# Patient Record
Sex: MALE | Race: BLACK OR AFRICAN AMERICAN | Employment: UNEMPLOYED | URBAN - NONMETROPOLITAN AREA
[De-identification: names, ages, dates, MRNs, and addresses within clinical notes are randomized per-mention and may not be internally consistent; named-entity substitution may affect disease eponyms.]

---

## 2017-01-01 ENCOUNTER — HOSPITAL ENCOUNTER (INPATIENT)
Age: 42
LOS: 3 days | Discharge: HOME | DRG: 420 | End: 2017-12-07
Attending: FAMILY MEDICINE | Admitting: INTERNAL MEDICINE
Payer: MEDICAID

## 2017-01-01 ENCOUNTER — TELEPHONE (OUTPATIENT)
Dept: OTHER | Facility: CLINIC | Age: 42
End: 2017-01-01

## 2017-01-01 ENCOUNTER — TELEPHONE (OUTPATIENT)
Dept: INTERNAL MEDICINE CLINIC | Age: 42
End: 2017-01-01

## 2017-01-01 ENCOUNTER — APPOINTMENT (OUTPATIENT)
Dept: GENERAL RADIOLOGY | Age: 42
DRG: 114 | End: 2017-01-01
Payer: MEDICAID

## 2017-01-01 ENCOUNTER — HOSPITAL ENCOUNTER (INPATIENT)
Age: 42
LOS: 3 days | Discharge: HOME | DRG: 114 | End: 2017-10-10
Attending: FAMILY MEDICINE | Admitting: FAMILY MEDICINE
Payer: MEDICAID

## 2017-01-01 ENCOUNTER — APPOINTMENT (OUTPATIENT)
Dept: CT IMAGING | Age: 42
DRG: 114 | End: 2017-01-01
Payer: MEDICAID

## 2017-01-01 ENCOUNTER — APPOINTMENT (OUTPATIENT)
Dept: GENERAL RADIOLOGY | Age: 42
DRG: 420 | End: 2017-01-01
Payer: MEDICAID

## 2017-01-01 VITALS
HEART RATE: 88 BPM | DIASTOLIC BLOOD PRESSURE: 112 MMHG | RESPIRATION RATE: 16 BRPM | OXYGEN SATURATION: 100 % | SYSTOLIC BLOOD PRESSURE: 175 MMHG | BODY MASS INDEX: 24.59 KG/M2 | HEIGHT: 69 IN | TEMPERATURE: 97.9 F | WEIGHT: 166.01 LBS

## 2017-01-01 VITALS
OXYGEN SATURATION: 98 % | BODY MASS INDEX: 24.35 KG/M2 | RESPIRATION RATE: 14 BRPM | WEIGHT: 164.4 LBS | HEART RATE: 94 BPM | DIASTOLIC BLOOD PRESSURE: 72 MMHG | HEIGHT: 69 IN | TEMPERATURE: 97.5 F | SYSTOLIC BLOOD PRESSURE: 119 MMHG

## 2017-01-01 DIAGNOSIS — R73.9 HYPERGLYCEMIA: Primary | ICD-10-CM

## 2017-01-01 DIAGNOSIS — E10.10 DIABETIC KETOACIDOSIS WITHOUT COMA ASSOCIATED WITH TYPE 1 DIABETES MELLITUS (HCC): Primary | ICD-10-CM

## 2017-01-01 DIAGNOSIS — L03.211 FACIAL CELLULITIS: ICD-10-CM

## 2017-01-01 DIAGNOSIS — K04.7 TOOTH ABSCESS: ICD-10-CM

## 2017-01-01 LAB
ABO: NORMAL
ALBUMIN SERPL-MCNC: 2.8 G/DL (ref 3.5–5.1)
ALLEN TEST: POSITIVE
ALLEN TEST: POSITIVE
ALP BLD-CCNC: 179 U/L (ref 38–126)
ALT SERPL-CCNC: 10 U/L (ref 11–66)
AMPHETAMINE+METHAMPHETAMINE URINE SCREEN: NEGATIVE
AMYLASE: 138 U/L (ref 20–104)
ANION GAP SERPL CALCULATED.3IONS-SCNC: 14 MEQ/L (ref 8–16)
ANION GAP SERPL CALCULATED.3IONS-SCNC: 15 MEQ/L (ref 8–16)
ANION GAP SERPL CALCULATED.3IONS-SCNC: 16 MEQ/L (ref 8–16)
ANION GAP SERPL CALCULATED.3IONS-SCNC: 17 MEQ/L (ref 8–16)
ANION GAP SERPL CALCULATED.3IONS-SCNC: 18 MEQ/L (ref 8–16)
ANION GAP SERPL CALCULATED.3IONS-SCNC: 19 MEQ/L (ref 8–16)
ANION GAP SERPL CALCULATED.3IONS-SCNC: 19 MEQ/L (ref 8–16)
ANION GAP SERPL CALCULATED.3IONS-SCNC: 20 MEQ/L (ref 8–16)
ANION GAP SERPL CALCULATED.3IONS-SCNC: 21 MEQ/L (ref 8–16)
ANION GAP SERPL CALCULATED.3IONS-SCNC: 21 MEQ/L (ref 8–16)
ANION GAP SERPL CALCULATED.3IONS-SCNC: 23 MEQ/L (ref 8–16)
ANION GAP SERPL CALCULATED.3IONS-SCNC: 25 MEQ/L (ref 8–16)
ANION GAP SERPL CALCULATED.3IONS-SCNC: 34 MEQ/L (ref 8–16)
ANION GAP SERPL CALCULATED.3IONS-SCNC: 38 MEQ/L (ref 8–16)
ANISOCYTOSIS: ABNORMAL
ANTIBODY SCREEN: NORMAL
AST SERPL-CCNC: 12 U/L (ref 5–40)
AVERAGE GLUCOSE: 312 MG/DL (ref 70–126)
AVERAGE GLUCOSE: 348 MG/DL (ref 70–126)
BACTERIA: ABNORMAL /HPF
BARBITURATE QUANTITATIVE URINE: NEGATIVE
BASE EXCESS (CALCULATED): -15.6 MMOL/L (ref -2.5–2.5)
BASE EXCESS (CALCULATED): -2.8 MMOL/L (ref -2.5–2.5)
BASOPHILS # BLD: 0.2 %
BASOPHILS # BLD: 0.4 %
BASOPHILS # BLD: 1.1 %
BASOPHILS ABSOLUTE: 0 THOU/MM3 (ref 0–0.1)
BASOPHILS ABSOLUTE: 0 THOU/MM3 (ref 0–0.1)
BASOPHILS ABSOLUTE: 0.1 THOU/MM3 (ref 0–0.1)
BENZODIAZEPINE QUANTITATIVE URINE: NEGATIVE
BETA-HYDROXYBUTYRATE: 1.6 MG/DL (ref 0.2–2.81)
BETA-HYDROXYBUTYRATE: 24.92 MG/DL (ref 0.2–2.81)
BETA-HYDROXYBUTYRATE: 51.76 MG/DL (ref 0.2–2.81)
BILIRUB SERPL-MCNC: 0.2 MG/DL (ref 0.3–1.2)
BILIRUBIN DIRECT: < 0.2 MG/DL (ref 0–0.3)
BILIRUBIN URINE: NEGATIVE
BLOOD, URINE: ABNORMAL
BUN BLDV-MCNC: 38 MG/DL (ref 7–22)
BUN BLDV-MCNC: 39 MG/DL (ref 7–22)
BUN BLDV-MCNC: 39 MG/DL (ref 7–22)
BUN BLDV-MCNC: 40 MG/DL (ref 7–22)
BUN BLDV-MCNC: 41 MG/DL (ref 7–22)
BUN BLDV-MCNC: 42 MG/DL (ref 7–22)
BUN BLDV-MCNC: 48 MG/DL (ref 7–22)
BUN BLDV-MCNC: 49 MG/DL (ref 7–22)
BUN BLDV-MCNC: 49 MG/DL (ref 7–22)
BUN BLDV-MCNC: 51 MG/DL (ref 7–22)
BUN BLDV-MCNC: 55 MG/DL (ref 7–22)
BUN BLDV-MCNC: 55 MG/DL (ref 7–22)
BUN BLDV-MCNC: 56 MG/DL (ref 7–22)
BUN BLDV-MCNC: 57 MG/DL (ref 7–22)
BUN BLDV-MCNC: 59 MG/DL (ref 7–22)
BUN BLDV-MCNC: 67 MG/DL (ref 7–22)
BUN BLDV-MCNC: 69 MG/DL (ref 7–22)
C-REACTIVE PROTEIN: 7.71 MG/DL (ref 0–1)
CALCIUM SERPL-MCNC: 8.2 MG/DL (ref 8.5–10.5)
CALCIUM SERPL-MCNC: 8.3 MG/DL (ref 8.5–10.5)
CALCIUM SERPL-MCNC: 8.4 MG/DL (ref 8.5–10.5)
CALCIUM SERPL-MCNC: 8.4 MG/DL (ref 8.5–10.5)
CALCIUM SERPL-MCNC: 8.5 MG/DL (ref 8.5–10.5)
CALCIUM SERPL-MCNC: 8.6 MG/DL (ref 8.5–10.5)
CALCIUM SERPL-MCNC: 8.6 MG/DL (ref 8.5–10.5)
CALCIUM SERPL-MCNC: 8.7 MG/DL (ref 8.5–10.5)
CALCIUM SERPL-MCNC: 8.8 MG/DL (ref 8.5–10.5)
CALCIUM SERPL-MCNC: 8.9 MG/DL (ref 8.5–10.5)
CALCIUM SERPL-MCNC: 9 MG/DL (ref 8.5–10.5)
CALCIUM SERPL-MCNC: 9.1 MG/DL (ref 8.5–10.5)
CALCIUM SERPL-MCNC: 9.3 MG/DL (ref 8.5–10.5)
CANNABINOID QUANTITATIVE URINE: NEGATIVE
CASTS 2: ABNORMAL /LPF
CASTS UA: ABNORMAL /LPF
CHARACTER, URINE: CLEAR
CHLORIDE BLD-SCNC: 100 MEQ/L (ref 98–111)
CHLORIDE BLD-SCNC: 102 MEQ/L (ref 98–111)
CHLORIDE BLD-SCNC: 102 MEQ/L (ref 98–111)
CHLORIDE BLD-SCNC: 78 MEQ/L (ref 98–111)
CHLORIDE BLD-SCNC: 81 MEQ/L (ref 98–111)
CHLORIDE BLD-SCNC: 84 MEQ/L (ref 98–111)
CHLORIDE BLD-SCNC: 86 MEQ/L (ref 98–111)
CHLORIDE BLD-SCNC: 92 MEQ/L (ref 98–111)
CHLORIDE BLD-SCNC: 92 MEQ/L (ref 98–111)
CHLORIDE BLD-SCNC: 93 MEQ/L (ref 98–111)
CHLORIDE BLD-SCNC: 94 MEQ/L (ref 98–111)
CHLORIDE BLD-SCNC: 96 MEQ/L (ref 98–111)
CHLORIDE BLD-SCNC: 99 MEQ/L (ref 98–111)
CO2: 10 MEQ/L (ref 23–33)
CO2: 16 MEQ/L (ref 23–33)
CO2: 17 MEQ/L (ref 23–33)
CO2: 18 MEQ/L (ref 23–33)
CO2: 19 MEQ/L (ref 23–33)
CO2: 20 MEQ/L (ref 23–33)
CO2: 21 MEQ/L (ref 23–33)
CO2: 22 MEQ/L (ref 23–33)
CO2: 24 MEQ/L (ref 23–33)
CO2: 26 MEQ/L (ref 23–33)
CO2: 6 MEQ/L (ref 23–33)
COCAINE METABOLITE QUANTITATIVE URINE: NEGATIVE
COLLECTED BY:: ABNORMAL
COLLECTED BY:: ABNORMAL
COLOR: YELLOW
CREAT SERPL-MCNC: 4.1 MG/DL (ref 0.4–1.2)
CREAT SERPL-MCNC: 4.1 MG/DL (ref 0.4–1.2)
CREAT SERPL-MCNC: 4.2 MG/DL (ref 0.4–1.2)
CREAT SERPL-MCNC: 4.3 MG/DL (ref 0.4–1.2)
CREAT SERPL-MCNC: 4.5 MG/DL (ref 0.4–1.2)
CREAT SERPL-MCNC: 4.6 MG/DL (ref 0.4–1.2)
CREAT SERPL-MCNC: 4.7 MG/DL (ref 0.4–1.2)
CREAT SERPL-MCNC: 4.7 MG/DL (ref 0.4–1.2)
CREAT SERPL-MCNC: 4.9 MG/DL (ref 0.4–1.2)
CREAT SERPL-MCNC: 5 MG/DL (ref 0.4–1.2)
CREAT SERPL-MCNC: 5.1 MG/DL (ref 0.4–1.2)
CREAT SERPL-MCNC: 5.2 MG/DL (ref 0.4–1.2)
CREAT SERPL-MCNC: 5.3 MG/DL (ref 0.4–1.2)
CREAT SERPL-MCNC: 5.3 MG/DL (ref 0.4–1.2)
CREAT SERPL-MCNC: 5.4 MG/DL (ref 0.4–1.2)
CREAT SERPL-MCNC: 5.7 MG/DL (ref 0.4–1.2)
CREAT SERPL-MCNC: 5.8 MG/DL (ref 0.4–1.2)
CRYSTALS, UA: ABNORMAL
DEVICE: ABNORMAL
DEVICE: ABNORMAL
EKG ATRIAL RATE: 82 BPM
EKG ATRIAL RATE: 96 BPM
EKG P AXIS: 38 DEGREES
EKG P AXIS: 53 DEGREES
EKG P-R INTERVAL: 154 MS
EKG P-R INTERVAL: 164 MS
EKG Q-T INTERVAL: 400 MS
EKG Q-T INTERVAL: 402 MS
EKG QRS DURATION: 88 MS
EKG QRS DURATION: 90 MS
EKG QTC CALCULATION (BAZETT): 469 MS
EKG QTC CALCULATION (BAZETT): 505 MS
EKG R AXIS: 16 DEGREES
EKG R AXIS: 18 DEGREES
EKG T AXIS: -4 DEGREES
EKG T AXIS: -58 DEGREES
EKG VENTRICULAR RATE: 82 BPM
EKG VENTRICULAR RATE: 96 BPM
EOSINOPHIL # BLD: 0.4 %
EOSINOPHIL # BLD: 0.7 %
EOSINOPHIL # BLD: 1.7 %
EOSINOPHILS ABSOLUTE: 0 THOU/MM3 (ref 0–0.4)
EOSINOPHILS ABSOLUTE: 0.1 THOU/MM3 (ref 0–0.4)
EOSINOPHILS ABSOLUTE: 0.1 THOU/MM3 (ref 0–0.4)
EPITHELIAL CELLS, UA: ABNORMAL /HPF
GFR SERPL CREATININE-BSD FRML MDRD: 13 ML/MIN/1.73M2
GFR SERPL CREATININE-BSD FRML MDRD: 14 ML/MIN/1.73M2
GFR SERPL CREATININE-BSD FRML MDRD: 15 ML/MIN/1.73M2
GFR SERPL CREATININE-BSD FRML MDRD: 16 ML/MIN/1.73M2
GFR SERPL CREATININE-BSD FRML MDRD: 16 ML/MIN/1.73M2
GFR SERPL CREATININE-BSD FRML MDRD: 17 ML/MIN/1.73M2
GFR SERPL CREATININE-BSD FRML MDRD: 18 ML/MIN/1.73M2
GFR SERPL CREATININE-BSD FRML MDRD: 19 ML/MIN/1.73M2
GFR SERPL CREATININE-BSD FRML MDRD: 19 ML/MIN/1.73M2
GFR SERPL CREATININE-BSD FRML MDRD: 20 ML/MIN/1.73M2
GFR SERPL CREATININE-BSD FRML MDRD: 20 ML/MIN/1.73M2
GLUCOSE BLD-MCNC: 102 MG/DL (ref 70–108)
GLUCOSE BLD-MCNC: 112 MG/DL (ref 70–108)
GLUCOSE BLD-MCNC: 1124 MG/DL (ref 70–108)
GLUCOSE BLD-MCNC: 116 MG/DL (ref 70–108)
GLUCOSE BLD-MCNC: 119 MG/DL (ref 70–108)
GLUCOSE BLD-MCNC: 122 MG/DL (ref 70–108)
GLUCOSE BLD-MCNC: 124 MG/DL (ref 70–108)
GLUCOSE BLD-MCNC: 127 MG/DL (ref 70–108)
GLUCOSE BLD-MCNC: 131 MG/DL (ref 70–108)
GLUCOSE BLD-MCNC: 133 MG/DL (ref 70–108)
GLUCOSE BLD-MCNC: 138 MG/DL (ref 70–108)
GLUCOSE BLD-MCNC: 143 MG/DL (ref 70–108)
GLUCOSE BLD-MCNC: 144 MG/DL (ref 70–108)
GLUCOSE BLD-MCNC: 146 MG/DL (ref 70–108)
GLUCOSE BLD-MCNC: 158 MG/DL (ref 70–108)
GLUCOSE BLD-MCNC: 167 MG/DL (ref 70–108)
GLUCOSE BLD-MCNC: 169 MG/DL (ref 70–108)
GLUCOSE BLD-MCNC: 171 MG/DL (ref 70–108)
GLUCOSE BLD-MCNC: 173 MG/DL (ref 70–108)
GLUCOSE BLD-MCNC: 175 MG/DL (ref 70–108)
GLUCOSE BLD-MCNC: 177 MG/DL (ref 70–108)
GLUCOSE BLD-MCNC: 193 MG/DL (ref 70–108)
GLUCOSE BLD-MCNC: 195 MG/DL (ref 70–108)
GLUCOSE BLD-MCNC: 196 MG/DL (ref 70–108)
GLUCOSE BLD-MCNC: 197 MG/DL (ref 70–108)
GLUCOSE BLD-MCNC: 198 MG/DL (ref 70–108)
GLUCOSE BLD-MCNC: 202 MG/DL (ref 70–108)
GLUCOSE BLD-MCNC: 207 MG/DL (ref 70–108)
GLUCOSE BLD-MCNC: 211 MG/DL (ref 70–108)
GLUCOSE BLD-MCNC: 213 MG/DL (ref 70–108)
GLUCOSE BLD-MCNC: 224 MG/DL (ref 70–108)
GLUCOSE BLD-MCNC: 230 MG/DL (ref 70–108)
GLUCOSE BLD-MCNC: 233 MG/DL (ref 70–108)
GLUCOSE BLD-MCNC: 236 MG/DL (ref 70–108)
GLUCOSE BLD-MCNC: 242 MG/DL (ref 70–108)
GLUCOSE BLD-MCNC: 256 MG/DL (ref 70–108)
GLUCOSE BLD-MCNC: 256 MG/DL (ref 70–108)
GLUCOSE BLD-MCNC: 257 MG/DL (ref 70–108)
GLUCOSE BLD-MCNC: 261 MG/DL (ref 70–108)
GLUCOSE BLD-MCNC: 263 MG/DL (ref 70–108)
GLUCOSE BLD-MCNC: 264 MG/DL (ref 70–108)
GLUCOSE BLD-MCNC: 268 MG/DL (ref 70–108)
GLUCOSE BLD-MCNC: 288 MG/DL (ref 70–108)
GLUCOSE BLD-MCNC: 293 MG/DL (ref 70–108)
GLUCOSE BLD-MCNC: 294 MG/DL (ref 70–108)
GLUCOSE BLD-MCNC: 296 MG/DL (ref 70–108)
GLUCOSE BLD-MCNC: 299 MG/DL (ref 70–108)
GLUCOSE BLD-MCNC: 300 MG/DL (ref 70–108)
GLUCOSE BLD-MCNC: 308 MG/DL (ref 70–108)
GLUCOSE BLD-MCNC: 311 MG/DL (ref 70–108)
GLUCOSE BLD-MCNC: 318 MG/DL (ref 70–108)
GLUCOSE BLD-MCNC: 320 MG/DL (ref 70–108)
GLUCOSE BLD-MCNC: 323 MG/DL (ref 70–108)
GLUCOSE BLD-MCNC: 325 MG/DL (ref 70–108)
GLUCOSE BLD-MCNC: 327 MG/DL (ref 70–108)
GLUCOSE BLD-MCNC: 331 MG/DL (ref 70–108)
GLUCOSE BLD-MCNC: 360 MG/DL (ref 70–108)
GLUCOSE BLD-MCNC: 386 MG/DL (ref 70–108)
GLUCOSE BLD-MCNC: 414 MG/DL (ref 70–108)
GLUCOSE BLD-MCNC: 54 MG/DL (ref 70–108)
GLUCOSE BLD-MCNC: 600 MG/DL (ref 70–108)
GLUCOSE BLD-MCNC: 655 MG/DL (ref 70–108)
GLUCOSE BLD-MCNC: 66 MG/DL (ref 70–108)
GLUCOSE BLD-MCNC: 70 MG/DL (ref 70–108)
GLUCOSE BLD-MCNC: 74 MG/DL (ref 70–108)
GLUCOSE BLD-MCNC: 75 MG/DL (ref 70–108)
GLUCOSE BLD-MCNC: 80 MG/DL (ref 70–108)
GLUCOSE BLD-MCNC: 81 MG/DL (ref 70–108)
GLUCOSE BLD-MCNC: 823 MG/DL (ref 70–108)
GLUCOSE BLD-MCNC: 85 MG/DL (ref 70–108)
GLUCOSE BLD-MCNC: 868 MG/DL (ref 70–108)
GLUCOSE BLD-MCNC: 90 MG/DL (ref 70–108)
GLUCOSE BLD-MCNC: 91 MG/DL (ref 70–108)
GLUCOSE BLD-MCNC: 917 MG/DL (ref 70–108)
GLUCOSE BLD-MCNC: 92 MG/DL (ref 70–108)
GLUCOSE BLD-MCNC: 99 MG/DL (ref 70–108)
GLUCOSE BLD-MCNC: > 600 MG/DL (ref 70–108)
GLUCOSE URINE: >= 1000 MG/DL
HBA1C MFR BLD: 12.4 % (ref 4.4–6.4)
HBA1C MFR BLD: 13.6 % (ref 4.4–6.4)
HCO3: 23 MMOL/L (ref 23–28)
HCO3: 8 MMOL/L (ref 23–28)
HCT VFR BLD CALC: 33.3 % (ref 42–52)
HCT VFR BLD CALC: 34.7 % (ref 42–52)
HCT VFR BLD CALC: 35.4 % (ref 42–52)
HCT VFR BLD CALC: 35.7 % (ref 42–52)
HCT VFR BLD CALC: 36.2 % (ref 42–52)
HEMOGLOBIN: 10.9 GM/DL (ref 14–18)
HEMOGLOBIN: 11.1 GM/DL (ref 14–18)
HEMOGLOBIN: 11.2 GM/DL (ref 14–18)
HEMOGLOBIN: 11.3 GM/DL (ref 14–18)
HEMOGLOBIN: 11.7 GM/DL (ref 14–18)
HYPOCHROMIA: ABNORMAL
IFIO2: 2
KETONES, URINE: 15
LACTIC ACID: 4.9 MMOL/L (ref 0.5–2.2)
LEUKOCYTE ESTERASE, URINE: NEGATIVE
LIPASE: 28 U/L (ref 5.6–51.3)
LIPASE: 49.6 U/L (ref 5.6–51.3)
LYMPHOCYTES # BLD: 10 %
LYMPHOCYTES # BLD: 18.7 %
LYMPHOCYTES # BLD: 6.2 %
LYMPHOCYTES ABSOLUTE: 0.5 THOU/MM3 (ref 1–4.8)
LYMPHOCYTES ABSOLUTE: 1.1 THOU/MM3 (ref 1–4.8)
LYMPHOCYTES ABSOLUTE: 1.6 THOU/MM3 (ref 1–4.8)
MAGNESIUM: 1.6 MG/DL (ref 1.6–2.4)
MAGNESIUM: 1.7 MG/DL (ref 1.6–2.4)
MAGNESIUM: 1.7 MG/DL (ref 1.6–2.4)
MAGNESIUM: 1.8 MG/DL (ref 1.6–2.4)
MAGNESIUM: 1.9 MG/DL (ref 1.6–2.4)
MAGNESIUM: 1.9 MG/DL (ref 1.6–2.4)
MAGNESIUM: 2 MG/DL (ref 1.6–2.4)
MAGNESIUM: 2.1 MG/DL (ref 1.6–2.4)
MCH RBC QN AUTO: 26.4 PG (ref 27–31)
MCH RBC QN AUTO: 26.7 PG (ref 27–31)
MCH RBC QN AUTO: 26.7 PG (ref 27–31)
MCH RBC QN AUTO: 26.9 PG (ref 27–31)
MCH RBC QN AUTO: 27.1 PG (ref 27–31)
MCHC RBC AUTO-ENTMCNC: 30.9 GM/DL (ref 33–37)
MCHC RBC AUTO-ENTMCNC: 31.2 GM/DL (ref 33–37)
MCHC RBC AUTO-ENTMCNC: 32.5 GM/DL (ref 33–37)
MCHC RBC AUTO-ENTMCNC: 32.6 GM/DL (ref 33–37)
MCHC RBC AUTO-ENTMCNC: 32.7 GM/DL (ref 33–37)
MCV RBC AUTO: 82.2 FL (ref 80–94)
MCV RBC AUTO: 82.2 FL (ref 80–94)
MCV RBC AUTO: 83.2 FL (ref 80–94)
MCV RBC AUTO: 84.4 FL (ref 80–94)
MCV RBC AUTO: 86.5 FL (ref 80–94)
MISCELLANEOUS 2: ABNORMAL
MONOCYTES # BLD: 10 %
MONOCYTES # BLD: 2.3 %
MONOCYTES # BLD: 20.3 %
MONOCYTES ABSOLUTE: 0.2 THOU/MM3 (ref 0.4–1.3)
MONOCYTES ABSOLUTE: 1.1 THOU/MM3 (ref 0.4–1.3)
MONOCYTES ABSOLUTE: 1.7 THOU/MM3 (ref 0.4–1.3)
MRSA SCREEN RT-PCR: NEGATIVE
MRSA SCREEN RT-PCR: NEGATIVE
MRSA SCREEN: NORMAL
NITRITE, URINE: NEGATIVE
NUCLEATED RED BLOOD CELLS: 0 /100 WBC
O2 SATURATION: 95 %
O2 SATURATION: 99 %
OPIATES, URINE: NEGATIVE
OSMOLALITY CALCULATION: 305.6 MOSMOL/KG (ref 275–300)
OSMOLALITY CALCULATION: 311.1 MOSMOL/KG (ref 275–300)
OSMOLALITY CALCULATION: 323 MOSMOL/KG (ref 275–300)
OXYCODONE: NEGATIVE
PCO2: 15 MMHG (ref 35–45)
PCO2: 43 MMHG (ref 35–45)
PDW BLD-RTO: 16.9 % (ref 11.5–14.5)
PDW BLD-RTO: 17.5 % (ref 11.5–14.5)
PDW BLD-RTO: 17.8 % (ref 11.5–14.5)
PDW BLD-RTO: 17.9 % (ref 11.5–14.5)
PDW BLD-RTO: 18.1 % (ref 11.5–14.5)
PH BLOOD GAS: 7.33 (ref 7.35–7.45)
PH BLOOD GAS: 7.34 (ref 7.35–7.45)
PH UA: 6
PHENCYCLIDINE QUANTITATIVE URINE: NEGATIVE
PHOSPHORUS: 3.3 MG/DL (ref 2.4–4.7)
PHOSPHORUS: 3.4 MG/DL (ref 2.4–4.7)
PHOSPHORUS: 3.5 MG/DL (ref 2.4–4.7)
PHOSPHORUS: 3.6 MG/DL (ref 2.4–4.7)
PHOSPHORUS: 3.7 MG/DL (ref 2.4–4.7)
PHOSPHORUS: 3.9 MG/DL (ref 2.4–4.7)
PHOSPHORUS: 4 MG/DL (ref 2.4–4.7)
PHOSPHORUS: 4.1 MG/DL (ref 2.4–4.7)
PHOSPHORUS: 4.2 MG/DL (ref 2.4–4.7)
PHOSPHORUS: 4.3 MG/DL (ref 2.4–4.7)
PHOSPHORUS: 4.6 MG/DL (ref 2.4–4.7)
PHOSPHORUS: 5.3 MG/DL (ref 2.4–4.7)
PHOSPHORUS: 5.8 MG/DL (ref 2.4–4.7)
PHOSPHORUS: 7.2 MG/DL (ref 2.4–4.7)
PLATELET # BLD: 262 THOU/MM3 (ref 130–400)
PLATELET # BLD: 275 THOU/MM3 (ref 130–400)
PLATELET # BLD: 308 THOU/MM3 (ref 130–400)
PLATELET # BLD: 357 THOU/MM3 (ref 130–400)
PLATELET # BLD: 398 THOU/MM3 (ref 130–400)
PMV BLD AUTO: 7.5 MCM (ref 7.4–10.4)
PMV BLD AUTO: 7.6 MCM (ref 7.4–10.4)
PMV BLD AUTO: 7.7 MCM (ref 7.4–10.4)
PMV BLD AUTO: 7.9 MCM (ref 7.4–10.4)
PMV BLD AUTO: 8 MCM (ref 7.4–10.4)
PO2: 133 MMHG (ref 71–104)
PO2: 83 MMHG (ref 71–104)
POTASSIUM SERPL-SCNC: 2.8 MEQ/L (ref 3.5–5.2)
POTASSIUM SERPL-SCNC: 2.8 MEQ/L (ref 3.5–5.2)
POTASSIUM SERPL-SCNC: 3.1 MEQ/L (ref 3.5–5.2)
POTASSIUM SERPL-SCNC: 3.1 MEQ/L (ref 3.5–5.2)
POTASSIUM SERPL-SCNC: 3.2 MEQ/L (ref 3.5–5.2)
POTASSIUM SERPL-SCNC: 3.2 MEQ/L (ref 3.5–5.2)
POTASSIUM SERPL-SCNC: 3.3 MEQ/L (ref 3.5–5.2)
POTASSIUM SERPL-SCNC: 3.5 MEQ/L (ref 3.5–5.2)
POTASSIUM SERPL-SCNC: 3.6 MEQ/L (ref 3.5–5.2)
POTASSIUM SERPL-SCNC: 3.6 MEQ/L (ref 3.5–5.2)
POTASSIUM SERPL-SCNC: 3.7 MEQ/L (ref 3.5–5.2)
POTASSIUM SERPL-SCNC: 4 MEQ/L (ref 3.5–5.2)
POTASSIUM SERPL-SCNC: 4.2 MEQ/L (ref 3.5–5.2)
POTASSIUM SERPL-SCNC: 4.3 MEQ/L (ref 3.5–5.2)
POTASSIUM SERPL-SCNC: 4.6 MEQ/L (ref 3.5–5.2)
PRO-BNP: 2283 PG/ML (ref 0–450)
PRO-BNP: 3779 PG/ML (ref 0–450)
PROTEIN UA: 100
RBC # BLD: 4 MILL/MM3 (ref 4.7–6.1)
RBC # BLD: 4.18 MILL/MM3 (ref 4.7–6.1)
RBC # BLD: 4.2 MILL/MM3 (ref 4.7–6.1)
RBC # BLD: 4.23 MILL/MM3 (ref 4.7–6.1)
RBC # BLD: 4.35 MILL/MM3 (ref 4.7–6.1)
RBC # BLD: NORMAL 10*6/UL
RBC # BLD: NORMAL 10*6/UL
RBC URINE: ABNORMAL /HPF
RENAL EPITHELIAL, UA: ABNORMAL
RH FACTOR: NORMAL
SEDIMENTATION RATE, ERYTHROCYTE: 81 MM/HR (ref 0–10)
SEG NEUTROPHILS: 58.2 %
SEG NEUTROPHILS: 78.9 %
SEG NEUTROPHILS: 90.9 %
SEGMENTED NEUTROPHILS ABSOLUTE COUNT: 5 THOU/MM3 (ref 1.8–7.7)
SEGMENTED NEUTROPHILS ABSOLUTE COUNT: 8 THOU/MM3 (ref 1.8–7.7)
SEGMENTED NEUTROPHILS ABSOLUTE COUNT: 8.7 THOU/MM3 (ref 1.8–7.7)
SODIUM BLD-SCNC: 122 MEQ/L (ref 135–145)
SODIUM BLD-SCNC: 123 MEQ/L (ref 135–145)
SODIUM BLD-SCNC: 126 MEQ/L (ref 135–145)
SODIUM BLD-SCNC: 130 MEQ/L (ref 135–145)
SODIUM BLD-SCNC: 131 MEQ/L (ref 135–145)
SODIUM BLD-SCNC: 132 MEQ/L (ref 135–145)
SODIUM BLD-SCNC: 132 MEQ/L (ref 135–145)
SODIUM BLD-SCNC: 133 MEQ/L (ref 135–145)
SODIUM BLD-SCNC: 134 MEQ/L (ref 135–145)
SODIUM BLD-SCNC: 134 MEQ/L (ref 135–145)
SODIUM BLD-SCNC: 136 MEQ/L (ref 135–145)
SODIUM BLD-SCNC: 137 MEQ/L (ref 135–145)
SODIUM BLD-SCNC: 137 MEQ/L (ref 135–145)
SODIUM BLD-SCNC: 138 MEQ/L (ref 135–145)
SODIUM BLD-SCNC: 139 MEQ/L (ref 135–145)
SOURCE, BLOOD GAS: ABNORMAL
SOURCE, BLOOD GAS: ABNORMAL
SPECIFIC GRAVITY, URINE: 1.02 (ref 1–1.03)
TOTAL CK: 87 U/L (ref 55–170)
TOTAL PROTEIN: 6.4 G/DL (ref 6.1–8)
TROPONIN T: 0.06 NG/ML
TROPONIN T: 0.07 NG/ML
TROPONIN T: 0.07 NG/ML
TROPONIN T: 0.09 NG/ML
TSH SERPL DL<=0.05 MIU/L-ACNC: 0.69 UIU/ML (ref 0.4–4.2)
UROBILINOGEN, URINE: 0.2 EU/DL
VANCOMYCIN RANDOM: 9.3 UG/ML (ref 0.1–39.9)
VRE CULTURE: NORMAL
WBC # BLD: 11 THOU/MM3 (ref 4.8–10.8)
WBC # BLD: 7.3 THOU/MM3 (ref 4.8–10.8)
WBC # BLD: 8.6 THOU/MM3 (ref 4.8–10.8)
WBC # BLD: 8.8 THOU/MM3 (ref 4.8–10.8)
WBC # BLD: 9.1 THOU/MM3 (ref 4.8–10.8)
WBC UA: ABNORMAL /HPF
YEAST: ABNORMAL

## 2017-01-01 PROCEDURE — 84295 ASSAY OF SERUM SODIUM: CPT

## 2017-01-01 PROCEDURE — 87641 MR-STAPH DNA AMP PROBE: CPT

## 2017-01-01 PROCEDURE — 2060000000 HC ICU INTERMEDIATE R&B

## 2017-01-01 PROCEDURE — 82150 ASSAY OF AMYLASE: CPT

## 2017-01-01 PROCEDURE — 3E1M39Z IRRIGATION OF PERITONEAL CAVITY USING DIALYSATE, PERCUTANEOUS APPROACH: ICD-10-PCS | Performed by: INTERNAL MEDICINE

## 2017-01-01 PROCEDURE — 36415 COLL VENOUS BLD VENIPUNCTURE: CPT

## 2017-01-01 PROCEDURE — 82948 REAGENT STRIP/BLOOD GLUCOSE: CPT

## 2017-01-01 PROCEDURE — 6370000000 HC RX 637 (ALT 250 FOR IP): Performed by: FAMILY MEDICINE

## 2017-01-01 PROCEDURE — 80053 COMPREHEN METABOLIC PANEL: CPT

## 2017-01-01 PROCEDURE — 96375 TX/PRO/DX INJ NEW DRUG ADDON: CPT

## 2017-01-01 PROCEDURE — 6370000000 HC RX 637 (ALT 250 FOR IP): Performed by: HOSPITALIST

## 2017-01-01 PROCEDURE — 6370000000 HC RX 637 (ALT 250 FOR IP): Performed by: INTERNAL MEDICINE

## 2017-01-01 PROCEDURE — 90945 DIALYSIS ONE EVALUATION: CPT | Performed by: INTERNAL MEDICINE

## 2017-01-01 PROCEDURE — 6360000002 HC RX W HCPCS: Performed by: FAMILY MEDICINE

## 2017-01-01 PROCEDURE — 2580000003 HC RX 258: Performed by: INTERNAL MEDICINE

## 2017-01-01 PROCEDURE — 93005 ELECTROCARDIOGRAM TRACING: CPT

## 2017-01-01 PROCEDURE — 83690 ASSAY OF LIPASE: CPT

## 2017-01-01 PROCEDURE — 99233 SBSQ HOSP IP/OBS HIGH 50: CPT | Performed by: INTERNAL MEDICINE

## 2017-01-01 PROCEDURE — 86850 RBC ANTIBODY SCREEN: CPT

## 2017-01-01 PROCEDURE — 84443 ASSAY THYROID STIM HORMONE: CPT

## 2017-01-01 PROCEDURE — 83735 ASSAY OF MAGNESIUM: CPT

## 2017-01-01 PROCEDURE — 84100 ASSAY OF PHOSPHORUS: CPT

## 2017-01-01 PROCEDURE — 82947 ASSAY GLUCOSE BLOOD QUANT: CPT

## 2017-01-01 PROCEDURE — 82803 BLOOD GASES ANY COMBINATION: CPT

## 2017-01-01 PROCEDURE — 2580000003 HC RX 258: Performed by: FAMILY MEDICINE

## 2017-01-01 PROCEDURE — 85025 COMPLETE CBC W/AUTO DIFF WBC: CPT

## 2017-01-01 PROCEDURE — 80048 BASIC METABOLIC PNL TOTAL CA: CPT

## 2017-01-01 PROCEDURE — 6360000002 HC RX W HCPCS: Performed by: INTERNAL MEDICINE

## 2017-01-01 PROCEDURE — 85027 COMPLETE CBC AUTOMATED: CPT

## 2017-01-01 PROCEDURE — 2000000000 HC ICU R&B

## 2017-01-01 PROCEDURE — 86900 BLOOD TYPING SEROLOGIC ABO: CPT

## 2017-01-01 PROCEDURE — 82010 KETONE BODYS QUAN: CPT

## 2017-01-01 PROCEDURE — 99284 EMERGENCY DEPT VISIT MOD MDM: CPT

## 2017-01-01 PROCEDURE — 99223 1ST HOSP IP/OBS HIGH 75: CPT | Performed by: FAMILY MEDICINE

## 2017-01-01 PROCEDURE — 83880 ASSAY OF NATRIURETIC PEPTIDE: CPT

## 2017-01-01 PROCEDURE — 71010 XR CHEST LIMITED ONE VIEW: CPT

## 2017-01-01 PROCEDURE — 99291 CRITICAL CARE FIRST HOUR: CPT | Performed by: INTERNAL MEDICINE

## 2017-01-01 PROCEDURE — A6212 FOAM DRG <=16 SQ IN W/BORDER: HCPCS

## 2017-01-01 PROCEDURE — 99239 HOSP IP/OBS DSCHRG MGMT >30: CPT | Performed by: INTERNAL MEDICINE

## 2017-01-01 PROCEDURE — 99221 1ST HOSP IP/OBS SF/LOW 40: CPT | Performed by: INTERNAL MEDICINE

## 2017-01-01 PROCEDURE — 80202 ASSAY OF VANCOMYCIN: CPT

## 2017-01-01 PROCEDURE — 83605 ASSAY OF LACTIC ACID: CPT

## 2017-01-01 PROCEDURE — 6370000000 HC RX 637 (ALT 250 FOR IP): Performed by: NURSE PRACTITIONER

## 2017-01-01 PROCEDURE — 87081 CULTURE SCREEN ONLY: CPT

## 2017-01-01 PROCEDURE — 83036 HEMOGLOBIN GLYCOSYLATED A1C: CPT

## 2017-01-01 PROCEDURE — 36600 WITHDRAWAL OF ARTERIAL BLOOD: CPT

## 2017-01-01 PROCEDURE — 82550 ASSAY OF CK (CPK): CPT

## 2017-01-01 PROCEDURE — 84484 ASSAY OF TROPONIN QUANT: CPT

## 2017-01-01 PROCEDURE — 86140 C-REACTIVE PROTEIN: CPT

## 2017-01-01 PROCEDURE — 85651 RBC SED RATE NONAUTOMATED: CPT

## 2017-01-01 PROCEDURE — 81001 URINALYSIS AUTO W/SCOPE: CPT

## 2017-01-01 PROCEDURE — 99253 IP/OBS CNSLTJ NEW/EST LOW 45: CPT | Performed by: INTERNAL MEDICINE

## 2017-01-01 PROCEDURE — 70486 CT MAXILLOFACIAL W/O DYE: CPT

## 2017-01-01 PROCEDURE — 86901 BLOOD TYPING SEROLOGIC RH(D): CPT

## 2017-01-01 PROCEDURE — S0028 INJECTION, FAMOTIDINE, 20 MG: HCPCS | Performed by: FAMILY MEDICINE

## 2017-01-01 PROCEDURE — 71020 XR CHEST STANDARD TWO VW: CPT

## 2017-01-01 PROCEDURE — 93005 ELECTROCARDIOGRAM TRACING: CPT | Performed by: FAMILY MEDICINE

## 2017-01-01 PROCEDURE — 1200000003 HC TELEMETRY R&B

## 2017-01-01 PROCEDURE — 2580000003 HC RX 258: Performed by: NURSE PRACTITIONER

## 2017-01-01 PROCEDURE — 99238 HOSP IP/OBS DSCHRG MGMT 30/<: CPT | Performed by: INTERNAL MEDICINE

## 2017-01-01 PROCEDURE — 80307 DRUG TEST PRSMV CHEM ANLYZR: CPT

## 2017-01-01 PROCEDURE — 99232 SBSQ HOSP IP/OBS MODERATE 35: CPT | Performed by: INTERNAL MEDICINE

## 2017-01-01 PROCEDURE — 82248 BILIRUBIN DIRECT: CPT

## 2017-01-01 PROCEDURE — 99285 EMERGENCY DEPT VISIT HI MDM: CPT

## 2017-01-01 PROCEDURE — 2500000003 HC RX 250 WO HCPCS: Performed by: FAMILY MEDICINE

## 2017-01-01 PROCEDURE — 96374 THER/PROPH/DIAG INJ IV PUSH: CPT

## 2017-01-01 RX ORDER — LACTULOSE 10 G/15ML
30 SOLUTION ORAL ONCE
Status: COMPLETED | OUTPATIENT
Start: 2017-01-01 | End: 2017-01-01

## 2017-01-01 RX ORDER — FUROSEMIDE 40 MG/1
80 TABLET ORAL 2 TIMES DAILY
Status: DISCONTINUED | OUTPATIENT
Start: 2017-01-01 | End: 2017-01-01 | Stop reason: HOSPADM

## 2017-01-01 RX ORDER — ONDANSETRON 2 MG/ML
4 INJECTION INTRAMUSCULAR; INTRAVENOUS ONCE
Status: COMPLETED | OUTPATIENT
Start: 2017-01-01 | End: 2017-01-01

## 2017-01-01 RX ORDER — MAGNESIUM SULFATE IN WATER 40 MG/ML
2 INJECTION, SOLUTION INTRAVENOUS ONCE
Status: COMPLETED | OUTPATIENT
Start: 2017-01-01 | End: 2017-01-01

## 2017-01-01 RX ORDER — AMOXICILLIN AND CLAVULANATE POTASSIUM 500; 125 MG/1; MG/1
1 TABLET, FILM COATED ORAL DAILY
Qty: 5 TABLET | Refills: 0 | Status: SHIPPED | OUTPATIENT
Start: 2017-01-01 | End: 2017-01-01

## 2017-01-01 RX ORDER — POTASSIUM CHLORIDE 20MEQ/15ML
40 LIQUID (ML) ORAL ONCE
Status: COMPLETED | OUTPATIENT
Start: 2017-01-01 | End: 2017-01-01

## 2017-01-01 RX ORDER — HYDROCODONE BITARTRATE AND ACETAMINOPHEN 5; 325 MG/1; MG/1
TABLET ORAL
Status: DISPENSED
Start: 2017-01-01 | End: 2017-01-01

## 2017-01-01 RX ORDER — SODIUM CHLORIDE, SODIUM LACTATE, CALCIUM CHLORIDE, MAGNESIUM CHLORIDE AND DEXTROSE 2.5; 538; 448; 18.3; 5.08 G/100ML; MG/100ML; MG/100ML; MG/100ML; MG/100ML
INJECTION, SOLUTION INTRAPERITONEAL 4 TIMES DAILY
Status: DISCONTINUED | OUTPATIENT
Start: 2017-01-01 | End: 2017-01-01 | Stop reason: HOSPADM

## 2017-01-01 RX ORDER — CLONIDINE HYDROCHLORIDE 0.1 MG/1
0.2 TABLET ORAL 2 TIMES DAILY
Status: ON HOLD | COMMUNITY
End: 2018-01-01 | Stop reason: HOSPADM

## 2017-01-01 RX ORDER — CARVEDILOL 25 MG/1
50 TABLET ORAL 2 TIMES DAILY
Status: DISCONTINUED | OUTPATIENT
Start: 2017-01-01 | End: 2017-01-01 | Stop reason: HOSPADM

## 2017-01-01 RX ORDER — HYDROCODONE BITARTRATE AND ACETAMINOPHEN 5; 325 MG/1; MG/1
1 TABLET ORAL EVERY 6 HOURS PRN
Status: DISCONTINUED | OUTPATIENT
Start: 2017-01-01 | End: 2017-01-01

## 2017-01-01 RX ORDER — SODIUM CHLORIDE, SODIUM LACTATE, CALCIUM CHLORIDE, MAGNESIUM CHLORIDE AND DEXTROSE 1.5; 538; 448; 18.3; 5.08 G/100ML; MG/100ML; MG/100ML; MG/100ML; MG/100ML
INJECTION, SOLUTION INTRAPERITONEAL ONCE
Status: COMPLETED | OUTPATIENT
Start: 2017-01-01 | End: 2017-01-01

## 2017-01-01 RX ORDER — DOXAZOSIN MESYLATE 4 MG/1
2 TABLET ORAL EVERY 12 HOURS SCHEDULED
Status: DISCONTINUED | OUTPATIENT
Start: 2017-01-01 | End: 2017-01-01

## 2017-01-01 RX ORDER — FENTANYL CITRATE 50 UG/ML
25 INJECTION, SOLUTION INTRAMUSCULAR; INTRAVENOUS EVERY 4 HOURS PRN
Status: DISCONTINUED | OUTPATIENT
Start: 2017-01-01 | End: 2017-01-01 | Stop reason: HOSPADM

## 2017-01-01 RX ORDER — SIROLIMUS 0.5 MG/1
3 TABLET, FILM COATED ORAL ONCE
Status: COMPLETED | OUTPATIENT
Start: 2017-01-01 | End: 2017-01-01

## 2017-01-01 RX ORDER — DEXTROSE MONOHYDRATE 25 G/50ML
12.5 INJECTION, SOLUTION INTRAVENOUS PRN
Status: DISCONTINUED | OUTPATIENT
Start: 2017-01-01 | End: 2017-01-01 | Stop reason: HOSPADM

## 2017-01-01 RX ORDER — OMEPRAZOLE 20 MG/1
20 CAPSULE, DELAYED RELEASE ORAL DAILY
Status: DISCONTINUED | OUTPATIENT
Start: 2017-01-01 | End: 2017-01-01 | Stop reason: HOSPADM

## 2017-01-01 RX ORDER — SIROLIMUS 1 MG/1
4 TABLET, FILM COATED ORAL DAILY
Status: DISCONTINUED | OUTPATIENT
Start: 2017-01-01 | End: 2017-01-01 | Stop reason: HOSPADM

## 2017-01-01 RX ORDER — SIROLIMUS 0.5 MG/1
4 TABLET, FILM COATED ORAL DAILY
Status: DISCONTINUED | OUTPATIENT
Start: 2017-01-01 | End: 2017-01-01 | Stop reason: HOSPADM

## 2017-01-01 RX ORDER — NICOTINE POLACRILEX 4 MG
15 LOZENGE BUCCAL PRN
Status: DISCONTINUED | OUTPATIENT
Start: 2017-01-01 | End: 2017-01-01 | Stop reason: HOSPADM

## 2017-01-01 RX ORDER — SIMVASTATIN 20 MG
20 TABLET ORAL EVERY EVENING
Status: DISCONTINUED | OUTPATIENT
Start: 2017-01-01 | End: 2017-01-01 | Stop reason: HOSPADM

## 2017-01-01 RX ORDER — POTASSIUM CHLORIDE 750 MG/1
40 TABLET, FILM COATED, EXTENDED RELEASE ORAL ONCE
Status: COMPLETED | OUTPATIENT
Start: 2017-01-01 | End: 2017-01-01

## 2017-01-01 RX ORDER — FENTANYL CITRATE 50 UG/ML
50 INJECTION, SOLUTION INTRAMUSCULAR; INTRAVENOUS EVERY 4 HOURS PRN
Status: DISCONTINUED | OUTPATIENT
Start: 2017-01-01 | End: 2017-01-01 | Stop reason: HOSPADM

## 2017-01-01 RX ORDER — SODIUM CHLORIDE 0.9 % (FLUSH) 0.9 %
10 SYRINGE (ML) INJECTION EVERY 12 HOURS SCHEDULED
Status: DISCONTINUED | OUTPATIENT
Start: 2017-01-01 | End: 2017-01-01 | Stop reason: HOSPADM

## 2017-01-01 RX ORDER — MYCOPHENOLIC ACID 180 MG/1
360 TABLET, DELAYED RELEASE ORAL 2 TIMES DAILY
Status: DISCONTINUED | OUTPATIENT
Start: 2017-01-01 | End: 2017-01-01 | Stop reason: HOSPADM

## 2017-01-01 RX ORDER — CARVEDILOL 25 MG/1
25 TABLET ORAL 2 TIMES DAILY
Status: DISCONTINUED | OUTPATIENT
Start: 2017-01-01 | End: 2017-01-01 | Stop reason: HOSPADM

## 2017-01-01 RX ORDER — SODIUM CHLORIDE 9 MG/ML
INJECTION, SOLUTION INTRAVENOUS CONTINUOUS
Status: DISCONTINUED | OUTPATIENT
Start: 2017-01-01 | End: 2017-01-01

## 2017-01-01 RX ORDER — DEXTROSE AND SODIUM CHLORIDE 5; .45 G/100ML; G/100ML
INJECTION, SOLUTION INTRAVENOUS CONTINUOUS PRN
Status: DISCONTINUED | OUTPATIENT
Start: 2017-01-01 | End: 2017-01-01

## 2017-01-01 RX ORDER — DEXTROSE AND SODIUM CHLORIDE 5; .45 G/100ML; G/100ML
INJECTION, SOLUTION INTRAVENOUS CONTINUOUS PRN
Status: DISCONTINUED | OUTPATIENT
Start: 2017-01-01 | End: 2017-01-01 | Stop reason: HOSPADM

## 2017-01-01 RX ORDER — LATANOPROST 50 UG/ML
1 SOLUTION/ DROPS OPHTHALMIC NIGHTLY
Status: DISCONTINUED | OUTPATIENT
Start: 2017-01-01 | End: 2017-01-01 | Stop reason: HOSPADM

## 2017-01-01 RX ORDER — SIMVASTATIN 20 MG
20 TABLET ORAL NIGHTLY
Status: DISCONTINUED | OUTPATIENT
Start: 2017-01-01 | End: 2017-01-01 | Stop reason: HOSPADM

## 2017-01-01 RX ORDER — DEXTROSE MONOHYDRATE 25 G/50ML
12.5 INJECTION, SOLUTION INTRAVENOUS PRN
Status: DISCONTINUED | OUTPATIENT
Start: 2017-01-01 | End: 2017-01-01 | Stop reason: SDUPTHER

## 2017-01-01 RX ORDER — ASPIRIN 81 MG/1
81 TABLET, CHEWABLE ORAL DAILY
Status: DISCONTINUED | OUTPATIENT
Start: 2017-01-01 | End: 2017-01-01 | Stop reason: HOSPADM

## 2017-01-01 RX ORDER — METHYLPHENIDATE HYDROCHLORIDE 10 MG/1
10 TABLET ORAL 2 TIMES DAILY WITH MEALS
Status: DISCONTINUED | OUTPATIENT
Start: 2017-01-01 | End: 2017-01-01 | Stop reason: HOSPADM

## 2017-01-01 RX ORDER — INSULIN GLARGINE 100 [IU]/ML
5 INJECTION, SOLUTION SUBCUTANEOUS NIGHTLY
Status: DISCONTINUED | OUTPATIENT
Start: 2017-01-01 | End: 2017-01-01 | Stop reason: HOSPADM

## 2017-01-01 RX ORDER — POTASSIUM CHLORIDE 7.45 MG/ML
10 INJECTION INTRAVENOUS PRN
Status: DISCONTINUED | OUTPATIENT
Start: 2017-01-01 | End: 2017-01-01 | Stop reason: HOSPADM

## 2017-01-01 RX ORDER — INSULIN GLARGINE 100 [IU]/ML
15 INJECTION, SOLUTION SUBCUTANEOUS EVERY MORNING
Status: DISCONTINUED | OUTPATIENT
Start: 2017-01-01 | End: 2017-01-01 | Stop reason: HOSPADM

## 2017-01-01 RX ORDER — ACETAMINOPHEN 325 MG/1
650 TABLET ORAL EVERY 4 HOURS PRN
Status: DISCONTINUED | OUTPATIENT
Start: 2017-01-01 | End: 2017-01-01 | Stop reason: HOSPADM

## 2017-01-01 RX ORDER — 0.9 % SODIUM CHLORIDE 0.9 %
500 INTRAVENOUS SOLUTION INTRAVENOUS ONCE
Status: COMPLETED | OUTPATIENT
Start: 2017-01-01 | End: 2017-01-01

## 2017-01-01 RX ORDER — INSULIN GLARGINE 100 [IU]/ML
20 INJECTION, SOLUTION SUBCUTANEOUS DAILY
Status: DISCONTINUED | OUTPATIENT
Start: 2017-01-01 | End: 2017-01-01 | Stop reason: HOSPADM

## 2017-01-01 RX ORDER — SIROLIMUS 0.5 MG/1
1 TABLET, FILM COATED ORAL DAILY
Status: DISCONTINUED | OUTPATIENT
Start: 2017-01-01 | End: 2017-01-01

## 2017-01-01 RX ORDER — SODIUM CHLORIDE 0.9 % (FLUSH) 0.9 %
10 SYRINGE (ML) INJECTION PRN
Status: DISCONTINUED | OUTPATIENT
Start: 2017-01-01 | End: 2017-01-01 | Stop reason: HOSPADM

## 2017-01-01 RX ORDER — POTASSIUM CHLORIDE 7.45 MG/ML
10 INJECTION INTRAVENOUS
Status: DISCONTINUED | OUTPATIENT
Start: 2017-01-01 | End: 2017-01-01

## 2017-01-01 RX ORDER — LACTULOSE 10 G/15ML
20 SOLUTION ORAL; RECTAL PRN
COMMUNITY
End: 2018-01-01

## 2017-01-01 RX ORDER — METHYLPHENIDATE HYDROCHLORIDE 5 MG/1
10 TABLET ORAL 2 TIMES DAILY
Status: DISCONTINUED | OUTPATIENT
Start: 2017-01-01 | End: 2017-01-01 | Stop reason: HOSPADM

## 2017-01-01 RX ORDER — HEPARIN SODIUM 5000 [USP'U]/ML
5000 INJECTION, SOLUTION INTRAVENOUS; SUBCUTANEOUS EVERY 8 HOURS SCHEDULED
Status: DISCONTINUED | OUTPATIENT
Start: 2017-01-01 | End: 2017-01-01 | Stop reason: HOSPADM

## 2017-01-01 RX ORDER — CETIRIZINE HYDROCHLORIDE 10 MG/1
10 TABLET ORAL DAILY PRN
COMMUNITY

## 2017-01-01 RX ORDER — POTASSIUM CHLORIDE 20MEQ/15ML
30 LIQUID (ML) ORAL ONCE
Status: COMPLETED | OUTPATIENT
Start: 2017-01-01 | End: 2017-01-01

## 2017-01-01 RX ORDER — ONDANSETRON 2 MG/ML
4 INJECTION INTRAMUSCULAR; INTRAVENOUS EVERY 6 HOURS PRN
Status: DISCONTINUED | OUTPATIENT
Start: 2017-01-01 | End: 2017-01-01 | Stop reason: HOSPADM

## 2017-01-01 RX ORDER — SODIUM CHLORIDE 9 MG/ML
INJECTION, SOLUTION INTRAVENOUS CONTINUOUS
Status: DISCONTINUED | OUTPATIENT
Start: 2017-01-01 | End: 2017-01-01 | Stop reason: SDUPTHER

## 2017-01-01 RX ORDER — OXYCODONE AND ACETAMINOPHEN 10; 325 MG/1; MG/1
1 TABLET ORAL EVERY 4 HOURS PRN
Status: DISCONTINUED | OUTPATIENT
Start: 2017-01-01 | End: 2017-01-01 | Stop reason: HOSPADM

## 2017-01-01 RX ORDER — DIPHENHYDRAMINE HYDROCHLORIDE 50 MG/ML
INJECTION INTRAMUSCULAR; INTRAVENOUS
Status: DISPENSED
Start: 2017-01-01 | End: 2017-01-01

## 2017-01-01 RX ORDER — M-VIT,TX,IRON,MINS/CALC/FOLIC 27MG-0.4MG
1 TABLET ORAL DAILY
Status: DISCONTINUED | OUTPATIENT
Start: 2017-01-01 | End: 2017-01-01 | Stop reason: HOSPADM

## 2017-01-01 RX ORDER — CETIRIZINE HYDROCHLORIDE 10 MG/1
10 TABLET ORAL DAILY
Status: DISCONTINUED | OUTPATIENT
Start: 2017-01-01 | End: 2017-01-01 | Stop reason: HOSPADM

## 2017-01-01 RX ORDER — AMOXICILLIN AND CLAVULANATE POTASSIUM 500; 125 MG/1; MG/1
1 TABLET, FILM COATED ORAL 2 TIMES DAILY
Qty: 10 TABLET | Refills: 0 | Status: SHIPPED | OUTPATIENT
Start: 2017-01-01 | End: 2017-01-01

## 2017-01-01 RX ORDER — DEXTROSE MONOHYDRATE 50 MG/ML
100 INJECTION, SOLUTION INTRAVENOUS PRN
Status: DISCONTINUED | OUTPATIENT
Start: 2017-01-01 | End: 2017-01-01 | Stop reason: HOSPADM

## 2017-01-01 RX ORDER — HEPARIN SODIUM 5000 [USP'U]/ML
5000 INJECTION, SOLUTION INTRAVENOUS; SUBCUTANEOUS 2 TIMES DAILY
Status: DISCONTINUED | OUTPATIENT
Start: 2017-01-01 | End: 2017-01-01 | Stop reason: HOSPADM

## 2017-01-01 RX ORDER — 0.9 % SODIUM CHLORIDE 0.9 %
15 INTRAVENOUS SOLUTION INTRAVENOUS ONCE
Status: COMPLETED | OUTPATIENT
Start: 2017-01-01 | End: 2017-01-01

## 2017-01-01 RX ORDER — SODIUM CHLORIDE, SODIUM LACTATE, CALCIUM CHLORIDE, MAGNESIUM CHLORIDE AND DEXTROSE 2.5; 538; 448; 18.3; 5.08 G/100ML; MG/100ML; MG/100ML; MG/100ML; MG/100ML
2000 INJECTION, SOLUTION INTRAPERITONEAL EVERY 6 HOURS SCHEDULED
Status: DISCONTINUED | OUTPATIENT
Start: 2017-01-01 | End: 2017-01-01 | Stop reason: HOSPADM

## 2017-01-01 RX ADMIN — CLONIDINE HYDROCHLORIDE 0.3 MG: 0.2 TABLET ORAL at 20:18

## 2017-01-01 RX ADMIN — AMPICILLIN SODIUM AND SULBACTAM SODIUM 3 G: 2; 1 INJECTION, POWDER, FOR SOLUTION INTRAMUSCULAR; INTRAVENOUS at 12:40

## 2017-01-01 RX ADMIN — CLONIDINE HYDROCHLORIDE 0.3 MG: 0.2 TABLET ORAL at 08:25

## 2017-01-01 RX ADMIN — METHYLPHENIDATE HYDROCHLORIDE 10 MG: 10 TABLET ORAL at 13:45

## 2017-01-01 RX ADMIN — Medication 9 UNITS: at 08:44

## 2017-01-01 RX ADMIN — CETIRIZINE HYDROCHLORIDE 10 MG: 10 TABLET ORAL at 08:39

## 2017-01-01 RX ADMIN — MYCOPHENOLIC ACID 360 MG: 180 TABLET, DELAYED RELEASE ORAL at 09:05

## 2017-01-01 RX ADMIN — FUROSEMIDE 80 MG: 40 TABLET ORAL at 17:34

## 2017-01-01 RX ADMIN — SODIUM CHLORIDE, SODIUM LACTATE, CALCIUM CHLORIDE, MAGNESIUM CHLORIDE AND DEXTROSE: 2.5; 538; 448; 18.3; 5.08 INJECTION, SOLUTION INTRAPERITONEAL at 11:57

## 2017-01-01 RX ADMIN — SODIUM CHLORIDE, SODIUM LACTATE, CALCIUM CHLORIDE, MAGNESIUM CHLORIDE AND DEXTROSE 2000 ML: 2.5; 538; 448; 18.3; 5.08 INJECTION, SOLUTION INTRAPERITONEAL at 10:03

## 2017-01-01 RX ADMIN — CARVEDILOL 50 MG: 25 TABLET, FILM COATED ORAL at 10:24

## 2017-01-01 RX ADMIN — INSULIN GLARGINE 20 UNITS: 100 INJECTION, SOLUTION SUBCUTANEOUS at 08:25

## 2017-01-01 RX ADMIN — MYCOPHENOLIC ACID 360 MG: 180 TABLET, DELAYED RELEASE ORAL at 08:39

## 2017-01-01 RX ADMIN — SODIUM CHLORIDE 500 ML: 9 INJECTION, SOLUTION INTRAVENOUS at 15:06

## 2017-01-01 RX ADMIN — SIMVASTATIN 20 MG: 20 TABLET, FILM COATED ORAL at 19:36

## 2017-01-01 RX ADMIN — FAMOTIDINE 20 MG: 10 INJECTION, SOLUTION INTRAVENOUS at 13:17

## 2017-01-01 RX ADMIN — MULTIPLE VITAMINS W/ MINERALS TAB 1 TABLET: TAB at 08:25

## 2017-01-01 RX ADMIN — INSULIN LISPRO 7 UNITS: 100 INJECTION, SOLUTION INTRAVENOUS; SUBCUTANEOUS at 08:56

## 2017-01-01 RX ADMIN — OMEPRAZOLE 20 MG: 20 CAPSULE, DELAYED RELEASE ORAL at 08:52

## 2017-01-01 RX ADMIN — VANCOMYCIN HYDROCHLORIDE 1250 MG: 1 INJECTION, POWDER, LYOPHILIZED, FOR SOLUTION INTRAVENOUS at 11:52

## 2017-01-01 RX ADMIN — SODIUM CHLORIDE, SODIUM LACTATE, CALCIUM CHLORIDE, MAGNESIUM CHLORIDE AND DEXTROSE 2000 ML: 2.5; 538; 448; 18.3; 5.08 INJECTION, SOLUTION INTRAPERITONEAL at 10:42

## 2017-01-01 RX ADMIN — SIMVASTATIN 20 MG: 20 TABLET, FILM COATED ORAL at 20:18

## 2017-01-01 RX ADMIN — MYCOPHENOLIC ACID 360 MG: 180 TABLET, DELAYED RELEASE ORAL at 20:38

## 2017-01-01 RX ADMIN — CLONIDINE HYDROCHLORIDE 0.3 MG: 0.2 TABLET ORAL at 20:45

## 2017-01-01 RX ADMIN — SODIUM CHLORIDE, SODIUM LACTATE, CALCIUM CHLORIDE, MAGNESIUM CHLORIDE AND DEXTROSE: 2.5; 538; 448; 18.3; 5.08 INJECTION, SOLUTION INTRAPERITONEAL at 06:13

## 2017-01-01 RX ADMIN — Medication 3 UNITS: at 12:41

## 2017-01-01 RX ADMIN — POTASSIUM CHLORIDE 40 MEQ: 40 SOLUTION ORAL at 09:59

## 2017-01-01 RX ADMIN — OMEPRAZOLE 20 MG: 20 CAPSULE, DELAYED RELEASE ORAL at 08:25

## 2017-01-01 RX ADMIN — SODIUM CHLORIDE, SODIUM LACTATE, CALCIUM CHLORIDE, MAGNESIUM CHLORIDE AND DEXTROSE: 2.5; 538; 448; 18.3; 5.08 INJECTION, SOLUTION INTRAPERITONEAL at 00:23

## 2017-01-01 RX ADMIN — SODIUM CHLORIDE, SODIUM LACTATE, CALCIUM CHLORIDE, MAGNESIUM CHLORIDE AND DEXTROSE 2000 ML: 2.5; 538; 448; 18.3; 5.08 INJECTION, SOLUTION INTRAPERITONEAL at 22:00

## 2017-01-01 RX ADMIN — SODIUM CHLORIDE, SODIUM LACTATE, CALCIUM CHLORIDE, MAGNESIUM CHLORIDE AND DEXTROSE 2000 ML: 2.5; 538; 448; 18.3; 5.08 INJECTION, SOLUTION INTRAPERITONEAL at 04:18

## 2017-01-01 RX ADMIN — MULTIPLE VITAMINS W/ MINERALS TAB 1 TABLET: TAB at 08:38

## 2017-01-01 RX ADMIN — SIROLIMUS 4 MG: 1 TABLET ORAL at 08:52

## 2017-01-01 RX ADMIN — CARVEDILOL 25 MG: 25 TABLET, FILM COATED ORAL at 12:40

## 2017-01-01 RX ADMIN — Medication 10 ML: at 10:26

## 2017-01-01 RX ADMIN — INSULIN GLARGINE 20 UNITS: 100 INJECTION, SOLUTION SUBCUTANEOUS at 08:52

## 2017-01-01 RX ADMIN — METHYLPHENIDATE HYDROCHLORIDE 10 MG: 10 TABLET ORAL at 17:25

## 2017-01-01 RX ADMIN — SODIUM CHLORIDE, SODIUM LACTATE, CALCIUM CHLORIDE, MAGNESIUM CHLORIDE AND DEXTROSE: 2.5; 538; 448; 18.3; 5.08 INJECTION, SOLUTION INTRAPERITONEAL at 17:12

## 2017-01-01 RX ADMIN — FUROSEMIDE 80 MG: 40 TABLET ORAL at 17:25

## 2017-01-01 RX ADMIN — LATANOPROST 1 DROP: 50 SOLUTION/ DROPS OPHTHALMIC at 21:12

## 2017-01-01 RX ADMIN — DEXTROSE AND SODIUM CHLORIDE 150 ML/HR: 5; 450 INJECTION, SOLUTION INTRAVENOUS at 19:42

## 2017-01-01 RX ADMIN — SODIUM CHLORIDE, SODIUM LACTATE, CALCIUM CHLORIDE, MAGNESIUM CHLORIDE AND DEXTROSE: 2.5; 538; 448; 18.3; 5.08 INJECTION, SOLUTION INTRAPERITONEAL at 05:44

## 2017-01-01 RX ADMIN — FENTANYL CITRATE 50 MCG: 50 INJECTION INTRAMUSCULAR; INTRAVENOUS at 10:23

## 2017-01-01 RX ADMIN — CLONIDINE HYDROCHLORIDE 0.3 MG: 0.2 TABLET ORAL at 10:23

## 2017-01-01 RX ADMIN — POTASSIUM CHLORIDE 40 MEQ: 1.5 SOLUTION ORAL at 10:02

## 2017-01-01 RX ADMIN — CARVEDILOL 50 MG: 25 TABLET, FILM COATED ORAL at 20:38

## 2017-01-01 RX ADMIN — MYCOPHENOLIC ACID 360 MG: 180 TABLET, DELAYED RELEASE ORAL at 19:37

## 2017-01-01 RX ADMIN — INSULIN LISPRO 7 UNITS: 100 INJECTION, SOLUTION INTRAVENOUS; SUBCUTANEOUS at 08:24

## 2017-01-01 RX ADMIN — INSULIN GLARGINE 15 UNITS: 100 INJECTION, SOLUTION SUBCUTANEOUS at 12:12

## 2017-01-01 RX ADMIN — Medication 15 UNITS: at 08:56

## 2017-01-01 RX ADMIN — SODIUM CHLORIDE 5.2 UNITS/HR: 9 INJECTION, SOLUTION INTRAVENOUS at 12:15

## 2017-01-01 RX ADMIN — CETIRIZINE HYDROCHLORIDE 10 MG: 10 TABLET ORAL at 10:24

## 2017-01-01 RX ADMIN — INSULIN LISPRO 7 UNITS: 100 INJECTION, SOLUTION INTRAVENOUS; SUBCUTANEOUS at 12:43

## 2017-01-01 RX ADMIN — CARVEDILOL 25 MG: 25 TABLET, FILM COATED ORAL at 08:10

## 2017-01-01 RX ADMIN — Medication 2 UNITS: at 12:14

## 2017-01-01 RX ADMIN — CARVEDILOL 50 MG: 25 TABLET, FILM COATED ORAL at 08:25

## 2017-01-01 RX ADMIN — HEPARIN SODIUM 5000 UNITS: 5000 INJECTION, SOLUTION INTRAVENOUS; SUBCUTANEOUS at 21:57

## 2017-01-01 RX ADMIN — SODIUM CHLORIDE 21.3 UNITS/HR: 9 INJECTION, SOLUTION INTRAVENOUS at 15:02

## 2017-01-01 RX ADMIN — MYCOPHENOLIC ACID 360 MG: 180 TABLET, DELAYED RELEASE ORAL at 08:25

## 2017-01-01 RX ADMIN — SIMVASTATIN 20 MG: 20 TABLET, FILM COATED ORAL at 20:38

## 2017-01-01 RX ADMIN — Medication 10 ML: at 20:38

## 2017-01-01 RX ADMIN — INSULIN GLARGINE 5 UNITS: 100 INJECTION, SOLUTION SUBCUTANEOUS at 21:56

## 2017-01-01 RX ADMIN — MULTIPLE VITAMINS W/ MINERALS TAB 1 TABLET: TAB at 10:24

## 2017-01-01 RX ADMIN — SODIUM CHLORIDE, SODIUM LACTATE, CALCIUM CHLORIDE, MAGNESIUM CHLORIDE AND DEXTROSE 2000 ML: 2.5; 538; 448; 18.3; 5.08 INJECTION, SOLUTION INTRAPERITONEAL at 21:48

## 2017-01-01 RX ADMIN — DEXTROSE AND SODIUM CHLORIDE: 5; 450 INJECTION, SOLUTION INTRAVENOUS at 20:26

## 2017-01-01 RX ADMIN — MAGNESIUM SULFATE HEPTAHYDRATE 2 G: 40 INJECTION, SOLUTION INTRAVENOUS at 00:07

## 2017-01-01 RX ADMIN — INSULIN LISPRO 2 UNITS: 100 INJECTION, SOLUTION INTRAVENOUS; SUBCUTANEOUS at 21:57

## 2017-01-01 RX ADMIN — ASPIRIN 81 MG: 81 TABLET, CHEWABLE ORAL at 08:39

## 2017-01-01 RX ADMIN — LATANOPROST 1 DROP: 50 SOLUTION/ DROPS OPHTHALMIC at 20:38

## 2017-01-01 RX ADMIN — DEXTROSE AND SODIUM CHLORIDE: 5; 450 INJECTION, SOLUTION INTRAVENOUS at 02:39

## 2017-01-01 RX ADMIN — Medication 3 UNITS: at 21:14

## 2017-01-01 RX ADMIN — LACTULOSE 20 G: 20 SOLUTION ORAL at 13:47

## 2017-01-01 RX ADMIN — AMPICILLIN SODIUM AND SULBACTAM SODIUM 3 G: 2; 1 INJECTION, POWDER, FOR SOLUTION INTRAMUSCULAR; INTRAVENOUS at 11:34

## 2017-01-01 RX ADMIN — METHYLPHENIDATE HYDROCHLORIDE 10 MG: 10 TABLET ORAL at 08:25

## 2017-01-01 RX ADMIN — SIMVASTATIN 20 MG: 20 TABLET, FILM COATED ORAL at 20:45

## 2017-01-01 RX ADMIN — Medication 3 UNITS: at 17:09

## 2017-01-01 RX ADMIN — ASPIRIN 81 MG: 81 TABLET, CHEWABLE ORAL at 08:10

## 2017-01-01 RX ADMIN — SODIUM CHLORIDE, SODIUM LACTATE, CALCIUM CHLORIDE, MAGNESIUM CHLORIDE AND DEXTROSE: 2.5; 538; 448; 18.3; 5.08 INJECTION, SOLUTION INTRAPERITONEAL at 00:06

## 2017-01-01 RX ADMIN — POTASSIUM CHLORIDE 40 MEQ: 2 INJECTION, SOLUTION, CONCENTRATE INTRAVENOUS at 08:50

## 2017-01-01 RX ADMIN — SIROLIMUS 4 MG: 0.5 TABLET, FILM COATED ORAL at 12:14

## 2017-01-01 RX ADMIN — POTASSIUM CHLORIDE 40 MEQ: 2 INJECTION, SOLUTION, CONCENTRATE INTRAVENOUS at 19:03

## 2017-01-01 RX ADMIN — MYCOPHENOLIC ACID 360 MG: 180 TABLET, DELAYED RELEASE ORAL at 20:19

## 2017-01-01 RX ADMIN — CEFTRIAXONE 1 G: 1 INJECTION, POWDER, FOR SOLUTION INTRAMUSCULAR; INTRAVENOUS at 10:24

## 2017-01-01 RX ADMIN — AMPICILLIN SODIUM AND SULBACTAM SODIUM 3 G: 2; 1 INJECTION, POWDER, FOR SOLUTION INTRAMUSCULAR; INTRAVENOUS at 01:10

## 2017-01-01 RX ADMIN — SODIUM CHLORIDE: 9 INJECTION, SOLUTION INTRAVENOUS at 10:56

## 2017-01-01 RX ADMIN — SODIUM CHLORIDE, SODIUM LACTATE, CALCIUM CHLORIDE, MAGNESIUM CHLORIDE AND DEXTROSE 2000 ML: 2.5; 538; 448; 18.3; 5.08 INJECTION, SOLUTION INTRAPERITONEAL at 04:01

## 2017-01-01 RX ADMIN — CARVEDILOL 25 MG: 25 TABLET, FILM COATED ORAL at 19:36

## 2017-01-01 RX ADMIN — INSULIN LISPRO 7 UNITS: 100 INJECTION, SOLUTION INTRAVENOUS; SUBCUTANEOUS at 17:51

## 2017-01-01 RX ADMIN — Medication 10 ML: at 10:46

## 2017-01-01 RX ADMIN — FUROSEMIDE 80 MG: 40 TABLET ORAL at 08:10

## 2017-01-01 RX ADMIN — INSULIN GLARGINE 15 UNITS: 100 INJECTION, SOLUTION SUBCUTANEOUS at 08:10

## 2017-01-01 RX ADMIN — Medication 10 ML: at 21:12

## 2017-01-01 RX ADMIN — SODIUM CHLORIDE, SODIUM LACTATE, CALCIUM CHLORIDE, MAGNESIUM CHLORIDE AND DEXTROSE 2000 ML: 2.5; 538; 448; 18.3; 5.08 INJECTION, SOLUTION INTRAPERITONEAL at 11:02

## 2017-01-01 RX ADMIN — AMPICILLIN SODIUM AND SULBACTAM SODIUM 3 G: 2; 1 INJECTION, POWDER, FOR SOLUTION INTRAMUSCULAR; INTRAVENOUS at 13:31

## 2017-01-01 RX ADMIN — SIMVASTATIN 20 MG: 20 TABLET, FILM COATED ORAL at 21:10

## 2017-01-01 RX ADMIN — SODIUM CHLORIDE, SODIUM LACTATE, CALCIUM CHLORIDE, MAGNESIUM CHLORIDE AND DEXTROSE 2000 ML: 2.5; 538; 448; 18.3; 5.08 INJECTION, SOLUTION INTRAPERITONEAL at 04:04

## 2017-01-01 RX ADMIN — SODIUM CHLORIDE, SODIUM LACTATE, CALCIUM CHLORIDE, MAGNESIUM CHLORIDE AND DEXTROSE: 2.5; 538; 448; 18.3; 5.08 INJECTION, SOLUTION INTRAPERITONEAL at 22:20

## 2017-01-01 RX ADMIN — OXYCODONE HYDROCHLORIDE AND ACETAMINOPHEN 1 TABLET: 10; 325 TABLET ORAL at 20:45

## 2017-01-01 RX ADMIN — INSULIN GLARGINE 20 UNITS: 100 INJECTION, SOLUTION SUBCUTANEOUS at 11:39

## 2017-01-01 RX ADMIN — Medication 10 ML: at 11:35

## 2017-01-01 RX ADMIN — METHYLPHENIDATE HYDROCHLORIDE 10 MG: 10 TABLET ORAL at 17:49

## 2017-01-01 RX ADMIN — FUROSEMIDE 80 MG: 40 TABLET ORAL at 10:24

## 2017-01-01 RX ADMIN — POTASSIUM CHLORIDE 30 MEQ: 40 SOLUTION ORAL at 04:40

## 2017-01-01 RX ADMIN — OXYCODONE HYDROCHLORIDE AND ACETAMINOPHEN 1 TABLET: 10; 325 TABLET ORAL at 16:12

## 2017-01-01 RX ADMIN — Medication 9 UNITS: at 17:50

## 2017-01-01 RX ADMIN — FUROSEMIDE 80 MG: 40 TABLET ORAL at 17:49

## 2017-01-01 RX ADMIN — ONDANSETRON 4 MG: 2 INJECTION INTRAMUSCULAR; INTRAVENOUS at 13:17

## 2017-01-01 RX ADMIN — CARVEDILOL 25 MG: 25 TABLET, FILM COATED ORAL at 20:18

## 2017-01-01 RX ADMIN — SODIUM CHLORIDE, SODIUM LACTATE, CALCIUM CHLORIDE, MAGNESIUM CHLORIDE AND DEXTROSE 2000 ML: 2.5; 538; 448; 18.3; 5.08 INJECTION, SOLUTION INTRAPERITONEAL at 16:09

## 2017-01-01 RX ADMIN — SODIUM CHLORIDE 1143 ML: 9 INJECTION, SOLUTION INTRAVENOUS at 07:20

## 2017-01-01 RX ADMIN — CLONIDINE HYDROCHLORIDE 0.3 MG: 0.2 TABLET ORAL at 20:38

## 2017-01-01 RX ADMIN — DEXTROSE AND SODIUM CHLORIDE: 5; 450 INJECTION, SOLUTION INTRAVENOUS at 12:46

## 2017-01-01 RX ADMIN — DEXTROSE AND SODIUM CHLORIDE: 5; 450 INJECTION, SOLUTION INTRAVENOUS at 12:12

## 2017-01-01 RX ADMIN — SODIUM CHLORIDE, SODIUM LACTATE, CALCIUM CHLORIDE, MAGNESIUM CHLORIDE AND DEXTROSE: 2.5; 538; 448; 18.3; 5.08 INJECTION, SOLUTION INTRAPERITONEAL at 12:54

## 2017-01-01 RX ADMIN — Medication 10 ML: at 08:27

## 2017-01-01 RX ADMIN — SODIUM CHLORIDE, SODIUM LACTATE, CALCIUM CHLORIDE, MAGNESIUM CHLORIDE AND DEXTROSE 2000 ML: 1.5; 538; 448; 18.3; 5.08 INJECTION, SOLUTION INTRAPERITONEAL at 16:20

## 2017-01-01 RX ADMIN — SIROLIMUS 1 MG: 0.5 TABLET, FILM COATED ORAL at 12:40

## 2017-01-01 RX ADMIN — INSULIN LISPRO 7 UNITS: 100 INJECTION, SOLUTION INTRAVENOUS; SUBCUTANEOUS at 13:26

## 2017-01-01 RX ADMIN — POTASSIUM CHLORIDE 40 MEQ: 750 TABLET, FILM COATED, EXTENDED RELEASE ORAL at 13:31

## 2017-01-01 RX ADMIN — INSULIN LISPRO 2 UNITS: 100 INJECTION, SOLUTION INTRAVENOUS; SUBCUTANEOUS at 21:14

## 2017-01-01 RX ADMIN — FUROSEMIDE 80 MG: 40 TABLET ORAL at 08:52

## 2017-01-01 RX ADMIN — CARVEDILOL 50 MG: 25 TABLET, FILM COATED ORAL at 20:45

## 2017-01-01 RX ADMIN — SODIUM CHLORIDE 0.01 UNITS/KG/HR: 9 INJECTION, SOLUTION INTRAVENOUS at 06:38

## 2017-01-01 RX ADMIN — CLONIDINE HYDROCHLORIDE 0.3 MG: 0.2 TABLET ORAL at 08:10

## 2017-01-01 RX ADMIN — CLONIDINE HYDROCHLORIDE 0.3 MG: 0.2 TABLET ORAL at 21:10

## 2017-01-01 RX ADMIN — SIROLIMUS 4 MG: 1 TABLET ORAL at 11:34

## 2017-01-01 RX ADMIN — CARVEDILOL 50 MG: 25 TABLET, FILM COATED ORAL at 08:52

## 2017-01-01 RX ADMIN — FUROSEMIDE 80 MG: 40 TABLET ORAL at 08:25

## 2017-01-01 RX ADMIN — Medication 10 ML: at 20:47

## 2017-01-01 RX ADMIN — SODIUM CHLORIDE, SODIUM LACTATE, CALCIUM CHLORIDE, MAGNESIUM CHLORIDE AND DEXTROSE: 2.5; 538; 448; 18.3; 5.08 INJECTION, SOLUTION INTRAPERITONEAL at 06:51

## 2017-01-01 RX ADMIN — MYCOPHENOLIC ACID 360 MG: 180 TABLET, DELAYED RELEASE ORAL at 08:10

## 2017-01-01 RX ADMIN — HYDROCODONE BITARTRATE AND ACETAMINOPHEN 1 TABLET: 5; 325 TABLET ORAL at 14:36

## 2017-01-01 RX ADMIN — CLONIDINE HYDROCHLORIDE 0.3 MG: 0.2 TABLET ORAL at 19:36

## 2017-01-01 RX ADMIN — DEXTROSE AND SODIUM CHLORIDE: 5; 450 INJECTION, SOLUTION INTRAVENOUS at 06:14

## 2017-01-01 RX ADMIN — CLONIDINE HYDROCHLORIDE 0.3 MG: 0.2 TABLET ORAL at 08:52

## 2017-01-01 RX ADMIN — CARVEDILOL 25 MG: 25 TABLET, FILM COATED ORAL at 08:58

## 2017-01-01 RX ADMIN — MYCOPHENOLIC ACID 360 MG: 180 TABLET, DELAYED RELEASE ORAL at 08:53

## 2017-01-01 RX ADMIN — INSULIN GLARGINE 20 UNITS: 100 INJECTION, SOLUTION SUBCUTANEOUS at 13:45

## 2017-01-01 RX ADMIN — CLONIDINE HYDROCHLORIDE 0.3 MG: 0.2 TABLET ORAL at 12:40

## 2017-01-01 RX ADMIN — MULTIPLE VITAMINS W/ MINERALS TAB 1 TABLET: TAB at 08:52

## 2017-01-01 RX ADMIN — MYCOPHENOLIC ACID 360 MG: 180 TABLET, DELAYED RELEASE ORAL at 21:11

## 2017-01-01 RX ADMIN — FUROSEMIDE 80 MG: 40 TABLET ORAL at 08:39

## 2017-01-01 RX ADMIN — AMPICILLIN SODIUM AND SULBACTAM SODIUM 3 G: 2; 1 INJECTION, POWDER, FOR SOLUTION INTRAMUSCULAR; INTRAVENOUS at 02:12

## 2017-01-01 RX ADMIN — MYCOPHENOLIC ACID 360 MG: 180 TABLET, DELAYED RELEASE ORAL at 10:23

## 2017-01-01 RX ADMIN — SIROLIMUS 4 MG: 1 TABLET ORAL at 12:41

## 2017-01-01 RX ADMIN — SODIUM CHLORIDE, SODIUM LACTATE, CALCIUM CHLORIDE, MAGNESIUM CHLORIDE AND DEXTROSE: 2.5; 538; 448; 18.3; 5.08 INJECTION, SOLUTION INTRAPERITONEAL at 18:28

## 2017-01-01 RX ADMIN — ASPIRIN 81 MG: 81 TABLET, CHEWABLE ORAL at 08:25

## 2017-01-01 RX ADMIN — MYCOPHENOLIC ACID 360 MG: 180 TABLET, DELAYED RELEASE ORAL at 20:45

## 2017-01-01 RX ADMIN — CETIRIZINE HYDROCHLORIDE 10 MG: 10 TABLET ORAL at 08:52

## 2017-01-01 RX ADMIN — CARVEDILOL 50 MG: 25 TABLET, FILM COATED ORAL at 08:38

## 2017-01-01 RX ADMIN — CARVEDILOL 50 MG: 25 TABLET, FILM COATED ORAL at 21:10

## 2017-01-01 RX ADMIN — ASPIRIN 81 MG: 81 TABLET, CHEWABLE ORAL at 08:52

## 2017-01-01 RX ADMIN — SODIUM CHLORIDE 11.9 UNITS/HR: 9 INJECTION, SOLUTION INTRAVENOUS at 16:47

## 2017-01-01 RX ADMIN — CEFTRIAXONE 1 G: 1 INJECTION, POWDER, FOR SOLUTION INTRAMUSCULAR; INTRAVENOUS at 08:25

## 2017-01-01 RX ADMIN — LATANOPROST 1 DROP: 50 SOLUTION/ DROPS OPHTHALMIC at 20:46

## 2017-01-01 RX ADMIN — SODIUM CHLORIDE, SODIUM LACTATE, CALCIUM CHLORIDE, MAGNESIUM CHLORIDE AND DEXTROSE 2000 ML: 2.5; 538; 448; 18.3; 5.08 INJECTION, SOLUTION INTRAPERITONEAL at 22:45

## 2017-01-01 RX ADMIN — CLONIDINE HYDROCHLORIDE 0.3 MG: 0.2 TABLET ORAL at 08:51

## 2017-01-01 RX ADMIN — POTASSIUM CHLORIDE 40 MEQ: 40 SOLUTION ORAL at 10:11

## 2017-01-01 RX ADMIN — INSULIN HUMAN 8 UNITS: 100 INJECTION, SOLUTION PARENTERAL at 05:56

## 2017-01-01 RX ADMIN — SIROLIMUS 4 MG: 1 TABLET ORAL at 13:45

## 2017-01-01 RX ADMIN — INSULIN LISPRO 7 UNITS: 100 INJECTION, SOLUTION INTRAVENOUS; SUBCUTANEOUS at 08:40

## 2017-01-01 RX ADMIN — CLONIDINE HYDROCHLORIDE 0.3 MG: 0.2 TABLET ORAL at 08:38

## 2017-01-01 RX ADMIN — SODIUM CHLORIDE: 9 INJECTION, SOLUTION INTRAVENOUS at 08:40

## 2017-01-01 RX ADMIN — METHYLPHENIDATE HYDROCHLORIDE 10 MG: 10 TABLET ORAL at 08:52

## 2017-01-01 RX ADMIN — CLONIDINE HYDROCHLORIDE 0.3 MG: 0.2 TABLET ORAL at 04:41

## 2017-01-01 RX ADMIN — ASPIRIN 81 MG: 81 TABLET, CHEWABLE ORAL at 10:24

## 2017-01-01 RX ADMIN — SIROLIMUS 3 MG: 0.5 TABLET, FILM COATED ORAL at 14:40

## 2017-01-01 ASSESSMENT — ENCOUNTER SYMPTOMS
NAUSEA: 1
SHORTNESS OF BREATH: 0
BACK PAIN: 0
DIARRHEA: 0
EYE DISCHARGE: 0
NAUSEA: 0
DIARRHEA: 0
RHINORRHEA: 0
EYE DISCHARGE: 0
WHEEZING: 0
COUGH: 1
EYE REDNESS: 0
VOMITING: 0
VOMITING: 1
COUGH: 0
SORE THROAT: 0
ABDOMINAL PAIN: 0

## 2017-01-01 ASSESSMENT — PAIN DESCRIPTION - LOCATION
LOCATION: FACE
LOCATION: FACE
LOCATION: ABDOMEN
LOCATION: FACE
LOCATION: FACE;MOUTH
LOCATION: FACE

## 2017-01-01 ASSESSMENT — PAIN SCALES - GENERAL
PAINLEVEL_OUTOF10: 0
PAINLEVEL_OUTOF10: 0
PAINLEVEL_OUTOF10: 10
PAINLEVEL_OUTOF10: 4
PAINLEVEL_OUTOF10: 8
PAINLEVEL_OUTOF10: 0
PAINLEVEL_OUTOF10: 0
PAINLEVEL_OUTOF10: 10
PAINLEVEL_OUTOF10: 9
PAINLEVEL_OUTOF10: 0
PAINLEVEL_OUTOF10: 0
PAINLEVEL_OUTOF10: 8
PAINLEVEL_OUTOF10: 0
PAINLEVEL_OUTOF10: 0
PAINLEVEL_OUTOF10: 10
PAINLEVEL_OUTOF10: 2
PAINLEVEL_OUTOF10: 0
PAINLEVEL_OUTOF10: 4
PAINLEVEL_OUTOF10: 0
PAINLEVEL_OUTOF10: 0
PAINLEVEL_OUTOF10: 10
PAINLEVEL_OUTOF10: 3
PAINLEVEL_OUTOF10: 0
PAINLEVEL_OUTOF10: 5
PAINLEVEL_OUTOF10: 0
PAINLEVEL_OUTOF10: 5
PAINLEVEL_OUTOF10: 4
PAINLEVEL_OUTOF10: 6

## 2017-01-01 ASSESSMENT — PAIN DESCRIPTION - PAIN TYPE
TYPE: ACUTE PAIN

## 2017-01-01 ASSESSMENT — PAIN DESCRIPTION - DESCRIPTORS
DESCRIPTORS: BURNING;ACHING
DESCRIPTORS: BURNING
DESCRIPTORS: ACHING;DISCOMFORT

## 2017-01-01 ASSESSMENT — PAIN DESCRIPTION - ORIENTATION
ORIENTATION: LEFT

## 2017-01-01 ASSESSMENT — PAIN DESCRIPTION - FREQUENCY
FREQUENCY: CONTINUOUS
FREQUENCY: CONTINUOUS

## 2017-01-13 RX ORDER — MYCOPHENOLIC ACID 360 MG/1
360 TABLET, DELAYED RELEASE ORAL 2 TIMES DAILY
Qty: 120 TABLET | OUTPATIENT
Start: 2017-01-13

## 2017-01-14 ENCOUNTER — OFFICE VISIT (OUTPATIENT)
Dept: FAMILY MEDICINE CLINIC | Age: 42
End: 2017-01-14

## 2017-01-14 VITALS
SYSTOLIC BLOOD PRESSURE: 142 MMHG | BODY MASS INDEX: 25.65 KG/M2 | HEIGHT: 69 IN | HEART RATE: 62 BPM | TEMPERATURE: 98 F | RESPIRATION RATE: 12 BRPM | DIASTOLIC BLOOD PRESSURE: 96 MMHG | WEIGHT: 173.2 LBS

## 2017-01-14 DIAGNOSIS — Z94.83 HISTORY OF SIMULTANEOUS KIDNEY AND PANCREAS TRANSPLANT (HCC): ICD-10-CM

## 2017-01-14 DIAGNOSIS — Z94.0 HISTORY OF SIMULTANEOUS KIDNEY AND PANCREAS TRANSPLANT (HCC): ICD-10-CM

## 2017-01-14 DIAGNOSIS — N18.1 TYPE 1 DIABETES MELLITUS WITH STAGE 1 CHRONIC KIDNEY DISEASE (HCC): Primary | ICD-10-CM

## 2017-01-14 DIAGNOSIS — J01.90 ACUTE RHINOSINUSITIS: ICD-10-CM

## 2017-01-14 DIAGNOSIS — K21.9 GASTROESOPHAGEAL REFLUX DISEASE WITHOUT ESOPHAGITIS: ICD-10-CM

## 2017-01-14 DIAGNOSIS — I10 ESSENTIAL HYPERTENSION: ICD-10-CM

## 2017-01-14 DIAGNOSIS — E10.22 TYPE 1 DIABETES MELLITUS WITH STAGE 1 CHRONIC KIDNEY DISEASE (HCC): Primary | ICD-10-CM

## 2017-01-14 LAB — HBA1C MFR BLD: 13.2 %

## 2017-01-14 PROCEDURE — 83036 HEMOGLOBIN GLYCOSYLATED A1C: CPT | Performed by: FAMILY MEDICINE

## 2017-01-14 PROCEDURE — 99214 OFFICE O/P EST MOD 30 MIN: CPT | Performed by: FAMILY MEDICINE

## 2017-01-14 RX ORDER — MYCOPHENOLIC ACID 360 MG/1
360 TABLET, DELAYED RELEASE ORAL 2 TIMES DAILY
Qty: 60 TABLET | Refills: 2 | Status: SHIPPED | OUTPATIENT
Start: 2017-01-14

## 2017-01-14 RX ORDER — DOXYCYCLINE HYCLATE 100 MG
100 TABLET ORAL 2 TIMES DAILY
Qty: 20 TABLET | Refills: 0 | Status: SHIPPED | OUTPATIENT
Start: 2017-01-14 | End: 2017-01-24

## 2017-01-14 RX ORDER — OMEPRAZOLE 20 MG/1
20 CAPSULE, DELAYED RELEASE ORAL DAILY
COMMUNITY
End: 2017-01-14 | Stop reason: SDUPTHER

## 2017-01-14 RX ORDER — OMEPRAZOLE 20 MG/1
20 CAPSULE, DELAYED RELEASE ORAL DAILY
Qty: 30 CAPSULE | Refills: 3 | Status: ON HOLD | OUTPATIENT
Start: 2017-01-14 | End: 2017-01-01

## 2017-01-14 RX ORDER — FLUTICASONE PROPIONATE 50 MCG
1 SPRAY, SUSPENSION (ML) NASAL DAILY
Qty: 1 BOTTLE | Refills: 0 | Status: ON HOLD | OUTPATIENT
Start: 2017-01-14 | End: 2017-05-15

## 2017-01-14 RX ORDER — PRAVASTATIN SODIUM 20 MG
20 TABLET ORAL NIGHTLY
Qty: 30 TABLET | Refills: 3 | Status: SHIPPED | OUTPATIENT
Start: 2017-01-14 | End: 2017-03-05 | Stop reason: ALTCHOICE

## 2017-01-14 RX ORDER — CLONIDINE HYDROCHLORIDE 0.2 MG/1
0.2 TABLET ORAL 2 TIMES DAILY
Qty: 60 TABLET | Refills: 2 | Status: ON HOLD | OUTPATIENT
Start: 2017-01-14 | End: 2017-03-09 | Stop reason: HOSPADM

## 2017-01-16 ENCOUNTER — TELEPHONE (OUTPATIENT)
Dept: FAMILY MEDICINE CLINIC | Age: 42
End: 2017-01-16

## 2017-01-18 ENCOUNTER — TELEPHONE (OUTPATIENT)
Dept: FAMILY MEDICINE CLINIC | Age: 42
End: 2017-01-18

## 2017-01-26 ENCOUNTER — OFFICE VISIT (OUTPATIENT)
Dept: NEPHROLOGY | Age: 42
End: 2017-01-26

## 2017-01-26 VITALS
HEART RATE: 79 BPM | BODY MASS INDEX: 23.99 KG/M2 | DIASTOLIC BLOOD PRESSURE: 82 MMHG | RESPIRATION RATE: 18 BRPM | WEIGHT: 162 LBS | OXYGEN SATURATION: 100 % | SYSTOLIC BLOOD PRESSURE: 126 MMHG | HEIGHT: 69 IN

## 2017-01-26 DIAGNOSIS — N18.4 CKD (CHRONIC KIDNEY DISEASE) STAGE 4, GFR 15-29 ML/MIN (HCC): Primary | ICD-10-CM

## 2017-01-26 PROCEDURE — 99213 OFFICE O/P EST LOW 20 MIN: CPT | Performed by: INTERNAL MEDICINE

## 2017-02-08 ENCOUNTER — TELEPHONE (OUTPATIENT)
Dept: FAMILY MEDICINE CLINIC | Age: 42
End: 2017-02-08

## 2017-02-08 DIAGNOSIS — N18.1 TYPE 1 DIABETES MELLITUS WITH STAGE 1 CHRONIC KIDNEY DISEASE (HCC): Primary | ICD-10-CM

## 2017-02-08 DIAGNOSIS — E10.22 TYPE 1 DIABETES MELLITUS WITH STAGE 1 CHRONIC KIDNEY DISEASE (HCC): Primary | ICD-10-CM

## 2017-02-09 RX ORDER — SIMVASTATIN 20 MG
20 TABLET ORAL EVERY EVENING
Qty: 30 TABLET | Refills: 3 | Status: SHIPPED | OUTPATIENT
Start: 2017-02-09

## 2017-03-08 ENCOUNTER — TELEPHONE (OUTPATIENT)
Dept: FAMILY MEDICINE CLINIC | Age: 42
End: 2017-03-08

## 2017-03-27 ENCOUNTER — OFFICE VISIT (OUTPATIENT)
Dept: INTERNAL MEDICINE | Age: 42
End: 2017-03-27

## 2017-03-27 VITALS
BODY MASS INDEX: 23.25 KG/M2 | WEIGHT: 157 LBS | HEIGHT: 69 IN | SYSTOLIC BLOOD PRESSURE: 140 MMHG | DIASTOLIC BLOOD PRESSURE: 100 MMHG | HEART RATE: 72 BPM

## 2017-03-27 DIAGNOSIS — Z94.83 HISTORY OF PANCREAS TRANSPLANT (HCC): ICD-10-CM

## 2017-03-27 DIAGNOSIS — Z91.199 NONCOMPLIANCE: ICD-10-CM

## 2017-03-27 DIAGNOSIS — E87.1 HYPONATREMIA: ICD-10-CM

## 2017-03-27 DIAGNOSIS — I10 ESSENTIAL HYPERTENSION: ICD-10-CM

## 2017-03-27 DIAGNOSIS — D63.1 ANEMIA IN CKD (CHRONIC KIDNEY DISEASE): ICD-10-CM

## 2017-03-27 DIAGNOSIS — N18.6 ESRD (END STAGE RENAL DISEASE) (HCC): ICD-10-CM

## 2017-03-27 DIAGNOSIS — N18.9 ANEMIA IN CKD (CHRONIC KIDNEY DISEASE): ICD-10-CM

## 2017-03-27 DIAGNOSIS — E10.9 TYPE 1 DIABETES MELLITUS NOT AT GOAL (HCC): Primary | ICD-10-CM

## 2017-03-27 DIAGNOSIS — E10.10 DIABETIC KETOACIDOSIS WITHOUT COMA ASSOCIATED WITH TYPE 1 DIABETES MELLITUS (HCC): ICD-10-CM

## 2017-03-27 DIAGNOSIS — Z94.0 H/O KIDNEY TRANSPLANT: ICD-10-CM

## 2017-03-27 LAB — HBA1C MFR BLD: 11.6 %

## 2017-03-27 PROCEDURE — 36416 COLLJ CAPILLARY BLOOD SPEC: CPT | Performed by: INTERNAL MEDICINE

## 2017-03-27 PROCEDURE — 83036 HEMOGLOBIN GLYCOSYLATED A1C: CPT | Performed by: INTERNAL MEDICINE

## 2017-03-27 PROCEDURE — 99214 OFFICE O/P EST MOD 30 MIN: CPT | Performed by: INTERNAL MEDICINE

## 2017-05-01 ENCOUNTER — TELEPHONE (OUTPATIENT)
Dept: FAMILY MEDICINE CLINIC | Age: 42
End: 2017-05-01

## 2017-05-04 ENCOUNTER — TELEPHONE (OUTPATIENT)
Dept: FAMILY MEDICINE CLINIC | Age: 42
End: 2017-05-04

## 2017-05-04 DIAGNOSIS — E10.22 TYPE 1 DIABETES MELLITUS WITH STAGE 1 CHRONIC KIDNEY DISEASE (HCC): ICD-10-CM

## 2017-05-04 DIAGNOSIS — Z94.0 HISTORY OF SIMULTANEOUS KIDNEY AND PANCREAS TRANSPLANT (HCC): ICD-10-CM

## 2017-05-04 DIAGNOSIS — Z94.83 HISTORY OF SIMULTANEOUS KIDNEY AND PANCREAS TRANSPLANT (HCC): ICD-10-CM

## 2017-05-04 DIAGNOSIS — N18.1 TYPE 1 DIABETES MELLITUS WITH STAGE 1 CHRONIC KIDNEY DISEASE (HCC): ICD-10-CM

## 2017-05-14 PROBLEM — I50.23 SYSTOLIC CHF, ACUTE ON CHRONIC (HCC): Status: ACTIVE | Noted: 2017-05-14

## 2017-06-01 ENCOUNTER — TELEPHONE (OUTPATIENT)
Dept: OTHER | Age: 42
End: 2017-06-01

## 2017-06-06 ENCOUNTER — OFFICE VISIT (OUTPATIENT)
Dept: INTERNAL MEDICINE | Age: 42
End: 2017-06-06

## 2017-06-06 VITALS
DIASTOLIC BLOOD PRESSURE: 96 MMHG | SYSTOLIC BLOOD PRESSURE: 152 MMHG | WEIGHT: 167 LBS | BODY MASS INDEX: 24.73 KG/M2 | HEIGHT: 69 IN | HEART RATE: 82 BPM | OXYGEN SATURATION: 98 %

## 2017-06-06 DIAGNOSIS — N18.5 TYPE 1 DIABETES MELLITUS WITH STAGE 5 CHRONIC KIDNEY DISEASE NOT ON CHRONIC DIALYSIS (HCC): Primary | ICD-10-CM

## 2017-06-06 DIAGNOSIS — I10 ESSENTIAL HYPERTENSION: ICD-10-CM

## 2017-06-06 DIAGNOSIS — N18.9 ANEMIA IN CKD (CHRONIC KIDNEY DISEASE): ICD-10-CM

## 2017-06-06 DIAGNOSIS — Z94.0 H/O KIDNEY TRANSPLANT: ICD-10-CM

## 2017-06-06 DIAGNOSIS — Z94.83 HISTORY OF PANCREAS TRANSPLANT (HCC): ICD-10-CM

## 2017-06-06 DIAGNOSIS — D63.1 ANEMIA IN CKD (CHRONIC KIDNEY DISEASE): ICD-10-CM

## 2017-06-06 DIAGNOSIS — E10.22 TYPE 1 DIABETES MELLITUS WITH STAGE 5 CHRONIC KIDNEY DISEASE NOT ON CHRONIC DIALYSIS (HCC): Primary | ICD-10-CM

## 2017-06-06 DIAGNOSIS — T86.12 KIDNEY TRANSPLANT FAILURE: ICD-10-CM

## 2017-06-06 DIAGNOSIS — Z91.199 NONCOMPLIANCE: ICD-10-CM

## 2017-06-06 PROCEDURE — 99214 OFFICE O/P EST MOD 30 MIN: CPT | Performed by: INTERNAL MEDICINE

## 2017-06-08 ENCOUNTER — TELEPHONE (OUTPATIENT)
Dept: INTERNAL MEDICINE | Age: 42
End: 2017-06-08

## 2017-06-14 RX ORDER — CARVEDILOL 25 MG/1
50 TABLET ORAL 2 TIMES DAILY
Qty: 120 TABLET | Refills: 5 | Status: SHIPPED | OUTPATIENT
Start: 2017-06-14

## 2017-07-15 ENCOUNTER — APPOINTMENT (OUTPATIENT)
Dept: CT IMAGING | Age: 42
End: 2017-07-15
Payer: MEDICAID

## 2017-07-15 ENCOUNTER — HOSPITAL ENCOUNTER (EMERGENCY)
Age: 42
Discharge: HOME | End: 2017-07-15
Attending: INTERNAL MEDICINE
Payer: MEDICAID

## 2017-07-15 VITALS
DIASTOLIC BLOOD PRESSURE: 96 MMHG | HEIGHT: 69 IN | BODY MASS INDEX: 25.18 KG/M2 | SYSTOLIC BLOOD PRESSURE: 137 MMHG | HEART RATE: 80 BPM | RESPIRATION RATE: 18 BRPM | WEIGHT: 170 LBS | TEMPERATURE: 97.6 F | OXYGEN SATURATION: 99 %

## 2017-07-15 DIAGNOSIS — R73.9 HYPERGLYCEMIA: Primary | ICD-10-CM

## 2017-07-15 DIAGNOSIS — K65.9 PERITONITIS (HCC): ICD-10-CM

## 2017-07-15 LAB
ANION GAP SERPL CALCULATED.3IONS-SCNC: 23 MEQ/L (ref 8–16)
ANISOCYTOSIS: ABNORMAL
BASOPHILS # BLD: 1.1 %
BASOPHILS ABSOLUTE: 0.1 THOU/MM3 (ref 0–0.1)
BUN BLDV-MCNC: 57 MG/DL (ref 7–22)
CALCIUM SERPL-MCNC: 8.7 MG/DL (ref 8.5–10.5)
CHLORIDE BLD-SCNC: 88 MEQ/L (ref 98–111)
CO2: 16 MEQ/L (ref 23–33)
CREAT SERPL-MCNC: 4.8 MG/DL (ref 0.4–1.2)
EOSINOPHIL # BLD: 0.4 %
EOSINOPHILS ABSOLUTE: 0 THOU/MM3 (ref 0–0.4)
GFR SERPL CREATININE-BSD FRML MDRD: 16 ML/MIN/1.73M2
GLUCOSE BLD-MCNC: 554 MG/DL (ref 70–108)
GLUCOSE BLD-MCNC: 949 MG/DL (ref 70–108)
GRAM STAIN RESULT: NORMAL
HCT VFR BLD CALC: 31.6 % (ref 42–52)
HEMOGLOBIN: 9.4 GM/DL (ref 14–18)
HYPOCHROMIA: ABNORMAL
LYMPHOCYTES # BLD: 9.6 %
LYMPHOCYTES ABSOLUTE: 0.7 THOU/MM3 (ref 1–4.8)
MCH RBC QN AUTO: 26.1 PG (ref 27–31)
MCHC RBC AUTO-ENTMCNC: 29.7 GM/DL (ref 33–37)
MCV RBC AUTO: 87.8 FL (ref 80–94)
MONOCYTES # BLD: 7.9 %
MONOCYTES ABSOLUTE: 0.6 THOU/MM3 (ref 0.4–1.3)
NUCLEATED RED BLOOD CELLS: 0 /100 WBC
PDW BLD-RTO: 18.6 % (ref 11.5–14.5)
PLATELET # BLD: 306 THOU/MM3 (ref 130–400)
PMV BLD AUTO: 7.6 MCM (ref 7.4–10.4)
POTASSIUM SERPL-SCNC: 4.4 MEQ/L (ref 3.5–5.2)
RBC # BLD: 3.6 MILL/MM3 (ref 4.7–6.1)
RBC # BLD: NORMAL 10*6/UL
SEG NEUTROPHILS: 81 %
SEGMENTED NEUTROPHILS ABSOLUTE COUNT: 6.1 THOU/MM3 (ref 1.8–7.7)
SODIUM BLD-SCNC: 127 MEQ/L (ref 135–145)
WBC # BLD: 7.5 THOU/MM3 (ref 4.8–10.8)

## 2017-07-15 PROCEDURE — 6360000002 HC RX W HCPCS: Performed by: INTERNAL MEDICINE

## 2017-07-15 PROCEDURE — 89051 BODY FLUID CELL COUNT: CPT

## 2017-07-15 PROCEDURE — 87205 SMEAR GRAM STAIN: CPT

## 2017-07-15 PROCEDURE — 80048 BASIC METABOLIC PNL TOTAL CA: CPT

## 2017-07-15 PROCEDURE — 6370000000 HC RX 637 (ALT 250 FOR IP): Performed by: INTERNAL MEDICINE

## 2017-07-15 PROCEDURE — 85025 COMPLETE CBC W/AUTO DIFF WBC: CPT

## 2017-07-15 PROCEDURE — 74176 CT ABD & PELVIS W/O CONTRAST: CPT

## 2017-07-15 PROCEDURE — 87040 BLOOD CULTURE FOR BACTERIA: CPT

## 2017-07-15 PROCEDURE — 36415 COLL VENOUS BLD VENIPUNCTURE: CPT

## 2017-07-15 PROCEDURE — 87077 CULTURE AEROBIC IDENTIFY: CPT

## 2017-07-15 PROCEDURE — 82948 REAGENT STRIP/BLOOD GLUCOSE: CPT

## 2017-07-15 PROCEDURE — 87075 CULTR BACTERIA EXCEPT BLOOD: CPT

## 2017-07-15 PROCEDURE — 99284 EMERGENCY DEPT VISIT MOD MDM: CPT

## 2017-07-15 PROCEDURE — 87186 SC STD MICRODIL/AGAR DIL: CPT

## 2017-07-15 PROCEDURE — 2580000003 HC RX 258: Performed by: INTERNAL MEDICINE

## 2017-07-15 PROCEDURE — 96365 THER/PROPH/DIAG IV INF INIT: CPT

## 2017-07-15 PROCEDURE — 87070 CULTURE OTHR SPECIMN AEROBIC: CPT

## 2017-07-15 PROCEDURE — 96375 TX/PRO/DX INJ NEW DRUG ADDON: CPT

## 2017-07-15 RX ADMIN — INSULIN HUMAN 10 UNITS: 100 INJECTION, SOLUTION PARENTERAL at 12:55

## 2017-07-15 RX ADMIN — CEFEPIME HYDROCHLORIDE 2 G: 2 INJECTION, POWDER, FOR SOLUTION INTRAVENOUS at 10:34

## 2017-07-15 ASSESSMENT — ENCOUNTER SYMPTOMS
VOMITING: 0
ABDOMINAL DISTENTION: 1
RHINORRHEA: 0
COUGH: 0
DIARRHEA: 0
SORE THROAT: 0
SHORTNESS OF BREATH: 0
ABDOMINAL PAIN: 0
EYE REDNESS: 0
BACK PAIN: 0
WHEEZING: 0
NAUSEA: 0
EYE DISCHARGE: 0

## 2017-07-16 LAB
CHARACTER, BODY FLUID: ABNORMAL
COLOR: COLORLESS
MONOCYTE, FLUID: 18 % (ref 25–100)
RBC FLUID: 1099 /CUMM (ref 0–100)
SEGMENTED NEUTROPHILS, BODY FLUID: 82 % (ref 0–25)
SPECIMEN: ABNORMAL
WBC FLUID: 2957 /MM3 (ref 0–500)

## 2017-07-18 LAB
BODY FLUID CULTURE, STERILE: ABNORMAL
BODY FLUID CULTURE, STERILE: ABNORMAL
ORGANISM: ABNORMAL

## 2017-07-19 ENCOUNTER — TELEPHONE (OUTPATIENT)
Dept: PHARMACY | Age: 42
End: 2017-07-19

## 2017-07-20 LAB
BLOOD CULTURE, ROUTINE: NORMAL
BLOOD CULTURE, ROUTINE: NORMAL

## 2017-08-14 ENCOUNTER — APPOINTMENT (OUTPATIENT)
Dept: GENERAL RADIOLOGY | Age: 42
DRG: 420 | End: 2017-08-14
Payer: MEDICAID

## 2017-08-14 ENCOUNTER — HOSPITAL ENCOUNTER (INPATIENT)
Age: 42
LOS: 2 days | Discharge: HOME | DRG: 420 | End: 2017-08-16
Attending: EMERGENCY MEDICINE | Admitting: INTERNAL MEDICINE
Payer: MEDICAID

## 2017-08-14 DIAGNOSIS — E10.10 DIABETIC KETOACIDOSIS WITHOUT COMA ASSOCIATED WITH TYPE 1 DIABETES MELLITUS (HCC): Primary | ICD-10-CM

## 2017-08-14 LAB
ALBUMIN SERPL-MCNC: 2.9 G/DL (ref 3.5–5.1)
ALP BLD-CCNC: 128 U/L (ref 38–126)
ALT SERPL-CCNC: 11 U/L (ref 11–66)
ANION GAP SERPL CALCULATED.3IONS-SCNC: 16 MEQ/L (ref 8–16)
ANION GAP SERPL CALCULATED.3IONS-SCNC: 18 MEQ/L (ref 8–16)
ANISOCYTOSIS: ABNORMAL
AST SERPL-CCNC: 25 U/L (ref 5–40)
BACTERIA: ABNORMAL
BASE EXCESS MIXED: 0 MMOL/L (ref -2–3)
BASOPHILS # BLD: 0.4 %
BASOPHILS ABSOLUTE: 0 THOU/MM3 (ref 0–0.1)
BILIRUB SERPL-MCNC: < 0.2 MG/DL (ref 0.3–1.2)
BILIRUBIN DIRECT: < 0.2 MG/DL (ref 0–0.3)
BILIRUBIN URINE: NEGATIVE
BLOOD, URINE: ABNORMAL
BUN BLDV-MCNC: 45 MG/DL (ref 7–22)
BUN BLDV-MCNC: 48 MG/DL (ref 7–22)
CALCIUM IONIZED: 1.1 MMOL/L (ref 1.12–1.32)
CALCIUM SERPL-MCNC: 8.6 MG/DL (ref 8.5–10.5)
CALCIUM SERPL-MCNC: 9.2 MG/DL (ref 8.5–10.5)
CASTS: ABNORMAL /LPF
CASTS: ABNORMAL /LPF
CHARACTER, URINE: CLEAR
CHLORIDE BLD-SCNC: 91 MEQ/L (ref 98–111)
CHLORIDE BLD-SCNC: 96 MEQ/L (ref 98–111)
CO2: 20 MEQ/L (ref 23–33)
CO2: 21 MEQ/L (ref 23–33)
COLLECTED BY:: ABNORMAL
COLOR: YELLOW
COMMENT: ABNORMAL
CREAT SERPL-MCNC: 4.8 MG/DL (ref 0.4–1.2)
CREAT SERPL-MCNC: 4.9 MG/DL (ref 0.4–1.2)
CRYSTALS: ABNORMAL
EOSINOPHIL # BLD: 1.1 %
EOSINOPHILS ABSOLUTE: 0.1 THOU/MM3 (ref 0–0.4)
EPITHELIAL CELLS, UA: ABNORMAL /HPF
GFR SERPL CREATININE-BSD FRML MDRD: 16 ML/MIN/1.73M2
GFR SERPL CREATININE-BSD FRML MDRD: 16 ML/MIN/1.73M2
GLUCOSE BLD-MCNC: 322 MG/DL (ref 70–108)
GLUCOSE BLD-MCNC: 334 MG/DL (ref 70–108)
GLUCOSE BLD-MCNC: 347 MG/DL (ref 70–108)
GLUCOSE BLD-MCNC: 473 MG/DL (ref 70–108)
GLUCOSE, URINE: >= 1000 MG/DL
HCO3, MIXED: 25 MMOL/L (ref 23–28)
HCT VFR BLD CALC: 33.2 % (ref 42–52)
HEMOGLOBIN: 11.2 GM/DL (ref 14–18)
KETONES, URINE: 15
LACTIC ACID: 1.8 MMOL/L (ref 0.5–2.2)
LEUKOCYTE EST, POC: NEGATIVE
LIPASE: 17.6 U/L (ref 5.6–51.3)
LYMPHOCYTES # BLD: 17 %
LYMPHOCYTES ABSOLUTE: 1.3 THOU/MM3 (ref 1–4.8)
MAGNESIUM: 1.9 MG/DL (ref 1.6–2.4)
MAGNESIUM: 1.9 MG/DL (ref 1.6–2.4)
MCH RBC QN AUTO: 27 PG (ref 27–31)
MCHC RBC AUTO-ENTMCNC: 33.7 GM/DL (ref 33–37)
MCV RBC AUTO: 80.1 FL (ref 80–94)
MICROCYTES: ABNORMAL
MISCELLANEOUS LAB TEST RESULT: ABNORMAL
MONOCYTES # BLD: 9.7 %
MONOCYTES ABSOLUTE: 0.7 THOU/MM3 (ref 0.4–1.3)
NITRITE, URINE: NEGATIVE
NUCLEATED RED BLOOD CELLS: 0 /100 WBC
O2 SAT, MIXED: 78 %
OSMOLALITY CALCULATION: 292.4 MOSMOL/KG (ref 275–300)
PCO2, MIXED VENOUS: 43 MMHG (ref 41–51)
PDW BLD-RTO: 18.5 % (ref 11.5–14.5)
PH UA: 6
PH, MIXED: 7.38 (ref 7.31–7.41)
PHOSPHORUS: 3.9 MG/DL (ref 2.4–4.7)
PLATELET # BLD: 322 THOU/MM3 (ref 130–400)
PMV BLD AUTO: 7.5 MCM (ref 7.4–10.4)
PO2 MIXED: 44 MMHG (ref 25–40)
POTASSIUM SERPL-SCNC: 3.5 MEQ/L (ref 3.5–5.2)
POTASSIUM SERPL-SCNC: 3.9 MEQ/L (ref 3.5–5.2)
PROTEIN UA: 100 MG/DL
RBC # BLD: 4.14 MILL/MM3 (ref 4.7–6.1)
RBC # BLD: NORMAL 10*6/UL
RBC URINE: ABNORMAL /HPF
RENAL EPITHELIAL, UA: ABNORMAL
SEG NEUTROPHILS: 71.8 %
SEGMENTED NEUTROPHILS ABSOLUTE COUNT: 5.3 THOU/MM3 (ref 1.8–7.7)
SITE: ABNORMAL
SODIUM BLD-SCNC: 129 MEQ/L (ref 135–145)
SODIUM BLD-SCNC: 133 MEQ/L (ref 135–145)
SPECIFIC GRAVITY UA: 1.01 (ref 1–1.03)
TOTAL PROTEIN: 6.2 G/DL (ref 6.1–8)
UROBILINOGEN, URINE: 0.2 EU/DL
WBC # BLD: 7.4 THOU/MM3 (ref 4.8–10.8)
WBC UA: ABNORMAL /HPF
YEAST: ABNORMAL

## 2017-08-14 PROCEDURE — 83690 ASSAY OF LIPASE: CPT

## 2017-08-14 PROCEDURE — 1200000003 HC TELEMETRY R&B

## 2017-08-14 PROCEDURE — 2580000003 HC RX 258: Performed by: EMERGENCY MEDICINE

## 2017-08-14 PROCEDURE — 84100 ASSAY OF PHOSPHORUS: CPT

## 2017-08-14 PROCEDURE — 71020 XR CHEST STANDARD TWO VW: CPT

## 2017-08-14 PROCEDURE — 36415 COLL VENOUS BLD VENIPUNCTURE: CPT

## 2017-08-14 PROCEDURE — 82330 ASSAY OF CALCIUM: CPT

## 2017-08-14 PROCEDURE — 82948 REAGENT STRIP/BLOOD GLUCOSE: CPT

## 2017-08-14 PROCEDURE — 6370000000 HC RX 637 (ALT 250 FOR IP): Performed by: EMERGENCY MEDICINE

## 2017-08-14 PROCEDURE — 99285 EMERGENCY DEPT VISIT HI MDM: CPT

## 2017-08-14 PROCEDURE — 81001 URINALYSIS AUTO W/SCOPE: CPT

## 2017-08-14 PROCEDURE — 85025 COMPLETE CBC W/AUTO DIFF WBC: CPT

## 2017-08-14 PROCEDURE — 82248 BILIRUBIN DIRECT: CPT

## 2017-08-14 PROCEDURE — 83735 ASSAY OF MAGNESIUM: CPT

## 2017-08-14 PROCEDURE — 93005 ELECTROCARDIOGRAM TRACING: CPT

## 2017-08-14 PROCEDURE — 96361 HYDRATE IV INFUSION ADD-ON: CPT

## 2017-08-14 PROCEDURE — 80053 COMPREHEN METABOLIC PANEL: CPT

## 2017-08-14 PROCEDURE — 83605 ASSAY OF LACTIC ACID: CPT

## 2017-08-14 PROCEDURE — 80048 BASIC METABOLIC PNL TOTAL CA: CPT

## 2017-08-14 PROCEDURE — 87086 URINE CULTURE/COLONY COUNT: CPT

## 2017-08-14 PROCEDURE — 83036 HEMOGLOBIN GLYCOSYLATED A1C: CPT

## 2017-08-14 PROCEDURE — 96360 HYDRATION IV INFUSION INIT: CPT

## 2017-08-14 RX ORDER — SODIUM CHLORIDE, SODIUM LACTATE, CALCIUM CHLORIDE, MAGNESIUM CHLORIDE AND DEXTROSE 1.5; 538; 448; 18.3; 5.08 G/100ML; MG/100ML; MG/100ML; MG/100ML; MG/100ML
INJECTION, SOLUTION INTRAPERITONEAL EVERY 6 HOURS
Status: DISCONTINUED | OUTPATIENT
Start: 2017-08-15 | End: 2017-08-15

## 2017-08-14 RX ORDER — ASPIRIN 81 MG/1
81 TABLET, CHEWABLE ORAL DAILY
Status: DISCONTINUED | OUTPATIENT
Start: 2017-08-15 | End: 2017-08-16 | Stop reason: HOSPADM

## 2017-08-14 RX ORDER — PANTOPRAZOLE SODIUM 40 MG/1
40 TABLET, DELAYED RELEASE ORAL
Status: DISCONTINUED | OUTPATIENT
Start: 2017-08-15 | End: 2017-08-16 | Stop reason: HOSPADM

## 2017-08-14 RX ORDER — SODIUM CHLORIDE 450 MG/100ML
INJECTION, SOLUTION INTRAVENOUS CONTINUOUS
Status: DISCONTINUED | OUTPATIENT
Start: 2017-08-14 | End: 2017-08-14

## 2017-08-14 RX ORDER — DEXTROSE AND SODIUM CHLORIDE 5; .45 G/100ML; G/100ML
INJECTION, SOLUTION INTRAVENOUS CONTINUOUS PRN
Status: DISCONTINUED | OUTPATIENT
Start: 2017-08-14 | End: 2017-08-16 | Stop reason: HOSPADM

## 2017-08-14 RX ORDER — POTASSIUM CHLORIDE 7.45 MG/ML
10 INJECTION INTRAVENOUS PRN
Status: DISCONTINUED | OUTPATIENT
Start: 2017-08-14 | End: 2017-08-14 | Stop reason: SDUPTHER

## 2017-08-14 RX ORDER — FUROSEMIDE 40 MG/1
40 TABLET ORAL 2 TIMES DAILY
Status: DISCONTINUED | OUTPATIENT
Start: 2017-08-15 | End: 2017-08-16

## 2017-08-14 RX ORDER — DEXTROSE, SODIUM CHLORIDE, AND POTASSIUM CHLORIDE 5; .45; .15 G/100ML; G/100ML; G/100ML
INJECTION INTRAVENOUS CONTINUOUS PRN
Status: DISCONTINUED | OUTPATIENT
Start: 2017-08-14 | End: 2017-08-14

## 2017-08-14 RX ORDER — CETIRIZINE HYDROCHLORIDE 10 MG/1
10 TABLET ORAL DAILY
Status: DISCONTINUED | OUTPATIENT
Start: 2017-08-15 | End: 2017-08-16 | Stop reason: HOSPADM

## 2017-08-14 RX ORDER — LATANOPROST 50 UG/ML
1 SOLUTION/ DROPS OPHTHALMIC NIGHTLY
Status: DISCONTINUED | OUTPATIENT
Start: 2017-08-15 | End: 2017-08-16 | Stop reason: HOSPADM

## 2017-08-14 RX ORDER — 0.9 % SODIUM CHLORIDE 0.9 %
1000 INTRAVENOUS SOLUTION INTRAVENOUS ONCE
Status: COMPLETED | OUTPATIENT
Start: 2017-08-14 | End: 2017-08-14

## 2017-08-14 RX ORDER — DEXTROSE MONOHYDRATE 25 G/50ML
12.5 INJECTION, SOLUTION INTRAVENOUS PRN
Status: DISCONTINUED | OUTPATIENT
Start: 2017-08-14 | End: 2017-08-14 | Stop reason: SDUPTHER

## 2017-08-14 RX ORDER — METHYLPHENIDATE HYDROCHLORIDE 5 MG/1
10 TABLET ORAL 2 TIMES DAILY
Status: DISCONTINUED | OUTPATIENT
Start: 2017-08-15 | End: 2017-08-16 | Stop reason: HOSPADM

## 2017-08-14 RX ORDER — SODIUM CHLORIDE 9 MG/ML
INJECTION, SOLUTION INTRAVENOUS CONTINUOUS
Status: DISCONTINUED | OUTPATIENT
Start: 2017-08-14 | End: 2017-08-15

## 2017-08-14 RX ORDER — SIMVASTATIN 20 MG
20 TABLET ORAL EVERY EVENING
Status: DISCONTINUED | OUTPATIENT
Start: 2017-08-15 | End: 2017-08-16 | Stop reason: HOSPADM

## 2017-08-14 RX ORDER — MYCOPHENOLIC ACID 180 MG/1
360 TABLET, DELAYED RELEASE ORAL 2 TIMES DAILY
Status: DISCONTINUED | OUTPATIENT
Start: 2017-08-15 | End: 2017-08-16 | Stop reason: HOSPADM

## 2017-08-14 RX ORDER — INSULIN GLARGINE 100 [IU]/ML
15 INJECTION, SOLUTION SUBCUTANEOUS EVERY MORNING
Status: DISCONTINUED | OUTPATIENT
Start: 2017-08-15 | End: 2017-08-15

## 2017-08-14 RX ORDER — HEPARIN SODIUM 5000 [USP'U]/ML
5000 INJECTION, SOLUTION INTRAVENOUS; SUBCUTANEOUS 2 TIMES DAILY
Status: DISCONTINUED | OUTPATIENT
Start: 2017-08-15 | End: 2017-08-16 | Stop reason: HOSPADM

## 2017-08-14 RX ORDER — SODIUM CHLORIDE 9 MG/ML
INJECTION, SOLUTION INTRAVENOUS CONTINUOUS
Status: DISCONTINUED | OUTPATIENT
Start: 2017-08-14 | End: 2017-08-14

## 2017-08-14 RX ORDER — POTASSIUM CHLORIDE 7.45 MG/ML
10 INJECTION INTRAVENOUS PRN
Status: DISCONTINUED | OUTPATIENT
Start: 2017-08-14 | End: 2017-08-15 | Stop reason: ALTCHOICE

## 2017-08-14 RX ORDER — SIROLIMUS 1 MG/1
4 TABLET, FILM COATED ORAL DAILY
Status: DISCONTINUED | OUTPATIENT
Start: 2017-08-15 | End: 2017-08-16 | Stop reason: HOSPADM

## 2017-08-14 RX ORDER — CARVEDILOL 25 MG/1
50 TABLET ORAL 2 TIMES DAILY
Status: DISCONTINUED | OUTPATIENT
Start: 2017-08-15 | End: 2017-08-16 | Stop reason: HOSPADM

## 2017-08-14 RX ORDER — DEXTROSE MONOHYDRATE 25 G/50ML
12.5 INJECTION, SOLUTION INTRAVENOUS PRN
Status: DISCONTINUED | OUTPATIENT
Start: 2017-08-14 | End: 2017-08-15 | Stop reason: SDUPTHER

## 2017-08-14 RX ADMIN — SODIUM CHLORIDE 1000 ML: 9 INJECTION, SOLUTION INTRAVENOUS at 21:01

## 2017-08-14 RX ADMIN — SODIUM CHLORIDE 1000 ML: 9 INJECTION, SOLUTION INTRAVENOUS at 18:33

## 2017-08-14 RX ADMIN — SODIUM CHLORIDE 5.2 UNITS/HR: 9 INJECTION, SOLUTION INTRAVENOUS at 23:14

## 2017-08-14 ASSESSMENT — ENCOUNTER SYMPTOMS
WHEEZING: 0
DIARRHEA: 0
RHINORRHEA: 0
VOMITING: 0
SHORTNESS OF BREATH: 0
BACK PAIN: 0
NAUSEA: 0
COUGH: 1
SORE THROAT: 0
ABDOMINAL PAIN: 0
EYE PAIN: 0
EYE DISCHARGE: 1
EYE REDNESS: 0

## 2017-08-15 LAB
ANION GAP SERPL CALCULATED.3IONS-SCNC: 16 MEQ/L (ref 8–16)
ANION GAP SERPL CALCULATED.3IONS-SCNC: 17 MEQ/L (ref 8–16)
ANISOCYTOSIS: ABNORMAL
AVERAGE GLUCOSE: 303 MG/DL (ref 70–126)
BASE EXCESS MIXED: -2.9 MMOL/L (ref -2–3)
BASE EXCESS MIXED: -3.8 MMOL/L (ref -2–3)
BASOPHILS # BLD: 0.7 %
BASOPHILS ABSOLUTE: 0 THOU/MM3 (ref 0–0.1)
BUN BLDV-MCNC: 39 MG/DL (ref 7–22)
BUN BLDV-MCNC: 39 MG/DL (ref 7–22)
BUN BLDV-MCNC: 40 MG/DL (ref 7–22)
BUN BLDV-MCNC: 42 MG/DL (ref 7–22)
CALCIUM IONIZED: 1.1 MMOL/L (ref 1.12–1.32)
CALCIUM IONIZED: 1.13 MMOL/L (ref 1.12–1.32)
CALCIUM IONIZED: 1.17 MMOL/L (ref 1.12–1.32)
CALCIUM SERPL-MCNC: 8.4 MG/DL (ref 8.5–10.5)
CALCIUM SERPL-MCNC: 8.6 MG/DL (ref 8.5–10.5)
CALCIUM SERPL-MCNC: 8.7 MG/DL (ref 8.5–10.5)
CALCIUM SERPL-MCNC: 8.8 MG/DL (ref 8.5–10.5)
CHLORIDE BLD-SCNC: 100 MEQ/L (ref 98–111)
CHLORIDE BLD-SCNC: 100 MEQ/L (ref 98–111)
CHLORIDE BLD-SCNC: 96 MEQ/L (ref 98–111)
CHLORIDE BLD-SCNC: 98 MEQ/L (ref 98–111)
CO2: 19 MEQ/L (ref 23–33)
CO2: 20 MEQ/L (ref 23–33)
CO2: 20 MEQ/L (ref 23–33)
CO2: 21 MEQ/L (ref 23–33)
COLLECTED BY:: ABNORMAL
COLLECTED BY:: ABNORMAL
COMMENT: ABNORMAL
CREAT SERPL-MCNC: 4.5 MG/DL (ref 0.4–1.2)
CREAT SERPL-MCNC: 4.6 MG/DL (ref 0.4–1.2)
CREAT SERPL-MCNC: 4.8 MG/DL (ref 0.4–1.2)
CREAT SERPL-MCNC: 5 MG/DL (ref 0.4–1.2)
DEVICE: ABNORMAL
EKG ATRIAL RATE: 85 BPM
EKG P AXIS: 37 DEGREES
EKG P-R INTERVAL: 146 MS
EKG Q-T INTERVAL: 412 MS
EKG QRS DURATION: 86 MS
EKG QTC CALCULATION (BAZETT): 490 MS
EKG R AXIS: 25 DEGREES
EKG T AXIS: -63 DEGREES
EKG VENTRICULAR RATE: 85 BPM
EOSINOPHIL # BLD: 3 %
EOSINOPHILS ABSOLUTE: 0.2 THOU/MM3 (ref 0–0.4)
FERRITIN: 665 NG/ML (ref 22–322)
GFR SERPL CREATININE-BSD FRML MDRD: 16 ML/MIN/1.73M2
GFR SERPL CREATININE-BSD FRML MDRD: 16 ML/MIN/1.73M2
GFR SERPL CREATININE-BSD FRML MDRD: 17 ML/MIN/1.73M2
GFR SERPL CREATININE-BSD FRML MDRD: 18 ML/MIN/1.73M2
GLUCOSE BLD-MCNC: 108 MG/DL (ref 70–108)
GLUCOSE BLD-MCNC: 112 MG/DL (ref 70–108)
GLUCOSE BLD-MCNC: 118 MG/DL (ref 70–108)
GLUCOSE BLD-MCNC: 133 MG/DL (ref 70–108)
GLUCOSE BLD-MCNC: 137 MG/DL (ref 70–108)
GLUCOSE BLD-MCNC: 140 MG/DL (ref 70–108)
GLUCOSE BLD-MCNC: 141 MG/DL (ref 70–108)
GLUCOSE BLD-MCNC: 172 MG/DL (ref 70–108)
GLUCOSE BLD-MCNC: 179 MG/DL (ref 70–108)
GLUCOSE BLD-MCNC: 192 MG/DL (ref 70–108)
GLUCOSE BLD-MCNC: 200 MG/DL (ref 70–108)
GLUCOSE BLD-MCNC: 204 MG/DL (ref 70–108)
GLUCOSE BLD-MCNC: 218 MG/DL (ref 70–108)
GLUCOSE BLD-MCNC: 270 MG/DL (ref 70–108)
GLUCOSE BLD-MCNC: 276 MG/DL (ref 70–108)
GLUCOSE BLD-MCNC: 351 MG/DL (ref 70–108)
GLUCOSE BLD-MCNC: 381 MG/DL (ref 70–108)
GLUCOSE BLD-MCNC: 456 MG/DL (ref 70–108)
GLUCOSE BLD-MCNC: 478 MG/DL (ref 70–108)
GLUCOSE BLD-MCNC: 79 MG/DL (ref 70–108)
GRAM STAIN RESULT: NORMAL
HBA1C MFR BLD: 12.1 % (ref 4.4–6.4)
HCO3, MIXED: 22 MMOL/L (ref 23–28)
HCO3, MIXED: 23 MMOL/L (ref 23–28)
HCT VFR BLD CALC: 34.8 % (ref 42–52)
HEMOGLOBIN: 11.1 GM/DL (ref 14–18)
HYPOCHROMIA: ABNORMAL
IRON SATURATION: 65 % (ref 20–50)
IRON: 131 UG/DL (ref 65–195)
LYMPHOCYTES # BLD: 23.1 %
LYMPHOCYTES ABSOLUTE: 1.5 THOU/MM3 (ref 1–4.8)
MAGNESIUM: 1.7 MG/DL (ref 1.6–2.4)
MAGNESIUM: 1.8 MG/DL (ref 1.6–2.4)
MAGNESIUM: 1.8 MG/DL (ref 1.6–2.4)
MCH RBC QN AUTO: 26.1 PG (ref 27–31)
MCHC RBC AUTO-ENTMCNC: 32 GM/DL (ref 33–37)
MCV RBC AUTO: 81.6 FL (ref 80–94)
MONOCYTES # BLD: 12.9 %
MONOCYTES ABSOLUTE: 0.8 THOU/MM3 (ref 0.4–1.3)
NUCLEATED RED BLOOD CELLS: 0 /100 WBC
O2 SAT, MIXED: 65 %
O2 SAT, MIXED: 78 %
ORGANISM: ABNORMAL
PCO2, MIXED VENOUS: 43 MMHG (ref 41–51)
PCO2, MIXED VENOUS: 45 MMHG (ref 41–51)
PDW BLD-RTO: 18.1 % (ref 11.5–14.5)
PH, MIXED: 7.32 (ref 7.31–7.41)
PH, MIXED: 7.32 (ref 7.31–7.41)
PHOSPHORUS: 3.3 MG/DL (ref 2.4–4.7)
PHOSPHORUS: 3.4 MG/DL (ref 2.4–4.7)
PHOSPHORUS: 3.6 MG/DL (ref 2.4–4.7)
PLATELET # BLD: 309 THOU/MM3 (ref 130–400)
PMV BLD AUTO: 7.2 MCM (ref 7.4–10.4)
PO2 MIXED: 36 MMHG (ref 25–40)
PO2 MIXED: 46 MMHG (ref 25–40)
POTASSIUM SERPL-SCNC: 3.2 MEQ/L (ref 3.5–5.2)
POTASSIUM SERPL-SCNC: 3.4 MEQ/L (ref 3.5–5.2)
POTASSIUM SERPL-SCNC: 3.6 MEQ/L (ref 3.5–5.2)
POTASSIUM SERPL-SCNC: 4 MEQ/L (ref 3.5–5.2)
RBC # BLD: 4.26 MILL/MM3 (ref 4.7–6.1)
RBC # BLD: NORMAL 10*6/UL
SEG NEUTROPHILS: 60.3 %
SEGMENTED NEUTROPHILS ABSOLUTE COUNT: 3.9 THOU/MM3 (ref 1.8–7.7)
SITE: ABNORMAL
SODIUM BLD-SCNC: 132 MEQ/L (ref 135–145)
SODIUM BLD-SCNC: 135 MEQ/L (ref 135–145)
SODIUM BLD-SCNC: 137 MEQ/L (ref 135–145)
SODIUM BLD-SCNC: 137 MEQ/L (ref 135–145)
TOTAL IRON BINDING CAPACITY: 203 UG/DL (ref 171–450)
TSH SERPL DL<=0.05 MIU/L-ACNC: 0.59 UIU/ML (ref 0.4–4.2)
URINE CULTURE, ROUTINE: ABNORMAL
WBC # BLD: 6.4 THOU/MM3 (ref 4.8–10.8)

## 2017-08-15 PROCEDURE — 87075 CULTR BACTERIA EXCEPT BLOOD: CPT

## 2017-08-15 PROCEDURE — 1200000003 HC TELEMETRY R&B

## 2017-08-15 PROCEDURE — 99253 IP/OBS CNSLTJ NEW/EST LOW 45: CPT | Performed by: NURSE PRACTITIONER

## 2017-08-15 PROCEDURE — 84100 ASSAY OF PHOSPHORUS: CPT

## 2017-08-15 PROCEDURE — 36415 COLL VENOUS BLD VENIPUNCTURE: CPT

## 2017-08-15 PROCEDURE — 3E1M39Z IRRIGATION OF PERITONEAL CAVITY USING DIALYSATE, PERCUTANEOUS APPROACH: ICD-10-PCS | Performed by: INTERNAL MEDICINE

## 2017-08-15 PROCEDURE — 82728 ASSAY OF FERRITIN: CPT

## 2017-08-15 PROCEDURE — 87205 SMEAR GRAM STAIN: CPT

## 2017-08-15 PROCEDURE — 6370000000 HC RX 637 (ALT 250 FOR IP): Performed by: INTERNAL MEDICINE

## 2017-08-15 PROCEDURE — 82948 REAGENT STRIP/BLOOD GLUCOSE: CPT

## 2017-08-15 PROCEDURE — 82330 ASSAY OF CALCIUM: CPT

## 2017-08-15 PROCEDURE — 6360000002 HC RX W HCPCS: Performed by: ANESTHESIOLOGY

## 2017-08-15 PROCEDURE — 83550 IRON BINDING TEST: CPT

## 2017-08-15 PROCEDURE — 87147 CULTURE TYPE IMMUNOLOGIC: CPT

## 2017-08-15 PROCEDURE — 6360000002 HC RX W HCPCS: Performed by: INTERNAL MEDICINE

## 2017-08-15 PROCEDURE — 83735 ASSAY OF MAGNESIUM: CPT

## 2017-08-15 PROCEDURE — 99223 1ST HOSP IP/OBS HIGH 75: CPT | Performed by: ANESTHESIOLOGY

## 2017-08-15 PROCEDURE — 90945 DIALYSIS ONE EVALUATION: CPT | Performed by: NURSE PRACTITIONER

## 2017-08-15 PROCEDURE — 84443 ASSAY THYROID STIM HORMONE: CPT

## 2017-08-15 PROCEDURE — 2580000003 HC RX 258: Performed by: NURSE PRACTITIONER

## 2017-08-15 PROCEDURE — 6370000000 HC RX 637 (ALT 250 FOR IP): Performed by: ANESTHESIOLOGY

## 2017-08-15 PROCEDURE — 87070 CULTURE OTHR SPECIMN AEROBIC: CPT

## 2017-08-15 PROCEDURE — 87077 CULTURE AEROBIC IDENTIFY: CPT

## 2017-08-15 PROCEDURE — 93005 ELECTROCARDIOGRAM TRACING: CPT

## 2017-08-15 PROCEDURE — 2580000003 HC RX 258: Performed by: INTERNAL MEDICINE

## 2017-08-15 PROCEDURE — 90945 DIALYSIS ONE EVALUATION: CPT

## 2017-08-15 PROCEDURE — 85025 COMPLETE CBC W/AUTO DIFF WBC: CPT

## 2017-08-15 PROCEDURE — 83540 ASSAY OF IRON: CPT

## 2017-08-15 PROCEDURE — 94762 N-INVAS EAR/PLS OXIMTRY CONT: CPT

## 2017-08-15 PROCEDURE — 2700000000 HC OXYGEN THERAPY PER DAY

## 2017-08-15 PROCEDURE — 94761 N-INVAS EAR/PLS OXIMETRY MLT: CPT

## 2017-08-15 PROCEDURE — 2580000003 HC RX 258: Performed by: ANESTHESIOLOGY

## 2017-08-15 PROCEDURE — 99232 SBSQ HOSP IP/OBS MODERATE 35: CPT | Performed by: INTERNAL MEDICINE

## 2017-08-15 PROCEDURE — 6360000002 HC RX W HCPCS: Performed by: NURSE PRACTITIONER

## 2017-08-15 PROCEDURE — 87186 SC STD MICRODIL/AGAR DIL: CPT

## 2017-08-15 PROCEDURE — 80048 BASIC METABOLIC PNL TOTAL CA: CPT

## 2017-08-15 RX ORDER — DEXTROSE MONOHYDRATE 25 G/50ML
12.5 INJECTION, SOLUTION INTRAVENOUS PRN
Status: DISCONTINUED | OUTPATIENT
Start: 2017-08-15 | End: 2017-08-16 | Stop reason: HOSPADM

## 2017-08-15 RX ORDER — NICOTINE POLACRILEX 4 MG
15 LOZENGE BUCCAL PRN
Status: DISCONTINUED | OUTPATIENT
Start: 2017-08-15 | End: 2017-08-15 | Stop reason: SDUPTHER

## 2017-08-15 RX ORDER — NICOTINE POLACRILEX 4 MG
15 LOZENGE BUCCAL PRN
Status: DISCONTINUED | OUTPATIENT
Start: 2017-08-15 | End: 2017-08-16 | Stop reason: HOSPADM

## 2017-08-15 RX ORDER — POTASSIUM CHLORIDE 20 MEQ/1
40 TABLET, EXTENDED RELEASE ORAL ONCE
Status: COMPLETED | OUTPATIENT
Start: 2017-08-15 | End: 2017-08-15

## 2017-08-15 RX ORDER — POTASSIUM CHLORIDE 7.45 MG/ML
10 INJECTION INTRAVENOUS PRN
Status: DISCONTINUED | OUTPATIENT
Start: 2017-08-15 | End: 2017-08-16 | Stop reason: HOSPADM

## 2017-08-15 RX ORDER — POTASSIUM CHLORIDE 20MEQ/15ML
40 LIQUID (ML) ORAL PRN
Status: DISCONTINUED | OUTPATIENT
Start: 2017-08-15 | End: 2017-08-16 | Stop reason: HOSPADM

## 2017-08-15 RX ORDER — INSULIN GLARGINE 100 [IU]/ML
45 INJECTION, SOLUTION SUBCUTANEOUS EVERY MORNING
Status: DISCONTINUED | OUTPATIENT
Start: 2017-08-15 | End: 2017-08-16

## 2017-08-15 RX ORDER — DEXTROSE MONOHYDRATE 50 MG/ML
100 INJECTION, SOLUTION INTRAVENOUS PRN
Status: DISCONTINUED | OUTPATIENT
Start: 2017-08-15 | End: 2017-08-16 | Stop reason: HOSPADM

## 2017-08-15 RX ORDER — DEXTROSE MONOHYDRATE 25 G/50ML
12.5 INJECTION, SOLUTION INTRAVENOUS PRN
Status: DISCONTINUED | OUTPATIENT
Start: 2017-08-15 | End: 2017-08-15 | Stop reason: SDUPTHER

## 2017-08-15 RX ORDER — POTASSIUM CHLORIDE 20 MEQ/1
40 TABLET, EXTENDED RELEASE ORAL PRN
Status: DISCONTINUED | OUTPATIENT
Start: 2017-08-15 | End: 2017-08-16 | Stop reason: HOSPADM

## 2017-08-15 RX ORDER — SODIUM CHLORIDE, SODIUM LACTATE, CALCIUM CHLORIDE, MAGNESIUM CHLORIDE AND DEXTROSE 1.5; 538; 448; 18.3; 5.08 G/100ML; MG/100ML; MG/100ML; MG/100ML; MG/100ML
INJECTION, SOLUTION INTRAPERITONEAL EVERY 6 HOURS
Status: DISCONTINUED | OUTPATIENT
Start: 2017-08-17 | End: 2017-08-16 | Stop reason: HOSPADM

## 2017-08-15 RX ORDER — DEXTROSE MONOHYDRATE 50 MG/ML
100 INJECTION, SOLUTION INTRAVENOUS PRN
Status: DISCONTINUED | OUTPATIENT
Start: 2017-08-15 | End: 2017-08-15 | Stop reason: SDUPTHER

## 2017-08-15 RX ADMIN — METHYLPHENIDATE HYDROCHLORIDE 10 MG: 5 TABLET ORAL at 08:14

## 2017-08-15 RX ADMIN — CARVEDILOL 50 MG: 25 TABLET, FILM COATED ORAL at 20:23

## 2017-08-15 RX ADMIN — SIROLIMUS 4 MG: 1 TABLET ORAL at 12:54

## 2017-08-15 RX ADMIN — SODIUM CHLORIDE, SODIUM LACTATE, CALCIUM CHLORIDE, MAGNESIUM CHLORIDE AND DEXTROSE 2000 ML: 1.5; 538; 448; 18.3; 5.08 INJECTION, SOLUTION INTRAPERITONEAL at 03:30

## 2017-08-15 RX ADMIN — SODIUM CHLORIDE: 9 INJECTION, SOLUTION INTRAVENOUS at 00:55

## 2017-08-15 RX ADMIN — ASPIRIN 81 MG: 81 TABLET, CHEWABLE ORAL at 08:15

## 2017-08-15 RX ADMIN — MYCOPHENOLIC ACID 360 MG: 180 TABLET, DELAYED RELEASE ORAL at 20:23

## 2017-08-15 RX ADMIN — LATANOPROST 1 DROP: 50 SOLUTION/ DROPS OPHTHALMIC at 00:53

## 2017-08-15 RX ADMIN — MYCOPHENOLIC ACID 360 MG: 180 TABLET, DELAYED RELEASE ORAL at 08:14

## 2017-08-15 RX ADMIN — PANTOPRAZOLE SODIUM 40 MG: 40 TABLET, DELAYED RELEASE ORAL at 03:24

## 2017-08-15 RX ADMIN — HEPARIN SODIUM: 1000 INJECTION, SOLUTION INTRAVENOUS; SUBCUTANEOUS at 14:32

## 2017-08-15 RX ADMIN — MYCOPHENOLIC ACID 360 MG: 180 TABLET, DELAYED RELEASE ORAL at 00:53

## 2017-08-15 RX ADMIN — INSULIN GLARGINE 45 UNITS: 100 INJECTION, SOLUTION SUBCUTANEOUS at 12:24

## 2017-08-15 RX ADMIN — POTASSIUM CHLORIDE 40 MEQ: 1500 TABLET, EXTENDED RELEASE ORAL at 12:54

## 2017-08-15 RX ADMIN — SIMVASTATIN 20 MG: 20 TABLET, FILM COATED ORAL at 20:24

## 2017-08-15 RX ADMIN — LATANOPROST 1 DROP: 50 SOLUTION/ DROPS OPHTHALMIC at 20:25

## 2017-08-15 RX ADMIN — CLONIDINE HYDROCHLORIDE 0.3 MG: 0.2 TABLET ORAL at 00:52

## 2017-08-15 RX ADMIN — Medication 18 UNITS: at 13:16

## 2017-08-15 RX ADMIN — CALCIUM GLUCONATE 1 G: 94 INJECTION, SOLUTION INTRAVENOUS at 14:32

## 2017-08-15 RX ADMIN — CARVEDILOL 50 MG: 25 TABLET, FILM COATED ORAL at 00:53

## 2017-08-15 RX ADMIN — CLONIDINE HYDROCHLORIDE 0.3 MG: 0.2 TABLET ORAL at 08:15

## 2017-08-15 RX ADMIN — Medication 9 UNITS: at 16:36

## 2017-08-15 RX ADMIN — INSULIN LISPRO 2 UNITS: 100 INJECTION, SOLUTION INTRAVENOUS; SUBCUTANEOUS at 20:24

## 2017-08-15 RX ADMIN — DEXTROSE AND SODIUM CHLORIDE: 5; 450 INJECTION, SOLUTION INTRAVENOUS at 01:23

## 2017-08-15 RX ADMIN — SIMVASTATIN 20 MG: 20 TABLET, FILM COATED ORAL at 00:58

## 2017-08-15 RX ADMIN — HEPARIN SODIUM: 1000 INJECTION, SOLUTION INTRAVENOUS; SUBCUTANEOUS at 21:52

## 2017-08-15 RX ADMIN — CLONIDINE HYDROCHLORIDE 0.3 MG: 0.2 TABLET ORAL at 20:24

## 2017-08-15 RX ADMIN — CARVEDILOL 50 MG: 25 TABLET, FILM COATED ORAL at 08:15

## 2017-08-15 RX ADMIN — SODIUM CHLORIDE, SODIUM LACTATE, CALCIUM CHLORIDE, MAGNESIUM CHLORIDE AND DEXTROSE 2000 ML: 1.5; 538; 448; 18.3; 5.08 INJECTION, SOLUTION INTRAPERITONEAL at 09:34

## 2017-08-16 VITALS
SYSTOLIC BLOOD PRESSURE: 141 MMHG | BODY MASS INDEX: 26.5 KG/M2 | DIASTOLIC BLOOD PRESSURE: 97 MMHG | TEMPERATURE: 98.1 F | RESPIRATION RATE: 18 BRPM | HEART RATE: 91 BPM | OXYGEN SATURATION: 98 % | WEIGHT: 178.9 LBS | HEIGHT: 69 IN

## 2017-08-16 LAB
ANION GAP SERPL CALCULATED.3IONS-SCNC: 13 MEQ/L (ref 8–16)
BUN BLDV-MCNC: 37 MG/DL (ref 7–22)
CALCIUM IONIZED: 1.18 MMOL/L (ref 1.12–1.32)
CALCIUM SERPL-MCNC: 8.6 MG/DL (ref 8.5–10.5)
CHLORIDE BLD-SCNC: 101 MEQ/L (ref 98–111)
CO2: 22 MEQ/L (ref 23–33)
CREAT SERPL-MCNC: 4.7 MG/DL (ref 0.4–1.2)
EKG ATRIAL RATE: 76 BPM
EKG P AXIS: 27 DEGREES
EKG P-R INTERVAL: 140 MS
EKG Q-T INTERVAL: 446 MS
EKG QRS DURATION: 88 MS
EKG QTC CALCULATION (BAZETT): 501 MS
EKG R AXIS: 16 DEGREES
EKG T AXIS: -52 DEGREES
EKG VENTRICULAR RATE: 76 BPM
GFR SERPL CREATININE-BSD FRML MDRD: 17 ML/MIN/1.73M2
GLUCOSE BLD-MCNC: 134 MG/DL (ref 70–108)
GLUCOSE BLD-MCNC: 156 MG/DL (ref 70–108)
GLUCOSE BLD-MCNC: 30 MG/DL (ref 70–108)
GLUCOSE BLD-MCNC: 32 MG/DL (ref 70–108)
GLUCOSE BLD-MCNC: 65 MG/DL (ref 70–108)
GLUCOSE BLD-MCNC: 79 MG/DL (ref 70–108)
GLUCOSE BLD-MCNC: 88 MG/DL (ref 70–108)
GLUCOSE BLD-MCNC: 94 MG/DL (ref 70–108)
POTASSIUM SERPL-SCNC: 3.4 MEQ/L (ref 3.5–5.2)
SODIUM BLD-SCNC: 136 MEQ/L (ref 135–145)

## 2017-08-16 PROCEDURE — 2580000003 HC RX 258: Performed by: INTERNAL MEDICINE

## 2017-08-16 PROCEDURE — 6360000002 HC RX W HCPCS: Performed by: INTERNAL MEDICINE

## 2017-08-16 PROCEDURE — 82330 ASSAY OF CALCIUM: CPT

## 2017-08-16 PROCEDURE — 99239 HOSP IP/OBS DSCHRG MGMT >30: CPT | Performed by: INTERNAL MEDICINE

## 2017-08-16 PROCEDURE — 82948 REAGENT STRIP/BLOOD GLUCOSE: CPT

## 2017-08-16 PROCEDURE — 6370000000 HC RX 637 (ALT 250 FOR IP): Performed by: ANESTHESIOLOGY

## 2017-08-16 PROCEDURE — 6370000000 HC RX 637 (ALT 250 FOR IP): Performed by: NURSE PRACTITIONER

## 2017-08-16 PROCEDURE — 90945 DIALYSIS ONE EVALUATION: CPT | Performed by: NURSE PRACTITIONER

## 2017-08-16 PROCEDURE — 6370000000 HC RX 637 (ALT 250 FOR IP): Performed by: INTERNAL MEDICINE

## 2017-08-16 PROCEDURE — 36415 COLL VENOUS BLD VENIPUNCTURE: CPT

## 2017-08-16 PROCEDURE — 80048 BASIC METABOLIC PNL TOTAL CA: CPT

## 2017-08-16 PROCEDURE — 6360000002 HC RX W HCPCS: Performed by: ANESTHESIOLOGY

## 2017-08-16 RX ORDER — DOXAZOSIN 2 MG/1
2 TABLET ORAL EVERY 12 HOURS SCHEDULED
Qty: 60 TABLET | Refills: 0
Start: 2017-08-16 | End: 2017-08-16

## 2017-08-16 RX ORDER — FUROSEMIDE 40 MG/1
80 TABLET ORAL 2 TIMES DAILY
COMMUNITY
End: 2018-01-01

## 2017-08-16 RX ORDER — POTASSIUM CHLORIDE 20 MEQ/1
20 TABLET, EXTENDED RELEASE ORAL ONCE
Status: COMPLETED | OUTPATIENT
Start: 2017-08-16 | End: 2017-08-16

## 2017-08-16 RX ORDER — FUROSEMIDE 40 MG/1
80 TABLET ORAL 2 TIMES DAILY
Status: DISCONTINUED | OUTPATIENT
Start: 2017-08-16 | End: 2017-08-16 | Stop reason: HOSPADM

## 2017-08-16 RX ORDER — DOXAZOSIN 2 MG/1
2 TABLET ORAL EVERY 12 HOURS SCHEDULED
Qty: 60 TABLET | Refills: 0 | Status: ON HOLD | OUTPATIENT
Start: 2017-08-16 | End: 2017-01-01

## 2017-08-16 RX ORDER — POTASSIUM CHLORIDE 750 MG/1
20 TABLET, FILM COATED, EXTENDED RELEASE ORAL ONCE
Status: COMPLETED | OUTPATIENT
Start: 2017-08-16 | End: 2017-08-16

## 2017-08-16 RX ORDER — INSULIN GLARGINE 100 [IU]/ML
15 INJECTION, SOLUTION SUBCUTANEOUS ONCE
Status: COMPLETED | OUTPATIENT
Start: 2017-08-16 | End: 2017-08-16

## 2017-08-16 RX ORDER — LACTULOSE 10 G/15ML
30 SOLUTION ORAL ONCE
Status: COMPLETED | OUTPATIENT
Start: 2017-08-16 | End: 2017-08-16

## 2017-08-16 RX ORDER — DOXAZOSIN MESYLATE 4 MG/1
4 TABLET ORAL EVERY 12 HOURS SCHEDULED
Status: DISCONTINUED | OUTPATIENT
Start: 2017-08-16 | End: 2017-08-16 | Stop reason: HOSPADM

## 2017-08-16 RX ADMIN — LACTULOSE 30 G: 20 SOLUTION ORAL at 12:43

## 2017-08-16 RX ADMIN — POTASSIUM CHLORIDE 20 MEQ: 750 TABLET, FILM COATED, EXTENDED RELEASE ORAL at 15:21

## 2017-08-16 RX ADMIN — PANTOPRAZOLE SODIUM 40 MG: 40 TABLET, DELAYED RELEASE ORAL at 04:24

## 2017-08-16 RX ADMIN — MYCOPHENOLIC ACID 360 MG: 180 TABLET, DELAYED RELEASE ORAL at 08:06

## 2017-08-16 RX ADMIN — CARVEDILOL 50 MG: 25 TABLET, FILM COATED ORAL at 08:06

## 2017-08-16 RX ADMIN — INSULIN LISPRO 2 UNITS: 100 INJECTION, SOLUTION INTRAVENOUS; SUBCUTANEOUS at 13:35

## 2017-08-16 RX ADMIN — SIROLIMUS 4 MG: 1 TABLET ORAL at 12:30

## 2017-08-16 RX ADMIN — HEPARIN SODIUM: 1000 INJECTION, SOLUTION INTRAVENOUS; SUBCUTANEOUS at 11:09

## 2017-08-16 RX ADMIN — CLONIDINE HYDROCHLORIDE 0.3 MG: 0.2 TABLET ORAL at 08:06

## 2017-08-16 RX ADMIN — ASPIRIN 81 MG: 81 TABLET, CHEWABLE ORAL at 08:06

## 2017-08-16 RX ADMIN — CETIRIZINE HYDROCHLORIDE 10 MG: 10 TABLET ORAL at 08:06

## 2017-08-16 RX ADMIN — INSULIN GLARGINE 15 UNITS: 100 INJECTION, SOLUTION SUBCUTANEOUS at 11:39

## 2017-08-16 RX ADMIN — FUROSEMIDE 80 MG: 40 TABLET ORAL at 11:09

## 2017-08-16 RX ADMIN — HEPARIN SODIUM: 1000 INJECTION, SOLUTION INTRAVENOUS; SUBCUTANEOUS at 04:25

## 2017-08-16 RX ADMIN — DEXTROSE MONOHYDRATE 12.5 G: 25 INJECTION, SOLUTION INTRAVENOUS at 05:17

## 2017-08-16 RX ADMIN — POTASSIUM CHLORIDE 20 MEQ: 1500 TABLET, EXTENDED RELEASE ORAL at 11:09

## 2017-08-16 RX ADMIN — DOXAZOSIN 4 MG: 4 TABLET ORAL at 11:09

## 2017-08-16 RX ADMIN — METHYLPHENIDATE HYDROCHLORIDE 10 MG: 5 TABLET ORAL at 08:06

## 2017-08-18 LAB
BODY FLUID CULTURE, STERILE: ABNORMAL
BODY FLUID CULTURE, STERILE: ABNORMAL
ORGANISM: ABNORMAL

## 2017-08-22 ENCOUNTER — TELEPHONE (OUTPATIENT)
Dept: NEPHROLOGY | Age: 42
End: 2017-08-22

## 2017-09-01 ENCOUNTER — HOSPITAL ENCOUNTER (EMERGENCY)
Age: 42
Discharge: HOME | End: 2017-09-01
Attending: EMERGENCY MEDICINE
Payer: MEDICAID

## 2017-09-01 VITALS
DIASTOLIC BLOOD PRESSURE: 97 MMHG | RESPIRATION RATE: 18 BRPM | WEIGHT: 176 LBS | OXYGEN SATURATION: 100 % | BODY MASS INDEX: 26.07 KG/M2 | SYSTOLIC BLOOD PRESSURE: 129 MMHG | HEIGHT: 69 IN | HEART RATE: 80 BPM | TEMPERATURE: 98.2 F

## 2017-09-01 DIAGNOSIS — N18.6 END-STAGE RENAL DISEASE ON PERITONEAL DIALYSIS (HCC): ICD-10-CM

## 2017-09-01 DIAGNOSIS — Z99.2 END-STAGE RENAL DISEASE ON PERITONEAL DIALYSIS (HCC): ICD-10-CM

## 2017-09-01 DIAGNOSIS — R25.2 CRAMPS, EXTREMITY: Primary | ICD-10-CM

## 2017-09-01 LAB
ALBUMIN SERPL-MCNC: 2.9 G/DL (ref 3.5–5.1)
ALP BLD-CCNC: 124 U/L (ref 38–126)
ALT SERPL-CCNC: 11 U/L (ref 11–66)
ANION GAP SERPL CALCULATED.3IONS-SCNC: 17 MEQ/L (ref 8–16)
ANISOCYTOSIS: ABNORMAL
AST SERPL-CCNC: 19 U/L (ref 5–40)
BASOPHILS # BLD: 1.1 %
BASOPHILS ABSOLUTE: 0.1 THOU/MM3 (ref 0–0.1)
BILIRUB SERPL-MCNC: < 0.2 MG/DL (ref 0.3–1.2)
BUN BLDV-MCNC: 33 MG/DL (ref 7–22)
CALCIUM IONIZED: 1.17 MMOL/L (ref 1.12–1.32)
CALCIUM SERPL-MCNC: 9.4 MG/DL (ref 8.5–10.5)
CHLORIDE BLD-SCNC: 95 MEQ/L (ref 98–111)
CO2: 22 MEQ/L (ref 23–33)
CREAT SERPL-MCNC: 4.8 MG/DL (ref 0.4–1.2)
EOSINOPHIL # BLD: 2.2 %
EOSINOPHILS ABSOLUTE: 0.2 THOU/MM3 (ref 0–0.4)
GFR SERPL CREATININE-BSD FRML MDRD: 16 ML/MIN/1.73M2
GLUCOSE BLD-MCNC: 163 MG/DL (ref 70–108)
HCT VFR BLD CALC: 33 % (ref 42–52)
HEMOGLOBIN: 10.8 GM/DL (ref 14–18)
HYPOCHROMIA: ABNORMAL
LYMPHOCYTES # BLD: 17.9 %
LYMPHOCYTES ABSOLUTE: 1.5 THOU/MM3 (ref 1–4.8)
MAGNESIUM: 1.9 MG/DL (ref 1.6–2.4)
MCH RBC QN AUTO: 26.5 PG (ref 27–31)
MCHC RBC AUTO-ENTMCNC: 32.8 GM/DL (ref 33–37)
MCV RBC AUTO: 80.8 FL (ref 80–94)
MICROCYTES: ABNORMAL
MONOCYTES # BLD: 9.6 %
MONOCYTES ABSOLUTE: 0.8 THOU/MM3 (ref 0.4–1.3)
NUCLEATED RED BLOOD CELLS: 0 /100 WBC
OSMOLALITY CALCULATION: 279.1 MOSMOL/KG (ref 275–300)
PDW BLD-RTO: 17.3 % (ref 11.5–14.5)
PLATELET # BLD: 345 THOU/MM3 (ref 130–400)
PMV BLD AUTO: 7.5 MCM (ref 7.4–10.4)
POTASSIUM SERPL-SCNC: 3.6 MEQ/L (ref 3.5–5.2)
RBC # BLD: 4.08 MILL/MM3 (ref 4.7–6.1)
RBC # BLD: NORMAL 10*6/UL
SEG NEUTROPHILS: 69.2 %
SEGMENTED NEUTROPHILS ABSOLUTE COUNT: 5.9 THOU/MM3 (ref 1.8–7.7)
SODIUM BLD-SCNC: 134 MEQ/L (ref 135–145)
TOTAL PROTEIN: 6.4 G/DL (ref 6.1–8)
WBC # BLD: 8.5 THOU/MM3 (ref 4.8–10.8)

## 2017-09-01 PROCEDURE — 80053 COMPREHEN METABOLIC PANEL: CPT

## 2017-09-01 PROCEDURE — 93005 ELECTROCARDIOGRAM TRACING: CPT

## 2017-09-01 PROCEDURE — 36415 COLL VENOUS BLD VENIPUNCTURE: CPT

## 2017-09-01 PROCEDURE — 85025 COMPLETE CBC W/AUTO DIFF WBC: CPT

## 2017-09-01 PROCEDURE — 82330 ASSAY OF CALCIUM: CPT

## 2017-09-01 PROCEDURE — 99283 EMERGENCY DEPT VISIT LOW MDM: CPT

## 2017-09-01 PROCEDURE — 83735 ASSAY OF MAGNESIUM: CPT

## 2017-09-01 ASSESSMENT — ENCOUNTER SYMPTOMS
WHEEZING: 0
EYE ITCHING: 0
RHINORRHEA: 0
ABDOMINAL PAIN: 0
SHORTNESS OF BREATH: 0
ABDOMINAL DISTENTION: 0
VOMITING: 0
NAUSEA: 0
COUGH: 0
EYE DISCHARGE: 0
DIARRHEA: 0

## 2017-09-01 ASSESSMENT — PAIN SCALES - GENERAL
PAINLEVEL_OUTOF10: 10
PAINLEVEL_OUTOF10: 10

## 2017-09-01 ASSESSMENT — PAIN DESCRIPTION - PAIN TYPE: TYPE: ACUTE PAIN

## 2017-09-01 ASSESSMENT — PAIN DESCRIPTION - LOCATION: LOCATION: LEG

## 2017-09-01 ASSESSMENT — PAIN DESCRIPTION - DESCRIPTORS: DESCRIPTORS: ACHING

## 2017-09-01 ASSESSMENT — PAIN DESCRIPTION - ORIENTATION: ORIENTATION: LOWER;RIGHT;LEFT

## 2017-09-03 LAB
EKG ATRIAL RATE: 80 BPM
EKG P AXIS: -7 DEGREES
EKG P-R INTERVAL: 122 MS
EKG Q-T INTERVAL: 398 MS
EKG QRS DURATION: 94 MS
EKG QTC CALCULATION (BAZETT): 459 MS
EKG R AXIS: 30 DEGREES
EKG T AXIS: -37 DEGREES
EKG VENTRICULAR RATE: 80 BPM

## 2017-10-07 PROBLEM — L03.211 CELLULITIS OF FACE: Status: ACTIVE | Noted: 2017-01-01

## 2017-10-07 NOTE — FLOWSHEET NOTE
10/07/17 0657   Vital Signs   Temp 98.8 °F (37.1 °C)   Temp Source Oral   Pulse 93   Heart Rate Source Monitor   Resp 16   BP (!) 142/101   BP Location Right upper arm   MAP (mmHg) 117   Height and Weight   Height 5' 9\" (1.753 m)   Weight 165 lb 14.4 oz (75.3 kg)   Weight Method Actual;Standing scale   BSA (Calculated - sq m) 1.91 sq meters   BMI (Calculated) 24.6   Pain Assessment   Pain Level 10   Pain Type Acute pain   Pain Location Face   Pain Orientation Left   Oxygen Therapy   SpO2 100 %   Pulse Oximeter Device Mode Intermittent   Pulse Oximeter Device Location Finger   O2 Device None (Room air)     Pt arrived in 4K 16 from ED. Complaints: Facial swelling and hyperglycemia . IV- Insulin gtt running at 0.8 mL/hr.

## 2017-10-07 NOTE — ED PROVIDER NOTES
[facility]  eMERGENCY dEPARTMENT eNCOUnter          CHIEF COMPLAINT       Chief Complaint   Patient presents with    Facial Swelling       Nurses Notes reviewed and I agree except as noted in the HPI. HISTORY OF PRESENT ILLNESS    Mauro Stephenson is a 38 y.o. male who presents to the Emergency Department for the evaluation of facial swelling. The patient states that the left side of his face has been swollen x 2 days. He states that he has diabetes and is a peritoneal dialysis patient. Patient hasn't taken anything for the swelling and hasn't seen his PCP. Patient confirms a history of angioedema requiring FFP. The HPI was provided by the patient. REVIEW OF SYSTEMS     Review of Systems   Constitutional: Negative for appetite change, chills, fatigue and fever. HENT: Negative for congestion, ear pain, rhinorrhea and sore throat. Eyes: Negative for discharge, redness and visual disturbance. Respiratory: Negative for cough, shortness of breath and wheezing. Cardiovascular: Negative for chest pain, palpitations and leg swelling. Gastrointestinal: Negative for abdominal pain, diarrhea, nausea and vomiting. Genitourinary: Negative for decreased urine volume, difficulty urinating and dysuria. Musculoskeletal: Positive for myalgias (Left cheek and jaw). Negative for arthralgias, back pain, joint swelling, neck pain and neck stiffness. Skin: Negative for pallor and rash. Allergic/Immunologic: Negative for environmental allergies. Neurological: Negative for dizziness, syncope, weakness, light-headedness and headaches. Hematological: Negative for adenopathy. Psychiatric/Behavioral: Negative for agitation, confusion, dysphoric mood and suicidal ideas. The patient is not nervous/anxious. PAST MEDICAL HISTORY    has a past medical history of Anemia;  Angiotensin converting enzyme inhibitor-aggravated angioedema; Cerebral artery occlusion with cerebral infarction [facility]); CHF (congestive heart failure) ([facility] [address] Disease of blood and blood forming organ; DM type 1 (diabetes mellitus, type 1) ([facility] ESRD (end stage renal disease) ([facility] GERD (gastroesophageal reflux disease); H/O pancreas transplant ([facility] Hemodialysis patient [facility]); History of blood transfusion; HTN (hypertension); Hyperlipidemia; Hypersomnolence disorder; Kidney transplant status, living unrelated donor; Leaky heart valve; Metabolic acidosis; Narcolepsy; Nephropathy, diabetic ([facility] and Seizures ([facility] SURGICAL HISTORY      has a past surgical history that includes Pancreas surgery (2007); eye surgery; and Kidney transplant (2006). CURRENT MEDICATIONS       Previous Medications    ASPIRIN 81 MG CHEWABLE TABLET    Take 81 mg by mouth daily    B-D ULTRAFINE III SHORT PEN 31G X 8 MM MISC    Seven or eight    CARVEDILOL (COREG) 25 MG TABLET    Take 2 tablets by mouth 2 times daily    CETIRIZINE (ZYRTEC) 10 MG TABLET    Take 1 tablet by mouth daily    CLONIDINE (CATAPRES) 0.3 MG TABLET    Take 1 tablet by mouth 2 times daily    DOXAZOSIN (CARDURA) 2 MG TABLET    Take 1 tablet by mouth every 12 hours    FUROSEMIDE (LASIX) 40 MG TABLET    Take 80 mg by mouth 2 times daily    GINKGO BILOBA (GNP GINGKO BILOBA EXTRACT PO)    Take 1 capsule by mouth daily    GLUCOSE BLOOD VI TEST STRIPS (ONETOUCH VERIO) STRIP    1 each by In Vitro route 4 times daily As needed.     INSULIN GLARGINE (BASAGLAR KWIKPEN) 100 UNIT/ML INJECTION PEN    Inject 15 units in morning    INSULIN LISPRO (HUMALOG KWIKPEN) 100 UNIT/ML PEN    Inject 3 Units into the skin 3 times daily (before meals) Sliding scale:  -200 = 1 unit  -250 = 2 units  -300 = 3 units  -350 = 4 units  -400 = 5 units  BS > 400 = 10 units    INSULIN PEN NEEDLE 33G X 5 MM MISC    Indications: E10.21 Use with Insulin pens    LATANOPROST (XALATAN) 0.005 % OPHTHALMIC SOLUTION    Place 1 drop into both eyes nightly    METHYLPHENIDATE (RITALIN) patient's left cheek. Swelling is isolated to the cheek and does not extend to his forehead. No issues with secretions noted. Eyes: Conjunctivae are normal. Right eye exhibits no discharge. Left eye exhibits no discharge. No scleral icterus. Neck: Normal range of motion. Neck supple. No JVD present. Cardiovascular: Normal rate and normal heart sounds. Exam reveals no gallop and no friction rub. No murmur heard. Pulmonary/Chest: Effort normal and breath sounds normal. No respiratory distress. He has no decreased breath sounds. He has no wheezes. He has no rhonchi. He has no rales. Abdominal: Soft. He exhibits no distension. There is no tenderness. There is no rebound and no guarding. Musculoskeletal: Normal range of motion. He exhibits no edema. Neurological: He is alert and oriented to person, place, and time. He exhibits normal muscle tone. He displays no seizure activity. GCS eye subscore is 4. GCS verbal subscore is 5. GCS motor subscore is 6. Skin: Skin is warm and dry. No rash noted. He is not diaphoretic. Psychiatric: He has a normal mood and affect.  His behavior is normal. Thought content normal.       DIFFERENTIAL DIAGNOSIS:   Facial cellulitis, angioedema, hyperglycemia    DIAGNOSTIC RESULTS     EKG: All EKG's are interpreted by the Emergency Department Physician who either signs or Co-signs this chart in the absence of a cardiologist.    None    RADIOLOGY: non-plain film images(s) such as CT, Ultrasound and MRI are read by the radiologist.    No orders to display       LABS:     Labs Reviewed   CBC WITH AUTO DIFFERENTIAL - Abnormal; Notable for the following:        Result Value    WBC 11.0 (*)     RBC 4.20 (*)     Hemoglobin 11.1 (*)     Hematocrit 35.4 (*)     MCH 26.4 (*)     MCHC 31.2 (*)     RDW 18.1 (*)     Segs Absolute 8.7 (*)     All other components within normal limits   BASIC METABOLIC PANEL - Abnormal; Notable for the following:     Sodium 123 (*)     Chloride 81 (*) CO2 19 (*)     Glucose 868 (*)     BUN 55 (*)     CREATININE 5.3 (*)     All other components within normal limits   HEPATIC FUNCTION PANEL - Abnormal; Notable for the following:     Alb 2.8 (*)     Total Bilirubin 0.2 (*)     Alkaline Phosphatase 179 (*)     ALT 10 (*)     All other components within normal limits   SEDIMENTATION RATE - Abnormal; Notable for the following:     Sed Rate 81 (*)     All other components within normal limits   C-REACTIVE PROTEIN - Abnormal; Notable for the following:     CRP 7.71 (*)     All other components within normal limits   ANION GAP - Abnormal; Notable for the following: Anion Gap 23.0 (*)     All other components within normal limits   GLOMERULAR FILTRATION RATE, ESTIMATED - Abnormal; Notable for the following:     Est, Glom Filt Rate 15 (*)     All other components within normal limits   OSMOLALITY - Abnormal; Notable for the following:     Osmolality Calc 305.6 (*)     All other components within normal limits   BETA-HYDROXYBUTYRATE - Abnormal; Notable for the following:     Beta-Hydroxybutyrate 24.92 (*)     All other components within normal limits   PHOSPHORUS - Abnormal; Notable for the following:     Phosphorus 7.2 (*)     All other components within normal limits   LIPASE   TSH WITH REFLEX   MAGNESIUM   TYPE AND SCREEN       EMERGENCY DEPARTMENT COURSE:   Vitals:    Vitals:    10/07/17 0016 10/07/17 0506   BP: 137/86 138/79   Pulse: 88 80   Resp: 14 18   Temp: 97.9 °F (36.6 °C) 98 °F (36.7 °C)   TempSrc: Oral Oral   SpO2: 99% 99%   Weight: 168 lb (76.2 kg)        1:41 AM: The patient was seen and evaluated. Appropriate labs and imaging were ordered. The patient's blood sugar was returned at >800. DKA workup was initiated, patient was placed on abx for facial cellulitis and insulin gtt was ordered. I discussed the case with Dr. Alissa Jasmine who graciously agrees to admit the patient.         CRITICAL CARE:   None CONSULTS:  Cora--Admission    PROCEDURES:  None    FINAL IMPRESSION      1. Diabetic ketoacidosis without coma associated with type 1 diabetes mellitus ([facility] [address]    2. Facial cellulitis          DISPOSITION/PLAN   Admit    PATIENT REFERRED TO:  Isreal Porras DO  [facility]  [address]  [facility] [address] [id number]            DISCHARGE MEDICATIONS:  New Prescriptions    No medications on file       (Please note that portions of this note were completed with a voice recognition program.  Efforts were made to edit the dictations but occasionally words are mis-transcribed.)    The patient was given an opportunity to see the Emergency Attending. The patient voiced understanding that I was a Mid-Level Provider and was in agreement with being seen independently by myself. Scribe:  Ness Loredo 10/7/17 1:41 AM Scribing for and in the presence of Marva Gillespie CNP. Signed by: Milton Cabello, 10/07/17 5:48 AM    Provider:  I personally performed the services described in the documentation, reviewed and edited the documentation which was dictated to the scribe in my presence, and it accurately records my words and actions.     Marva Gillespie CNP 10/7/17 5:48 AM       Bella Caal NP  10/07/17 5133

## 2017-10-07 NOTE — ED NOTES
Patient blood sugar currently reads \"High\" on monitor. Lab paged to draw specimen to send sample. Awaiting antibiotics and insulin drip to arrive from pharmacy before patient is transported to the floor.      Arnulfo Gannon RN  10/07/17 0600

## 2017-10-07 NOTE — CONSULTS
Medications:  Prior to Admission medications    Medication Sig Start Date End Date Taking?  Authorizing Provider   furosemide (LASIX) 40 MG tablet Take 80 mg by mouth 2 times daily   Yes Historical Provider, MD   insulin glargine (BASAGLAR KWIKPEN) 100 UNIT/ML injection pen Inject 15 units in morning 6/22/17  Yes Juwan Sanon MD   carvedilol (COREG) 25 MG tablet Take 2 tablets by mouth 2 times daily 6/14/17  Yes Juwan Sanon MD   insulin lispro (HUMALOG KWIKPEN) 100 UNIT/ML pen Inject 3 Units into the skin 3 times daily (before meals) Sliding scale:  -200 = 1 unit  -250 = 2 units  -300 = 3 units  -350 = 4 units  -400 = 5 units  BS > 400 = 10 units 6/6/17  Yes Juwan Sanon MD   cloNIDine (CATAPRES) 0.3 MG tablet Take 1 tablet by mouth 2 times daily 3/9/17  Yes Margy Green MD   sirolimus (RAPAMUNE) 1 MG tablet Take 4 mg by mouth daily    Yes Historical Provider, MD   simvastatin (ZOCOR) 20 MG tablet Take 1 tablet by mouth every evening 2/9/17  Yes Max Sever, DO   omeprazole (PRILOSEC) 20 MG delayed release capsule Take 1 capsule by mouth daily 1/14/17  Yes Max Sever, DO   Mycophenolate Sodium 360 MG TBEC Take 1 tablet by mouth 2 times daily 1/14/17  Yes Max Sever, DO   methylphenidate (RITALIN) 10 MG tablet Take 1 tablet by mouth 2 times daily 10/21/16  Yes Miriam Norris MD   cetirizine (ZYRTEC) 10 MG tablet Take 1 tablet by mouth daily 10/14/16  Yes Juwan Sanon MD   B-D ULTRAFINE III SHORT PEN 31G X 8 MM MISC Seven or eight 7/7/16  Yes Historical Provider, MD   Multiple Vitamins-Minerals (MENS MULTI VITAMIN & MINERAL PO) Take 1 tablet by mouth daily   Yes Historical Provider, MD Richardskgseamus Biloba (GNP GINGKO BILOBA EXTRACT PO) Take 1 capsule by mouth daily   Yes Historical Provider, MD   Insulin Pen Needle 33G X 5 MM MISC Indications: E10.21 Use with Insulin pens 7/5/16  Yes Naresh Fuentes MD   aspirin 81 MG chewable tablet Take 81 mg by mouth Nausea: none, Vomiting: none, Abdominal pain: none, Diarrhea: none,   Constipation: none, Blood in the stool: none, Melena: none, Rebound: none, Rovsing's: none. GENITOURINARY  Dysuria: none, Pyuria: none, Gross hematuria: none, Urgency: none, Flank pain: none, STD: none. MUSCULOSKELETAL  Myalgia: none, Neck pain: none, Thoracic pain: none, Lumbar pain: none, Joint pain: none, Falls: none    ENDOCRINE/HEMATOLOGY/ALLERGIC  Easy bruising: none, Easy bleeding: none, Environmental allergies: none, Polydipsia: none. NEUROLOGICAL  Dizziness: none, Tingling: none, Numbness: none, Tremor: none, Sensory change: none,   Speech change: none, Focal weakness: none, Seizures: none, Loss of consciousness: none,    PSYCHIATRIC  Depression: none, Suicidal ideation: none, Heroin abuse: none, Cocaine abuse: none,   Marijuana abuse: none, Hallucinations: none, Anxiety: none, Memory loss: none       Physical Examination:  BP (!) 164/99   Pulse 88   Temp 98.7 °F (37.1 °C) (Oral)   Resp 16   Ht 5' 9\" (1.753 m)   Wt 165 lb 14.4 oz (75.3 kg)   SpO2 100%   BMI 24.50 kg/m²       General appearance: alert and cooperative with exam  HEENT: Head: Normocephalic, no lesions, without obvious abnormality. Left face is swollen  Neck: no adenopathy, no carotid bruit and no JVD  Lungs: clear to auscultation bilaterally  Heart: regular rate and rhythm, S1, S2 normal, no murmur, click, rub or gallop  Abdomen: soft, non-tender; bowel sounds normal; no masses,  no organomegaly  Extremities: extremities normal, atraumatic, no cyanosis or edema  Neurologic: Mental status: Alert, oriented, thought content appropriate  PSY: No evidence of depression. Mood is normal for the patient. Skin: Warm and dry. No unusual lesions or rashes noted. Muscles: Hand  is equal and strong bilaterally. Leg strength is equal and strong. Skeletal: No bony or skeletal abnormalities noted.     Admission weight: 168 lb (76.2 kg)  Wt Readings from Last 3

## 2017-10-07 NOTE — ED NOTES
Insulin started as it is ordered per ER provider. Antibiotics have still not arrived from pharmacy. Pharmacy to be notified of patient going to floor.      Ken Stoll RN  10/07/17 2335

## 2017-10-07 NOTE — PROGRESS NOTES
Pharmacy Note  Vancomycin Consult    Marcela Ma is a 38 y.o. male started on Vancomycin for cellulitis of face; consult received from Dr. Nathan Macdonald to manage therapy. Also receiving the following antibiotics: Rocephin.     Patient Active Problem List   Diagnosis    Congestive heart failure (HCC)    Heart disease    Heart valve disease    Type 1 diabetes mellitus with stage 5 chronic kidney disease not on chronic dialysis (HCC)    HIGH CHOLESTEROL    GERD (gastroesophageal reflux disease)    Anemia    VY [facility]) [facility]    Metabolic acidosis    Diabetic nephropathy associated with type 1 diabetes mellitus ([address]    ESRD (end stage renal disease) ([facility] [address] [facility]    Hypersomnia    History of kidney transplant    Essential hypertension    Hyponatremia    Narcolepsy    Hyperglycemia    Diabetic ketoacidosis without coma associated with type 1 diabetes mellitus (HCC)    Gastroesophageal reflux disease without esophagitis    Failed pancreas transplant    CKD (chronic kidney disease) stage 3, GFR 30-59 ml/min    Calcineurin inhibitor toxicity, therapeutic use    Partial seizure ([facility]    Noncompliance    Dehydration with hyponatremia    Acute on chronic renal failure (HCC)    Bilateral leg edema    Angiotensin converting enzyme inhibitor-aggravated angioedema    Elevated brain natriuretic peptide (BNP) level    SOB (shortness of breath) on exertion    Hypoxemia    Acute respiratory failure with hypoxia (HCC)    CKD (chronic kidney disease), stage IV (HCC)    Anemia in CKD (chronic kidney disease)    Mitral valve insufficiency    History of pancreas transplant ([facility]    H/O kidney transplant    Diabetic ketoacidosis associated with type 1 diabetes mellitus (HCC)    Elevated troponin    Chronic renal allograft nephropathy    ESRD on peritoneal dialysis ([facility]    Failed kidney transplant    Type 1 diabetes mellitus with hyperglycemia (HCC)    Dehydration    Diabetic ketoacidosis

## 2017-10-07 NOTE — PROGRESS NOTES
Attempted to consult endocrinology - no physician on call. Hospitalist and house supervisor made aware.

## 2017-10-07 NOTE — ED TRIAGE NOTES
Pt woke up 2 days ago with left sided facial swelling, pt denies seeing any bites or bumps, no dental caries noted or abscesses. Pt states swelling and pain have worsened.

## 2017-10-07 NOTE — H&P
chewable tablet Take 81 mg by mouth daily      ONE TOUCH LANCETS MISC 1 each by Does not apply route 4 times daily 100 each 12    glucose blood VI test strips (ONETOUCH VERIO) strip 1 each by In Vitro route 4 times daily As needed. 100 each 12    doxazosin (CARDURA) 2 MG tablet Take 1 tablet by mouth every 12 hours 60 tablet 0    methylphenidate (RITALIN) 10 MG tablet Take 1 tablet by mouth 2 times daily 60 tablet 0    latanoprost (XALATAN) 0.005 % ophthalmic solution Place 1 drop into both eyes nightly 1 Bottle 3       Allergies:  Ace inhibitors; Bactrim [sulfamethoxazole-trimethoprim]; Hydralazine; Lisinopril; Losartan; Amlodipine; Morphine; and Lipitor [atorvastatin]    Social History:    reports that he has never smoked. He has never used smokeless tobacco. He reports that he does not drink alcohol or use drugs. Family History:       Problem Relation Age of Onset    Heart Disease Mother     Diabetes Mother     High Blood Pressure Mother     Heart Disease Father     Diabetes Maternal Aunt     Kidney Disease Maternal Aunt     Diabetes Sister     High Blood Pressure Maternal Grandmother        Review of systems:  Constitutional: no fever, no night sweats, no fatigue  Head: no headache, no head injury, no migraines, + Left facial swelling and pain  Eye: no blurring of vision, no double vision.   Ears: no hearing difficulty, no tinnitus  Mouth/throat: no ulceration, dental caries, dysphagia  Lungs: no cough, no shortness of breath, no wheeze  CVS: no palpitation, no chest pain, no shortness of breath  GI: no abdominal pain, no nausea , no vomiting, no constipation  [place]: no dysuria, frequency and urgency, no hematuria, no kidney stones  Musculoskeletal: no joint pain, swelling , stiffness  Endocrine: no polyuria, polydypsia, no cold or heat intolerence  Hematology: no anemia, no easy brusing or bleeding, no hx of clotting disorder  Dermatology: no skin rash, no eczema, no prurities,  Psychiatry: no Anisocytosis 1+     Hypochromia 1+     Segs Absolute 8.7 (H) 1.8 - 7.7 thou/mm3    Lymphocytes # 1.1 1.0 - 4.8 thou/mm3    Monocytes # 1.1 0.4 - 1.3 thou/mm3    Eosinophils # 0.1 0.0 - 0.4 thou/mm3    Basophils # 0.0 0.0 - 0.1 thou/mm3   Basic Metabolic Panel    Collection Time: 10/07/17  3:35 AM   Result Value Ref Range    Sodium 123 (L) 135 - 145 meq/L    Potassium 4.6 3.5 - 5.2 meq/L    Chloride 81 (L) 98 - 111 meq/L    CO2 19 (L) 23 - 33 meq/L    Glucose 868 (HH) 70 - 108 mg/dL    BUN 55 (H) 7 - 22 mg/dL    CREATININE 5.3 (HH) 0.4 - 1.2 mg/dL    Calcium 8.9 8.5 - 10.5 mg/dL   Hepatic Function Panel    Collection Time: 10/07/17  3:35 AM   Result Value Ref Range    Alb 2.8 (L) 3.5 - 5.1 g/dL    Total Bilirubin 0.2 (L) 0.3 - 1.2 mg/dL    Bilirubin, Direct <0.2 0.0 - 0.3 mg/dL    Alkaline Phosphatase 179 (H) 38 - 126 U/L    AST 12 5 - 40 U/L    ALT 10 (L) 11 - 66 U/L    Total Protein 6.4 6.1 - 8.0 g/dL   Lipase    Collection Time: 10/07/17  3:35 AM   Result Value Ref Range    Lipase 28.0 5.6 - 51.3 U/L   TSH with Reflex    Collection Time: 10/07/17  3:35 AM   Result Value Ref Range    TSH 0.685 0.400 - 4.20 uIU/mL   Sedimentation Rate    Collection Time: 10/07/17  3:35 AM   Result Value Ref Range    Sed Rate 81 (H) 0 - 10 mm/hr   C-Reactive Protein    Collection Time: 10/07/17  3:35 AM   Result Value Ref Range    CRP 7.71 (H) 0.00 - 1.00 mg/dl   Anion Gap    Collection Time: 10/07/17  3:35 AM   Result Value Ref Range    Anion Gap 23.0 (H) 8.0 - 16.0 meq/L   Glomerular Filtration Rate, Estimated    Collection Time: 10/07/17  3:35 AM   Result Value Ref Range    Est, Glom Filt Rate 15 (A) ml/min/1.73m2   Osmolality    Collection Time: 10/07/17  3:35 AM   Result Value Ref Range    Osmolality Calc 305.6 (H) 275.0 - 300 mOsmol/kg   Beta-Hydroxybutyrate    Collection Time: 10/07/17  3:35 AM   Result Value Ref Range    Beta-Hydroxybutyrate 24.92 (H) 0.20 - 2.81 mg/dl   Magnesium    Collection Time: 10/07/17  3:35 AM

## 2017-10-07 NOTE — PROGRESS NOTES
Attempted to consult oral surgery - no physician on call for this specialty. Hospitalist and house supervisor made aware.  Will attempt consult again tomorrow

## 2017-10-08 NOTE — PROGRESS NOTES
Date    Anemia     Angiotensin converting enzyme inhibitor-aggravated angioedema 8/12/2016    Cerebral artery occlusion with [facility] infarction ([facility]     CHF (congestive heart failure) ([facility]     Disease of blood and blood forming organ     DM type 1 (diabetes mellitus, type 1) ([facility]     previous diabetes, diag at age of 17,  no tx after transplant in 2008, started on insulins again in august 2015 as pancreas is not functioning well    ESRD (end stage renal disease) ([facility] 12/10/2013    was on dialysis in 2006-2207, before transplant    GERD (gastroesophageal reflux disease)     H/O pancreas transplant ([facility] 5/15/2007    Hemodialysis patient ([facility]     History of blood transfusion     FFP for angioedema    HTN (hypertension)     Hyperlipidemia     Hypersomnolence disorder     Kidney transplant status, living unrelated donor 5/7/2006    Leaky heart valve     Metabolic acidosis     Narcolepsy     Nephropathy, diabetic ([facility]     Seizures ([facility]         Past Surgical History:  Past Surgical History:   Procedure Laterality Date    EYE SURGERY      vitrectomy, in both eyes, bleeding from heparin while on dialysis    KIDNEY TRANSPLANT  2006   [address]  2007    transplant        Family History:  Family History   Problem Relation Age of Onset    Heart Disease Mother     Diabetes Mother     High Blood Pressure Mother     Heart Disease Father     Diabetes Maternal Aunt     Kidney Disease Maternal Aunt     Diabetes Sister     High Blood Pressure Maternal Grandmother         Social History:  Social History     Social History    Marital status: Single     Spouse name: N/A    Number of children: 3    Years of education: 15     Occupational History    Disabled      Social History Main Topics    Smoking status: Never Smoker    Smokeless tobacco: Never Used    Alcohol use No    Drug use: No    Sexual activity: Not Currently     Other Topics Concern    Not on file     Social History Narrative    No narrative on file        Home Medications:  Prior to Admission medications    Medication Sig Start Date End Date Taking?  Authorizing Provider   furosemide (LASIX) 40 MG tablet Take 80 mg by mouth 2 times daily   Yes Historical Provider, MD   insulin glargine (BASAGLAR KWIKPEN) 100 UNIT/ML injection pen Inject 15 units in morning 6/22/17  Yes Saloni Linda MD   carvedilol (COREG) 25 MG tablet Take 2 tablets by mouth 2 times daily 6/14/17  Yes Saloni Linda MD   insulin lispro (HUMALOG KWIKPEN) 100 UNIT/ML pen Inject 3 Units into the skin 3 times daily (before meals) Sliding scale:  -200 = 1 unit  -250 = 2 units  -300 = 3 units  -350 = 4 units  -400 = 5 units  BS > 400 = 10 units 6/6/17  Yes Saloni Linda MD   cloNIDine (CATAPRES) 0.3 MG tablet Take 1 tablet by mouth 2 times daily 3/9/17  Yes Lawrence Billy MD   sirolimus (RAPAMUNE) 1 MG tablet Take 4 mg by mouth daily    Yes Historical Provider, MD   simvastatin (ZOCOR) 20 MG tablet Take 1 tablet by mouth every evening 2/9/17  Yes Gertrudis Keita,    omeprazole (PRILOSEC) 20 MG delayed release capsule Take 1 capsule by mouth daily 1/14/17  Yes Gertrudis Keita, DO   Mycophenolate Sodium 360 MG TBEC Take 1 tablet by mouth 2 times daily 1/14/17  Yes Gertrudis Keita,    methylphenidate (RITALIN) 10 MG tablet Take 1 tablet by mouth 2 times daily 10/21/16  Yes Ismael Monet MD   cetirizine (ZYRTEC) 10 MG tablet Take 1 tablet by mouth daily 10/14/16  Yes Saloni Linda MD   B-D ULTRAFINE III SHORT PEN 31G X 8 MM MISC Seven or eight 7/7/16  Yes Historical Provider, MD   Multiple Vitamins-Minerals (MENS MULTI VITAMIN & MINERAL PO) Take 1 tablet by mouth daily   Yes Historical Provider, MD   Gimilagroskgo Biloba (GNP GINGKO BILOBA EXTRACT PO) Take 1 capsule by mouth daily   Yes Historical Provider, MD   Insulin Pen Needle 33G X 5 MM MISC Indications: E10.21 Use with Insulin pens 7/5/16  Yes Fatmata Benavides MD   aspirin 81 MG chewable tablet Take 81 mg by mouth daily   Yes Historical Provider, MD   ONE TOUCH LANCETS [address] 1 each by Does not apply route 4 times daily 12/30/15  Yes Dimitry Coleman,    glucose blood VI test strips (ONETOUCH VERIO) strip 1 each by In Vitro route 4 times daily As needed. 12/30/15  Yes Dimitry Coleman DO   latanoprost (XALATAN) 0.005 % ophthalmic solution Place 1 drop into both eyes nightly 11/13/15  Yes Shari Ramírez MD        Lab data:  CBC:    Recent Labs      10/07/17   0335  10/08/17   0420   WBC  11.0*  8.6   HGB  11.1*  11.7*   PLT  308  275     CMP:    Recent Labs      10/07/17   0702  10/07/17   1123  10/08/17   0420  10/08/17   0845   NA  126*  134*  132*  133*   K  4.0  4.0  3.2*   --    CL  84*  92*  92*   --    CO2  17*  22*  22*   --    BUN  56*  55*  48*   --    CREATININE  5.7*  5.4*  5.1*   --    GLUCOSE  823*  268*  70   --    CALCIUM  9.3  9.1  8.8   --      Urine:  Recent Labs      10/07/17   0703   COLORU  YELLOW   PHUR  6.0   WBCUA  2-4   RBCUA  0-2   YEAST  NONE SEEN   BACTERIA  NONE   LEUKOCYTESUR  NEGATIVE   UROBILINOGEN  0.2   BILIRUBINUR  NEGATIVE   BLOODU  TRACE*      Sed Rate:   Recent Labs      10/07/17   0335   SEDRATE  81*            Radiology  PROCEDURE: CT FACIAL BONES WO CONTRAST       CLINICAL INFORMATION: left facial swelling, diabetic. Facial pain. No known injury. The patient is a diabetic and has an elevated creatinine. No IV contrast was given.       COMPARISON: No prior study.       TECHNIQUE: 3 mm noncontrast axial CT images were obtained through the facial soft tissues. 3 mm coronal reconstructions were obtained.       All CT scans at this facility use dose modulation, iterative reconstruction, and/or weight-based dosing when appropriate to reduce radiation dose to as low as reasonably achievable.       FINDINGS:        There is skin thickening and there is fat stranding in the left facial soft tissues.  This involves the cheek and the soft tissues overlying the left mandible. This extends into the submental location.       There is mucosal thickening along the floor the left maxillary sinus. There is an apical abscess associated with the 3 roots of the first left maxillary molar. The cortex of the maxilla overlying the most anterior root has been eroded. This is contiguous    with the adjacent inflamed soft tissues. The other paranasal sinuses are normally aerated.       There are no stones along the floor the mouth. There are no stones along the expected location of Jersey's duct. There are vascular calcifications.       There is no definite inflammation surrounding either parotid gland. There is no fat stranding surrounding the submandibular glands.       There are some prominent lymph nodes on the left. These are likely reactive.       There are no suspicious osseous lesions. There is no gross abnormality in the brain parenchyma.               Impression       1. Left facial cellulitis. 2. Small apical abscesses associated with the 3 roots of the left maxillary first molar. The abscess associated with the most anterior and lateral root has eroded into the soft tissues on the left. This is near the inflammation.              Review of Systems:  Source of data: patient    CONSTITUTIONAL  Fever: none, Chills: none, Weight loss: none, Malaise: none, Fatigue: none, Diaphoresis: none,   Weakness: none    SKIN  Rash: none, Itch: none, Open sores: none    HENT:  Headache: none, Hearing loss: none, Tinnitus: none, Ear pain: none, Ear discharge: none,   Nasal bleeding: none, Congestion: none, Stridor: none, Sore throat: none. EYES  Blurred vision: none, Double vision: none, Photophobia: none, Eye pain: none, Eye discharge: none,  Eye redness: none. CARDIOVASCULAR  Chest pain: none, Arm pain: none, Palpitations: none, Orthopnea: none, Claudication: none,   Leg swelling: none, PND: none.      RESPIRATORY  Cough: none, Hemoptysis: none, Sputum production: none, Shortness of breath: none, Wheezing: none    GASTROINTESTINAL  Heartburn: none, Nausea: none, Vomiting: none, Abdominal pain: none, Diarrhea: none,   Constipation: none, Blood in the stool: none, Melena: none, Rebound: none, Rovsing's: none. GENITOURINARY  Dysuria: none, Pyuria: none, Gross hematuria: none, Urgency: none, Flank pain: none, STD: none. MUSCULOSKELETAL  Myalgia: none, Neck pain: none, Thoracic pain: none, Lumbar pain: none, Joint pain: none, Falls: none    ENDOCRINE/HEMATOLOGY/ALLERGIC  Easy bruising: none, Easy bleeding: none, Environmental allergies: none, Polydipsia: none. NEUROLOGICAL  Dizziness: none, Tingling: none, Numbness: none, Tremor: none, Sensory change: none,   Speech change: none, Focal weakness: none, Seizures: none, Loss of consciousness: none,    PSYCHIATRIC  Depression: none, Suicidal ideation: none, Heroin abuse: none, Cocaine abuse: none,   Marijuana abuse: none, Hallucinations: none, Anxiety: none, Memory loss: none       Physical Examination:  /86   Pulse 85   Temp 98.3 °F (36.8 °C) (Oral)   Resp 16   Ht 5' 9\" (1.753 m)   Wt 163 lb 4.8 oz (74.1 kg) Comment: Dry weight   SpO2 98%   BMI 24.12 kg/m²       General appearance: alert and cooperative with exam  HEENT: Head: Normocephalic, no lesions, without obvious abnormality. Left face is swollen  Neck: no adenopathy, no carotid bruit and no JVD  Lungs: clear to auscultation bilaterally  Heart: regular rate and rhythm, S1, S2 normal, no murmur, click, rub or gallop  Abdomen: soft, non-tender; bowel sounds normal; no masses,  no organomegaly  Extremities: extremities normal, atraumatic, no cyanosis or edema  Neurologic: Mental status: Alert, oriented, thought content appropriate  PSY: No evidence of depression. Mood is normal for the patient. Skin: Warm and dry. No unusual lesions or rashes noted. Muscles: Hand  is equal and strong bilaterally. Leg strength is equal and strong.   Skeletal: No bony or

## 2017-10-08 NOTE — PROGRESS NOTES
Hospitalist Progress Note    Patient:  Abena Chen      Unit/Bed:4K-16/016-A    YOB: 1975    MRN: [id number]       Acct: [de-identified]     PCP: Tavon Chamberlain DO    Date of Admission: 10/7/2017    Chief Complaint: Left facial swelling, Facial pain    Hospital Course: 38 y.o. male admitted forleft facial swelling and pain for 2 days, found with DKA now resolved, pending endo and surgery consult     Subjective: feels better, left facial pain improved, denies fever, sob, chest pain, n/v or other complaint.         Medications:  Reviewed    Infusion Medications    Scheduled Medications    aspirin  81 mg Oral Daily    carvedilol  50 mg Oral BID    cetirizine  10 mg Oral Daily    cloNIDine  0.3 mg Oral BID    furosemide  80 mg Oral BID    latanoprost  1 drop Both Eyes Nightly    therapeutic multivitamin-minerals  1 tablet Oral Daily    mycophenolate  360 mg Oral BID    omeprazole  20 mg Oral Daily    simvastatin  20 mg Oral QPM    sirolimus  4 mg Oral Daily    heparin (porcine)  5,000 Units Subcutaneous BID    sodium chloride flush  10 mL Intravenous 2 times per day    cefTRIAXone (ROCEPHIN) IV  1 g Intravenous Q24H    vancomycin (VANCOCIN) intermittent dosing (placeholder)   Other RX Placeholder    methylphenidate  10 mg Oral BID WC    insulin glargine  20 Units Subcutaneous Daily    insulin lispro  0-18 Units Subcutaneous 4x Daily AC & HS    insulin lispro  0-9 Units Subcutaneous Nightly    insulin lispro  7 Units Subcutaneous TID AC    dianeal lo-mercedes 2.5%  2,000 mL Intraperitoneal 4 times per day     PRN Meds: insulin regular, dextrose, potassium chloride, magnesium sulfate, sodium phosphate IVPB **OR** sodium phosphate IVPB **OR** sodium phosphate IVPB, sodium chloride flush, acetaminophen, ondansetron, fentanNYL **OR** fentanNYL, oxyCODONE-acetaminophen      Intake/Output Summary (Last 24 hours) at 10/08/17 1115  Last data filed at 10/08/17 0500   Gross per 24 hour   Intake 7636.78 ml   Output             2500 ml   Net          5136.78 ml       Diet:  DIET CARB CONTROL; Carb Control: 4 carbs/meal (approximate 1800 kcals/day)    Exam:  /86   Pulse 85   Temp 98.3 °F (36.8 °C) (Oral)   Resp 16   Ht 5' 9\" (1.753 m)   Wt 163 lb 4.8 oz (74.1 kg) Comment: Dry weight   SpO2 98%   BMI 24.12 kg/m²     General appearance: No apparent distress, appears stated age and cooperative. HEENT: Pupils equal, round, and reactive to light. Conjunctivae/corneas clear. left facial swelling, mildly tender   Neck: Supple, with full range of motion. No jugular venous distention. Trachea midline. Respiratory:  Normal respiratory effort. Clear to auscultation, bilaterally without Rales/Wheezes/Rhonchi. Cardiovascular: Regular rate and rhythm with normal S1/S2 without murmurs, rubs or gallops. Abdomen: Soft, non-tender, non-distended with normal bowel sounds. Musculoskeletal: No clubbing, cyanosis or edema bilaterally. Full range of motion without deformity. Skin:Neurologic:  Neurovascularly intact without any focal sensory/motor deficits. Cranial nerves: II-XII intact, grossly non-focal.  Psychiatric: Alert and oriented, thought content appropriate, normal insight      Labs:   Recent Labs      10/07/17   0335  10/08/17   0420   WBC  11.0*  8.6   HGB  11.1*  11.7*   HCT  35.4*  35.7*   PLT  308  275     Recent Labs      10/07/17   0335  10/07/17   0702  10/07/17   1123  10/08/17   0420  10/08/17   0845   NA  123*  126*  134*  132*  133*   K  4.6  4.0  4.0  3.2*   --    CL  81*  84*  92*  92*   --    CO2  19*  17*  22*  22*   --    BUN  55*  56*  55*  48*   --    CREATININE  5.3*  5.7*  5.4*  5.1*   --    CALCIUM  8.9  9.3  9.1  8.8   --    PHOS  7.2*  5.8*  5.3*   --    --      Recent Labs      10/07/17   0335   AST  12   ALT  10*   BILIDIR  <0.2   BILITOT  0.2*   ALKPHOS  179*     No results for input(s): INR in the last 72 hours.   Recent Labs      10/07/17   0702   CKTOTAL  87

## 2017-10-08 NOTE — PROGRESS NOTES
Both endocrinology and oral surgery consults attempted to call today. Neither had anyone on call again today.

## 2017-10-09 NOTE — PROGRESS NOTES
Pharmacy Vancomycin Consult     Vancomycin Day: 3  Current Dosing: intermittent (PD patient)    Temp max:  98.8    Recent Labs      10/08/17   0420  10/09/17   0523   BUN  48*  49*       Recent Labs      10/08/17   0420  10/09/17   0523   CREATININE  5.1*  5.0*       Recent Labs      10/08/17   0420  10/09/17   0523   WBC  8.6  7.3         Intake/Output Summary (Last 24 hours) at 10/09/17 1315  Last data filed at 10/09/17 1155   Gross per 24 hour   Intake 6654.98 ml   Output 5650 ml   Net 1004.98 ml         Culture Date Source Results   10/7/17 VRE / MRSA screen negative (for both)                       Ht Readings from Last 1 Encounters:   10/07/17 5' 9\" (1.753 m)        Wt Readings from Last 1 Encounters:   10/08/17 163 lb 4.8 oz (74.1 kg)         Body mass index is 24.12 kg/m². CrCl: Patient is on PD    Vancomycin random level: 9.3    Assessment/Plan: Supplemental dose of 500mg X1 dose. Recheck vancomycin trough in approximately 48-72 hours. Rubén Becker, PharmD  10/9/2017 3:34 PM

## 2017-10-09 NOTE — CARE COORDINATION
10/9/17, 2:52 PM      Mauro Pastor       Admitted from: ED 10/7/2017/ [facility] day: 2   Location: [facility] Reason for admit: Diabetes mellitus type 1, uncontrolled, with complications ([facility] [O43.4, E10.65] Status: IP  Admit order signed?: yes  PMH:  has a past medical history of Anemia; Angiotensin converting enzyme inhibitor-aggravated angioedema; Cerebral artery occlusion with cerebral infarction [facility]); CHF (congestive heart failure) ([facility] Disease of blood and blood forming organ; DM type 1 (diabetes mellitus, type 1) ([facility] ESRD (end stage renal disease) ([facility] GERD (gastroesophageal reflux disease); H/O pancreas transplant ([facility] Hemodialysis patient [facility]); History of blood transfusion; HTN (hypertension); Hyperlipidemia; Hypersomnolence disorder; Kidney transplant status, living unrelated donor; Leaky heart valve; Metabolic acidosis; Narcolepsy; Nephropathy, diabetic ([facility] and Seizures ([facility]   Procedure: none  Pertinent abnormal Imaging: 10/7 Left Facial Cellulitis, Apical Abscess Left Maxillary First Molar  Medications:  Scheduled Meds:   ampicillin-sulbactam  3 g Intravenous Q12H    aspirin  81 mg Oral Daily    carvedilol  50 mg Oral BID    cetirizine  10 mg Oral Daily    cloNIDine  0.3 mg Oral BID    furosemide  80 mg Oral BID    latanoprost  1 drop Both Eyes Nightly    therapeutic multivitamin-minerals  1 tablet Oral Daily    mycophenolate  360 mg Oral BID    omeprazole  20 mg Oral Daily    simvastatin  20 mg Oral QPM    sirolimus  4 mg Oral Daily    heparin (porcine)  5,000 Units Subcutaneous BID    sodium chloride flush  10 mL Intravenous 2 times per day    methylphenidate  10 mg Oral BID WC    insulin glargine  20 Units Subcutaneous Daily    insulin lispro  0-18 Units Subcutaneous 4x Daily AC & HS    insulin lispro  0-9 Units Subcutaneous Nightly    insulin lispro  7 Units Subcutaneous TID AC    dianeal lo-mercedes 2.5%  2,000 mL Intraperitoneal 4 times per day     Continuous

## 2017-10-09 NOTE — PROGRESS NOTES
Hospitalist Progress Note    Patient:  [facility]      Unit/Bed:4K-16/016-A    YOB: 1975    MRN: [id number]       Acct: [de-identified]     PCP: Bismark Espinal DO    Date of Admission: 10/7/2017    Chief Complaint: Left facial swelling, Facial pain     Hospital Course: 42 y.o. male admitted forleft facial swelling and pain for 2 days, found with DKA now resolved, pending endo and surgery consult      Subjective: cont feeling better, with minimal left facial pain, swelling improved, denies fever, sob, chest pain, n/v or other complaint. Requested to go home for retuning to work then changed his mind, wants to stay over night waiting for oral surgery and endo consults.        Medications:  Reviewed    Infusion Medications    dextrose       Scheduled Medications    ampicillin-sulbactam  3 g Intravenous Q12H    aspirin  81 mg Oral Daily    carvedilol  50 mg Oral BID    cetirizine  10 mg Oral Daily    cloNIDine  0.3 mg Oral BID    furosemide  80 mg Oral BID    latanoprost  1 drop Both Eyes Nightly    therapeutic multivitamin-minerals  1 tablet Oral Daily    mycophenolate  360 mg Oral BID    omeprazole  20 mg Oral Daily    simvastatin  20 mg Oral QPM    sirolimus  4 mg Oral Daily    heparin (porcine)  5,000 Units Subcutaneous BID    sodium chloride flush  10 mL Intravenous 2 times per day    methylphenidate  10 mg Oral BID WC    insulin glargine  20 Units Subcutaneous Daily    insulin lispro  0-18 Units Subcutaneous 4x Daily AC & HS    insulin lispro  0-9 Units Subcutaneous Nightly    insulin lispro  7 Units Subcutaneous TID AC    dianeal lo-mercedes 2.5%  2,000 mL Intraperitoneal 4 times per day     PRN Meds: glucose, dextrose, glucagon (rDNA), dextrose, insulin regular, dextrose, potassium chloride, magnesium sulfate, sodium phosphate IVPB **OR** sodium phosphate IVPB **OR** sodium phosphate IVPB, sodium chloride flush, acetaminophen, ondansetron, fentanNYL **OR** fentanNYL, oxyCODONE-acetaminophen      Intake/Output Summary (Last 24 hours) at 10/09/17 1726  Last data filed at 10/09/17 1445   Gross per 24 hour   Intake          7460.98 ml   Output             5650 ml   Net          1810.98 ml       Diet:  DIET CARB CONTROL; Carb Control: 4 carbs/meal (approximate 1800 kcals/day)    Exam:  /83   Pulse 85   Temp 98.6 °F (37 °C) (Oral)   Resp 17   Ht 5' 9\" (1.753 m)   Wt 163 lb 4.8 oz (74.1 kg) Comment: Dry weight   SpO2 94%   BMI 24.12 kg/m²     General appearance: No apparent distress, appears stated age and cooperative. HEENT: Pupils equal, round, and reactive to light. Conjunctivae/corneas clear. left facial swelling, mildly tender   Neck: Supple, with full range of motion. No jugular venous distention. Trachea midline. Respiratory:  Normal respiratory effort. Clear to auscultation, bilaterally without Rales/Wheezes/Rhonchi. Cardiovascular: Regular rate and rhythm with normal S1/S2 without murmurs, rubs or gallops. Abdomen: Soft, non-tender, non-distended with normal bowel sounds. Musculoskeletal: No clubbing, cyanosis or edema bilaterally. Full range of motion without deformity. Skin:Neurologic:  Neurovascularly intact without any focal sensory/motor deficits.  Cranial nerves: II-XII intact, grossly non-focal.  Psychiatric: Alert and oriented, thought content appropriate, normal insight      Labs:   Recent Labs      10/07/17   0335  10/08/17   0420  10/09/17   0523   WBC  11.0*  8.6  7.3   HGB  11.1*  11.7*  11.3*   HCT  35.4*  35.7*  34.7*   PLT  308  275  262     Recent Labs      10/07/17   0335  10/07/17   0702  10/07/17   1123  10/08/17   0420  10/08/17   0845  10/09/17   0523   NA  123*  126*  134*  132*  133*  133*   K  4.6  4.0  4.0  3.2*   --   3.2*   CL  81*  84*  92*  92*   --   93*   CO2  19*  17*  22*  22*   --   24   BUN  55*  56*  55*  48*   --   49*   CREATININE  5.3*  5.7*  5.4*  5.1*   --   5.0*   CALCIUM  8.9  9.3  9.1  8.8   --   8.6   PHOS  7.2*

## 2017-10-09 NOTE — PLAN OF CARE
Problem: Pain:  Goal: Pain level will decrease  Pain level will decrease   Outcome: Met This Shift  Patient denies pain. Goal: Control of acute pain  Control of acute pain   Outcome: Met This Shift  Patient denies pain. Goal: Control of chronic pain  Control of chronic pain   Outcome: Met This Shift  Patient denies pain. Problem: Pain Control  Goal: Maintain pain level at or below patient's acceptable level (or 5 if patient is unable to determine acceptable level)  Outcome: Met This Shift  Patient denies pain. Problem: Respiratory  Goal: No pulmonary complications  Outcome: Met This Shift  Patient is able to cough and deep breathe. No shortness of breath. Pulse ox remained above 90%. No supplemental O2 required. Problem:   Goal: No urinary complication  Outcome: Ongoing  Patient did not experience any urinary complications. Problem: Emotional/Psychosocial  Goal: Understanding of community resources  Outcome: Ongoing      Problem: Spiritual  Goal: Coping methods/spiritual issues explored  Outcome: Ongoing      Problem: Nutrition  Goal: Optimal nutrition therapy  Outcome: Met This Shift  Patient able to feed self. Eats at least 75% of all meals. Goal: Understanding of nutritional guidelines  Outcome: Ongoing  Patient understands dietary order. Comments: Care plan reviewed with patient and patient verbalized understanding of the care plan and contributing to goal setting.

## 2017-10-09 NOTE — PLAN OF CARE
Problem: Pain:  Goal: Pain level will decrease  Pain level will decrease   Outcome: Ongoing  Pt pain tolerable this shift. Goal: Control of acute pain  Control of acute pain   Outcome: Ongoing  Pain under control this shift. Goal: Control of chronic pain  Control of chronic pain   Outcome: Completed Date Met: 10/09/17  No chronic pain this shift. Problem: Pain Control  Goal: Maintain pain level at or below patient's acceptable level (or 5 if patient is unable to determine acceptable level)  Outcome: Completed Date Met: 10/09/17      Problem: Respiratory  Goal: No pulmonary complications  Outcome: Completed Date Met: 10/09/17  No respiratory issues noted this shift. Problem:   Goal: No urinary complication  Outcome: Ongoing  Pt with peritoneal dialysis, good urinary output. Problem: Emotional/Psychosocial  Goal: Understanding of community resources  Outcome: Completed Date Met: 10/09/17      Problem: Spiritual  Goal: Coping methods/spiritual issues explored  Outcome: Completed Date Met: 10/09/17      Problem: Nutrition  Goal: Optimal nutrition therapy  Outcome: Completed Date Met: 10/09/17    Goal: Understanding of nutritional guidelines  Outcome: Completed Date Met: 10/09/17  Pt understands diet. Problem: Discharge Planning:  Goal: Discharged to appropriate level of care  Discharged to appropriate level of care  Outcome: Ongoing  Plan discharge back home after course of IV antibiotics, hopefully tomorrow. Comments: Care plan reviewed with patient. Patient verbalize understanding of the plan of care and contribute to goal setting.

## 2017-10-09 NOTE — DISCHARGE INSTR - DIET
 Good nutrition is important when healing from an illness, injury, or surgery. Follow any nutrition recommendations given to you during your hospital stay.  If you were given an oral nutrition supplement while in the hospital, continue to take this supplement at home. You can take it with meals, in-between meals, and/or before bedtime. These supplements can be purchased at most local grocery stores, pharmacies, and chain super-stores.  If you have any questions about your diet or nutrition, call the hospital and ask for the dietitian. You are recommended to follow a renal (kidney) diet    What foods are good to eat? Eat food rich in calories and protein. Meats and proteins like:  Beef  Fish  Poultry  Pork  Eggs  Breads and grains like:  Cereals  Bread  Milk products like: Yogurt  Cream cheese  Ricotta cheese  Butter  Margarine  Heavy cream  Sherbet  Fruits like:  Apples  Berries  Cherries  Grapes  Peaches  Pears  Pineapples  Plums  Veggies like:  Broccoli  Cabbage  Carrots  Cauliflower  Celery  Cucumber  Eggplant  Lettuce  Peppers  Radish  Zucchini  Yellow squash  Talk to your doctor about these foods if you need to control your blood sugar. What foods should be limited or avoided? Stay away from food high in water, potassium, phosphorus, and sodium or salt. If potassium builds up in your blood, it may cause heart problems. Phosphorus, in large amount in your blood, can take away the calcium in your blood. This may weaken your bones. Salt or sodium can trap fluids in your body and cause high blood pressure.  Limit or stay away from eating these foods:  Breads and grains like:  Whole wheat bread  Brown rice  Fruits like:  Oranges  Kiwis  Prunes  Raisins  Bananas  Melons  Veggies like:  Potatoes  Tomatoes  Winter squash  Pumpkin  Asparagus  Avocados  Beets  Spinach  Parsnips  Seasonings like:  Soy sauce  Teriyaki sauce  Other foods and drinks like:  Canned foods  Chips  Restaurant

## 2017-10-09 NOTE — PROGRESS NOTES
Contacted Dr. Dayana Ramos as patient states he has to work tomorrow and cannot stay. Asked if he wants to discharge patient or if he would have to sign out AMA. Dr. Dayana Ramos states he will discharge patient. He states to cancel the endocrinology consult and refer patient to oral surgeon. Called Dr. Dominic Busch to update him. He states to have patient follow up with him in 1 week. While reviewing discharge follow ups, patient states that he thinks it's best if he would stay another day like the doctor recommended and he was contacting someone at work. Updated Dr. Dayana Ramos, who states to cancel the discharge and continue IV antibiotics. He states to contact Dr. Dominic Busch and update him that patient is staying if he would like to see patient.  Called Dr. Dominic Busch who states he will not see patient in hospital and patient has not followed up with him in past.

## 2017-10-09 NOTE — DISCHARGE SUMMARY
Vitals:  Vitals:    10/09/17 0342 10/09/17 0834 10/09/17 1155 10/09/17 1636   BP: 128/73 122/88 112/78 127/83   Pulse: 89 89 91 85   Resp: 16 16 17 17   Temp: 98.4 °F (36.9 °C) 98.4 °F (36.9 °C) 98 °F (36.7 °C) 98.6 °F (37 °C)   TempSrc: Oral Oral Oral Oral   SpO2: 99% 96% 95% 94%   Weight:       Height:         Weight: Weight: 163 lb 4.8 oz (74.1 kg) (Dry weight )     24 hour intake/output:  Intake/Output Summary (Last 24 hours) at 10/09/17 1708  Last data filed at 10/09/17 1445   Gross per 24 hour   Intake          7460.98 ml   Output             5650 ml   Net          1810.98 ml         General appearance: No apparent distress, appears stated age and cooperative. HEENT: Pupils equal, round, and reactive to light. Conjunctivae/corneas clear. left facial swelling improved. Neck: Supple, with full range of motion. No jugular venous distention. Trachea midline. Respiratory:  Normal respiratory effort. Clear to auscultation, bilaterally without Rales/Wheezes/Rhonchi. Cardiovascular: Regular rate and rhythm with normal S1/S2 without murmurs, rubs or gallops. Abdomen: Soft, non-tender, non-distended with normal bowel sounds. Musculoskeletal: No clubbing, cyanosis or edema bilaterally. Full range of motion without deformity. Skin:Neurologic:  Neurovascularly intact without any focal sensory/motor deficits. Cranial nerves: II-XII intact, grossly non-focal.  Psychiatric: Alert and oriented, thought content appropriate, normal insight         Labs:  For convenience and continuity at follow-up the following most recent labs are provided:      CBC:    Lab Results   Component Value Date    WBC 7.3 10/09/2017    HGB 11.3 10/09/2017    HCT 34.7 10/09/2017     10/09/2017       Renal:  Lab Results   Component Value Date     10/09/2017    K 3.2 10/09/2017    CL 93 10/09/2017    CO2 24 10/09/2017    BUN 49 10/09/2017    CREATININE 5.0 10/09/2017    CALCIUM 8.6 10/09/2017    PHOS 5.3 10/07/2017 amoxicillin-clavulanate (AUGMENTIN) 500-125 MG per tablet  Take 1 tablet by mouth 2 times daily for 5 days             aspirin 81 MG chewable tablet  Take 81 mg by mouth daily             B-D ULTRAFINE III SHORT PEN 31G X 8 MM MISC  Seven or eight             carvedilol (COREG) 25 MG tablet  Take 2 tablets by mouth 2 times daily             cetirizine (ZYRTEC) 10 MG tablet  Take 1 tablet by mouth daily             cloNIDine (CATAPRES) 0.3 MG tablet  Take 1 tablet by mouth 2 times daily             darbepoetin mirna-polysorbate (ARANESP) 60 MCG/0.3ML SOSY injection  Inject 60 mcg into the skin every 14 days Per clinic             furosemide (LASIX) 40 MG tablet  Take 80 mg by mouth 2 times daily             Ginkgo Biloba (GNP GINGKO BILOBA EXTRACT PO)  Take 1 capsule by mouth daily             glucose blood VI test strips (ONETOUCH VERIO) strip  1 each by In Vitro route 4 times daily As needed.              insulin glargine (BASAGLAR KWIKPEN) 100 UNIT/ML injection pen  Inject 15 units in morning             insulin lispro (HUMALOG KWIKPEN) 100 UNIT/ML pen  Inject 3 Units into the skin 3 times daily (before meals) Sliding scale:  -200 = 1 unit  -250 = 2 units  -300 = 3 units  -350 = 4 units  -400 = 5 units  BS > 400 = 10 units             Insulin Pen Needle 33G X 5 MM MISC  Indications: E10.21 Use with Insulin pens             latanoprost (XALATAN) 0.005 % ophthalmic solution  Place 1 drop into both eyes nightly             methylphenidate (RITALIN) 10 MG tablet  Take 1 tablet by mouth 2 times daily             Multiple Vitamins-Minerals (MENS MULTI VITAMIN & MINERAL PO)  Take 1 tablet by mouth daily             Mycophenolate Sodium 360 MG TBEC  Take 1 tablet by mouth 2 times daily             omeprazole (PRILOSEC) 20 MG delayed release capsule  Take 1 capsule by mouth daily             ONE TOUCH LANCETS MISC  1 each by Does not apply route 4 times daily             simvastatin (ZOCOR) 20 MG tablet  Take 1 tablet by mouth every evening             sirolimus (RAPAMUNE) 1 MG tablet  Take 4 mg by mouth daily                  Time Spent on discharge is more than 30 minutes in the examination, evaluation, counseling and review of medications and discharge plan. Signed: Thank you Doroteo Hamlin DO for the opportunity to be involved in this patient's care.     Electronically signed by Arsalan Au MD on 10/9/2017 at 5:08 PM

## 2017-10-09 NOTE — PROGRESS NOTES
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 [address]     CHF (congestive heart failure) [facility] [address]     Disease of blood and blood forming organ     DM type 1 (diabetes mellitus, type 1) ([facility] [address]     previous diabetes, diag at age of 17,  no tx after transplant in 2008, started on insulins again in august 2015 as pancreas is not functioning well    ESRD (end stage renal disease) ([facility] [address] 12/10/2013    was on dialysis in 2006-2207, before transplant    GERD (gastroesophageal reflux disease)     H/O pancreas transplant ([facility] [address] 5/15/2007    Hemodialysis patient ([facility] [address]     History of blood transfusion     FFP for angioedema    HTN (hypertension)     Hyperlipidemia     Hypersomnolence disorder     Kidney transplant status, living unrelated donor 5/7/2006    Leaky heart valve     Metabolic acidosis     Narcolepsy     Nephropathy, diabetic ([facility] [address]     Seizures ([facility] [address]         Past Surgical History:  Past Surgical History:   Procedure Laterality Date    EYE SURGERY      vitrectomy, in both eyes, bleeding from heparin while on dialysis    KIDNEY TRANSPLANT  2006   [address]  2007    transplant        Family History:  Family History   Problem Relation Age of Onset    Heart Disease Mother     Diabetes Mother     High Blood Pressure Mother     Heart Disease Father     Diabetes Maternal Aunt     Kidney Disease Maternal Aunt     Diabetes Sister     High Blood Pressure Maternal Grandmother         Social History:  Social History     Social History    Marital status: Single     Spouse name: N/A    Number of children: 3    Years of education: 15     Occupational History    Disabled      Social History Main Topics    Smoking status: Never Smoker    Smokeless tobacco: Never Used    Alcohol use No    Drug use: No    Sexual activity: Not Currently     Other Topics Concern    Not on file     Social History Narrative    No narrative on file Home Medications:  Prior to Admission medications    Medication Sig Start Date End Date Taking?  Authorizing Provider   furosemide (LASIX) 40 MG tablet Take 80 mg by mouth 2 times daily   Yes Historical Provider, MD   insulin glargine (BASAGLAR KWIKPEN) 100 UNIT/ML injection pen Inject 15 units in morning 6/22/17  Yes Daja Garcia MD   carvedilol (COREG) 25 MG tablet Take 2 tablets by mouth 2 times daily 6/14/17  Yes Daja Garcia MD   insulin lispro (HUMALOG KWIKPEN) 100 UNIT/ML pen Inject 3 Units into the skin 3 times daily (before meals) Sliding scale:  -200 = 1 unit  -250 = 2 units  -300 = 3 units  -350 = 4 units  -400 = 5 units  BS > 400 = 10 units 6/6/17  Yes Daja Garcia MD   cloNIDine (CATAPRES) 0.3 MG tablet Take 1 tablet by mouth 2 times daily 3/9/17  Yes Maryanne Yanes MD   sirolimus (RAPAMUNE) 1 MG tablet Take 4 mg by mouth daily    Yes Historical Provider, MD   simvastatin (ZOCOR) 20 MG tablet Take 1 tablet by mouth every evening 2/9/17  Yes Mini Samples, DO   omeprazole (PRILOSEC) 20 MG delayed release capsule Take 1 capsule by mouth daily 1/14/17  Yes Mini Samples, DO   Mycophenolate Sodium 360 MG TBEC Take 1 tablet by mouth 2 times daily 1/14/17  Yes Mini Samples, DO   methylphenidate (RITALIN) 10 MG tablet Take 1 tablet by mouth 2 times daily 10/21/16  Yes Jennifer Benavides MD   cetirizine (ZYRTEC) 10 MG tablet Take 1 tablet by mouth daily 10/14/16  Yes Daja Garcia MD   B-D ULTRAFINE III SHORT PEN 31G X 8 MM MISC Seven or eight 7/7/16  Yes Historical Provider, MD   Multiple Vitamins-Minerals (MENS MULTI VITAMIN & MINERAL PO) Take 1 tablet by mouth daily   Yes Historical Provider, MD   Gimilagroskgseamus Biloba (GNP GINGKO BILOBA EXTRACT PO) Take 1 capsule by mouth daily   Yes Historical Provider, MD   Insulin Pen Needle 33G X 5 MM MISC Indications: E10.21 Use with Insulin pens 7/5/16  Yes Abram Chacon MD   aspirin 81 MG chewable tablet Take 81 mg by mouth daily   Yes Historical MD Therese   ONE TOUCH LANCETS [address] [facility] 1 each by Does not apply route 4 times daily 12/30/15  Yes Franklin Arita, DO   glucose blood VI test strips (ONETOUCH VERIO) strip 1 each by In Vitro route 4 times daily As needed. 12/30/15  Yes Franklin Arita, DO   latanoprost (XALATAN) 0.005 % ophthalmic solution Place 1 drop into both eyes nightly 11/13/15  Yes Mingo Calix MD        Lab data:  CBC:    Recent Labs      10/07/17   0335  10/08/17   0420  10/09/17   0523   WBC  11.0*  8.6  7.3   HGB  11.1*  11.7*  11.3*   PLT  308  275  262     CMP:    Recent Labs      10/07/17   1123  10/08/17   0420  10/08/17   0845  10/09/17   0523   NA  134*  132*  133*  133*   K  4.0  3.2*   --   3.2*   CL  92*  92*   --   93*   CO2  22*  22*   --   24   BUN  55*  48*   --   49*   CREATININE  5.4*  5.1*   --   5.0*   GLUCOSE  268*  70   --   264*   CALCIUM  9.1  8.8   --   8.6     Urine:  Recent Labs      10/07/17   0703   COLORU  YELLOW   PHUR  6.0   WBCUA  2-4   RBCUA  0-2   YEAST  NONE SEEN   BACTERIA  NONE   LEUKOCYTESUR  NEGATIVE   UROBILINOGEN  0.2   BILIRUBINUR  NEGATIVE   BLOODU  TRACE*      Sed Rate:   Recent Labs      10/07/17   0335   SEDRATE  81*            Radiology  PROCEDURE: CT FACIAL BONES WO CONTRAST       CLINICAL INFORMATION: left facial swelling, diabetic. Facial pain. No known injury. The patient is a diabetic and has an elevated creatinine. No IV contrast was given.       COMPARISON: No prior study.       TECHNIQUE: 3 mm noncontrast axial CT images were obtained through the facial soft tissues. 3 mm coronal reconstructions were obtained.       All CT scans at this facility use dose modulation, iterative reconstruction, and/or weight-based dosing when appropriate to reduce radiation dose to as low as reasonably achievable.       FINDINGS:        There is skin thickening and there is fat stranding in the left facial soft tissues.  This involves the cheek and the soft tissues overlying

## 2017-10-10 NOTE — PROGRESS NOTES
inhibitor-aggravated angioedema 8/12/2016    Cerebral artery occlusion with cerebral infarction ([facility]     CHF (congestive heart failure) ([facility]     Disease of blood and blood forming organ     DM type 1 (diabetes mellitus, type 1) ([facility]     previous diabetes, diag at age of 17,  no tx after transplant in 2008, started on insulins again in august 2015 as pancreas is not functioning well    ESRD (end stage renal disease) ([facility] 12/10/2013    was on dialysis in 2006-2207, before transplant    GERD (gastroesophageal reflux disease)     H/O pancreas transplant ([facility] 5/15/2007    Hemodialysis patient ([facility]     History of blood transfusion     FFP for angioedema    HTN (hypertension)     Hyperlipidemia     Hypersomnolence disorder     Kidney transplant status, living unrelated donor 5/7/2006    Leaky heart valve     Metabolic acidosis     Narcolepsy     Nephropathy, diabetic ([facility]     Seizures ([facility]         Past Surgical History:  Past Surgical History:   Procedure Laterality Date    EYE SURGERY      vitrectomy, in both eyes, bleeding from heparin while on dialysis    KIDNEY TRANSPLANT  2006   [address]  2007    transplant        Family History:  Family History   Problem Relation Age of Onset    Heart Disease Mother     Diabetes Mother     High Blood Pressure Mother     Heart Disease Father     Diabetes Maternal Aunt     Kidney Disease Maternal Aunt     Diabetes Sister     High Blood Pressure Maternal Grandmother         Social History:  Social History     Social History    Marital status: Single     Spouse name: N/A    Number of children: 3    Years of education: 15     Occupational History    Disabled      Social History Main Topics    Smoking status: Never Smoker    Smokeless tobacco: Never Used    Alcohol use No    Drug use: No    Sexual activity: Not Currently     Other Topics Concern    Not on file     Social History Narrative    No narrative on file        Home Medications:  Prior to Admission medications    Medication Sig Start Date End Date Taking?  Authorizing Provider   darbepoetin mirna-polysorbate (ARANESP) 60 MCG/0.3ML SOSY injection Inject 60 mcg into the skin every 14 days Per clinic 9/28/17  Yes Historical Provider, MD   amoxicillin-clavulanate (AUGMENTIN) 500-125 MG per tablet Take 1 tablet by mouth 2 times daily for 5 days 10/9/17 10/14/17 Yes Ronna Marroquin MD   furosemide (LASIX) 40 MG tablet Take 80 mg by mouth 2 times daily   Yes Historical Provider, MD   insulin glargine [facility]) 100 UNIT/ML injection pen Inject 15 units in morning 6/22/17  Yes Levonia Mcburney, MD   carvedilol (COREG) 25 MG tablet Take 2 tablets by mouth 2 times daily 6/14/17  Yes Levonia Mcburney, MD   insulin lispro (HUMALOG KWIKPEN) 100 UNIT/ML pen Inject 3 Units into the skin 3 times daily (before meals) Sliding scale:  -200 = 1 unit  -250 = 2 units  -300 = 3 units  -350 = 4 units  -400 = 5 units  BS > 400 = 10 units 6/6/17  Yes Levonia Mcburney, MD   cloNIDine (CATAPRES) 0.3 MG tablet Take 1 tablet by mouth 2 times daily 3/9/17  Yes Jeanine Gerber MD   sirolimus (RAPAMUNE) 1 MG tablet Take 4 mg by mouth daily    Yes Historical Provider, MD   simvastatin (ZOCOR) 20 MG tablet Take 1 tablet by mouth every evening 2/9/17  Yes Will Schumacher DO   omeprazole (PRILOSEC) 20 MG delayed release capsule Take 1 capsule by mouth daily 1/14/17  Yes Will Schumacher DO   Mycophenolate Sodium 360 MG TBEC Take 1 tablet by mouth 2 times daily 1/14/17  Yes Will Schumacher DO   methylphenidate (RITALIN) 10 MG tablet Take 1 tablet by mouth 2 times daily 10/21/16  Yes Gallito Majano MD   cetirizine (ZYRTEC) 10 MG tablet Take 1 tablet by mouth daily 10/14/16  Yes Levonia Mcburney, MD   B-D ULTRAFINE III SHORT PEN 31G X 8 MM MISC Seven or eight 7/7/16  Yes Historical Provider, MD   Multiple Vitamins-Minerals (MENS MULTI VITAMIN & MINERAL PO) Take 1 tablet by mouth daily   Yes Historical Provider, MD   Ginkgo Biloba (GNP GINGKO BILOBA EXTRACT PO) Take 1 capsule by mouth daily   Yes Historical Provider, MD   Insulin Pen Needle 33G X 5 MM MISC Indications: E10.21 Use with Insulin pens 7/5/16  Yes Braulio Simpson MD   aspirin 81 MG chewable tablet Take 81 mg by mouth daily   Yes Historical Provider, MD   ONE TOUCH LANCETS [facility] 1 each by Does not apply route 4 times daily 12/30/15  Yes Denny Siddiqi, DO   glucose blood VI test strips (ONETOUCH VERIO) strip 1 each by In Vitro route 4 times daily As needed. 12/30/15  Yes Denny Hadleyr, DO   latanoprost (XALATAN) 0.005 % ophthalmic solution Place 1 drop into both eyes nightly 11/13/15  Yes Kati Dolan MD        Lab data:  CBC:    Recent Labs      10/08/17   0420  10/09/17   0523   WBC  8.6  7.3   HGB  11.7*  11.3*   PLT  275  262     CMP:    Recent Labs      10/08/17   0420  10/08/17   0845  10/09/17   0523  10/10/17   0357   NA  132*  133*  133*  139   K  3.2*   --   3.2*  3.7   CL  92*   --   93*  96*   CO2  22*   --   24  26   BUN  48*   --   49*  57*   CREATININE  5.1*   --   5.0*  4.9*   GLUCOSE  70   --   264*  196*   CALCIUM  8.8   --   8.6  8.8     Urine:  No results for input(s): COLORU, PHUR, LABCAST, WBCUA, RBCUA, MUCUS, TRICHOMONAS, YEAST, BACTERIA, CLARITYU, SPECGRAV, LEUKOCYTESUR, UROBILINOGEN, BILIRUBINUR, BLOODU in the last 72 hours. Invalid input(s): NITRATE, GLUCOSEUKETONESUAMORPHOUS   Sed Rate:   No results for input(s): SEDRATE in the last 72 hours. Radiology  PROCEDURE: CT FACIAL BONES WO CONTRAST       CLINICAL INFORMATION: left facial swelling, diabetic. Facial pain. No known injury. The patient is a diabetic and has an elevated creatinine. No IV contrast was given.       COMPARISON: No prior study.       TECHNIQUE: 3 mm noncontrast axial CT images were obtained through the facial soft tissues.  3 mm coronal reconstructions were obtained.       All CT scans at this facility use dose modulation, iterative reconstruction, and/or weight-based dosing when appropriate to reduce radiation dose to as low as reasonably achievable.       FINDINGS:        There is skin thickening and there is fat stranding in the left facial soft tissues. This involves the cheek and the soft tissues overlying the left mandible. This extends into the submental location.       There is mucosal thickening along the floor the left maxillary sinus. There is an apical abscess associated with the 3 roots of the first left maxillary molar. The cortex of the maxilla overlying the most anterior root has been eroded. This is contiguous    with the adjacent inflamed soft tissues. The other paranasal sinuses are normally aerated.       There are no stones along the floor the mouth. There are no stones along the expected location of Bear's duct. There are vascular calcifications.       There is no definite inflammation surrounding either parotid gland. There is no fat stranding surrounding the submandibular glands.       There are some prominent lymph nodes on the left. These are likely reactive.       There are no suspicious osseous lesions. There is no gross abnormality in the brain parenchyma.               Impression       1. Left facial cellulitis. 2. Small apical abscesses associated with the 3 roots of the left maxillary first molar. The abscess associated with the most anterior and lateral root has eroded into the soft tissues on the left. This is near the inflammation.              Review of Systems:  Source of data: patient    CONSTITUTIONAL  Fever: none, Chills: none, Weight loss: none, Malaise: none, Fatigue: none, Diaphoresis: none,   Weakness: none    SKIN  Rash: none, Itch: none, Open sores: none    HENT:  Headache: none, Hearing loss: none, Tinnitus: none, Ear pain: none, Ear discharge: none,   Nasal bleeding: none, Congestion: none, Stridor: none, Sore throat: none.     EYES  Blurred vision: none, Double vision: none, Photophobia: none, Eye pain: none, Eye discharge: none,  Eye redness: none. CARDIOVASCULAR  Chest pain: none, Arm pain: none, Palpitations: none, Orthopnea: none, Claudication: none,   Leg swelling: none, PND: none. RESPIRATORY  Cough: none, Hemoptysis: none, Sputum production: none, Shortness of breath: none, Wheezing: none    GASTROINTESTINAL  Heartburn: none, Nausea: none, Vomiting: none, Abdominal pain: none, Diarrhea: none,   Constipation: none, Blood in the stool: none, Melena: none, Rebound: none, Rovsing's: none. GENITOURINARY  Dysuria: none, Pyuria: none, Gross hematuria: none, Urgency: none, Flank pain: none, STD: none. MUSCULOSKELETAL  Myalgia: none, Neck pain: none, Thoracic pain: none, Lumbar pain: none, Joint pain: none, Falls: none    ENDOCRINE/HEMATOLOGY/ALLERGIC  Easy bruising: none, Easy bleeding: none, Environmental allergies: none, Polydipsia: none. NEUROLOGICAL  Dizziness: none, Tingling: none, Numbness: none, Tremor: none, Sensory change: none,   Speech change: none, Focal weakness: none, Seizures: none, Loss of consciousness: none,    PSYCHIATRIC  Depression: none, Suicidal ideation: none, Heroin abuse: none, Cocaine abuse: none,   Marijuana abuse: none, Hallucinations: none, Anxiety: none, Memory loss: none       Physical Examination:  BP (!) 144/95   Pulse 96   Temp 98.7 °F (37.1 °C) (Oral)   Resp 16   Ht 5' 9\" (1.753 m)   Wt 164 lb 6.4 oz (74.6 kg) Comment: Dry weight  SpO2 99%   BMI 24.28 kg/m²       General appearance: alert and appropriate  HEENT: Head: Normocephalic, no lesions, without obvious abnormality.  Left face is swollen  Neck: no adenopathy, no carotid bruit and no JVD  Lungs: clear to auscultation bilaterally  Heart: regular rate and rhythm, S1, S2 normal, no murmur, click, rub or gallop  Abdomen: soft, non-tender; bowel sounds normal; no masses,  no organomegaly  Extremities: extremities normal, atraumatic, no cyanosis or edema  Neurologic: Mental status: Alert, oriented, thought content appropriate  PSY: No evidence of depression. Mood is normal for the patient. Skin: Warm and dry. No unusual lesions or rashes noted. Muscles: Hand  is equal and strong bilaterally. Leg strength is equal and strong. Skeletal: No bony or skeletal abnormalities noted. Admission weight: 168 lb (76.2 kg)  Wt Readings from Last 3 Encounters:   10/10/17 164 lb 6.4 oz (74.6 kg)   09/01/17 176 lb (79.8 kg)   08/16/17 178 lb 14.4 oz (81.1 kg)     Body mass index is 24.28 kg/m². Clinical Impressions:    1. ESRD from DM  2. Failed kidney transplant  3. failed pancrease transplant  4. DKA at admission. 5. HTN  6. Anemia from kidney disease  7. Left facial cellulitis. 8. Left tooth abscess. Plan of Care    1. continue peritoneal dialysis  2. Continue IV antibiotics. 3. Today is his usual day for dose of aranesp. 4. It is OK with nephrology for the primary physician to discharge the patient. 5. You do not need to schedule a follow up visit as we see these patients in clinic every month. 6. He will need follow up with a dentist about the infected tooth. Total time spent interviewing the patient and/or family, evaluating lab data and X-ray data and processing orders was 25 minutes. I personally spent more than 50% of the visit time face to face with the patient in counseling and coordination of the patient's care. Kinga Perkins DO  [facility].

## 2017-10-10 NOTE — CARE COORDINATION
10/10/17, 9:19 AM  Client denied needs as plans home with mother Zak Kramer, is [facility] client and plans new PCP Agustin Pizano CNP (see AVS) after list of choices offered  Discharge plan discussed by  and . Discharge plan reviewed with patient/ family. Patient/ family verbalize understanding of discharge plan and are in agreement with plan. Understanding was demonstrated using the teach back method.

## 2017-10-10 NOTE — PROGRESS NOTES
Discharge teaching and instructions for diagnosis/procedure DKA, tooth abscess completed with patient using teachback method. AVS reviewed. Printed prescriptions given to patient. Patient voiced understanding regarding prescriptions, follow up appointments, and care of self at home.  Discharged ambulatory to  home with support per self   Informed patient of dentist appt today @ 2 PM.

## 2017-10-10 NOTE — TELEPHONE ENCOUNTER
.Transition of Care visit scheduled. 10/16/2017  Patient is being discharged to home tonight, ,  hosp f/u dx Diabetic ketoacidosis without coma associated with type 1 diabetes mellitus.  Risk score 26.75  x

## 2017-10-11 NOTE — PROGRESS NOTES
CLINICAL PHARMACY: DISCHARGE MED RECONCILIATION/REVIEW    [facility] ([facility] Select Patient?: No  Total # of Interventions Recommended: 2 -   - Increased Dose #: 1  - Decreased Dose #: 1   -   Total # Interventions Accepted: 2  Intervention Severity:   - Level 1 Intervention Present?: No   - Level 2 #: 0   - Level 3 #: 2   Time Spent (min): 30    Additional Documentation:  Patient discharged home, discharge medications reviewed.      Shanique Mckeon, PharmD, BCPS   10/11/2017  10:41 AM

## 2017-10-11 NOTE — TELEPHONE ENCOUNTER
Have the medications prescribed at discharge been filled? No , not picked up yet  Does the patient understand what the medications are for? yes  Was the Patient given a follow up appointment at discharge? yes  If no appointment was made at the time of discharge, has the patient scheduled a follow up appointment? If Yes confirm the appointment date and time. 10/16/17 at 1:30  If no, schedule an appointment. Has the patient experienced any new symptoms or have previous symptoms worsened? no  Is the patient experiencing any pain? no If yes, is the pain well controlled? n/a  We want to be sure the patient has the best recovery possible. Does the patient understand all the discharge instructions? yes  Did someone talk to the patient and/or family about the patient needs prior to being discharged? yes  Did the patient's doctor communicate well? yes  Did the patient's nurse communicate well? yes  Was the patient satisfied with the services and care received at [address] [place] [address]? yes  Would the patient like someone to follow up about their concerns? no  We like to recognize our employees who provide excellent care for patients while in [facility] [employer]. Was there one particular person or group of persons the patient would like to recognize? no  Does the patient have any suggestions on what we could do to be even better? no  Has this call helped the patient feel better about their plan of care?  yes

## 2017-12-04 PROBLEM — E11.8 TYPE II DIABETES MELLITUS WITH MANIFESTATIONS (HCC): Status: ACTIVE | Noted: 2017-01-01

## 2017-12-04 NOTE — ED NOTES
Medicated per orders. Resp regular. Family at side. Denies needs. Call light in reach.      Sabine Mares RN  12/04/17 [id number]

## 2017-12-04 NOTE — ED NOTES
Pt updated on bed status. Verbalized understanding. Denies needs. Call light in reach.      Josey Sigala RN  12/04/17 4104

## 2017-12-04 NOTE — ED PROVIDER NOTES
nightly  Qty: 1 Bottle, Refills: 3       !! - Potential duplicate medications found. Please discuss with provider. ALLERGIES     is allergic to ace inhibitors; bactrim [sulfamethoxazole-trimethoprim]; hydralazine; lisinopril; losartan; amlodipine; morphine; and lipitor [atorvastatin]. FAMILY HISTORY     indicated that his mother is alive. He indicated that his father is alive. He indicated that the status of his sister is unknown. He indicated that the status of his maternal grandmother is unknown. He indicated that the status of his maternal aunt is unknown.    family history includes Diabetes in his maternal aunt, mother, and sister; Heart Disease in his father and mother; High Blood Pressure in his maternal grandmother and mother; Kidney Disease in his maternal aunt. SOCIAL HISTORY      reports that he has never smoked. He has never used smokeless tobacco. He reports that he does not drink alcohol or use drugs. PHYSICAL EXAM     INITIAL VITALS:  height is 5' 9\" (1.753 m) and weight is 170 lb 13.7 oz (77.5 kg). His oral temperature is 97.6 °F (36.4 °C). His blood pressure is 172/102 (abnormal) and his pulse is 95. His respiration is 27 and oxygen saturation is 100%. Physical Exam   Constitutional: He is oriented to person, place, and time. He appears well-developed and well-nourished. GCS 15   HENT:   Head: Normocephalic and atraumatic. Eyes: Conjunctivae are normal. Pupils are equal, round, and reactive to light. No scleral icterus. Neck: Normal range of motion. Neck supple. No JVD present. No meningismus   Cardiovascular: Normal rate and regular rhythm. Pulmonary/Chest: Effort normal and breath sounds normal.   Abdominal: Soft. Bowel sounds are normal. There is no tenderness. There is no rebound. Peritoneal dialysis catheter on the left side clean    Musculoskeletal: Normal range of motion. He exhibits no edema.    Neurological: He is alert and oriented to person, place, and Abnormal; Notable for the following: Anion Gap 38.0 (*)     All other components within normal limits   GLOMERULAR FILTRATION RATE, ESTIMATED - Abnormal; Notable for the following:     Est, Glom Filt Rate 13 (*)     All other components within normal limits   OSMOLALITY - Abnormal; Notable for the following:     Osmolality Calc 323.0 (*)     All other components within normal limits   HEMOGLOBIN A1C - Abnormal; Notable for the following:     Hemoglobin A1C 12.4 (*)     AVERAGE GLUCOSE 312 (*)     All other components within normal limits   BLOOD GAS, ARTERIAL - Abnormal; Notable for the following:     pH, Blood Gas 7.33 (*)     PCO2 15 (*)     PO2 133 (*)     HCO3 8 (*)     Base Excess (Calculated) -15.6 (*)     All other components within normal limits   POCT GLUCOSE - Abnormal; Notable for the following:     POC Glucose >600 (*)     All other components within normal limits   POCT GLUCOSE - Abnormal; Notable for the following:     POC Glucose >600 (*)     All other components within normal limits   URINE CULTURE   MRSA SCREENING CULTURE ONLY   VRE CULTURE   URINALYSIS WITH MICROSCOPIC   BASIC METABOLIC PANEL   BASIC METABOLIC PANEL   BASIC METABOLIC PANEL   BASIC METABOLIC PANEL   BASIC METABOLIC PANEL   BASIC METABOLIC PANEL   BASIC METABOLIC PANEL   BASIC METABOLIC PANEL   BASIC METABOLIC PANEL   BASIC METABOLIC PANEL   BASIC METABOLIC PANEL   BASIC METABOLIC PANEL   MRSA BY PCR   GLUCOSE, RANDOM   BASIC METABOLIC PANEL   BETA-HYDROXYBUTYRATE   LACTIC ACID, PLASMA   POCT GLUCOSE   POCT GLUCOSE   POCT GLUCOSE   POCT GLUCOSE       EMERGENCY DEPARTMENT COURSE:   Vitals:    Vitals:    12/04/17 1327 12/04/17 1446 12/04/17 1540 12/04/17 1543   BP: (!) 168/103 (!) 154/78 (!) 172/102    Pulse: 95 93 95    Resp: 18 18 27    Temp:   97.6 °F (36.4 °C)    TempSrc:   Oral    SpO2: 97% 100% 100%    Weight:    170 lb 13.7 oz (77.5 kg)   Height:           1:12 PM: The patient was seen and evaluated.     Nursing notes reviewed    Blood sugar returned at 1124    Given cautious hydration    Creatinine 5.8    Potassium 4.2    CO2 6    Insulin drip started    I did talk to  who will follow his renal status    Admit to intensivist         CRITICAL CARE:   Due to the immediate potential for life-threatening deterioration due to hyperglycemia, poorly controlled type 2 diabetes , I spent 30 minutes providing critical care. This time is excluding time spent performing procedures. CONSULTS:  Dr. Frederick Turner:  none     FINAL IMPRESSION      1. Hyperglycemia    2. Uncontrolled type 2 diabetes mellitus with complication, with long-term current use of insulin ([facility]          DISPOSITION/PLAN   Admit ICU    PATIENT REFERRED TO:  No follow-up provider specified. DISCHARGE MEDICATIONS:  Current Discharge Medication List          (Please note that portions of this note were completed with a voice recognition program.  Efforts were made to edit the dictations but occasionally words are mis-transcribed.)    Scribe:  Williams Silva 12/4/17 1:12 PM Scribing for and in the presence of Adeline Moore MD.    Signed by: Milton Chambers, 12/04/17 4:17 PM    Provider:  I personally performed the services described in the documentation, reviewed and edited the documentation which was dictated to the scribe in my presence, and it accurately records my words and actions.     Adeline Moore MD 12/4/17 4:17 PM          Adeline Moore MD  12/04/17 3740       Adeline Moore MD  12/04/17 1652

## 2017-12-04 NOTE — ED TRIAGE NOTES
Pt to er. Pt c/o SOB. States woke him up out of his sleep. C/o pain all over body. Pt resp regular. Pt c/o cough also. Pt placed on 2L NC per request. Assessment completed. Call light in reach.

## 2017-12-04 NOTE — PROGRESS NOTES
Pharmacy Medication History Note      List of current medications patient is taking is complete. Source of information: Patient and RxNT    Changes made to medication list:  Medications removed (include reason, ex. therapy complete or physician discontinued):  · None    Medications added/doses adjusted:  · Changed clonidine 0.3 mg BID to clonidine 0.3 mg TID  · Changed cetirizine 10 mg daily to cetirizine 10 mg daily PRN  · Changed insulin glargine 20 units daily to insulin glargine 15 units am and 5 units pm    Other notes (ex. Recent course of antibiotics, Coumadin dosing):  · Patient has not filled Ritalin since January of 2017, but he states that he still needs to be on it and is going to his doctor to get his script renewed soon. · Denies use of other OTC or herbal medications.       Allergies reviewed      Electronically signed by Gaudencio Durán [facility] on 12/4/2017 at 4:27 PM

## 2017-12-05 NOTE — CARE COORDINATION
17, 12:28 PM      Mauro Trinh       Admitted from: ED 2017/ [address] [facility] Pob 759 day: 1   Location: [address] [place] [address] Reason for admit: Type II diabetes mellitus with manifestations ([facility] [E11.8] Status: IP  Admit order signed?: yes  PMH:  has a past medical history of Anemia; Angiotensin converting enzyme inhibitor-aggravated angioedema; Cerebral artery occlusion with cerebral infarction [facility]); CHF (congestive heart failure) ([facility] Disease of blood and blood forming organ; DM type 1 (diabetes mellitus, type 1) ([facility] ESRD (end stage renal disease) ([facility] GERD (gastroesophageal reflux disease); H/O pancreas transplant ([facility] Hemodialysis patient [facility]); History of blood transfusion; HTN (hypertension); Hyperlipidemia; Hypersomnolence disorder; Kidney transplant status, living unrelated donor; Leaky heart valve; Metabolic acidosis; Narcolepsy; Nephropathy, diabetic ([facility] and Seizures ([facility] Procedure: none  Pertinent abnormal Imagin/4 CXR: no acute disease. Medications:  Scheduled Meds:   cloNIDine  0.3 mg Oral TID    sirolimus  1 mg Oral Daily    mycophenolate  360 mg Oral BID    carvedilol  25 mg Oral BID    simvastatin  20 mg Oral Nightly    heparin (porcine)  5,000 Units Subcutaneous 3 times per day    dianeal lo-mercedes (ULTRABAG) 2.5%   Intraperitoneal 4x Daily     Continuous Infusions:   insulin (HUMAN R) non-weight based infusion Stopped (17 1214)    dextrose 5 % and 0.45 % NaCl 150 mL/hr at 17 3160    sodium chloride Stopped (17)      Pertinent Info/Orders/Treatment Plan:  Patient has a history of a pancreas transplant and failed kidney transplant. Does home CAPD. Intensivist and Nephrology following. I/O. Daily weights. Na+ 131. K+ 3.3, bun 18, creatinine 5.2. Anion gap 19 (was 34 upon arrival). IVF. Insulin gtt. Diet: DIET RENAL;   DVT Prophylaxis: Heparin  Smoking status:  reports that he has never smoked.  He has never used smokeless tobacco.   Influenza Vaccination Screening Completed: nurse to complete  Pneumonia Vaccination Screening Completed: see above  Core measures: VTE  PCP: No primary care provider on file. Readmission:   no  Risk Score: 25.5     Discharge Planning  Current Residence:  Private Residence  Living Arrangements:  Parent   Support Systems:  Parent  Current Services PTA:     Potential Assistance Needed:  N/A  Potential Assistance Purchasing Medications:  No  Does patient want to participate in local refill/ meds to beds program?     Type of Home Care Services:  None  Patient expects to be discharged to:  home  Expected Discharge date:  12/08/17  Follow Up Appointment: Best Day/ Time: Monday AM    Discharge Plan: Spoke with patient, plans to return home at discharge. Denies needs. Discussed possible step-down transfer in rounds.       Evaluation: no

## 2017-12-05 NOTE — H&P
[address] [place] [address]                               HISTORY AND PHYSICAL    PATIENT NAME: Perry Mccullough                    :        1975  MED REC NO:   [id number]                           ROOM:       0016  ACCOUNT NO:   [de-identified]                           ADMIT DATE: 2017  PROVIDER:     Mayank Castrejon MD      CHIEF COMPLAINT:  Shortness of breath. HISTORY OF PRESENT ILLNESS:  The patient is a 61-year-old male with known  kidney and pancreatic transplant history that presented to our facility  with severe hyperglycemia/DKA. He had started feeling weak and nauseous  for the past 24 hours. Other associated complaints consist of progressing  shortness of breath and an unproductive cough. The patient denies any  fever, vomiting, headaches, or visual disturbances at this point in time. PAST MEDICAL HISTORY:  1. Anemia. 2.  Congestive heart failure. 3.  Hypertension. 4.  Narcolepsy. 5.  Diabetes. 6.  Seizures. 7.  GERD. 8.  End-stage renal disease. 9.  Cerebral artery occlusion with cerebral infarction. PAST SURGICAL HISTORY:  1. Pancreatic surgery in . 2.  Kidney transplant in . FAMILY HISTORY:  Diabetes, heart disease, hypertension, and a unknown  kidney disease in the maternal grandmother and mother. SOCIAL HISTORY:  Denies any history or current smoking for alcohol or drug  use. CURRENT MEDICATIONS:  Please see home med reconciliation form for complete  list.    ALLERGIES:  ACE INHIBITORS, BACTRIM, HYDRALAZINE, LISINOPRIL, LOSARTAN,  AMLODIPINE, MORPHINE, and LIPITOR. REVIEW OF SYSTEMS:  A 12-point review of systems was completed and was  negative unless noted above in HPI. PHYSICAL EXAMINATION:  VITAL SIGNS:  Reviewed. GENERAL:  Minimal distress. Alert and oriented x3. HEENT:  Pupils equal, round, and reactive to light. Dry oral mucosa.   NECK:  Supple without masses. CARDIOVASCULAR:  Regular rate to tachy. S1, S2. No appreciable murmurs. LUNGS:  Clear to auscultation bilaterally without wheezing or rhonchi. ABDOMEN:  Peritoneal dialysis catheter on the left abdomen, which is clean  without signs of infection; otherwise, abdomen is soft and nontender. Hypoactive bowel sounds. EXTREMITIES:  No lower extremity edema. NEURO:  Grossly intact. No appreciable deficits. LABORATORY/IMAGING:  Reviewed. ASSESSMENT AND PLAN:  1. DKA/hyperglycemia. We will continue the patient on DKA protocol. 2.  Pseudohyponatremia with a corrected sodium of 138. We will continue to  monitor electrolytes. 3.  End-stage renal disease/peritoneal dialysis dependent. We will consult  Dr. Burns Hatchet, Nephrology for continued care and treatment. 4.  Systolic heart failure with a noted EF of 30 to 35% from a echo on  05/15/2017. Strict Is and Os. We will monitor for O2 requirement to  necessitate further workup and to decrease the fluid intake.         Patricia Mccray MD    D: 12/04/2017 17:10:48       T: 12/04/2017 18:49:10     [email or web address]  Job#: [id number]     Doc#: [id number]    CC:

## 2017-12-05 NOTE — CONSULTS
[facility]    [facility]  [address]  [facility]  [address] [place] Nephrology Consult      12/5/2017 5:06 AM         Pt Name:    Bessie Alejandro  MRN:     [id number]   [id number]  YOB: 1975  Admit Date:    12/4/2017 12:48 PM  Primary Care Physician:  No primary care provider on file. Room Number:  4D-16/016-A   Reason for Consult:  Management of peritoneal dialysis  Requesting Providor: Dr. Wilder Conway  Primary Nephrologist: Dr. Jonathan Blanchard    ### ISOLATION ###:   none     Admit Chief Complaint: weakness and body achiness     History of Chief Complaint:   The patient is a 38y.o. year old black male who receives peritoneal dialysis under the care of Dr. Jonathan Blanchard to treat ESRD from DM and failed kidney and pancrease transplant. He was well until 12/04/17 when he was awakened by generalized body aches and pains. His dialysis has been proceeding well and his dialysate has been clear. He had nausea and vomiting but no diarrhea. He presented to ER and was found to have a glucose reading of over 1100. His sodium level was low. He states that he checks glucose at home only when needed. He claims that he does not trust glucose meter as he heard that some glucose meters are not compatible with the extraneal dialysate (however, my staff knows this and would have made sure that he has the correct meter). His hGb A1C has been over 12. He does not trust his diabetic doctor and therefore has not communicated with him. Nephrology is following the patient for: ESRD and peritoneal dialysis     24 hour summary:   The patient was relaxing in bed. He feels improved. He remains on the insulin drip. His nausea and vomiting are improved. His dialysate remains clear and inflow and outflow of dialysate remains painless. His sodium level is improving.       Baseline eGFR Peritoneal dialysis patient   Admit eGFR eGFR < 15 ml/min   Current eGFR      Compliance with treatment plan: 50% Spouse name: N/A    Number of children: 1    Years of education: 15     Occupational History    Disabled      Social History Main Topics    Smoking status: Never Smoker    Smokeless tobacco: Never Used    Alcohol use No    Drug use: No    Sexual activity: Not Currently     Other Topics Concern    Not on file     Social History Narrative    No narrative on file        Home Medications:  Prior to Admission medications    Medication Sig Start Date End Date Taking?  Authorizing Provider   cetirizine (ZYRTEC) 10 MG tablet Take 10 mg by mouth daily as needed for Allergies   Yes Historical Provider, MD   cloNIDine (CATAPRES) 0.3 MG tablet Take 0.3 mg by mouth 3 times daily   Yes Historical Provider, MD   insulin glargine (BASAGLAR KWIKPEN) 100 UNIT/ML injection pen Inject 15 units in the morning and 5 units in the evening   Yes Historical Provider, MD   lactulose encephalopathy 10 GM/15ML SOLN solution Take 20 g by mouth as needed (for constipation)   Yes [place],    darbepoetin mirna-polysorbate (ARANESP) 60 MCG/0.3ML SOSY injection Inject 60 mcg into the skin every 14 days Per clinic 9/28/17  Yes Historical Provider, MD   furosemide (LASIX) 40 MG tablet Take 80 mg by mouth 2 times daily   Yes Historical Provider, MD   carvedilol (COREG) 25 MG tablet Take 2 tablets by mouth 2 times daily 6/14/17  Yes Yrn Villasenor MD   insulin lispro (HUMALOG KWIKPEN) 100 UNIT/ML pen Inject 3 Units into the skin 3 times daily (before meals) Sliding scale:  -200 = 1 unit  -250 = 2 units  -300 = 3 units  -350 = 4 units  -400 = 5 units  BS > 400 = 10 units 6/6/17  Yes Yrn Villasenor MD   sirolimus (RAPAMUNE) 1 MG tablet Take 4 mg by mouth daily    Yes Historical Provider, MD   simvastatin (ZOCOR) 20 MG tablet Take 1 tablet by mouth every evening 2/9/17  Yes Oneida Espinal DO   Mycophenolate Sodium 360 MG TBEC Take 1 tablet by mouth 2 times daily 1/14/17  Yes Oneida Espinal DO   B-D pain: none, Ear discharge: none,   Nasal bleeding: none, Congestion: none, Stridor: none, Sore throat: none. EYES  Blurred vision: none, Double vision: none, Photophobia: none, Eye pain: none, Eye discharge: none,  Eye redness: none. CARDIOVASCULAR  Chest pain: none, Arm pain: none, Palpitations: none, Orthopnea: none, Claudication: none,   Leg swelling: none, PND: none. RESPIRATORY  Cough: none, Hemoptysis: none, Sputum production: none, Shortness of breath: none, Wheezing: none    GASTROINTESTINAL  Heartburn: none, Nausea: none, Vomiting: none, Abdominal pain: none, Diarrhea: none,   Constipation: none, Blood in the stool: none, Melena: none, Rebound: none, Rovsing's: none. GENITOURINARY  Dysuria: none, Pyuria: none, Gross hematuria: none, Urgency: none, Flank pain: none, STD: none. MUSCULOSKELETAL  Myalgia: none, Neck pain: none, Thoracic pain: none, Lumbar pain: none, Joint pain: none, Falls: none    ENDOCRINE/HEMATOLOGY/ALLERGIC  Easy bruising: none, Easy bleeding: none, Environmental allergies: none, Polydipsia: none. NEUROLOGICAL  Dizziness: none, Tingling: none, Numbness: none, Tremor: none, Sensory change: none,   Speech change: none, Focal weakness: none, Seizures: none, Loss of consciousness: none,    PSYCHIATRIC  Depression: none, Suicidal ideation: none, Heroin abuse: none, Cocaine abuse: none,   Marijuana abuse: none, Hallucinations: none, Anxiety: none, Memory loss: none       Physical Examination:  BP (!) 158/102   Pulse 105   Temp 99.1 °F (37.3 °C) (Oral)   Resp 19   Ht 5' 9\" (1.753 m)   Wt 170 lb 13.7 oz (77.5 kg)   SpO2 97%   BMI 25.23 kg/m²       General appearance: alert and cooperative with exam  HEENT: Head: Normocephalic, no lesions, without obvious abnormality.   Neck: no adenopathy, no carotid bruit, no JVD, supple, symmetrical, trachea midline and thyroid not enlarged, symmetric, no tenderness/mass/nodules  Lungs: clear to auscultation bilaterally  Heart: regular rate and rhythm, S1, S2 normal, no murmur, click, rub or gallop  Abdomen: soft, non-tender; bowel sounds normal; no masses,  no organomegaly  Extremities: extremities normal, atraumatic, no cyanosis or edema  Neurologic: Mental status: Alert, oriented, thought content appropriate  PSY: No evidence of depression. Mood is normal for the patient. Skin: Warm and dry. No unusual lesions or rashes noted. Muscles: Hand  is equal and strong bilaterally. Leg strength is equal and strong. Skeletal: No bony or skeletal abnormalities noted. Admission weight: 180 lb (81.6 kg)  Wt Readings from Last 3 Encounters:   12/04/17 170 lb 13.7 oz (77.5 kg)   10/10/17 164 lb 6.4 oz (74.6 kg)   09/01/17 176 lb (79.8 kg)     Body mass index is 25.23 kg/m². Clinical Impressions:    1. ESRD from DM, HTN and failed kidney transplant  2. DKA. 3. S/P pancrease transplant with failure of the allograft. 4. S/P kidney transplant with failure of the allograft. 5. Diabetes mellitus. Insulin dependent. 6. Medical non compliance  7. High HgB A1C  8. pseudohyponatremia     Plan of Care    1. Will continue peritoneal dialysis with 2000 ml 2.5% four times per day (he uses the cycler at home with extraneal). 2. Continue insulin drip. Spero Klinefelter Moser, DO  [facility].

## 2017-12-06 PROBLEM — Z79.4 TYPE 2 DIABETES MELLITUS WITH COMPLICATION, WITH LONG-TERM CURRENT USE OF INSULIN (HCC): Status: ACTIVE | Noted: 2017-01-01

## 2017-12-06 NOTE — PROGRESS NOTES
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 12/10/2013    was on dialysis in 1051-0019, before transplant    GERD (gastroesophageal reflux disease)     H/O pancreas transplant ([facility] 5/15/2007    Hemodialysis patient ([facility]     History of blood transfusion     FFP for angioedema    HTN (hypertension)     Hyperlipidemia     Hypersomnolence disorder     Kidney transplant status, living unrelated donor 5/7/2006    Leaky heart valve     Metabolic acidosis     Narcolepsy     Nephropathy, diabetic ([facility]     Seizures ([facility]         Past Surgical History:  Past Surgical History:   Procedure Laterality Date    EYE SURGERY      vitrectomy, in both eyes, bleeding from heparin while on dialysis    KIDNEY TRANSPLANT  2006    PANCREAS SURGERY  2007    transplant        Family History:  Family History   Problem Relation Age of Onset    Heart Disease Mother     Diabetes Mother     High Blood Pressure Mother     Heart Disease Father     Diabetes Maternal Aunt     Kidney Disease Maternal Aunt     Diabetes Sister     High Blood Pressure Maternal Grandmother Social History:  Social History     Social History    Marital status: Single     Spouse name: N/A    Number of children: 3    Years of education: 15     Occupational History    Disabled      Social History Main Topics    Smoking status: Never Smoker    Smokeless tobacco: Never Used    Alcohol use No    Drug use: No    Sexual activity: Not Currently     Other Topics Concern    Not on file     Social History Narrative    No narrative on file        Home Medications:  Prior to Admission medications    Medication Sig Start Date End Date Taking?  Authorizing Provider   cetirizine (ZYRTEC) 10 MG tablet Take 10 mg by mouth daily as needed for Allergies   Yes Historical Provider, MD   cloNIDine (CATAPRES) 0.3 MG tablet Take 0.3 mg by mouth 3 times daily   Yes Historical Provider, MD   insulin glargine (BASAGLAR KWIKPEN) 100 UNIT/ML injection pen Inject 15 units in the morning and 5 units in the evening   Yes Historical Provider, MD   lactulose encephalopathy 10 GM/15ML SOLN solution Take 20 g by mouth as needed (for constipation)   Yes Claudeen Nap, DO   darbepoetin mirna-polysorbate (ARANESP) 60 MCG/0.3ML SOSY injection Inject 60 mcg into the skin every 14 days Per clinic 9/28/17  Yes Historical Provider, MD   furosemide (LASIX) 40 MG tablet Take 80 mg by mouth 2 times daily   Yes Historical Provider, MD   carvedilol (COREG) 25 MG tablet Take 2 tablets by mouth 2 times daily 6/14/17  Yes Ramirez Daniel MD   insulin lispro (HUMALOG KWIKPEN) 100 UNIT/ML pen Inject 3 Units into the skin 3 times daily (before meals) Sliding scale:  -200 = 1 unit  -250 = 2 units  -300 = 3 units  -350 = 4 units  -400 = 5 units  BS > 400 = 10 units 6/6/17  Yes Ramirez Daniel MD   sirolimus (RAPAMUNE) 1 MG tablet Take 4 mg by mouth daily    Yes Historical Provider, MD   simvastatin (ZOCOR) 20 MG tablet Take 1 tablet by mouth every evening 2/9/17  Yes Catalina Lao, DO   Mycophenolate Sodium 360 MG TBEC Take 1 tablet by mouth 2 times daily 1/14/17  Yes Mauricio Truong, DO   B-D ULTRAFINE III SHORT PEN 31G X 8 MM MISC Seven or eight 7/7/16  Yes Historical Provider, MD   Multiple Vitamins-Minerals (MENS MULTI VITAMIN & MINERAL PO) Take 1 tablet by mouth daily   Yes Historical Provider, MD   Ginkgo Biloba (GNP GINGKO BILOBA EXTRACT PO) Take 1 capsule by mouth daily   Yes Historical Provider, MD   Insulin Pen Needle 33G X 5 MM MISC Indications: E10.21 Use with Insulin pens 7/5/16  Yes Tawnya Arce MD   aspirin 81 MG chewable tablet Take 81 mg by mouth daily   Yes Historical Provider, MD   ONE TOUCH LANCETS [address] [facility] 1 each by Does not apply route 4 times daily 12/30/15  Yes Doroteo Hamlin,    glucose blood VI test strips (ONETOUCH VERIO) strip 1 each by In Vitro route 4 times daily As needed. 12/30/15  Yes Doroteo Hamlin DO   latanoprost (XALATAN) 0.005 % ophthalmic solution Place 1 drop into both eyes nightly 11/13/15  Yes Suman Polk MD   methylphenidate (RITALIN) 10 MG tablet Take 1 tablet by mouth 2 times daily 10/21/16   Alivia Alvarado MD        Lab data:  CBC:    Recent Labs      12/04/17   1307  12/05/17   0531   WBC  8.8  9.1   HGB  11.2*  10.9*   PLT  398  357     CMP:    Recent Labs      12/06/17   0000  12/06/17   0417  12/06/17   0800   NA  136  137  138   K  3.5  2.8*  3.5   CL  100  102  99   CO2  22*  20*  20*   BUN  41*  39*  38*   CREATININE  4.7*  4.6*  4.2*   GLUCOSE  92  74  143*   CALCIUM  8.4*  8.3*  8.8     Urine:No results for input(s): COLORU, PHUR, LABCAST, WBCUA, RBCUA, MUCUS, TRICHOMONAS, YEAST, BACTERIA, CLARITYU, SPECGRAV, LEUKOCYTESUR, UROBILINOGEN, BILIRUBINUR, BLOODU in the last 72 hours. Invalid input(s): NITRATE, GLUCOSEUKETONESUAMORPHOUS   Sed Rate: No results for input(s): SEDRATE in the last 72 hours.          Radiology       Review of Systems:  Source of data: patient and chart    CONSTITUTIONAL  Fever: none, Chills: none, Weight loss: none, Malaise: yes, supple, symmetrical, trachea midline and thyroid not enlarged, symmetric, no tenderness/mass/nodules  Lungs: clear to auscultation bilaterally  Heart: regular rate and rhythm, S1, S2 normal, no murmur, click, rub or gallop  Abdomen: soft, non-tender; bowel sounds normal; no masses,  no organomegaly  Extremities: extremities normal, atraumatic, no cyanosis or edema  Neurologic: Mental status: Alert, oriented, thought content appropriate  PSY: No evidence of depression. Mood is normal for the patient. Skin: Warm and dry. No unusual lesions or rashes noted. Muscles: Hand  is equal and strong bilaterally. Leg strength is equal and strong. Skeletal: No bony or skeletal abnormalities noted. Admission weight: 180 lb (81.6 kg)  Wt Readings from Last 3 Encounters:   12/05/17 166 lb 0.1 oz (75.3 kg)   10/10/17 164 lb 6.4 oz (74.6 kg)   09/01/17 176 lb (79.8 kg)     Body mass index is 24.51 kg/m². Clinical Impressions:    1. ESRD from DM, HTN and failed kidney transplant  2. DKA-resolved  3. S/P pancrease transplant with failure of the allograft. 4. S/P kidney transplant with failure of the allograft. 5. Diabetes mellitus. Insulin dependent. 6. Medical non compliance  7. High HgB A1C  8. Pseudohyponatremia-resolved     Plan of Care    1. Will continue peritoneal dialysis with 2000 ml 2.5% four times per day (he uses the cycler at home with extraneal). 2. Continue insulin drip, DC as seen fit. 3. One dose of lactulose for constipation. 4. It is OK with nephrology for the primary care physician to transfer the patient out of ICU when ready. Alem Perkins DO  [facility].

## 2017-12-06 NOTE — PLAN OF CARE
Problem: Discharge Planning:  Goal: Participates in care planning  Participates in care planning  Outcome: Ongoing  Discharge planning in process and discussed with patient/family. Social work consulted for any additional needs. Care manager aware of discharge needs. Problem: Fluid Volume - Imbalance:  Goal: Absence of imbalanced fluid volume signs and symptoms  Absence of imbalanced fluid volume signs and symptoms  Outcome: Ongoing  Continue to monitor strict intake and output. CAPD exchanges. Problem: Serum Glucose Level - Abnormal:  Goal: Ability to maintain appropriate glucose levels will improve to within specified parameters  Ability to maintain appropriate glucose levels will improve to within specified parameters  Outcome: Ongoing  Pt remains on hyperglycemia protocol with glucostabilizer. Continue to monitor chems per glucostabilizer. Comments: Care plan reviewed with patient. Patient verbalizes understanding of the plan of care and contributes to goal setting.

## 2017-12-06 NOTE — PROGRESS NOTES
ICU NOTE   Past 24 hours:   No significant events appreciated    12/4 Admitted for DKA otherwise no significant events appreciated     PHYSICAL EXAMINATION:  VITAL SIGNS:  Reviewed. GENERAL:  NAD  Alert and oriented x3. HEENT:  Pupils equal, round, and reactive to light. NECK:  Supple without masses. CARDIOVASCULAR:  Regular rate  S1, S2.  No appreciable murmurs. LUNGS:  Clear to auscultation bilaterally without wheezing or rhonchi. ABDOMEN:  Peritoneal dialysis catheter on the left abdomen, which is clean  without signs of infection; otherwise, abdomen is soft and nontender, +bs  EXTREMITIES:  No lower extremity edema. NEURO:  Grossly intact.  No appreciable deficits.     LABORATORY/IMAGING:  Reviewed.     ASSESSMENT AND PLAN:  1.  DKA/hyperglycemia- improving-  DKA protocol- trending GAP, bicarb, K  2.  Pseudohyponatremia resolved  3.  End-stage renal disease/peritoneal dialysis dependent- Nephrology for continued care and treatment. 4.  chronic diastolic with systolic heart failure with a noted EF of 30 to 35% from a echo on 05/15/2017. Tanvinayak Browning[place] Is and Os.      will consult cardiology once DKA resolves  5.  Failed kidney and pancrease transplant     CC time 45 min  Yessy Andrade MD

## 2017-12-07 PROBLEM — I51.9 LEFT VENTRICULAR SYSTOLIC DYSFUNCTION: Status: ACTIVE | Noted: 2017-01-01

## 2017-12-07 NOTE — PROGRESS NOTES
12/4/2017 2:02 PM          Diet: DIET CARB CONTROL; Carb Control: 4 carbs/meal (approximate 1800 kcals/day)    DVT prophylaxis: [] Lovenox                                 [] SCDs                                 [x] SQ Heparin                                 [] Encourage ambulation           [] Already on Anticoagulation     Disposition:    [x] Home       [] TCU       [] Rehab       [] Psych       [] SNF       [] [place]       [] Other-    Code Status: Full Code        Assessment/Plan:    Anticipated Discharge in : today? Active Hospital Problems    Diagnosis Date Noted    Essential hypertension [I10]      Priority: Medium    Left ventricular systolic dysfunction [Z66.6] 12/07/2017    Encounter for peritoneal dialysis for ESRD ([facility] [N18.6, Z99.2]     Type 2 diabetes mellitus with complication, with long-term current use of insulin ([facility]) [E11.8, Z79.4] 12/04/2017    CKD (chronic kidney disease) stage 5, GFR less than 15 ml/min ([facility]) [N18.5]     Diabetic ketoacidosis without coma associated with diabetes mellitus due to underlying condition ([facility] [E08.10]     ESRD on peritoneal dialysis ([facility] [N18.6, Z99.2]        1. dka- resolved- sugar down this am  2. Failed transplants- why is he on immunosuppresssives? 3. lv syst dysfx- no background- consider cath, ICD in past?  4. Uncontrolled hbp- many allergies-candidate for minoxidil? 5.  Home when consulants feel ready        Electronically signed by Favian Vela MD on 12/7/2017 at 6:52 AM

## 2017-12-07 NOTE — DISCHARGE SUMMARY
12/07/17 0001 12/07/17 0401 12/07/17 0802   BP: (!) 175/106 (!) 166/90 (!) 148/99 (!) 175/112   Pulse: 92 87 88 88   Resp: 17 16 18 16   Temp: 97.8 °F (36.6 °C)   97.9 °F (36.6 °C)   TempSrc: Oral   Oral   SpO2: 100% 100% 99% 100%   Weight:       Height:         Weight: Weight: 166 lb 0.1 oz (75.3 kg)     24 hour intake/output:  Intake/Output Summary (Last 24 hours) at 12/07/17 1814  Last data filed at 12/07/17 6589   Gross per 24 hour   Intake             2240 ml   Output             2500 ml   Net             -260 ml         General appearance:  No apparent distress, appears stated age and cooperative. HEENT:  Normal cephalic, atraumatic without obvious deformity. Pupils equal, round, and reactive to light. Extra ocular muscles intact. Conjunctivae/corneas clear. Neck: Supple, with full range of motion. No jugular venous distention. Trachea midline. Respiratory:  Normal respiratory effort. Clear to auscultation, bilaterally without Rales/Wheezes/Rhonchi. Cardiovascular:  Regular rate and rhythm with normal S1/S2 without murmurs, rubs or gallops. Abdomen: somewhat distended. .  Musculoskeletal:  No clubbing, cyanosis or edema bilaterally. Full range of motion without deformity. Skin: Skin color, texture, turgor normal.  No rashes or lesions. Neurologic:  Neurovascularly intact without any focal sensory/motor deficits. Cranial nerves: II-XII intact, grossly non-focal.  Psychiatric:  Alert and oriented, thought content appropriate, normal insight      Labs:  For convenience and continuity at follow-up the following most recent labs are provided:      CBC:    Lab Results   Component Value Date    WBC 9.1 12/05/2017    HGB 10.9 12/05/2017    HCT 33.3 12/05/2017     12/05/2017       Renal:  Lab Results   Component Value Date     12/07/2017    K 3.5 12/07/2017     12/07/2017    CO2 20 12/07/2017    BUN 39 12/07/2017    CREATININE 4.1 12/07/2017    CALCIUM 8.6 12/07/2017    PHOS 3.9 12/07/2017 evening             insulin lispro (HUMALOG KWIKPEN) 100 UNIT/ML pen  Inject 3 Units into the skin 3 times daily (before meals) Sliding scale:  -200 = 1 unit  -250 = 2 units  -300 = 3 units  -350 = 4 units  -400 = 5 units  BS > 400 = 10 units             Insulin Pen Needle 33G X 5 MM MISC  Indications: E10.21 Use with Insulin pens             lactulose encephalopathy 10 GM/15ML SOLN solution  Take 20 g by mouth as needed (for constipation)             latanoprost (XALATAN) 0.005 % ophthalmic solution  Place 1 drop into both eyes nightly             methylphenidate (RITALIN) 10 MG tablet  Take 1 tablet by mouth 2 times daily             Multiple Vitamins-Minerals (MENS MULTI VITAMIN & MINERAL PO)  Take 1 tablet by mouth daily             Mycophenolate Sodium 360 MG TBEC  Take 1 tablet by mouth 2 times daily             ONE TOUCH LANCETS MISC  1 each by Does not apply route 4 times daily             simvastatin (ZOCOR) 20 MG tablet  Take 1 tablet by mouth every evening             sirolimus (RAPAMUNE) 1 MG tablet  Take 4 mg by mouth daily                  Time Spent on discharge is more than 30 minutes in the examination, evaluation, counseling and review of medications and discharge plan. Signed: Thank you No primary care provider on file. for the opportunity to be involved in this patient's care.     Electronically signed by Leandra Ramirez MD on 12/7/2017 at 6:14 PM

## 2018-01-01 ENCOUNTER — APPOINTMENT (OUTPATIENT)
Dept: CARDIAC CATH/INVASIVE PROCEDURES | Age: 43
DRG: 160 | End: 2018-01-01
Payer: MEDICAID

## 2018-01-01 ENCOUNTER — PREP FOR PROCEDURE (OUTPATIENT)
Dept: CARDIOLOGY | Age: 43
End: 2018-01-01

## 2018-01-01 ENCOUNTER — ANESTHESIA EVENT (OUTPATIENT)
Dept: OPERATING ROOM | Age: 43
DRG: 160 | End: 2018-01-01
Payer: MEDICAID

## 2018-01-01 ENCOUNTER — HOSPITAL ENCOUNTER (OUTPATIENT)
Dept: INTERVENTIONAL RADIOLOGY/VASCULAR | Age: 43
Discharge: HOME | End: 2018-06-12
Payer: MEDICAID

## 2018-01-01 ENCOUNTER — APPOINTMENT (OUTPATIENT)
Dept: GENERAL RADIOLOGY | Age: 43
DRG: 160 | End: 2018-01-01
Payer: MEDICAID

## 2018-01-01 ENCOUNTER — HOSPITAL ENCOUNTER (OUTPATIENT)
Dept: INTERVENTIONAL RADIOLOGY/VASCULAR | Age: 43
Discharge: HOME | End: 2018-06-01
Payer: MEDICAID

## 2018-01-01 ENCOUNTER — OFFICE VISIT (OUTPATIENT)
Dept: SURGERY | Age: 43
End: 2018-01-01

## 2018-01-01 ENCOUNTER — HOSPITAL ENCOUNTER (OUTPATIENT)
Dept: INTERVENTIONAL RADIOLOGY/VASCULAR | Age: 43
Discharge: HOME | End: 2018-06-06
Payer: MEDICAID

## 2018-01-01 ENCOUNTER — HOSPITAL ENCOUNTER (OUTPATIENT)
Age: 43
Discharge: HOME | End: 2018-07-27
Payer: MEDICAID

## 2018-01-01 ENCOUNTER — APPOINTMENT (OUTPATIENT)
Dept: INTERVENTIONAL RADIOLOGY/VASCULAR | Age: 43
DRG: 160 | End: 2018-01-01
Payer: MEDICAID

## 2018-01-01 ENCOUNTER — CARE COORDINATION (OUTPATIENT)
Dept: CASE MANAGEMENT | Age: 43
End: 2018-01-01

## 2018-01-01 ENCOUNTER — TELEPHONE (OUTPATIENT)
Dept: SURGERY | Age: 43
End: 2018-01-01

## 2018-01-01 ENCOUNTER — HOSPITAL ENCOUNTER (INPATIENT)
Age: 43
LOS: 7 days | Discharge: HOME | DRG: 174 | End: 2018-08-24
Attending: INTERNAL MEDICINE | Admitting: INTERNAL MEDICINE
Payer: MEDICAID

## 2018-01-01 ENCOUNTER — HOSPITAL ENCOUNTER (OUTPATIENT)
Dept: GENERAL RADIOLOGY | Age: 43
Discharge: HOME | End: 2018-07-27
Payer: MEDICAID

## 2018-01-01 ENCOUNTER — ANESTHESIA (OUTPATIENT)
Dept: OPERATING ROOM | Age: 43
DRG: 227 | End: 2018-01-01
Payer: MEDICAID

## 2018-01-01 ENCOUNTER — TELEPHONE (OUTPATIENT)
Dept: INTERNAL MEDICINE CLINIC | Age: 43
End: 2018-01-01

## 2018-01-01 ENCOUNTER — ANESTHESIA (OUTPATIENT)
Dept: OPERATING ROOM | Age: 43
DRG: 160 | End: 2018-01-01
Payer: MEDICAID

## 2018-01-01 ENCOUNTER — OFFICE VISIT (OUTPATIENT)
Dept: INTERNAL MEDICINE CLINIC | Age: 43
End: 2018-01-01
Payer: MEDICAID

## 2018-01-01 ENCOUNTER — APPOINTMENT (OUTPATIENT)
Dept: CT IMAGING | Age: 43
DRG: 160 | End: 2018-01-01
Payer: MEDICAID

## 2018-01-01 ENCOUNTER — ANESTHESIA EVENT (OUTPATIENT)
Dept: OPERATING ROOM | Age: 43
DRG: 227 | End: 2018-01-01
Payer: MEDICAID

## 2018-01-01 ENCOUNTER — HOSPITAL ENCOUNTER (EMERGENCY)
Age: 43
Discharge: HOME | End: 2018-06-09
Payer: MEDICAID

## 2018-01-01 ENCOUNTER — HOSPITAL ENCOUNTER (INPATIENT)
Age: 43
LOS: 13 days | DRG: 160 | End: 2018-09-08
Attending: INTERNAL MEDICINE | Admitting: THORACIC SURGERY (CARDIOTHORACIC VASCULAR SURGERY)
Payer: MEDICAID

## 2018-01-01 ENCOUNTER — HOSPITAL ENCOUNTER (OUTPATIENT)
Age: 43
Setting detail: SPECIMEN
Discharge: HOME | End: 2018-07-13
Payer: MEDICAID

## 2018-01-01 ENCOUNTER — HOSPITAL ENCOUNTER (INPATIENT)
Age: 43
LOS: 2 days | Discharge: HOME | DRG: 227 | End: 2018-07-01
Attending: SURGERY | Admitting: SURGERY
Payer: MEDICAID

## 2018-01-01 ENCOUNTER — APPOINTMENT (OUTPATIENT)
Dept: GENERAL RADIOLOGY | Age: 43
DRG: 174 | End: 2018-01-01
Payer: MEDICAID

## 2018-01-01 ENCOUNTER — HOSPITAL ENCOUNTER (OUTPATIENT)
Age: 43
Discharge: HOME | End: 2018-06-05
Payer: MEDICAID

## 2018-01-01 ENCOUNTER — HOSPITAL ENCOUNTER (EMERGENCY)
Age: 43
Discharge: HOME | End: 2018-05-28
Payer: MEDICAID

## 2018-01-01 ENCOUNTER — OFFICE VISIT (OUTPATIENT)
Dept: SURGERY | Age: 43
End: 2018-01-01
Payer: MEDICAID

## 2018-01-01 VITALS
TEMPERATURE: 99.5 F | OXYGEN SATURATION: 98 % | HEART RATE: 92 BPM | BODY MASS INDEX: 26.66 KG/M2 | SYSTOLIC BLOOD PRESSURE: 144 MMHG | HEIGHT: 69 IN | WEIGHT: 180 LBS | RESPIRATION RATE: 17 BRPM | DIASTOLIC BLOOD PRESSURE: 98 MMHG

## 2018-01-01 VITALS
OXYGEN SATURATION: 100 % | DIASTOLIC BLOOD PRESSURE: 82 MMHG | SYSTOLIC BLOOD PRESSURE: 117 MMHG | RESPIRATION RATE: 1 BRPM

## 2018-01-01 VITALS
SYSTOLIC BLOOD PRESSURE: 120 MMHG | BODY MASS INDEX: 28.15 KG/M2 | HEART RATE: 80 BPM | WEIGHT: 190.04 LBS | RESPIRATION RATE: 10 BRPM | OXYGEN SATURATION: 100 % | DIASTOLIC BLOOD PRESSURE: 70 MMHG | TEMPERATURE: 97 F | HEIGHT: 69 IN

## 2018-01-01 VITALS
HEIGHT: 69 IN | RESPIRATION RATE: 18 BRPM | OXYGEN SATURATION: 96 % | WEIGHT: 176.1 LBS | HEART RATE: 87 BPM | TEMPERATURE: 98.6 F | SYSTOLIC BLOOD PRESSURE: 150 MMHG | DIASTOLIC BLOOD PRESSURE: 84 MMHG | BODY MASS INDEX: 26.08 KG/M2

## 2018-01-01 VITALS
HEART RATE: 90 BPM | DIASTOLIC BLOOD PRESSURE: 106 MMHG | SYSTOLIC BLOOD PRESSURE: 143 MMHG | OXYGEN SATURATION: 99 % | RESPIRATION RATE: 18 BRPM

## 2018-01-01 VITALS
TEMPERATURE: 99.5 F | HEART RATE: 88 BPM | DIASTOLIC BLOOD PRESSURE: 92 MMHG | RESPIRATION RATE: 14 BRPM | SYSTOLIC BLOOD PRESSURE: 149 MMHG | OXYGEN SATURATION: 98 %

## 2018-01-01 VITALS
TEMPERATURE: 97.1 F | SYSTOLIC BLOOD PRESSURE: 130 MMHG | HEART RATE: 88 BPM | HEIGHT: 69 IN | DIASTOLIC BLOOD PRESSURE: 88 MMHG | BODY MASS INDEX: 26.09 KG/M2 | RESPIRATION RATE: 18 BRPM | WEIGHT: 176.15 LBS | OXYGEN SATURATION: 97 %

## 2018-01-01 VITALS
DIASTOLIC BLOOD PRESSURE: 82 MMHG | BODY MASS INDEX: 26.81 KG/M2 | OXYGEN SATURATION: 96 % | HEART RATE: 81 BPM | SYSTOLIC BLOOD PRESSURE: 124 MMHG | RESPIRATION RATE: 18 BRPM | WEIGHT: 181 LBS | HEIGHT: 69 IN | TEMPERATURE: 99 F

## 2018-01-01 VITALS
TEMPERATURE: 98.2 F | BODY MASS INDEX: 26.41 KG/M2 | RESPIRATION RATE: 16 BRPM | SYSTOLIC BLOOD PRESSURE: 130 MMHG | WEIGHT: 178.3 LBS | DIASTOLIC BLOOD PRESSURE: 76 MMHG | HEIGHT: 69 IN | HEART RATE: 76 BPM | OXYGEN SATURATION: 98 %

## 2018-01-01 VITALS
OXYGEN SATURATION: 100 % | HEART RATE: 88 BPM | BODY MASS INDEX: 25.67 KG/M2 | HEIGHT: 69 IN | SYSTOLIC BLOOD PRESSURE: 155 MMHG | RESPIRATION RATE: 16 BRPM | DIASTOLIC BLOOD PRESSURE: 96 MMHG | TEMPERATURE: 98.3 F | WEIGHT: 173.28 LBS

## 2018-01-01 VITALS
TEMPERATURE: 96.8 F | RESPIRATION RATE: 10 BRPM | OXYGEN SATURATION: 100 % | DIASTOLIC BLOOD PRESSURE: 77 MMHG | SYSTOLIC BLOOD PRESSURE: 126 MMHG

## 2018-01-01 VITALS
DIASTOLIC BLOOD PRESSURE: 70 MMHG | SYSTOLIC BLOOD PRESSURE: 134 MMHG | TEMPERATURE: 97.3 F | WEIGHT: 188.4 LBS | OXYGEN SATURATION: 96 % | HEART RATE: 92 BPM | RESPIRATION RATE: 18 BRPM | HEIGHT: 69 IN | BODY MASS INDEX: 27.91 KG/M2

## 2018-01-01 VITALS — WEIGHT: 189.13 LBS | BODY MASS INDEX: 27.93 KG/M2

## 2018-01-01 DIAGNOSIS — R77.8 ELEVATED TROPONIN: ICD-10-CM

## 2018-01-01 DIAGNOSIS — K43.2 INCISIONAL HERNIA, WITHOUT OBSTRUCTION OR GANGRENE: Primary | ICD-10-CM

## 2018-01-01 DIAGNOSIS — N18.6 ENCOUNTER REGARDING VASCULAR ACCESS FOR DIALYSIS FOR ESRD (HCC): ICD-10-CM

## 2018-01-01 DIAGNOSIS — R06.00 DYSPNEA, UNSPECIFIED TYPE: Primary | ICD-10-CM

## 2018-01-01 DIAGNOSIS — Z99.2 ENCOUNTER REGARDING VASCULAR ACCESS FOR DIALYSIS FOR ESRD (HCC): ICD-10-CM

## 2018-01-01 DIAGNOSIS — Z87.19 HISTORY OF VENTRAL HERNIA: Primary | ICD-10-CM

## 2018-01-01 DIAGNOSIS — Z87.19 S/P REPAIR OF RECURRENT VENTRAL HERNIA: ICD-10-CM

## 2018-01-01 DIAGNOSIS — Z01.818 PRE-OP EXAMINATION: ICD-10-CM

## 2018-01-01 DIAGNOSIS — N18.5 TYPE 1 DIABETES MELLITUS WITH STAGE 5 CHRONIC KIDNEY DISEASE NOT ON CHRONIC DIALYSIS (HCC): ICD-10-CM

## 2018-01-01 DIAGNOSIS — E10.22 TYPE 1 DIABETES MELLITUS WITH STAGE 5 CHRONIC KIDNEY DISEASE NOT ON CHRONIC DIALYSIS (HCC): ICD-10-CM

## 2018-01-01 DIAGNOSIS — E10.21 TYPE 1 DIABETES MELLITUS WITH NEPHROPATHY (HCC): ICD-10-CM

## 2018-01-01 DIAGNOSIS — N18.6 ESRD (END STAGE RENAL DISEASE) (HCC): ICD-10-CM

## 2018-01-01 DIAGNOSIS — I10 ESSENTIAL HYPERTENSION: ICD-10-CM

## 2018-01-01 DIAGNOSIS — K43.9 VENTRAL HERNIA WITHOUT OBSTRUCTION OR GANGRENE: ICD-10-CM

## 2018-01-01 DIAGNOSIS — Z48.00 ENCOUNTER FOR POSTPROCEDURAL CHANGE OF DRESSING: Primary | ICD-10-CM

## 2018-01-01 DIAGNOSIS — N18.30 CKD (CHRONIC KIDNEY DISEASE), STAGE III (HCC): ICD-10-CM

## 2018-01-01 DIAGNOSIS — E87.6 HYPOKALEMIA: ICD-10-CM

## 2018-01-01 DIAGNOSIS — R06.02 SOB (SHORTNESS OF BREATH): ICD-10-CM

## 2018-01-01 DIAGNOSIS — E87.5 HYPERKALEMIA: ICD-10-CM

## 2018-01-01 DIAGNOSIS — E08.00 DIABETES MELLITUS DUE TO UNDERLYING CONDITION WITH HYPEROSMOLARITY WITHOUT COMA, WITH LONG-TERM CURRENT USE OF INSULIN (HCC): ICD-10-CM

## 2018-01-01 DIAGNOSIS — Z87.19 S/P REPAIR OF RECURRENT VENTRAL HERNIA: Primary | ICD-10-CM

## 2018-01-01 DIAGNOSIS — R06.02 SHORTNESS OF BREATH: Primary | ICD-10-CM

## 2018-01-01 DIAGNOSIS — Z98.890 S/P REPAIR OF RECURRENT VENTRAL HERNIA: Primary | ICD-10-CM

## 2018-01-01 DIAGNOSIS — G89.18 ACUTE POSTOPERATIVE PAIN: Primary | ICD-10-CM

## 2018-01-01 DIAGNOSIS — T85.71XA PERITONITIS, DIALYSIS-ASSOCIATED, INITIAL ENCOUNTER (HCC): Primary | ICD-10-CM

## 2018-01-01 DIAGNOSIS — Z98.890 S/P REPAIR OF RECURRENT VENTRAL HERNIA: ICD-10-CM

## 2018-01-01 DIAGNOSIS — S39.012A LUMBOSACRAL STRAIN, INITIAL ENCOUNTER: ICD-10-CM

## 2018-01-01 DIAGNOSIS — Z79.4 DIABETES MELLITUS DUE TO UNDERLYING CONDITION WITH HYPEROSMOLARITY WITHOUT COMA, WITH LONG-TERM CURRENT USE OF INSULIN (HCC): ICD-10-CM

## 2018-01-01 LAB
ABO: NORMAL
ACTIVATED CLOTTING TIME: 132 SECONDS (ref 99–130)
ACTIVATED CLOTTING TIME: 150 SECONDS (ref 99–130)
ACTIVATED CLOTTING TIME: 202 SECONDS (ref 1–150)
ACTIVATED CLOTTING TIME: 301 SECONDS (ref 1–150)
ACTIVATED CLOTTING TIME: 487 SECONDS (ref 99–130)
ACTIVATED CLOTTING TIME: 604 SECONDS (ref 99–130)
ACTIVATED CLOTTING TIME: 610 SECONDS (ref 99–130)
ACTIVATED CLOTTING TIME: 705 SECONDS (ref 99–130)
ACTIVATED CLOTTING TIME: 970 SECONDS (ref 99–130)
ALBUMIN SERPL-MCNC: 2.7 G/DL (ref 3.5–5.1)
ALBUMIN SERPL-MCNC: 2.8 G/DL (ref 3.5–5.1)
ALBUMIN SERPL-MCNC: 2.9 G/DL (ref 3.5–5.1)
ALBUMIN SERPL-MCNC: 2.9 G/DL (ref 3.5–5.1)
ALBUMIN SERPL-MCNC: 3 G/DL (ref 3.5–5.1)
ALBUMIN SERPL-MCNC: 3 G/DL (ref 3.5–5.1)
ALBUMIN SERPL-MCNC: 3.1 G/DL (ref 3.5–5.1)
ALBUMIN SERPL-MCNC: 3.2 G/DL (ref 3.5–5.1)
ALBUMIN SERPL-MCNC: 3.2 G/DL (ref 3.5–5.1)
ALBUMIN SERPL-MCNC: 3.3 G/DL (ref 3.5–5.1)
ALBUMIN SERPL-MCNC: 3.4 G/DL (ref 3.5–5.1)
ALBUMIN SERPL-MCNC: 3.4 G/DL (ref 3.5–5.1)
ALBUMIN SERPL-MCNC: 3.7 G/DL (ref 3.5–5.1)
ALLEN TEST: ABNORMAL
ALP BLD-CCNC: 125 U/L (ref 38–126)
ALP BLD-CCNC: 128 U/L (ref 38–126)
ALP BLD-CCNC: 134 U/L (ref 38–126)
ALP BLD-CCNC: 159 U/L (ref 38–126)
ALP BLD-CCNC: 168 U/L (ref 38–126)
ALP BLD-CCNC: 201 U/L (ref 38–126)
ALP BLD-CCNC: 219 U/L (ref 38–126)
ALP BLD-CCNC: 220 U/L (ref 38–126)
ALP BLD-CCNC: 221 U/L (ref 38–126)
ALP BLD-CCNC: 223 U/L (ref 38–126)
ALP BLD-CCNC: 230 U/L (ref 38–126)
ALP BLD-CCNC: 230 U/L (ref 38–126)
ALP BLD-CCNC: 274 U/L (ref 38–126)
ALP BLD-CCNC: 66 U/L (ref 38–126)
ALT SERPL-CCNC: 124 U/L (ref 11–66)
ALT SERPL-CCNC: 1343 U/L (ref 11–66)
ALT SERPL-CCNC: 1635 U/L (ref 11–66)
ALT SERPL-CCNC: 17 U/L (ref 11–66)
ALT SERPL-CCNC: 1902 U/L (ref 11–66)
ALT SERPL-CCNC: 1912 U/L (ref 11–66)
ALT SERPL-CCNC: 2411 U/L (ref 11–66)
ALT SERPL-CCNC: 2692 U/L (ref 11–66)
ALT SERPL-CCNC: 2933 U/L (ref 11–66)
ALT SERPL-CCNC: 3010 U/L (ref 11–66)
ALT SERPL-CCNC: 334 U/L (ref 11–66)
ALT SERPL-CCNC: 508 U/L (ref 11–66)
ALT SERPL-CCNC: 7 U/L (ref 11–66)
ALT SERPL-CCNC: 895 U/L (ref 11–66)
AMYLASE: 75 U/L (ref 20–104)
ANION GAP SERPL CALCULATED.3IONS-SCNC: 10 MEQ/L (ref 8–16)
ANION GAP SERPL CALCULATED.3IONS-SCNC: 12 MEQ/L (ref 8–16)
ANION GAP SERPL CALCULATED.3IONS-SCNC: 14 MEQ/L (ref 8–16)
ANION GAP SERPL CALCULATED.3IONS-SCNC: 15 MEQ/L (ref 8–16)
ANION GAP SERPL CALCULATED.3IONS-SCNC: 15 MEQ/L (ref 8–16)
ANION GAP SERPL CALCULATED.3IONS-SCNC: 16 MEQ/L (ref 8–16)
ANION GAP SERPL CALCULATED.3IONS-SCNC: 17 MEQ/L (ref 8–16)
ANION GAP SERPL CALCULATED.3IONS-SCNC: 18 MEQ/L (ref 8–16)
ANION GAP SERPL CALCULATED.3IONS-SCNC: 19 MEQ/L (ref 8–16)
ANION GAP SERPL CALCULATED.3IONS-SCNC: 20 MEQ/L (ref 8–16)
ANION GAP SERPL CALCULATED.3IONS-SCNC: 22 MEQ/L (ref 8–16)
ANION GAP SERPL CALCULATED.3IONS-SCNC: 23 MEQ/L (ref 8–16)
ANION GAP SERPL CALCULATED.3IONS-SCNC: 24 MEQ/L (ref 8–16)
ANION GAP SERPL CALCULATED.3IONS-SCNC: 24 MEQ/L (ref 8–16)
ANION GAP SERPL CALCULATED.3IONS-SCNC: 25 MEQ/L (ref 8–16)
ANION GAP SERPL CALCULATED.3IONS-SCNC: 28 MEQ/L (ref 8–16)
ANION GAP SERPL CALCULATED.3IONS-SCNC: 28 MEQ/L (ref 8–16)
ANION GAP SERPL CALCULATED.3IONS-SCNC: 30 MEQ/L (ref 8–16)
ANION GAP SERPL CALCULATED.3IONS-SCNC: 7 MEQ/L (ref 8–16)
ANION GAP SERPL CALCULATED.3IONS-SCNC: 8 MEQ/L (ref 8–16)
ANISOCYTOSIS: ABNORMAL
ANTIBODY SCREEN: NORMAL
APTT: 104 SECONDS (ref 22–38)
APTT: 32.1 SECONDS (ref 22–38)
APTT: 34.1 SECONDS (ref 22–38)
APTT: 34.3 SECONDS (ref 22–38)
APTT: 34.4 SECONDS (ref 22–38)
APTT: 35.5 SECONDS (ref 22–38)
APTT: 37.1 SECONDS (ref 22–38)
APTT: 37.7 SECONDS (ref 22–38)
APTT: 38.6 SECONDS (ref 22–38)
APTT: 39.5 SECONDS (ref 22–38)
APTT: 39.6 SECONDS (ref 22–38)
APTT: 40.9 SECONDS (ref 22–38)
APTT: 49.2 SECONDS (ref 22–38)
APTT: 49.6 SECONDS (ref 22–38)
APTT: 50 SECONDS (ref 22–38)
APTT: 56 SECONDS (ref 22–38)
APTT: 56.3 SECONDS (ref 22–38)
APTT: 64.4 SECONDS (ref 22–38)
APTT: 65.1 SECONDS (ref 22–38)
APTT: 67.3 SECONDS (ref 22–38)
APTT: 70.1 SECONDS (ref 22–38)
APTT: 70.5 SECONDS (ref 22–38)
APTT: 72.1 SECONDS (ref 22–38)
APTT: 72.7 SECONDS (ref 22–38)
APTT: 73 SECONDS (ref 22–38)
APTT: 79.8 SECONDS (ref 22–38)
APTT: 82.5 SECONDS (ref 22–38)
APTT: 84.3 SECONDS (ref 22–38)
APTT: 84.9 SECONDS (ref 22–38)
APTT: 85 SECONDS (ref 22–38)
APTT: 85.6 SECONDS (ref 22–38)
APTT: 88.2 SECONDS (ref 22–38)
APTT: > 200 SECONDS (ref 22–38)
AST SERPL-CCNC: 11 U/L (ref 5–40)
AST SERPL-CCNC: 1900 U/L (ref 5–40)
AST SERPL-CCNC: 24 U/L (ref 5–40)
AST SERPL-CCNC: 3351 U/L (ref 5–40)
AST SERPL-CCNC: 3907 U/L (ref 5–40)
AST SERPL-CCNC: 5856 U/L (ref 5–40)
AST SERPL-CCNC: 77 U/L (ref 5–40)
AST SERPL-CCNC: 984 U/L (ref 5–40)
AST SERPL-CCNC: > 7000 U/L (ref 5–40)
AVERAGE GLUCOSE: 234 MG/DL (ref 70–126)
AVERAGE GLUCOSE: 240 MG/DL (ref 70–126)
BACTERIA: ABNORMAL
BACTERIA: ABNORMAL /HPF
BASE EXCESS (CALCULATED): -1 MMOL/L (ref -2.5–2.5)
BASE EXCESS (CALCULATED): -1.8 MMOL/L (ref -2.5–2.5)
BASE EXCESS (CALCULATED): -10.1 MMOL/L (ref -2.5–2.5)
BASE EXCESS (CALCULATED): -11.2 MMOL/L (ref -2.5–2.5)
BASE EXCESS (CALCULATED): -13.1 MMOL/L (ref -2.5–2.5)
BASE EXCESS (CALCULATED): -2.3 MMOL/L (ref -2.5–2.5)
BASE EXCESS (CALCULATED): -4.3 MMOL/L (ref -2.5–2.5)
BASE EXCESS (CALCULATED): -5.6 MMOL/L (ref -2.5–2.5)
BASE EXCESS (CALCULATED): -6.1 MMOL/L (ref -2.5–2.5)
BASE EXCESS (CALCULATED): -6.8 MMOL/L (ref -2.5–2.5)
BASE EXCESS (CALCULATED): -6.9 MMOL/L (ref -2.5–2.5)
BASE EXCESS (CALCULATED): -9 MMOL/L (ref -2.5–2.5)
BASE EXCESS (CALCULATED): -9.5 MMOL/L (ref -2.5–2.5)
BASE EXCESS (CALCULATED): 0.1 MMOL/L (ref -2.5–2.5)
BASE EXCESS (CALCULATED): 10.3 MMOL/L (ref -2.5–2.5)
BASE EXCESS (CALCULATED): 12.1 MMOL/L (ref -2.5–2.5)
BASE EXCESS (CALCULATED): 12.3 MMOL/L (ref -2.5–2.5)
BASE EXCESS (CALCULATED): 2.8 MMOL/L (ref -2.5–2.5)
BASE EXCESS (CALCULATED): 3.1 MMOL/L (ref -2.5–2.5)
BASE EXCESS (CALCULATED): 3.3 MMOL/L (ref -2.5–2.5)
BASE EXCESS (CALCULATED): 4 MMOL/L (ref -2.5–2.5)
BASE EXCESS (CALCULATED): 4.3 MMOL/L (ref -2.5–2.5)
BASE EXCESS (CALCULATED): 5.4 MMOL/L (ref -2.5–2.5)
BASE EXCESS (CALCULATED): 5.5 MMOL/L (ref -2.5–2.5)
BASE EXCESS (CALCULATED): 5.6 MMOL/L (ref -2.5–2.5)
BASE EXCESS (CALCULATED): 5.7 MMOL/L (ref -2.5–2.5)
BASE EXCESS (CALCULATED): 6.7 MMOL/L (ref -2.5–2.5)
BASE EXCESS (CALCULATED): 8.6 MMOL/L (ref -2.5–2.5)
BASE EXCESS MIXED: -1 MMOL/L (ref -2–3)
BASE EXCESS MIXED: -12.4 MMOL/L (ref -2–3)
BASE EXCESS MIXED: -7.8 MMOL/L (ref -2–3)
BASE EXCESS MIXED: 5.1 MMOL/L (ref -2–3)
BASOPHILS # BLD: 0.6 %
BASOPHILS # BLD: 1 %
BASOPHILS # BLD: 1.2 %
BASOPHILS # BLD: 1.5 %
BASOPHILS ABSOLUTE: 0 THOU/MM3 (ref 0–0.1)
BASOPHILS ABSOLUTE: 0.1 THOU/MM3 (ref 0–0.1)
BILIRUB SERPL-MCNC: 0.2 MG/DL (ref 0.3–1.2)
BILIRUB SERPL-MCNC: 0.2 MG/DL (ref 0.3–1.2)
BILIRUB SERPL-MCNC: 0.4 MG/DL (ref 0.3–1.2)
BILIRUB SERPL-MCNC: 0.9 MG/DL (ref 0.3–1.2)
BILIRUB SERPL-MCNC: 1 MG/DL (ref 0.3–1.2)
BILIRUB SERPL-MCNC: 1.5 MG/DL (ref 0.3–1.2)
BILIRUB SERPL-MCNC: 11 MG/DL (ref 0.3–1.2)
BILIRUB SERPL-MCNC: 2.4 MG/DL (ref 0.3–1.2)
BILIRUB SERPL-MCNC: 3.4 MG/DL (ref 0.3–1.2)
BILIRUB SERPL-MCNC: 3.8 MG/DL (ref 0.3–1.2)
BILIRUB SERPL-MCNC: 4.8 MG/DL (ref 0.3–1.2)
BILIRUB SERPL-MCNC: 6 MG/DL (ref 0.3–1.2)
BILIRUB SERPL-MCNC: 7.3 MG/DL (ref 0.3–1.2)
BILIRUB SERPL-MCNC: 8.2 MG/DL (ref 0.3–1.2)
BILIRUBIN DIRECT: < 0.2 MG/DL (ref 0–0.3)
BILIRUBIN DIRECT: < 0.2 MG/DL (ref 0–0.3)
BILIRUBIN URINE: NEGATIVE
BILIRUBIN URINE: NEGATIVE
BLOOD CULTURE, ROUTINE: NORMAL
BLOOD CULTURE, ROUTINE: NORMAL
BLOOD, URINE: ABNORMAL
BLOOD, URINE: ABNORMAL
BUN BLDV-MCNC: 10 MG/DL (ref 7–22)
BUN BLDV-MCNC: 12 MG/DL (ref 7–22)
BUN BLDV-MCNC: 14 MG/DL (ref 7–22)
BUN BLDV-MCNC: 18 MG/DL (ref 7–22)
BUN BLDV-MCNC: 22 MG/DL (ref 7–22)
BUN BLDV-MCNC: 26 MG/DL (ref 7–22)
BUN BLDV-MCNC: 34 MG/DL (ref 7–22)
BUN BLDV-MCNC: 42 MG/DL (ref 7–22)
BUN BLDV-MCNC: 43 MG/DL (ref 7–22)
BUN BLDV-MCNC: 45 MG/DL (ref 7–22)
BUN BLDV-MCNC: 46 MG/DL (ref 7–22)
BUN BLDV-MCNC: 49 MG/DL (ref 7–22)
BUN BLDV-MCNC: 49 MG/DL (ref 7–22)
BUN BLDV-MCNC: 50 MG/DL (ref 7–22)
BUN BLDV-MCNC: 50 MG/DL (ref 7–22)
BUN BLDV-MCNC: 56 MG/DL (ref 7–22)
BUN BLDV-MCNC: 57 MG/DL (ref 7–22)
BUN BLDV-MCNC: 60 MG/DL (ref 7–22)
BUN BLDV-MCNC: 68 MG/DL (ref 7–22)
BUN BLDV-MCNC: 7 MG/DL (ref 7–22)
BUN BLDV-MCNC: 70 MG/DL (ref 7–22)
BUN BLDV-MCNC: 71 MG/DL (ref 7–22)
BUN BLDV-MCNC: 79 MG/DL (ref 7–22)
BUN BLDV-MCNC: 8 MG/DL (ref 7–22)
BUN BLDV-MCNC: 82 MG/DL (ref 7–22)
BUN BLDV-MCNC: 84 MG/DL (ref 7–22)
BUN BLDV-MCNC: 87 MG/DL (ref 7–22)
BUN BLDV-MCNC: 89 MG/DL (ref 7–22)
BUN BLDV-MCNC: 9 MG/DL (ref 7–22)
C-PEPTIDE: < 0.1 NG/ML (ref 1.1–4.4)
CALCIUM IONIZED SERUM: 1.03 MMOL/L (ref 1.12–1.32)
CALCIUM IONIZED SERUM: 1.04 MMOL/L (ref 1.12–1.32)
CALCIUM IONIZED SERUM: 1.05 MMOL/L (ref 1.12–1.32)
CALCIUM IONIZED SERUM: 1.09 MMOL/L (ref 1.12–1.32)
CALCIUM IONIZED SERUM: 1.27 MMOL/L (ref 1.12–1.32)
CALCIUM IONIZED: 1 MMOL/L (ref 1.12–1.32)
CALCIUM IONIZED: 1.03 MMOL/L (ref 1.12–1.32)
CALCIUM IONIZED: 1.04 MMOL/L (ref 1.12–1.32)
CALCIUM IONIZED: 1.04 MMOL/L (ref 1.12–1.32)
CALCIUM IONIZED: 1.05 MMOL/L (ref 1.12–1.32)
CALCIUM IONIZED: 1.06 MMOL/L (ref 1.12–1.32)
CALCIUM IONIZED: 1.06 MMOL/L (ref 1.12–1.32)
CALCIUM IONIZED: 1.07 MMOL/L (ref 1.12–1.32)
CALCIUM IONIZED: 1.08 MMOL/L (ref 1.12–1.32)
CALCIUM IONIZED: 1.1 MMOL/L (ref 1.12–1.32)
CALCIUM IONIZED: 1.13 MMOL/L (ref 1.12–1.32)
CALCIUM IONIZED: 1.25 MMOL/L (ref 1.12–1.32)
CALCIUM SERPL-MCNC: 7.9 MG/DL (ref 8.5–10.5)
CALCIUM SERPL-MCNC: 8.2 MG/DL (ref 8.5–10.5)
CALCIUM SERPL-MCNC: 8.3 MG/DL (ref 8.5–10.5)
CALCIUM SERPL-MCNC: 8.4 MG/DL (ref 8.5–10.5)
CALCIUM SERPL-MCNC: 8.4 MG/DL (ref 8.5–10.5)
CALCIUM SERPL-MCNC: 8.5 MG/DL (ref 8.5–10.5)
CALCIUM SERPL-MCNC: 8.5 MG/DL (ref 8.5–10.5)
CALCIUM SERPL-MCNC: 8.6 MG/DL (ref 8.5–10.5)
CALCIUM SERPL-MCNC: 8.6 MG/DL (ref 8.5–10.5)
CALCIUM SERPL-MCNC: 8.7 MG/DL (ref 8.5–10.5)
CALCIUM SERPL-MCNC: 8.8 MG/DL (ref 8.5–10.5)
CALCIUM SERPL-MCNC: 8.9 MG/DL (ref 8.5–10.5)
CALCIUM SERPL-MCNC: 9 MG/DL (ref 8.5–10.5)
CALCIUM SERPL-MCNC: 9.1 MG/DL (ref 8.5–10.5)
CALCIUM SERPL-MCNC: 9.2 MG/DL (ref 8.5–10.5)
CALCIUM SERPL-MCNC: 9.3 MG/DL (ref 8.5–10.5)
CALCIUM SERPL-MCNC: 9.4 MG/DL (ref 8.5–10.5)
CALCIUM SERPL-MCNC: 9.4 MG/DL (ref 8.5–10.5)
CALCIUM SERPL-MCNC: 9.6 MG/DL (ref 8.5–10.5)
CARTRIDGE COLOR: NORMAL
CASTS 2: ABNORMAL /LPF
CASTS UA: ABNORMAL /LPF
CASTS: ABNORMAL /LPF
CASTS: ABNORMAL /LPF
CHARACTER, BODY FLUID: ABNORMAL
CHARACTER, URINE: CLEAR
CHARACTER, URINE: CLEAR
CHLORIDE BLD-SCNC: 89 MEQ/L (ref 98–111)
CHLORIDE BLD-SCNC: 90 MEQ/L (ref 98–111)
CHLORIDE BLD-SCNC: 91 MEQ/L (ref 98–111)
CHLORIDE BLD-SCNC: 92 MEQ/L (ref 98–111)
CHLORIDE BLD-SCNC: 93 MEQ/L (ref 98–111)
CHLORIDE BLD-SCNC: 94 MEQ/L (ref 98–111)
CHLORIDE BLD-SCNC: 94 MEQ/L (ref 98–111)
CHLORIDE BLD-SCNC: 95 MEQ/L (ref 98–111)
CHLORIDE BLD-SCNC: 96 MEQ/L (ref 98–111)
CHLORIDE BLD-SCNC: 97 MEQ/L (ref 98–111)
CHLORIDE BLD-SCNC: 97 MEQ/L (ref 98–111)
CHLORIDE BLD-SCNC: 98 MEQ/L (ref 98–111)
CO2: 11 MEQ/L (ref 23–33)
CO2: 13 MEQ/L (ref 23–33)
CO2: 14 MEQ/L (ref 23–33)
CO2: 15 MEQ/L (ref 23–33)
CO2: 15 MEQ/L (ref 23–33)
CO2: 16 MEQ/L (ref 23–33)
CO2: 19 MEQ/L (ref 23–33)
CO2: 20 MEQ/L (ref 23–33)
CO2: 20 MEQ/L (ref 23–33)
CO2: 21 MEQ/L (ref 23–33)
CO2: 22 MEQ/L (ref 23–33)
CO2: 22 MEQ/L (ref 23–33)
CO2: 24 MEQ/L (ref 23–33)
CO2: 25 MEQ/L (ref 23–33)
CO2: 26 MEQ/L (ref 23–33)
CO2: 26 MEQ/L (ref 23–33)
CO2: 27 MEQ/L (ref 23–33)
CO2: 28 MEQ/L (ref 23–33)
CO2: 29 MEQ/L (ref 23–33)
CO2: 31 MEQ/L (ref 23–33)
CO2: 33 MEQ/L (ref 23–33)
CO2: 35 MEQ/L (ref 23–33)
CO2: 35 MEQ/L (ref 23–33)
CO2: 36 MEQ/L (ref 23–33)
COLLECTED BY:: ABNORMAL
COLLECTED BY:: NORMAL
COLOR: ABNORMAL
COLOR: YELLOW
COLOR: YELLOW
COMMENT: ABNORMAL
CORTISOL COLLECTION INFO: NORMAL
CORTISOL: 56.89 UG/DL
CREAT SERPL-MCNC: 1 MG/DL (ref 0.4–1.2)
CREAT SERPL-MCNC: 1.1 MG/DL (ref 0.4–1.2)
CREAT SERPL-MCNC: 1.3 MG/DL (ref 0.4–1.2)
CREAT SERPL-MCNC: 1.6 MG/DL (ref 0.4–1.2)
CREAT SERPL-MCNC: 1.6 MG/DL (ref 0.4–1.2)
CREAT SERPL-MCNC: 10.1 MG/DL (ref 0.4–1.2)
CREAT SERPL-MCNC: 10.2 MG/DL (ref 0.4–1.2)
CREAT SERPL-MCNC: 10.2 MG/DL (ref 0.4–1.2)
CREAT SERPL-MCNC: 11.2 MG/DL (ref 0.4–1.2)
CREAT SERPL-MCNC: 11.3 MG/DL (ref 0.4–1.2)
CREAT SERPL-MCNC: 11.3 MG/DL (ref 0.4–1.2)
CREAT SERPL-MCNC: 11.5 MG/DL (ref 0.4–1.2)
CREAT SERPL-MCNC: 2 MG/DL (ref 0.4–1.2)
CREAT SERPL-MCNC: 2.5 MG/DL (ref 0.4–1.2)
CREAT SERPL-MCNC: 3.3 MG/DL (ref 0.4–1.2)
CREAT SERPL-MCNC: 3.5 MG/DL (ref 0.4–1.2)
CREAT SERPL-MCNC: 4.7 MG/DL (ref 0.4–1.2)
CREAT SERPL-MCNC: 5.4 MG/DL (ref 0.4–1.2)
CREAT SERPL-MCNC: 6.8 MG/DL (ref 0.4–1.2)
CREAT SERPL-MCNC: 7.1 MG/DL (ref 0.4–1.2)
CREAT SERPL-MCNC: 7.3 MG/DL (ref 0.4–1.2)
CREAT SERPL-MCNC: 7.4 MG/DL (ref 0.4–1.2)
CREAT SERPL-MCNC: 7.6 MG/DL (ref 0.4–1.2)
CREAT SERPL-MCNC: 7.7 MG/DL (ref 0.4–1.2)
CREAT SERPL-MCNC: 8.1 MG/DL (ref 0.4–1.2)
CREAT SERPL-MCNC: 8.4 MG/DL (ref 0.4–1.2)
CREAT SERPL-MCNC: 8.4 MG/DL (ref 0.4–1.2)
CREAT SERPL-MCNC: 9.8 MG/DL (ref 0.4–1.2)
CREAT SERPL-MCNC: 9.9 MG/DL (ref 0.4–1.2)
CRYSTALS, UA: ABNORMAL
CRYSTALS: ABNORMAL
DEVICE: ABNORMAL
DEVICE: NORMAL
EKG ATRIAL RATE: 100 BPM
EKG ATRIAL RATE: 81 BPM
EKG ATRIAL RATE: 88 BPM
EKG ATRIAL RATE: 88 BPM
EKG ATRIAL RATE: 90 BPM
EKG ATRIAL RATE: 93 BPM
EKG ATRIAL RATE: 98 BPM
EKG P AXIS: 19 DEGREES
EKG P AXIS: 24 DEGREES
EKG P AXIS: 27 DEGREES
EKG P AXIS: 34 DEGREES
EKG P AXIS: 41 DEGREES
EKG P AXIS: 43 DEGREES
EKG P AXIS: 48 DEGREES
EKG P-R INTERVAL: 148 MS
EKG P-R INTERVAL: 154 MS
EKG P-R INTERVAL: 156 MS
EKG P-R INTERVAL: 158 MS
EKG P-R INTERVAL: 160 MS
EKG P-R INTERVAL: 168 MS
EKG P-R INTERVAL: 186 MS
EKG Q-T INTERVAL: 378 MS
EKG Q-T INTERVAL: 380 MS
EKG Q-T INTERVAL: 390 MS
EKG Q-T INTERVAL: 402 MS
EKG Q-T INTERVAL: 410 MS
EKG Q-T INTERVAL: 410 MS
EKG Q-T INTERVAL: 418 MS
EKG QRS DURATION: 72 MS
EKG QRS DURATION: 78 MS
EKG QRS DURATION: 78 MS
EKG QRS DURATION: 80 MS
EKG QRS DURATION: 82 MS
EKG QRS DURATION: 84 MS
EKG QRS DURATION: 84 MS
EKG QTC CALCULATION (BAZETT): 477 MS
EKG QTC CALCULATION (BAZETT): 485 MS
EKG QTC CALCULATION (BAZETT): 485 MS
EKG QTC CALCULATION (BAZETT): 487 MS
EKG QTC CALCULATION (BAZETT): 496 MS
EKG QTC CALCULATION (BAZETT): 496 MS
EKG QTC CALCULATION (BAZETT): 499 MS
EKG R AXIS: 26 DEGREES
EKG R AXIS: 29 DEGREES
EKG R AXIS: 51 DEGREES
EKG R AXIS: 63 DEGREES
EKG R AXIS: 71 DEGREES
EKG R AXIS: 77 DEGREES
EKG R AXIS: 87 DEGREES
EKG T AXIS: -19 DEGREES
EKG T AXIS: -40 DEGREES
EKG T AXIS: -46 DEGREES
EKG T AXIS: -52 DEGREES
EKG T AXIS: -55 DEGREES
EKG T AXIS: -63 DEGREES
EKG T AXIS: -67 DEGREES
EKG VENTRICULAR RATE: 100 BPM
EKG VENTRICULAR RATE: 81 BPM
EKG VENTRICULAR RATE: 88 BPM
EKG VENTRICULAR RATE: 88 BPM
EKG VENTRICULAR RATE: 90 BPM
EKG VENTRICULAR RATE: 93 BPM
EKG VENTRICULAR RATE: 98 BPM
EOSINOPHIL # BLD: 2.7 %
EOSINOPHIL # BLD: 6.2 %
EOSINOPHIL # BLD: 8 %
EOSINOPHIL # BLD: 8.3 %
EOSINOPHILS ABSOLUTE: 0.2 THOU/MM3 (ref 0–0.4)
EOSINOPHILS ABSOLUTE: 0.5 THOU/MM3 (ref 0–0.4)
EOSINOPHILS ABSOLUTE: 0.6 THOU/MM3 (ref 0–0.4)
EOSINOPHILS ABSOLUTE: 0.8 THOU/MM3 (ref 0–0.4)
EPITHELIAL CELLS, UA: ABNORMAL /HPF
EPITHELIAL CELLS, UA: ABNORMAL /HPF
ERYTHROCYTE [DISTWIDTH] IN BLOOD BY AUTOMATED COUNT: 16 % (ref 11.5–14.5)
ERYTHROCYTE [DISTWIDTH] IN BLOOD BY AUTOMATED COUNT: 16.1 % (ref 11.5–14.5)
ERYTHROCYTE [DISTWIDTH] IN BLOOD BY AUTOMATED COUNT: 16.1 % (ref 11.5–14.5)
ERYTHROCYTE [DISTWIDTH] IN BLOOD BY AUTOMATED COUNT: 16.3 % (ref 11.5–14.5)
ERYTHROCYTE [DISTWIDTH] IN BLOOD BY AUTOMATED COUNT: 16.4 % (ref 11.5–14.5)
ERYTHROCYTE [DISTWIDTH] IN BLOOD BY AUTOMATED COUNT: 16.4 % (ref 11.5–14.5)
ERYTHROCYTE [DISTWIDTH] IN BLOOD BY AUTOMATED COUNT: 16.5 % (ref 11.5–14.5)
ERYTHROCYTE [DISTWIDTH] IN BLOOD BY AUTOMATED COUNT: 16.7 % (ref 11.5–14.5)
ERYTHROCYTE [DISTWIDTH] IN BLOOD BY AUTOMATED COUNT: 16.7 % (ref 11.5–14.5)
ERYTHROCYTE [DISTWIDTH] IN BLOOD BY AUTOMATED COUNT: 16.8 % (ref 11.5–14.5)
ERYTHROCYTE [DISTWIDTH] IN BLOOD BY AUTOMATED COUNT: 16.9 % (ref 11.5–14.5)
ERYTHROCYTE [DISTWIDTH] IN BLOOD BY AUTOMATED COUNT: 17 % (ref 11.5–14.5)
ERYTHROCYTE [DISTWIDTH] IN BLOOD BY AUTOMATED COUNT: 17 % (ref 11.5–14.5)
ERYTHROCYTE [DISTWIDTH] IN BLOOD BY AUTOMATED COUNT: 17.1 % (ref 11.5–14.5)
ERYTHROCYTE [DISTWIDTH] IN BLOOD BY AUTOMATED COUNT: 17.1 % (ref 11.5–14.5)
ERYTHROCYTE [DISTWIDTH] IN BLOOD BY AUTOMATED COUNT: 17.2 % (ref 11.5–14.5)
ERYTHROCYTE [DISTWIDTH] IN BLOOD BY AUTOMATED COUNT: 17.5 % (ref 11.5–14.5)
ERYTHROCYTE [DISTWIDTH] IN BLOOD BY AUTOMATED COUNT: 47.2 FL (ref 35–45)
ERYTHROCYTE [DISTWIDTH] IN BLOOD BY AUTOMATED COUNT: 47.9 FL (ref 35–45)
ERYTHROCYTE [DISTWIDTH] IN BLOOD BY AUTOMATED COUNT: 48 FL (ref 35–45)
ERYTHROCYTE [DISTWIDTH] IN BLOOD BY AUTOMATED COUNT: 48 FL (ref 35–45)
ERYTHROCYTE [DISTWIDTH] IN BLOOD BY AUTOMATED COUNT: 48.2 FL (ref 35–45)
ERYTHROCYTE [DISTWIDTH] IN BLOOD BY AUTOMATED COUNT: 48.6 FL (ref 35–45)
ERYTHROCYTE [DISTWIDTH] IN BLOOD BY AUTOMATED COUNT: 48.9 FL (ref 35–45)
ERYTHROCYTE [DISTWIDTH] IN BLOOD BY AUTOMATED COUNT: 49.1 FL (ref 35–45)
ERYTHROCYTE [DISTWIDTH] IN BLOOD BY AUTOMATED COUNT: 49.2 FL (ref 35–45)
ERYTHROCYTE [DISTWIDTH] IN BLOOD BY AUTOMATED COUNT: 49.6 FL (ref 35–45)
ERYTHROCYTE [DISTWIDTH] IN BLOOD BY AUTOMATED COUNT: 49.8 FL (ref 35–45)
ERYTHROCYTE [DISTWIDTH] IN BLOOD BY AUTOMATED COUNT: 50 FL (ref 35–45)
ERYTHROCYTE [DISTWIDTH] IN BLOOD BY AUTOMATED COUNT: 50 FL (ref 35–45)
ERYTHROCYTE [DISTWIDTH] IN BLOOD BY AUTOMATED COUNT: 50.1 FL (ref 35–45)
ERYTHROCYTE [DISTWIDTH] IN BLOOD BY AUTOMATED COUNT: 50.6 FL (ref 35–45)
ERYTHROCYTE [DISTWIDTH] IN BLOOD BY AUTOMATED COUNT: 50.8 FL (ref 35–45)
ERYTHROCYTE [DISTWIDTH] IN BLOOD BY AUTOMATED COUNT: 50.9 FL (ref 35–45)
ERYTHROCYTE [DISTWIDTH] IN BLOOD BY AUTOMATED COUNT: 50.9 FL (ref 35–45)
ERYTHROCYTE [DISTWIDTH] IN BLOOD BY AUTOMATED COUNT: 51 FL (ref 35–45)
ERYTHROCYTE [DISTWIDTH] IN BLOOD BY AUTOMATED COUNT: 51.9 FL (ref 35–45)
ERYTHROCYTE [DISTWIDTH] IN BLOOD BY AUTOMATED COUNT: 52.1 FL (ref 35–45)
ERYTHROCYTE [DISTWIDTH] IN BLOOD BY AUTOMATED COUNT: 52.3 FL (ref 35–45)
ERYTHROCYTE [DISTWIDTH] IN BLOOD BY AUTOMATED COUNT: 52.8 FL (ref 35–45)
ERYTHROCYTE [DISTWIDTH] IN BLOOD BY AUTOMATED COUNT: 53.6 FL (ref 35–45)
ERYTHROCYTE [DISTWIDTH] IN BLOOD BY AUTOMATED COUNT: 54.4 FL (ref 35–45)
ERYTHROCYTE [DISTWIDTH] IN BLOOD BY AUTOMATED COUNT: 54.9 FL (ref 35–45)
FIO2, MIXED VENOUS: 40
FOLATE: 17.4 NG/ML (ref 4.8–24.2)
GFR SERPL CREATININE-BSD FRML MDRD: 10 ML/MIN/1.73M2
GFR SERPL CREATININE-BSD FRML MDRD: 11 ML/MIN/1.73M2
GFR SERPL CREATININE-BSD FRML MDRD: 14 ML/MIN/1.73M2
GFR SERPL CREATININE-BSD FRML MDRD: 17 ML/MIN/1.73M2
GFR SERPL CREATININE-BSD FRML MDRD: 23 ML/MIN/1.73M2
GFR SERPL CREATININE-BSD FRML MDRD: 25 ML/MIN/1.73M2
GFR SERPL CREATININE-BSD FRML MDRD: 35 ML/MIN/1.73M2
GFR SERPL CREATININE-BSD FRML MDRD: 45 ML/MIN/1.73M2
GFR SERPL CREATININE-BSD FRML MDRD: 58 ML/MIN/1.73M2
GFR SERPL CREATININE-BSD FRML MDRD: 58 ML/MIN/1.73M2
GFR SERPL CREATININE-BSD FRML MDRD: 6 ML/MIN/1.73M2
GFR SERPL CREATININE-BSD FRML MDRD: 7 ML/MIN/1.73M2
GFR SERPL CREATININE-BSD FRML MDRD: 74 ML/MIN/1.73M2
GFR SERPL CREATININE-BSD FRML MDRD: 89 ML/MIN/1.73M2
GFR SERPL CREATININE-BSD FRML MDRD: 9 ML/MIN/1.73M2
GFR SERPL CREATININE-BSD FRML MDRD: > 90 ML/MIN/1.73M2
GLUCOSE BLD-MCNC: 103 MG/DL (ref 70–108)
GLUCOSE BLD-MCNC: 104 MG/DL (ref 70–108)
GLUCOSE BLD-MCNC: 105 MG/DL (ref 70–108)
GLUCOSE BLD-MCNC: 107 MG/DL (ref 70–108)
GLUCOSE BLD-MCNC: 109 MG/DL (ref 70–108)
GLUCOSE BLD-MCNC: 109 MG/DL (ref 70–108)
GLUCOSE BLD-MCNC: 111 MG/DL (ref 70–108)
GLUCOSE BLD-MCNC: 111 MG/DL (ref 70–108)
GLUCOSE BLD-MCNC: 112 MG/DL (ref 70–108)
GLUCOSE BLD-MCNC: 112 MG/DL (ref 70–108)
GLUCOSE BLD-MCNC: 116 MG/DL (ref 70–108)
GLUCOSE BLD-MCNC: 117 MG/DL (ref 70–108)
GLUCOSE BLD-MCNC: 118 MG/DL (ref 70–108)
GLUCOSE BLD-MCNC: 119 MG/DL (ref 70–108)
GLUCOSE BLD-MCNC: 121 MG/DL (ref 70–108)
GLUCOSE BLD-MCNC: 122 MG/DL (ref 70–108)
GLUCOSE BLD-MCNC: 123 MG/DL (ref 70–108)
GLUCOSE BLD-MCNC: 124 MG/DL (ref 70–108)
GLUCOSE BLD-MCNC: 128 MG/DL (ref 70–108)
GLUCOSE BLD-MCNC: 129 MG/DL (ref 70–108)
GLUCOSE BLD-MCNC: 135 MG/DL (ref 70–108)
GLUCOSE BLD-MCNC: 142 MG/DL (ref 70–108)
GLUCOSE BLD-MCNC: 146 MG/DL (ref 70–108)
GLUCOSE BLD-MCNC: 146 MG/DL (ref 70–108)
GLUCOSE BLD-MCNC: 151 MG/DL (ref 70–108)
GLUCOSE BLD-MCNC: 159 MG/DL (ref 70–108)
GLUCOSE BLD-MCNC: 159 MG/DL (ref 70–108)
GLUCOSE BLD-MCNC: 167 MG/DL (ref 70–108)
GLUCOSE BLD-MCNC: 167 MG/DL (ref 70–108)
GLUCOSE BLD-MCNC: 173 MG/DL (ref 70–108)
GLUCOSE BLD-MCNC: 174 MG/DL (ref 70–108)
GLUCOSE BLD-MCNC: 186 MG/DL (ref 70–108)
GLUCOSE BLD-MCNC: 192 MG/DL (ref 70–108)
GLUCOSE BLD-MCNC: 197 MG/DL (ref 70–108)
GLUCOSE BLD-MCNC: 203 MG/DL (ref 70–108)
GLUCOSE BLD-MCNC: 205 MG/DL (ref 70–108)
GLUCOSE BLD-MCNC: 206 MG/DL (ref 70–108)
GLUCOSE BLD-MCNC: 211 MG/DL (ref 70–108)
GLUCOSE BLD-MCNC: 212 MG/DL (ref 70–108)
GLUCOSE BLD-MCNC: 217 MG/DL (ref 70–108)
GLUCOSE BLD-MCNC: 217 MG/DL (ref 70–108)
GLUCOSE BLD-MCNC: 220 MG/DL (ref 70–108)
GLUCOSE BLD-MCNC: 227 MG/DL (ref 70–108)
GLUCOSE BLD-MCNC: 228 MG/DL (ref 70–108)
GLUCOSE BLD-MCNC: 233 MG/DL (ref 70–108)
GLUCOSE BLD-MCNC: 235 MG/DL (ref 70–108)
GLUCOSE BLD-MCNC: 238 MG/DL (ref 70–108)
GLUCOSE BLD-MCNC: 248 MG/DL (ref 70–108)
GLUCOSE BLD-MCNC: 254 MG/DL (ref 70–108)
GLUCOSE BLD-MCNC: 265 MG/DL (ref 70–108)
GLUCOSE BLD-MCNC: 266 MG/DL (ref 70–108)
GLUCOSE BLD-MCNC: 267 MG/DL (ref 70–108)
GLUCOSE BLD-MCNC: 269 MG/DL (ref 70–108)
GLUCOSE BLD-MCNC: 270 MG/DL (ref 70–108)
GLUCOSE BLD-MCNC: 273 MG/DL (ref 70–108)
GLUCOSE BLD-MCNC: 277 MG/DL (ref 70–108)
GLUCOSE BLD-MCNC: 279 MG/DL (ref 70–108)
GLUCOSE BLD-MCNC: 287 MG/DL (ref 70–108)
GLUCOSE BLD-MCNC: 288 MG/DL (ref 70–108)
GLUCOSE BLD-MCNC: 292 MG/DL (ref 70–108)
GLUCOSE BLD-MCNC: 296 MG/DL (ref 70–108)
GLUCOSE BLD-MCNC: 306 MG/DL (ref 70–108)
GLUCOSE BLD-MCNC: 307 MG/DL (ref 70–108)
GLUCOSE BLD-MCNC: 312 MG/DL (ref 70–108)
GLUCOSE BLD-MCNC: 318 MG/DL (ref 70–108)
GLUCOSE BLD-MCNC: 328 MG/DL (ref 70–108)
GLUCOSE BLD-MCNC: 331 MG/DL (ref 70–108)
GLUCOSE BLD-MCNC: 332 MG/DL (ref 70–108)
GLUCOSE BLD-MCNC: 332 MG/DL (ref 70–108)
GLUCOSE BLD-MCNC: 339 MG/DL (ref 70–108)
GLUCOSE BLD-MCNC: 343 MG/DL (ref 70–108)
GLUCOSE BLD-MCNC: 347 MG/DL (ref 70–108)
GLUCOSE BLD-MCNC: 373 MG/DL (ref 70–108)
GLUCOSE BLD-MCNC: 375 MG/DL (ref 70–108)
GLUCOSE BLD-MCNC: 378 MG/DL (ref 70–108)
GLUCOSE BLD-MCNC: 383 MG/DL (ref 70–108)
GLUCOSE BLD-MCNC: 388 MG/DL (ref 70–108)
GLUCOSE BLD-MCNC: 397 MG/DL (ref 70–108)
GLUCOSE BLD-MCNC: 398 MG/DL (ref 70–108)
GLUCOSE BLD-MCNC: 398 MG/DL (ref 70–108)
GLUCOSE BLD-MCNC: 403 MG/DL (ref 70–108)
GLUCOSE BLD-MCNC: 406 MG/DL (ref 70–108)
GLUCOSE BLD-MCNC: 408 MG/DL (ref 70–108)
GLUCOSE BLD-MCNC: 409 MG/DL (ref 70–108)
GLUCOSE BLD-MCNC: 412 MG/DL (ref 70–108)
GLUCOSE BLD-MCNC: 416 MG/DL (ref 70–108)
GLUCOSE BLD-MCNC: 420 MG/DL (ref 70–108)
GLUCOSE BLD-MCNC: 434 MG/DL (ref 70–108)
GLUCOSE BLD-MCNC: 456 MG/DL (ref 70–108)
GLUCOSE BLD-MCNC: 475 MG/DL (ref 70–108)
GLUCOSE BLD-MCNC: 48 MG/DL (ref 70–108)
GLUCOSE BLD-MCNC: 49 MG/DL (ref 70–108)
GLUCOSE BLD-MCNC: 52 MG/DL (ref 70–108)
GLUCOSE BLD-MCNC: 54 MG/DL (ref 70–108)
GLUCOSE BLD-MCNC: 56 MG/DL (ref 70–108)
GLUCOSE BLD-MCNC: 58 MG/DL (ref 70–108)
GLUCOSE BLD-MCNC: 60 MG/DL (ref 70–108)
GLUCOSE BLD-MCNC: 62 MG/DL (ref 70–108)
GLUCOSE BLD-MCNC: 65 MG/DL (ref 70–108)
GLUCOSE BLD-MCNC: 66 MG/DL (ref 70–108)
GLUCOSE BLD-MCNC: 70 MG/DL (ref 70–108)
GLUCOSE BLD-MCNC: 70 MG/DL (ref 70–108)
GLUCOSE BLD-MCNC: 72 MG/DL (ref 70–108)
GLUCOSE BLD-MCNC: 79 MG/DL (ref 70–108)
GLUCOSE BLD-MCNC: 81 MG/DL (ref 70–108)
GLUCOSE BLD-MCNC: 82 MG/DL (ref 70–108)
GLUCOSE BLD-MCNC: 85 MG/DL (ref 70–108)
GLUCOSE BLD-MCNC: 86 MG/DL (ref 70–108)
GLUCOSE BLD-MCNC: 87 MG/DL (ref 70–108)
GLUCOSE BLD-MCNC: 88 MG/DL (ref 70–108)
GLUCOSE BLD-MCNC: 89 MG/DL (ref 70–108)
GLUCOSE BLD-MCNC: 90 MG/DL (ref 70–108)
GLUCOSE BLD-MCNC: 90 MG/DL (ref 70–108)
GLUCOSE BLD-MCNC: 95 MG/DL (ref 70–108)
GLUCOSE BLD-MCNC: 99 MG/DL (ref 70–108)
GLUCOSE URINE: 500 MG/DL
GLUCOSE, URINE: >= 1000 MG/DL
GLUCOSE, WHOLE BLOOD: 102 MG/DL (ref 70–108)
GLUCOSE, WHOLE BLOOD: 104 MG/DL (ref 70–108)
GLUCOSE, WHOLE BLOOD: 106 MG/DL (ref 70–108)
GLUCOSE, WHOLE BLOOD: 107 MG/DL (ref 70–108)
GLUCOSE, WHOLE BLOOD: 109 MG/DL (ref 70–108)
GLUCOSE, WHOLE BLOOD: 114 MG/DL (ref 70–108)
GLUCOSE, WHOLE BLOOD: 116 MG/DL (ref 70–108)
GLUCOSE, WHOLE BLOOD: 117 MG/DL (ref 70–108)
GLUCOSE, WHOLE BLOOD: 118 MG/DL (ref 70–108)
GLUCOSE, WHOLE BLOOD: 121 MG/DL (ref 70–108)
GLUCOSE, WHOLE BLOOD: 128 MG/DL (ref 70–108)
GLUCOSE, WHOLE BLOOD: 131 MG/DL (ref 70–108)
GLUCOSE, WHOLE BLOOD: 132 MG/DL (ref 70–108)
GLUCOSE, WHOLE BLOOD: 146 MG/DL (ref 70–108)
GLUCOSE, WHOLE BLOOD: 146 MG/DL (ref 70–108)
GLUCOSE, WHOLE BLOOD: 181 MG/DL (ref 70–108)
GLUCOSE, WHOLE BLOOD: 188 MG/DL (ref 70–108)
GLUCOSE, WHOLE BLOOD: 209 MG/DL (ref 70–108)
GLUCOSE, WHOLE BLOOD: 211 MG/DL (ref 70–108)
GLUCOSE, WHOLE BLOOD: 226 MG/DL (ref 70–108)
GLUCOSE, WHOLE BLOOD: 232 MG/DL (ref 70–108)
GLUCOSE, WHOLE BLOOD: 239 MG/DL (ref 70–108)
GLUCOSE, WHOLE BLOOD: 246 MG/DL (ref 70–108)
GLUCOSE, WHOLE BLOOD: 247 MG/DL (ref 70–108)
GLUCOSE, WHOLE BLOOD: 252 MG/DL (ref 70–108)
GLUCOSE, WHOLE BLOOD: 263 MG/DL (ref 70–108)
GLUCOSE, WHOLE BLOOD: 277 MG/DL (ref 70–108)
GLUCOSE, WHOLE BLOOD: 277 MG/DL (ref 70–108)
GLUCOSE, WHOLE BLOOD: 282 MG/DL (ref 70–108)
GLUCOSE, WHOLE BLOOD: 287 MG/DL (ref 70–108)
GLUCOSE, WHOLE BLOOD: 305 MG/DL (ref 70–108)
GLUCOSE, WHOLE BLOOD: 306 MG/DL (ref 70–108)
GLUCOSE, WHOLE BLOOD: 311 MG/DL (ref 70–108)
GLUCOSE, WHOLE BLOOD: 315 MG/DL (ref 70–108)
GLUCOSE, WHOLE BLOOD: 315 MG/DL (ref 70–108)
GLUCOSE, WHOLE BLOOD: 321 MG/DL (ref 70–108)
GLUCOSE, WHOLE BLOOD: 322 MG/DL (ref 70–108)
GLUCOSE, WHOLE BLOOD: 332 MG/DL (ref 70–108)
GLUCOSE, WHOLE BLOOD: 335 MG/DL (ref 70–108)
GLUCOSE, WHOLE BLOOD: 343 MG/DL (ref 70–108)
GLUCOSE, WHOLE BLOOD: 354 MG/DL (ref 70–108)
GLUCOSE, WHOLE BLOOD: 355 MG/DL (ref 70–108)
GLUCOSE, WHOLE BLOOD: 360 MG/DL (ref 70–108)
GLUCOSE, WHOLE BLOOD: 364 MG/DL (ref 70–108)
GLUCOSE, WHOLE BLOOD: 369 MG/DL (ref 70–108)
GLUCOSE, WHOLE BLOOD: 376 MG/DL (ref 70–108)
GLUCOSE, WHOLE BLOOD: 377 MG/DL (ref 70–108)
GLUCOSE, WHOLE BLOOD: 384 MG/DL (ref 70–108)
GLUCOSE, WHOLE BLOOD: 389 MG/DL (ref 70–108)
GLUCOSE, WHOLE BLOOD: 395 MG/DL (ref 70–108)
GLUCOSE, WHOLE BLOOD: 400 MG/DL (ref 70–108)
GLUCOSE, WHOLE BLOOD: 400 MG/DL (ref 70–108)
GLUCOSE, WHOLE BLOOD: 425 MG/DL (ref 70–108)
GLUCOSE, WHOLE BLOOD: 425 MG/DL (ref 70–108)
GLUCOSE, WHOLE BLOOD: 426 MG/DL (ref 70–108)
GLUCOSE, WHOLE BLOOD: 51 MG/DL (ref 70–108)
GLUCOSE, WHOLE BLOOD: 52 MG/DL (ref 70–108)
GLUCOSE, WHOLE BLOOD: 53 MG/DL (ref 70–108)
GLUCOSE, WHOLE BLOOD: 65 MG/DL (ref 70–108)
GLUCOSE, WHOLE BLOOD: 71 MG/DL (ref 70–108)
GLUCOSE, WHOLE BLOOD: 72 MG/DL (ref 70–108)
GLUCOSE, WHOLE BLOOD: 77 MG/DL (ref 70–108)
GLUCOSE, WHOLE BLOOD: 77 MG/DL (ref 70–108)
GLUCOSE, WHOLE BLOOD: 79 MG/DL (ref 70–108)
GLUCOSE, WHOLE BLOOD: 80 MG/DL (ref 70–108)
GLUCOSE, WHOLE BLOOD: 80 MG/DL (ref 70–108)
GLUCOSE, WHOLE BLOOD: 81 MG/DL (ref 70–108)
GLUCOSE, WHOLE BLOOD: 83 MG/DL (ref 70–108)
GLUCOSE, WHOLE BLOOD: 89 MG/DL (ref 70–108)
GLUCOSE, WHOLE BLOOD: 93 MG/DL (ref 70–108)
GLUCOSE, WHOLE BLOOD: 96 MG/DL (ref 70–108)
GRAM STAIN RESULT: NORMAL
HAPTOGLOBIN: 145 MG/DL (ref 30–200)
HBA1C MFR BLD: 10 % (ref 4.4–6.4)
HBA1C MFR BLD: 9.8 % (ref 4.4–6.4)
HBV SURFACE AB TITR SER: POSITIVE {TITER}
HBV SURFACE AB TITR SER: POSITIVE {TITER}
HCO3, MIXED: 15 MMOL/L (ref 23–28)
HCO3, MIXED: 16 MMOL/L (ref 23–28)
HCO3, MIXED: 25 MMOL/L (ref 23–28)
HCO3, MIXED: 31 MMOL/L (ref 23–28)
HCO3: 13 MMOL/L (ref 23–28)
HCO3: 13 MMOL/L (ref 23–28)
HCO3: 14 MMOL/L (ref 23–28)
HCO3: 14 MMOL/L (ref 23–28)
HCO3: 15 MMOL/L (ref 23–28)
HCO3: 16 MMOL/L (ref 23–28)
HCO3: 17 MMOL/L (ref 23–28)
HCO3: 20 MMOL/L (ref 23–28)
HCO3: 21 MMOL/L (ref 23–28)
HCO3: 22 MMOL/L (ref 23–28)
HCO3: 23 MMOL/L (ref 23–28)
HCO3: 24 MMOL/L (ref 23–28)
HCO3: 27 MMOL/L (ref 23–28)
HCO3: 28 MMOL/L (ref 23–28)
HCO3: 29 MMOL/L (ref 23–28)
HCO3: 30 MMOL/L (ref 23–28)
HCO3: 32 MMOL/L (ref 23–28)
HCO3: 33 MMOL/L (ref 23–28)
HCO3: 36 MMOL/L (ref 23–28)
HCO3: 38 MMOL/L (ref 23–28)
HCO3: 38 MMOL/L (ref 23–28)
HCT VFR BLD CALC: 20.7 % (ref 42–52)
HCT VFR BLD CALC: 21.3 % (ref 42–52)
HCT VFR BLD CALC: 21.5 % (ref 42–52)
HCT VFR BLD CALC: 21.8 % (ref 42–52)
HCT VFR BLD CALC: 22.7 % (ref 42–52)
HCT VFR BLD CALC: 23.4 % (ref 42–52)
HCT VFR BLD CALC: 24.4 % (ref 42–52)
HCT VFR BLD CALC: 25.2 % (ref 42–52)
HCT VFR BLD CALC: 25.6 % (ref 42–52)
HCT VFR BLD CALC: 26.9 % (ref 42–52)
HCT VFR BLD CALC: 27 % (ref 42–52)
HCT VFR BLD CALC: 27.2 % (ref 42–52)
HCT VFR BLD CALC: 27.5 % (ref 42–52)
HCT VFR BLD CALC: 27.5 % (ref 42–52)
HCT VFR BLD CALC: 28.2 % (ref 42–52)
HCT VFR BLD CALC: 28.3 % (ref 42–52)
HCT VFR BLD CALC: 28.5 % (ref 42–52)
HCT VFR BLD CALC: 28.8 % (ref 42–52)
HCT VFR BLD CALC: 28.8 % (ref 42–52)
HCT VFR BLD CALC: 28.9 % (ref 42–52)
HCT VFR BLD CALC: 29.1 % (ref 42–52)
HCT VFR BLD CALC: 29.2 % (ref 42–52)
HCT VFR BLD CALC: 29.4 % (ref 42–52)
HCT VFR BLD CALC: 30.1 % (ref 42–52)
HCT VFR BLD CALC: 30.2 % (ref 42–52)
HCT VFR BLD CALC: 30.7 % (ref 42–52)
HCT VFR BLD CALC: 30.8 % (ref 42–52)
HCT VFR BLD CALC: 30.9 % (ref 42–52)
HCT VFR BLD CALC: 30.9 % (ref 42–52)
HCT VFR BLD CALC: 31.9 % (ref 42–52)
HCT VFR BLD CALC: 32.1 % (ref 42–52)
HCT VFR BLD CALC: 32.3 % (ref 42–52)
HCT VFR BLD CALC: 32.8 % (ref 42–52)
HEMOGLOBIN FINGERSTICK, POC: 7.3 G/DL (ref 14–18)
HEMOGLOBIN FINGERSTICK, POC: 7.4 G/DL (ref 14–18)
HEMOGLOBIN FINGERSTICK, POC: 7.8 G/DL (ref 14–18)
HEMOGLOBIN FINGERSTICK, POC: 8 G/DL (ref 14–18)
HEMOGLOBIN FINGERSTICK, POC: 8.8 G/DL (ref 14–18)
HEMOGLOBIN PLASMA: NORMAL
HEMOGLOBIN: 10.4 GM/DL (ref 14–18)
HEMOGLOBIN: 6.4 GM/DL (ref 14–18)
HEMOGLOBIN: 6.8 GM/DL (ref 14–18)
HEMOGLOBIN: 6.9 GM/DL (ref 14–18)
HEMOGLOBIN: 7.1 GM/DL (ref 14–18)
HEMOGLOBIN: 7.1 GM/DL (ref 14–18)
HEMOGLOBIN: 7.2 GM/DL (ref 14–18)
HEMOGLOBIN: 7.7 GM/DL (ref 14–18)
HEMOGLOBIN: 7.8 GM/DL (ref 14–18)
HEMOGLOBIN: 8.1 GM/DL (ref 14–18)
HEMOGLOBIN: 8.5 GM/DL (ref 14–18)
HEMOGLOBIN: 8.6 GM/DL (ref 14–18)
HEMOGLOBIN: 8.7 GM/DL (ref 14–18)
HEMOGLOBIN: 8.9 GM/DL (ref 14–18)
HEMOGLOBIN: 9 GM/DL (ref 14–18)
HEMOGLOBIN: 9 GM/DL (ref 14–18)
HEMOGLOBIN: 9.1 GM/DL (ref 14–18)
HEMOGLOBIN: 9.2 GM/DL (ref 14–18)
HEMOGLOBIN: 9.2 GM/DL (ref 14–18)
HEMOGLOBIN: 9.3 GM/DL (ref 14–18)
HEMOGLOBIN: 9.3 GM/DL (ref 14–18)
HEMOGLOBIN: 9.4 GM/DL (ref 14–18)
HEMOGLOBIN: 9.4 GM/DL (ref 14–18)
HEMOGLOBIN: 9.5 GM/DL (ref 14–18)
HEMOGLOBIN: 9.6 GM/DL (ref 14–18)
HEMOGLOBIN: 9.7 GM/DL (ref 14–18)
HEMOGLOBIN: 9.7 GM/DL (ref 14–18)
HEMOGLOBIN: 9.8 GM/DL (ref 14–18)
HEMOGLOBIN: 9.8 GM/DL (ref 14–18)
HEPATITIS B CORE TOTAL ANTIBODY: NEGATIVE
HEPATITIS B SURFACE ANTIGEN: NEGATIVE
HEPATITIS B SURFACE ANTIGEN: NEGATIVE
HYPOCHROMIA: ABNORMAL
IFIO2: 100
IFIO2: 25
IFIO2: 30
IFIO2: 35
IFIO2: 40
IFIO2: 50
IFIO2: 50
IFIO2: 75
IFIO2: 80
IFIO2: 80
IFIO2: 85
IMMATURE GRANS (ABS): 0.03 THOU/MM3 (ref 0–0.07)
IMMATURE GRANS (ABS): 0.03 THOU/MM3 (ref 0–0.07)
IMMATURE GRANS (ABS): 0.04 THOU/MM3 (ref 0–0.07)
IMMATURE GRANULOCYTES: 0.3 %
IMMATURE GRANULOCYTES: 0.4 %
IMMATURE GRANULOCYTES: 0.5 %
INR BLD: 1.07 (ref 0.85–1.13)
INR BLD: 1.07 (ref 0.85–1.13)
INR BLD: 1.11 (ref 0.85–1.13)
INR BLD: 1.69 (ref 0.85–1.13)
INR BLD: 1.77 (ref 0.85–1.13)
INR BLD: 2 (ref 0.85–1.13)
INR BLD: 2.04 (ref 0.85–1.13)
INR BLD: 2.43 (ref 0.85–1.13)
INR BLD: 2.55 (ref 0.85–1.13)
INR BLD: 2.58 (ref 0.85–1.13)
INR BLD: 3.15 (ref 0.85–1.13)
INR BLD: 3.45 (ref 0.85–1.13)
INR BLD: 3.68 (ref 0.85–1.13)
INR BLD: 3.87 (ref 0.85–1.13)
INTERPRETATION: ABNORMAL
IRON SATURATION: 24 % (ref 20–50)
IRON: 19 UG/DL (ref 65–195)
IRON: 32 UG/DL (ref 65–195)
KETONES, URINE: ABNORMAL
KETONES, URINE: NEGATIVE
LACTIC ACID: 0.6 MMOL/L (ref 0.5–2.2)
LACTIC ACID: 1.1 MMOL/L (ref 0.5–2.2)
LACTIC ACID: 11.7 MMOL/L (ref 0.5–2.2)
LACTIC ACID: 9.1 MMOL/L (ref 0.5–2.2)
LEUKOCYTE EST, POC: NEGATIVE
LEUKOCYTE ESTERASE, URINE: NEGATIVE
LIPASE: 11.9 U/L (ref 5.6–51.3)
LIPASE: 7.3 U/L (ref 5.6–51.3)
LV EF: 28 %
LV EF: 38 %
LV EF: 40 %
LVEF MODALITY: NORMAL
LYMPHOCYTES # BLD: 16.3 %
LYMPHOCYTES # BLD: 19.7 %
LYMPHOCYTES # BLD: 23.4 %
LYMPHOCYTES # BLD: 32.9 %
LYMPHOCYTES ABSOLUTE: 1.4 THOU/MM3 (ref 1–4.8)
LYMPHOCYTES ABSOLUTE: 1.5 THOU/MM3 (ref 1–4.8)
LYMPHOCYTES ABSOLUTE: 1.8 THOU/MM3 (ref 1–4.8)
LYMPHOCYTES ABSOLUTE: 2.6 THOU/MM3 (ref 1–4.8)
LYMPHOCYTES, BODY FLUID: 1 % (ref 25–100)
MAGNESIUM: 1.6 MG/DL (ref 1.6–2.4)
MAGNESIUM: 1.8 MG/DL (ref 1.6–2.4)
MAGNESIUM: 1.9 MG/DL (ref 1.6–2.4)
MAGNESIUM: 2 MG/DL (ref 1.6–2.4)
MAGNESIUM: 2 MG/DL (ref 1.6–2.4)
MAGNESIUM: 2.3 MG/DL (ref 1.6–2.4)
MAGNESIUM: 2.4 MG/DL (ref 1.6–2.4)
MAGNESIUM: 3.2 MG/DL (ref 1.6–2.4)
MCH RBC QN AUTO: 24.3 PG (ref 26–33)
MCH RBC QN AUTO: 24.4 PG (ref 26–33)
MCH RBC QN AUTO: 24.6 PG (ref 26–33)
MCH RBC QN AUTO: 24.7 PG (ref 26–33)
MCH RBC QN AUTO: 24.9 PG (ref 26–33)
MCH RBC QN AUTO: 24.9 PG (ref 26–33)
MCH RBC QN AUTO: 25 PG (ref 26–33)
MCH RBC QN AUTO: 25.1 PG (ref 26–33)
MCH RBC QN AUTO: 25.1 PG (ref 26–33)
MCH RBC QN AUTO: 25.3 PG (ref 26–33)
MCH RBC QN AUTO: 26.1 PG (ref 26–33)
MCH RBC QN AUTO: 26.2 PG (ref 26–33)
MCH RBC QN AUTO: 26.4 PG (ref 26–33)
MCH RBC QN AUTO: 26.5 PG (ref 26–33)
MCH RBC QN AUTO: 26.5 PG (ref 26–33)
MCH RBC QN AUTO: 26.5 PG (ref 27–31)
MCH RBC QN AUTO: 26.6 PG (ref 26–33)
MCH RBC QN AUTO: 26.8 PG (ref 26–33)
MCH RBC QN AUTO: 26.8 PG (ref 26–33)
MCH RBC QN AUTO: 27.1 PG (ref 26–33)
MCH RBC QN AUTO: 28.2 PG (ref 26–33)
MCH RBC QN AUTO: 28.2 PG (ref 26–33)
MCH RBC QN AUTO: 28.3 PG (ref 26–33)
MCH RBC QN AUTO: 28.6 PG (ref 26–33)
MCHC RBC AUTO-ENTMCNC: 29.5 GM/DL (ref 32.2–35.5)
MCHC RBC AUTO-ENTMCNC: 29.8 GM/DL (ref 32.2–35.5)
MCHC RBC AUTO-ENTMCNC: 30 GM/DL (ref 32.2–35.5)
MCHC RBC AUTO-ENTMCNC: 30.1 GM/DL (ref 32.2–35.5)
MCHC RBC AUTO-ENTMCNC: 30.2 GM/DL (ref 32.2–35.5)
MCHC RBC AUTO-ENTMCNC: 30.2 GM/DL (ref 32.2–35.5)
MCHC RBC AUTO-ENTMCNC: 30.3 GM/DL (ref 32.2–35.5)
MCHC RBC AUTO-ENTMCNC: 30.5 GM/DL (ref 32.2–35.5)
MCHC RBC AUTO-ENTMCNC: 30.7 GM/DL (ref 32.2–35.5)
MCHC RBC AUTO-ENTMCNC: 30.8 GM/DL (ref 32.2–35.5)
MCHC RBC AUTO-ENTMCNC: 30.9 GM/DL (ref 32.2–35.5)
MCHC RBC AUTO-ENTMCNC: 30.9 GM/DL (ref 32.2–35.5)
MCHC RBC AUTO-ENTMCNC: 31 GM/DL (ref 32.2–35.5)
MCHC RBC AUTO-ENTMCNC: 31.2 GM/DL (ref 32.2–35.5)
MCHC RBC AUTO-ENTMCNC: 31.3 GM/DL (ref 32.2–35.5)
MCHC RBC AUTO-ENTMCNC: 31.3 GM/DL (ref 32.2–35.5)
MCHC RBC AUTO-ENTMCNC: 31.6 GM/DL (ref 32.2–35.5)
MCHC RBC AUTO-ENTMCNC: 31.8 GM/DL (ref 32.2–35.5)
MCHC RBC AUTO-ENTMCNC: 32.2 GM/DL (ref 32.2–35.5)
MCHC RBC AUTO-ENTMCNC: 32.4 GM/DL (ref 32.2–35.5)
MCHC RBC AUTO-ENTMCNC: 32.7 GM/DL (ref 32.2–35.5)
MCHC RBC AUTO-ENTMCNC: 32.8 GM/DL (ref 33–37)
MCHC RBC AUTO-ENTMCNC: 33 GM/DL (ref 32.2–35.5)
MCHC RBC AUTO-ENTMCNC: 33.1 GM/DL (ref 32.2–35.5)
MCHC RBC AUTO-ENTMCNC: 33.2 GM/DL (ref 32.2–35.5)
MCHC RBC AUTO-ENTMCNC: 33.3 GM/DL (ref 32.2–35.5)
MCHC RBC AUTO-ENTMCNC: 33.9 GM/DL (ref 32.2–35.5)
MCV RBC AUTO: 79.8 FL (ref 80–94)
MCV RBC AUTO: 79.9 FL (ref 80–94)
MCV RBC AUTO: 80.1 FL (ref 80–94)
MCV RBC AUTO: 80.7 FL (ref 80–94)
MCV RBC AUTO: 80.8 FL (ref 80–94)
MCV RBC AUTO: 80.8 FL (ref 80–94)
MCV RBC AUTO: 80.9 FL (ref 80–94)
MCV RBC AUTO: 81 FL (ref 80–94)
MCV RBC AUTO: 81.1 FL (ref 80–94)
MCV RBC AUTO: 81.7 FL (ref 80–94)
MCV RBC AUTO: 81.8 FL (ref 80–94)
MCV RBC AUTO: 82.1 FL (ref 80–94)
MCV RBC AUTO: 82.2 FL (ref 80–94)
MCV RBC AUTO: 82.8 FL (ref 80–94)
MCV RBC AUTO: 82.8 FL (ref 80–94)
MCV RBC AUTO: 84.2 FL (ref 80–94)
MCV RBC AUTO: 84.4 FL (ref 80–94)
MCV RBC AUTO: 84.5 FL (ref 80–94)
MCV RBC AUTO: 84.8 FL (ref 80–94)
MCV RBC AUTO: 84.9 FL (ref 80–94)
MCV RBC AUTO: 85.3 FL (ref 80–94)
MCV RBC AUTO: 85.5 FL (ref 80–94)
MCV RBC AUTO: 85.6 FL (ref 80–94)
MCV RBC AUTO: 85.7 FL (ref 80–94)
MCV RBC AUTO: 87.3 FL (ref 80–94)
MICROCYTES: ABNORMAL
MISCELLANEOUS 2: ABNORMAL
MISCELLANEOUS LAB TEST RESULT: ABNORMAL
MODE: ABNORMAL
MODE: AC
MODE: NORMAL
MONOCYTE, FLUID: 40 % (ref 25–100)
MONOCYTES # BLD: 11.7 %
MONOCYTES # BLD: 13.4 %
MONOCYTES # BLD: 15.3 %
MONOCYTES # BLD: 8.8 %
MONOCYTES ABSOLUTE: 0.8 THOU/MM3 (ref 0.4–1.3)
MONOCYTES ABSOLUTE: 0.9 THOU/MM3 (ref 0.4–1.3)
MONOCYTES ABSOLUTE: 0.9 THOU/MM3 (ref 0.4–1.3)
MONOCYTES ABSOLUTE: 1.2 THOU/MM3 (ref 0.4–1.3)
NITRITE, URINE: NEGATIVE
NITRITE, URINE: NEGATIVE
NUCLEATED RED BLOOD CELLS: 0 /100 WBC
O2 SAT, MIXED: 31 %
O2 SAT, MIXED: 43 %
O2 SAT, MIXED: 45 %
O2 SAT, MIXED: 78 %
O2 SATURATION: 100 %
O2 SATURATION: 88 %
O2 SATURATION: 89 %
O2 SATURATION: 90 %
O2 SATURATION: 94 %
O2 SATURATION: 98 %
O2 SATURATION: 99 %
ORGANISM: ABNORMAL
ORGANISM: ABNORMAL
ORIGINAL SAMPLE NUMBER: NORMAL
OSMOLALITY CALCULATION: 283.5 MOSMOL/KG (ref 275–300)
OSMOLALITY CALCULATION: 285.4 MOSMOL/KG (ref 275–300)
OSMOLALITY CALCULATION: 290.1 MOSMOL/KG (ref 275–300)
OSMOLALITY CALCULATION: 294.6 MOSMOL/KG (ref 275–300)
OSMOLALITY: NORMAL MOSMOL/KG (ref 275–295)
PATHOLOGIST REVIEW: ABNORMAL
PATHOLOGIST REVIEW: ABNORMAL
PATIENT BOLUS: NORMAL
PATIENT HEPARIN CONCENTRATION: 0
PATIENT HEPARIN CONCENTRATION: 2
PATIENT HEPARIN CONCENTRATION: 3
PATIENT HEPARIN CONCENTRATION: 3
PATIENT HEPARIN CONCENTRATION: 4
PCO2 (TEMP CORRECTED): 29 (ref 35–45)
PCO2 (TEMP CORRECTED): 33 (ref 35–45)
PCO2 (TEMP CORRECTED): 33 (ref 35–45)
PCO2 (TEMP CORRECTED): 44 (ref 35–45)
PCO2, MIXED VENOUS: 25 MMHG (ref 41–51)
PCO2, MIXED VENOUS: 37 MMHG (ref 41–51)
PCO2, MIXED VENOUS: 47 MMHG (ref 41–51)
PCO2, MIXED VENOUS: 50 MMHG (ref 41–51)
PCO2: 19 MMHG (ref 35–45)
PCO2: 22 MMHG (ref 35–45)
PCO2: 22 MMHG (ref 35–45)
PCO2: 25 MMHG (ref 35–45)
PCO2: 25 MMHG (ref 35–45)
PCO2: 26 MMHG (ref 35–45)
PCO2: 27 MMHG (ref 35–45)
PCO2: 28 MMHG (ref 35–45)
PCO2: 30 MMHG (ref 35–45)
PCO2: 30 MMHG (ref 35–45)
PCO2: 34 MMHG (ref 35–45)
PCO2: 34 MMHG (ref 35–45)
PCO2: 35 MMHG (ref 35–45)
PCO2: 37 MMHG (ref 35–45)
PCO2: 39 MMHG (ref 35–45)
PCO2: 40 MMHG (ref 35–45)
PCO2: 40 MMHG (ref 35–45)
PCO2: 41 MMHG (ref 35–45)
PCO2: 41 MMHG (ref 35–45)
PCO2: 42 MMHG (ref 35–45)
PCO2: 44 MMHG (ref 35–45)
PCO2: 46 MMHG (ref 35–45)
PCO2: 48 MMHG (ref 35–45)
PCO2: 54 MMHG (ref 35–45)
PCO2: 58 MMHG (ref 35–45)
PCO2: 63 MMHG (ref 35–45)
PDW BLD-RTO: 16 % (ref 11.5–14.5)
PH (TEMPERATURE CORRECTED): 7.44 (ref 7.35–7.45)
PH (TEMPERATURE CORRECTED): 7.51 (ref 7.35–7.45)
PH (TEMPERATURE CORRECTED): 7.52 (ref 7.35–7.45)
PH (TEMPERATURE CORRECTED): 7.52 (ref 7.35–7.45)
PH BLOOD GAS: 7.28 (ref 7.35–7.45)
PH BLOOD GAS: 7.35 (ref 7.35–7.45)
PH BLOOD GAS: 7.37 (ref 7.35–7.45)
PH BLOOD GAS: 7.37 (ref 7.35–7.45)
PH BLOOD GAS: 7.38 (ref 7.35–7.45)
PH BLOOD GAS: 7.39 (ref 7.35–7.45)
PH BLOOD GAS: 7.41 (ref 7.35–7.45)
PH BLOOD GAS: 7.42 (ref 7.35–7.45)
PH BLOOD GAS: 7.43 (ref 7.35–7.45)
PH BLOOD GAS: 7.44 (ref 7.35–7.45)
PH BLOOD GAS: 7.45 (ref 7.35–7.45)
PH BLOOD GAS: 7.46 (ref 7.35–7.45)
PH BLOOD GAS: 7.47 (ref 7.35–7.45)
PH BLOOD GAS: 7.47 (ref 7.35–7.45)
PH BLOOD GAS: 7.48 (ref 7.35–7.45)
PH BLOOD GAS: 7.49 (ref 7.35–7.45)
PH BLOOD GAS: 7.49 (ref 7.35–7.45)
PH BLOOD GAS: 7.5 (ref 7.35–7.45)
PH BLOOD GAS: 7.51 (ref 7.35–7.45)
PH BLOOD GAS: 7.51 (ref 7.35–7.45)
PH UA: 8
PH UA: 8
PH, MIXED: 7.2 (ref 7.31–7.41)
PH, MIXED: 7.34 (ref 7.31–7.41)
PH, MIXED: 7.4 (ref 7.31–7.41)
PH, MIXED: 7.41 (ref 7.31–7.41)
PHOSPHORUS: 0.9 MG/DL (ref 2.4–4.7)
PHOSPHORUS: 1.1 MG/DL (ref 2.4–4.7)
PHOSPHORUS: 1.4 MG/DL (ref 2.4–4.7)
PHOSPHORUS: 2.2 MG/DL (ref 2.4–4.7)
PHOSPHORUS: 5.2 MG/DL (ref 2.4–4.7)
PHOSPHORUS: 5.8 MG/DL (ref 2.4–4.7)
PHOSPHORUS: 6.3 MG/DL (ref 2.4–4.7)
PHOSPHORUS: 6.5 MG/DL (ref 2.4–4.7)
PHOSPHORUS: 7.2 MG/DL (ref 2.4–4.7)
PHOSPHORUS: 7.2 MG/DL (ref 2.4–4.7)
PHOSPHORUS: 8.1 MG/DL (ref 2.4–4.7)
PLATELET # BLD: 124 THOU/MM3 (ref 130–400)
PLATELET # BLD: 144 THOU/MM3 (ref 130–400)
PLATELET # BLD: 15 THOU/MM3 (ref 130–400)
PLATELET # BLD: 16 THOU/MM3 (ref 130–400)
PLATELET # BLD: 163 THOU/MM3 (ref 130–400)
PLATELET # BLD: 195 THOU/MM3 (ref 130–400)
PLATELET # BLD: 198 THOU/MM3 (ref 130–400)
PLATELET # BLD: 20 THOU/MM3 (ref 130–400)
PLATELET # BLD: 203 THOU/MM3 (ref 130–400)
PLATELET # BLD: 207 THOU/MM3 (ref 130–400)
PLATELET # BLD: 220 THOU/MM3 (ref 130–400)
PLATELET # BLD: 233 THOU/MM3 (ref 130–400)
PLATELET # BLD: 236 THOU/MM3 (ref 130–400)
PLATELET # BLD: 243 THOU/MM3 (ref 130–400)
PLATELET # BLD: 25 THOU/MM3 (ref 130–400)
PLATELET # BLD: 25 THOU/MM3 (ref 130–400)
PLATELET # BLD: 258 THOU/MM3 (ref 130–400)
PLATELET # BLD: 263 THOU/MM3 (ref 130–400)
PLATELET # BLD: 265 THOU/MM3 (ref 130–400)
PLATELET # BLD: 268 THOU/MM3 (ref 130–400)
PLATELET # BLD: 269 THOU/MM3 (ref 130–400)
PLATELET # BLD: 277 THOU/MM3 (ref 130–400)
PLATELET # BLD: 281 THOU/MM3 (ref 130–400)
PLATELET # BLD: 283 THOU/MM3 (ref 130–400)
PLATELET # BLD: 288 THOU/MM3 (ref 130–400)
PLATELET # BLD: 29 THOU/MM3 (ref 130–400)
PLATELET # BLD: 290 THOU/MM3 (ref 130–400)
PLATELET # BLD: 306 THOU/MM3 (ref 130–400)
PLATELET # BLD: 31 THOU/MM3 (ref 130–400)
PLATELET # BLD: 44 THOU/MM3 (ref 130–400)
PLATELET # BLD: 52 THOU/MM3 (ref 130–400)
PLATELET # BLD: 72 THOU/MM3 (ref 130–400)
PLATELET # BLD: 72 THOU/MM3 (ref 130–400)
PLATELET # BLD: 79 THOU/MM3 (ref 130–400)
PLATELET # BLD: 83 THOU/MM3 (ref 130–400)
PMV BLD AUTO: 10 FL (ref 9.4–12.4)
PMV BLD AUTO: 10.2 FL (ref 9.4–12.4)
PMV BLD AUTO: 10.3 FL (ref 9.4–12.4)
PMV BLD AUTO: 10.3 FL (ref 9.4–12.4)
PMV BLD AUTO: 10.4 FL (ref 9.4–12.4)
PMV BLD AUTO: 10.4 FL (ref 9.4–12.4)
PMV BLD AUTO: 10.5 FL (ref 9.4–12.4)
PMV BLD AUTO: 10.7 FL (ref 9.4–12.4)
PMV BLD AUTO: 10.7 FL (ref 9.4–12.4)
PMV BLD AUTO: 10.9 FL (ref 9.4–12.4)
PMV BLD AUTO: 10.9 FL (ref 9.4–12.4)
PMV BLD AUTO: 11.5 FL (ref 9.4–12.4)
PMV BLD AUTO: 12.4 FL (ref 9.4–12.4)
PMV BLD AUTO: 12.4 FL (ref 9.4–12.4)
PMV BLD AUTO: 7.7 FL (ref 7.4–10.4)
PMV BLD AUTO: 9.5 FL (ref 9.4–12.4)
PMV BLD AUTO: 9.7 FL (ref 9.4–12.4)
PMV BLD AUTO: 9.8 FL (ref 9.4–12.4)
PMV BLD AUTO: 9.8 FL (ref 9.4–12.4)
PMV BLD AUTO: 9.9 FL (ref 9.4–12.4)
PMV BLD AUTO: 9.9 FL (ref 9.4–12.4)
PMV BLD AUTO: ABNORMAL FL (ref 9.4–12.4)
PO2 (TEMP CORRECTED): 163 (ref 71–104)
PO2 (TEMP CORRECTED): 49 (ref 71–104)
PO2 (TEMP CORRECTED): 58 (ref 71–104)
PO2 (TEMP CORRECTED): 89 (ref 71–104)
PO2 MIXED: 19 MMHG (ref 25–40)
PO2 MIXED: 27 MMHG (ref 25–40)
PO2 MIXED: 29 MMHG (ref 25–40)
PO2 MIXED: 44 MMHG (ref 25–40)
PO2: 105 MMHG (ref 71–104)
PO2: 106 MMHG (ref 71–104)
PO2: 118 MMHG (ref 71–104)
PO2: 125 MMHG (ref 71–104)
PO2: 128 MMHG (ref 71–104)
PO2: 137 MMHG (ref 71–104)
PO2: 138 MMHG (ref 71–104)
PO2: 146 MMHG (ref 71–104)
PO2: 147 MMHG (ref 71–104)
PO2: 148 MMHG (ref 71–104)
PO2: 150 MMHG (ref 71–104)
PO2: 153 MMHG (ref 71–104)
PO2: 168 MMHG (ref 71–104)
PO2: 224 MMHG (ref 71–104)
PO2: 243 MMHG (ref 71–104)
PO2: 249 MMHG (ref 71–104)
PO2: 326 MMHG (ref 71–104)
PO2: 329 MMHG (ref 71–104)
PO2: 330 MMHG (ref 71–104)
PO2: 345 MMHG (ref 71–104)
PO2: 472 MMHG (ref 71–104)
PO2: 524 MMHG (ref 71–104)
PO2: 53 MMHG (ref 71–104)
PO2: 54 MMHG (ref 71–104)
PO2: 57 MMHG (ref 71–104)
PO2: 600 MMHG (ref 71–104)
PO2: 62 MMHG (ref 71–104)
PO2: 95 MMHG (ref 71–104)
POC ACTIVATED CLOTTING TIME KAOLIN: 169 SECONDS (ref 1–150)
POC ACTIVATED CLOTTING TIME KAOLIN: 180 SECONDS (ref 1–150)
POC ACTIVATED CLOTTING TIME KAOLIN: 186 SECONDS (ref 1–150)
POC ACTIVATED CLOTTING TIME KAOLIN: 191 SECONDS (ref 1–150)
POC ACTIVATED CLOTTING TIME KAOLIN: 197 SECONDS (ref 1–150)
POC ACTIVATED CLOTTING TIME KAOLIN: 202 SECONDS (ref 1–150)
POC ACTIVATED CLOTTING TIME KAOLIN: 213 SECONDS (ref 1–150)
POC ACTIVATED CLOTTING TIME KAOLIN: 230 SECONDS (ref 1–150)
POC ACTIVATED CLOTTING TIME KAOLIN: 775 SECONDS (ref 1–150)
POC O2 SATURATION: 93 % (ref 94–97)
POTASSIUM REFLEX MAGNESIUM: 2.9 MEQ/L (ref 3.5–5.2)
POTASSIUM REFLEX MAGNESIUM: 3 MEQ/L (ref 3.5–5.2)
POTASSIUM REFLEX MAGNESIUM: 3.5 MEQ/L (ref 3.5–5.2)
POTASSIUM REFLEX MAGNESIUM: 3.7 MEQ/L (ref 3.5–5.2)
POTASSIUM REFLEX MAGNESIUM: 3.9 MEQ/L (ref 3.5–5.2)
POTASSIUM REFLEX MAGNESIUM: 4.4 MEQ/L (ref 3.5–5.2)
POTASSIUM REFLEX MAGNESIUM: 4.6 MEQ/L (ref 3.5–5.2)
POTASSIUM REFLEX MAGNESIUM: 5.3 MEQ/L (ref 3.5–5.2)
POTASSIUM REFLEX MAGNESIUM: 5.5 MEQ/L (ref 3.5–5.2)
POTASSIUM REFLEX MAGNESIUM: 5.5 MEQ/L (ref 3.5–5.2)
POTASSIUM REFLEX MAGNESIUM: 5.7 MEQ/L (ref 3.5–5.2)
POTASSIUM REFLEX MAGNESIUM: 5.8 MEQ/L (ref 3.5–5.2)
POTASSIUM REFLEX MAGNESIUM: 6.3 MEQ/L (ref 3.5–5.2)
POTASSIUM REFLEX MAGNESIUM: 6.7 MEQ/L (ref 3.5–5.2)
POTASSIUM SERPL-SCNC: 2.6 MEQ/L (ref 3.5–5.2)
POTASSIUM SERPL-SCNC: 2.7 MEQ/L (ref 3.5–5.2)
POTASSIUM SERPL-SCNC: 3 MEQ/L (ref 3.5–5.2)
POTASSIUM SERPL-SCNC: 3.5 MEQ/L (ref 3.5–5.2)
POTASSIUM SERPL-SCNC: 4 MEQ/L (ref 3.5–5.2)
POTASSIUM SERPL-SCNC: 4.1 MEQ/L (ref 3.5–5.2)
POTASSIUM SERPL-SCNC: 4.2 MEQ/L (ref 3.5–5.2)
POTASSIUM SERPL-SCNC: 4.4 MEQ/L (ref 3.5–5.2)
POTASSIUM SERPL-SCNC: 4.9 MEQ/L (ref 3.5–5.2)
POTASSIUM SERPL-SCNC: 5 MEQ/L (ref 3.5–5.2)
POTASSIUM SERPL-SCNC: 5 MEQ/L (ref 3.5–5.2)
POTASSIUM SERPL-SCNC: 5.2 MEQ/L (ref 3.5–5.2)
POTASSIUM SERPL-SCNC: 5.3 MEQ/L (ref 3.5–5.2)
POTASSIUM SERPL-SCNC: 5.3 MEQ/L (ref 3.5–5.2)
POTASSIUM SERPL-SCNC: 5.4 MEQ/L (ref 3.5–5.2)
POTASSIUM SERPL-SCNC: 5.4 MEQ/L (ref 3.5–5.2)
POTASSIUM SERPL-SCNC: 5.7 MEQ/L (ref 3.5–5.2)
POTASSIUM SERPL-SCNC: 5.8 MEQ/L (ref 3.5–5.2)
POTASSIUM SERPL-SCNC: 5.9 MEQ/L (ref 3.5–5.2)
POTASSIUM SERPL-SCNC: 5.9 MEQ/L (ref 3.5–5.2)
POTASSIUM SERPL-SCNC: 6 MEQ/L (ref 3.5–5.2)
POTASSIUM SERPL-SCNC: 6.2 MEQ/L (ref 3.5–5.2)
POTASSIUM SERPL-SCNC: 6.2 MEQ/L (ref 3.5–5.2)
POTASSIUM SERPL-SCNC: 6.5 MEQ/L (ref 3.5–5.2)
POTASSIUM SERPL-SCNC: 6.8 MEQ/L (ref 3.5–5.2)
POTASSIUM, WHOLE BLOOD: 4.4 MEQ/L (ref 3.5–4.9)
POTASSIUM, WHOLE BLOOD: 4.8 MEQ/L (ref 3.5–4.9)
POTASSIUM, WHOLE BLOOD: 5.1 MEQ/L (ref 3.5–4.9)
POTASSIUM, WHOLE BLOOD: 5.1 MEQ/L (ref 3.5–4.9)
POTASSIUM, WHOLE BLOOD: 5.2 MEQ/L (ref 3.5–4.9)
POTASSIUM, WHOLE BLOOD: 5.3 MEQ/L (ref 3.5–4.9)
POTASSIUM, WHOLE BLOOD: 6 MEQ/L (ref 3.5–4.9)
PRO-BNP: ABNORMAL PG/ML (ref 0–450)
PRO-BNP: ABNORMAL PG/ML (ref 0–450)
PROCALCITONIN: 1.77 NG/ML (ref 0.01–0.09)
PROCALCITONIN: 1.93 NG/ML (ref 0.01–0.09)
PROCALCITONIN: 2.35 NG/ML (ref 0.01–0.09)
PROCALCITONIN: 2.5 NG/ML (ref 0.01–0.09)
PROJECTED HEPARIN CONCENTATION: 3
PROTEIN UA: 100
PROTEIN UA: >= 300 MG/DL
RANGE: NORMAL
RBC # BLD: 2.44 MILL/MM3 (ref 4.7–6.1)
RBC # BLD: 2.51 MILL/MM3 (ref 4.7–6.1)
RBC # BLD: 2.51 MILL/MM3 (ref 4.7–6.1)
RBC # BLD: 2.52 MILL/MM3 (ref 4.7–6.1)
RBC # BLD: 2.55 MILL/MM3 (ref 4.7–6.1)
RBC # BLD: 2.69 MILL/MM3 (ref 4.7–6.1)
RBC # BLD: 2.76 MILL/MM3 (ref 4.7–6.1)
RBC # BLD: 2.86 MILL/MM3 (ref 4.7–6.1)
RBC # BLD: 3.12 MILL/MM3 (ref 4.7–6.1)
RBC # BLD: 3.19 MILL/MM3 (ref 4.7–6.1)
RBC # BLD: 3.32 MILL/MM3 (ref 4.7–6.1)
RBC # BLD: 3.33 MILL/MM3 (ref 4.7–6.1)
RBC # BLD: 3.36 MILL/MM3 (ref 4.7–6.1)
RBC # BLD: 3.4 MILL/MM3 (ref 4.7–6.1)
RBC # BLD: 3.4 MILL/MM3 (ref 4.7–6.1)
RBC # BLD: 3.48 MILL/MM3 (ref 4.7–6.1)
RBC # BLD: 3.49 MILL/MM3 (ref 4.7–6.1)
RBC # BLD: 3.52 MILL/MM3 (ref 4.7–6.1)
RBC # BLD: 3.54 MILL/MM3 (ref 4.7–6.1)
RBC # BLD: 3.65 MILL/MM3 (ref 4.7–6.1)
RBC # BLD: 3.66 MILL/MM3 (ref 4.7–6.1)
RBC # BLD: 3.67 MILL/MM3 (ref 4.7–6.1)
RBC # BLD: 3.68 MILL/MM3 (ref 4.7–6.1)
RBC # BLD: 3.73 MILL/MM3 (ref 4.7–6.1)
RBC # BLD: 3.74 MILL/MM3 (ref 4.7–6.1)
RBC # BLD: 3.76 MILL/MM3 (ref 4.7–6.1)
RBC # BLD: 3.8 MILL/MM3 (ref 4.7–6.1)
RBC # BLD: 3.93 MILL/MM3 (ref 4.7–6.1)
RBC # BLD: 3.93 MILL/MM3 (ref 4.7–6.1)
RBC FLUID: 1782 /CUMM (ref 0–100)
RBC URINE: ABNORMAL /HPF
RBC URINE: ABNORMAL /HPF
REFERENCE LOCATION: NORMAL
REFERENCE RANGE: NORMAL
RENAL EPITHELIAL, UA: ABNORMAL
RENAL EPITHELIAL, UA: ABNORMAL
RH FACTOR: NORMAL
SCAN OF BLOOD SMEAR: NORMAL
SEG NEUTROPHILS: 44.5 %
SEG NEUTROPHILS: 55.3 %
SEG NEUTROPHILS: 62.7 %
SEG NEUTROPHILS: 65.3 %
SEGMENTED NEUTROPHILS ABSOLUTE COUNT: 3.5 THOU/MM3 (ref 1.8–7.7)
SEGMENTED NEUTROPHILS ABSOLUTE COUNT: 4.3 THOU/MM3 (ref 1.8–7.7)
SEGMENTED NEUTROPHILS ABSOLUTE COUNT: 4.4 THOU/MM3 (ref 1.8–7.7)
SEGMENTED NEUTROPHILS ABSOLUTE COUNT: 6.1 THOU/MM3 (ref 1.8–7.7)
SEGMENTED NEUTROPHILS, BODY FLUID: 59 % (ref 0–25)
SET PEEP: 10 MMHG
SET PEEP: 5 MMHG
SET PEEP: 8 MMHG
SET PRESS SUPP: 10 CMH2O
SET PRESS SUPP: 16 CMH2O
SET PRESS SUPP: 20 CMH2O
SET RESPIRATORY RATE: 10 BPM
SET RESPIRATORY RATE: 14 BPM
SET RESPIRATORY RATE: 14 BPM
SET RESPIRATORY RATE: 16 BPM
SET TIDAL VOLUME: 380 ML
SET TIDAL VOLUME: 430 ML
SET TIDAL VOLUME: 570 ML
SITE: ABNORMAL
SITE: ABNORMAL
SITE: NORMAL
SODIUM BLD-SCNC: 130 MEQ/L (ref 135–145)
SODIUM BLD-SCNC: 133 MEQ/L (ref 135–145)
SODIUM BLD-SCNC: 133 MEQ/L (ref 135–145)
SODIUM BLD-SCNC: 134 MEQ/L (ref 135–145)
SODIUM BLD-SCNC: 135 MEQ/L (ref 135–145)
SODIUM BLD-SCNC: 136 MEQ/L (ref 135–145)
SODIUM BLD-SCNC: 137 MEQ/L (ref 135–145)
SODIUM BLD-SCNC: 138 MEQ/L (ref 135–145)
SODIUM BLD-SCNC: 139 MEQ/L (ref 135–145)
SODIUM, WHOLE BLOOD: 129 MEQ/L (ref 138–146)
SODIUM, WHOLE BLOOD: 131 MEQ/L (ref 138–146)
SODIUM, WHOLE BLOOD: 133 MEQ/L (ref 138–146)
SODIUM, WHOLE BLOOD: 134 MEQ/L (ref 138–146)
SODIUM, WHOLE BLOOD: 134 MEQ/L (ref 138–146)
SODIUM, WHOLE BLOOD: 135 MEQ/L (ref 138–146)
SODIUM, WHOLE BLOOD: 135 MEQ/L (ref 138–146)
SOURCE, BLOOD GAS: ABNORMAL
SPECIFIC GRAVITY UA: 1.01 (ref 1–1.03)
SPECIFIC GRAVITY, URINE: 1.01 (ref 1–1.03)
SPECIMEN: ABNORMAL
TEMPERATURE: 35.9
TEMPERATURE: 36
TEMPERATURE: 36.1
TEMPERATURE: 36.1
TEST RESULTS WITH UNITS: 310
TEST(S) BEING PERFORMED: NORMAL
TOTAL IRON BINDING CAPACITY: 136 UG/DL (ref 171–450)
TOTAL PROTEIN: 4.5 G/DL (ref 6.1–8)
TOTAL PROTEIN: 4.6 G/DL (ref 6.1–8)
TOTAL PROTEIN: 4.6 G/DL (ref 6.1–8)
TOTAL PROTEIN: 4.7 G/DL (ref 6.1–8)
TOTAL PROTEIN: 4.8 G/DL (ref 6.1–8)
TOTAL PROTEIN: 4.9 G/DL (ref 6.1–8)
TOTAL PROTEIN: 5 G/DL (ref 6.1–8)
TOTAL PROTEIN: 5.1 G/DL (ref 6.1–8)
TOTAL PROTEIN: 5.5 G/DL (ref 6.1–8)
TOTAL PROTEIN: 5.6 G/DL (ref 6.1–8)
TOTAL PROTEIN: 5.7 G/DL (ref 6.1–8)
TOTAL PROTEIN: 6.6 G/DL (ref 6.1–8)
TOTAL PROTEIN: 7.1 G/DL (ref 6.1–8)
TOTAL PROTEIN: 7.2 G/DL (ref 6.1–8)
TROPONIN T: 0.16 NG/ML
TROPONIN T: 0.17 NG/ML
TROPONIN T: 0.18 NG/ML
TROPONIN T: 0.18 NG/ML
TROPONIN T: 0.2 NG/ML
URINE CULTURE, ROUTINE: ABNORMAL
UROBILINOGEN, URINE: 0.2 EU/DL
UROBILINOGEN, URINE: 0.2 EU/DL
VITAMIN B-12: 740 PG/ML (ref 211–911)
WBC # BLD: 0.9 THOU/MM3 (ref 4.8–10.8)
WBC # BLD: 10.9 THOU/MM3 (ref 4.8–10.8)
WBC # BLD: 11.7 THOU/MM3 (ref 4.8–10.8)
WBC # BLD: 12.1 THOU/MM3 (ref 4.8–10.8)
WBC # BLD: 14.2 THOU/MM3 (ref 4.8–10.8)
WBC # BLD: 14.4 THOU/MM3 (ref 4.8–10.8)
WBC # BLD: 16.1 THOU/MM3 (ref 4.8–10.8)
WBC # BLD: 17.4 THOU/MM3 (ref 4.8–10.8)
WBC # BLD: 17.8 THOU/MM3 (ref 4.8–10.8)
WBC # BLD: 18.9 THOU/MM3 (ref 4.8–10.8)
WBC # BLD: 2.1 THOU/MM3 (ref 4.8–10.8)
WBC # BLD: 21 THOU/MM3 (ref 4.8–10.8)
WBC # BLD: 23.7 THOU/MM3 (ref 4.8–10.8)
WBC # BLD: 25.4 THOU/MM3 (ref 4.8–10.8)
WBC # BLD: 7 THOU/MM3 (ref 4.8–10.8)
WBC # BLD: 7.4 THOU/MM3 (ref 4.8–10.8)
WBC # BLD: 7.5 THOU/MM3 (ref 4.8–10.8)
WBC # BLD: 7.5 THOU/MM3 (ref 4.8–10.8)
WBC # BLD: 7.6 THOU/MM3 (ref 4.8–10.8)
WBC # BLD: 7.7 THOU/MM3 (ref 4.8–10.8)
WBC # BLD: 7.8 THOU/MM3 (ref 4.8–10.8)
WBC # BLD: 7.9 THOU/MM3 (ref 4.8–10.8)
WBC # BLD: 8.1 THOU/MM3 (ref 4.8–10.8)
WBC # BLD: 8.8 THOU/MM3 (ref 4.8–10.8)
WBC # BLD: 9.3 THOU/MM3 (ref 4.8–10.8)
WBC FLUID: 6008 /MM3 (ref 0–500)
WBC UA: ABNORMAL /HPF
WBC UA: ABNORMAL /HPF
YEAST: ABNORMAL
YEAST: ABNORMAL

## 2018-01-01 PROCEDURE — 2780000010 HC IMPLANT OTHER

## 2018-01-01 PROCEDURE — 86923 COMPATIBILITY TEST ELECTRIC: CPT

## 2018-01-01 PROCEDURE — 82948 REAGENT STRIP/BLOOD GLUCOSE: CPT

## 2018-01-01 PROCEDURE — 3700000001 HC ADD 15 MINUTES (ANESTHESIA): Performed by: SURGERY

## 2018-01-01 PROCEDURE — 2580000003 HC RX 258: Performed by: INTERNAL MEDICINE

## 2018-01-01 PROCEDURE — 6360000002 HC RX W HCPCS: Performed by: INTERNAL MEDICINE

## 2018-01-01 PROCEDURE — 80053 COMPREHEN METABOLIC PANEL: CPT

## 2018-01-01 PROCEDURE — 86850 RBC ANTIBODY SCREEN: CPT

## 2018-01-01 PROCEDURE — 93458 L HRT ARTERY/VENTRICLE ANGIO: CPT | Performed by: INTERNAL MEDICINE

## 2018-01-01 PROCEDURE — 99024 POSTOP FOLLOW-UP VISIT: CPT | Performed by: SURGERY

## 2018-01-01 PROCEDURE — 6370000000 HC RX 637 (ALT 250 FOR IP): Performed by: INTERNAL MEDICINE

## 2018-01-01 PROCEDURE — 83010 ASSAY OF HAPTOGLOBIN QUANT: CPT

## 2018-01-01 PROCEDURE — 6360000002 HC RX W HCPCS

## 2018-01-01 PROCEDURE — 84484 ASSAY OF TROPONIN QUANT: CPT

## 2018-01-01 PROCEDURE — 2580000003 HC RX 258: Performed by: RADIOLOGY

## 2018-01-01 PROCEDURE — 85027 COMPLETE CBC AUTOMATED: CPT

## 2018-01-01 PROCEDURE — 83735 ASSAY OF MAGNESIUM: CPT

## 2018-01-01 PROCEDURE — 6370000000 HC RX 637 (ALT 250 FOR IP): Performed by: SURGERY

## 2018-01-01 PROCEDURE — 85014 HEMATOCRIT: CPT

## 2018-01-01 PROCEDURE — 80048 BASIC METABOLIC PNL TOTAL CA: CPT

## 2018-01-01 PROCEDURE — 6360000002 HC RX W HCPCS: Performed by: HOSPITALIST

## 2018-01-01 PROCEDURE — 2500000003 HC RX 250 WO HCPCS: Performed by: INTERNAL MEDICINE

## 2018-01-01 PROCEDURE — 85730 THROMBOPLASTIN TIME PARTIAL: CPT

## 2018-01-01 PROCEDURE — 36430 TRANSFUSION BLD/BLD COMPNT: CPT

## 2018-01-01 PROCEDURE — 71045 X-RAY EXAM CHEST 1 VIEW: CPT

## 2018-01-01 PROCEDURE — 36415 COLL VENOUS BLD VENIPUNCTURE: CPT

## 2018-01-01 PROCEDURE — 84295 ASSAY OF SERUM SODIUM: CPT

## 2018-01-01 PROCEDURE — 83605 ASSAY OF LACTIC ACID: CPT

## 2018-01-01 PROCEDURE — 93306 TTE W/DOPPLER COMPLETE: CPT

## 2018-01-01 PROCEDURE — P9045 ALBUMIN (HUMAN), 5%, 250 ML: HCPCS | Performed by: NURSE ANESTHETIST, CERTIFIED REGISTERED

## 2018-01-01 PROCEDURE — 93005 ELECTROCARDIOGRAM TRACING: CPT | Performed by: NURSE PRACTITIONER

## 2018-01-01 PROCEDURE — G8419 CALC BMI OUT NRM PARAM NOF/U: HCPCS | Performed by: SURGERY

## 2018-01-01 PROCEDURE — 2500000003 HC RX 250 WO HCPCS

## 2018-01-01 PROCEDURE — 2000000000 HC ICU R&B

## 2018-01-01 PROCEDURE — 71046 X-RAY EXAM CHEST 2 VIEWS: CPT

## 2018-01-01 PROCEDURE — 2700000000 HC OXYGEN THERAPY PER DAY

## 2018-01-01 PROCEDURE — C1713 ANCHOR/SCREW BN/BN,TIS/BN: HCPCS | Performed by: SURGERY

## 2018-01-01 PROCEDURE — 84100 ASSAY OF PHOSPHORUS: CPT

## 2018-01-01 PROCEDURE — 99233 SBSQ HOSP IP/OBS HIGH 50: CPT | Performed by: INTERNAL MEDICINE

## 2018-01-01 PROCEDURE — 99232 SBSQ HOSP IP/OBS MODERATE 35: CPT | Performed by: INTERNAL MEDICINE

## 2018-01-01 PROCEDURE — 80069 RENAL FUNCTION PANEL: CPT

## 2018-01-01 PROCEDURE — P9016 RBC LEUKOCYTES REDUCED: HCPCS

## 2018-01-01 PROCEDURE — 90935 HEMODIALYSIS ONE EVALUATION: CPT

## 2018-01-01 PROCEDURE — P9046 ALBUMIN (HUMAN), 25%, 20 ML: HCPCS | Performed by: INTERNAL MEDICINE

## 2018-01-01 PROCEDURE — C9113 INJ PANTOPRAZOLE SODIUM, VIA: HCPCS | Performed by: HOSPITALIST

## 2018-01-01 PROCEDURE — 6360000002 HC RX W HCPCS: Performed by: SURGERY

## 2018-01-01 PROCEDURE — 94640 AIRWAY INHALATION TREATMENT: CPT

## 2018-01-01 PROCEDURE — 85347 COAGULATION TIME ACTIVATED: CPT

## 2018-01-01 PROCEDURE — 99253 IP/OBS CNSLTJ NEW/EST LOW 45: CPT | Performed by: INTERNAL MEDICINE

## 2018-01-01 PROCEDURE — 2709999900 HC NON-CHARGEABLE SUPPLY

## 2018-01-01 PROCEDURE — 6360000002 HC RX W HCPCS: Performed by: THORACIC SURGERY (CARDIOTHORACIC VASCULAR SURGERY)

## 2018-01-01 PROCEDURE — 82330 ASSAY OF CALCIUM: CPT

## 2018-01-01 PROCEDURE — 86704 HEP B CORE ANTIBODY TOTAL: CPT

## 2018-01-01 PROCEDURE — 2140000000 HC CCU INTERMEDIATE R&B

## 2018-01-01 PROCEDURE — 87077 CULTURE AEROBIC IDENTIFY: CPT

## 2018-01-01 PROCEDURE — 82810 BLOOD GASES O2 SAT ONLY: CPT

## 2018-01-01 PROCEDURE — 2500000003 HC RX 250 WO HCPCS: Performed by: RADIOLOGY

## 2018-01-01 PROCEDURE — 83051 HEMOGLOBIN PLASMA: CPT

## 2018-01-01 PROCEDURE — 6360000002 HC RX W HCPCS: Performed by: NURSE ANESTHETIST, CERTIFIED REGISTERED

## 2018-01-01 PROCEDURE — 99231 SBSQ HOSP IP/OBS SF/LOW 25: CPT | Performed by: PHYSICIAN ASSISTANT

## 2018-01-01 PROCEDURE — 5A02210 ASSISTANCE WITH CARDIAC OUTPUT USING BALLOON PUMP, CONTINUOUS: ICD-10-PCS | Performed by: THORACIC SURGERY (CARDIOTHORACIC VASCULAR SURGERY)

## 2018-01-01 PROCEDURE — 2580000003 HC RX 258: Performed by: PHYSICIAN ASSISTANT

## 2018-01-01 PROCEDURE — 2580000003 HC RX 258: Performed by: THORACIC SURGERY (CARDIOTHORACIC VASCULAR SURGERY)

## 2018-01-01 PROCEDURE — 85049 AUTOMATED PLATELET COUNT: CPT

## 2018-01-01 PROCEDURE — P9035 PLATELET PHERES LEUKOREDUCED: HCPCS

## 2018-01-01 PROCEDURE — 85025 COMPLETE CBC W/AUTO DIFF WBC: CPT

## 2018-01-01 PROCEDURE — 99285 EMERGENCY DEPT VISIT HI MDM: CPT

## 2018-01-01 PROCEDURE — 81001 URINALYSIS AUTO W/SCOPE: CPT

## 2018-01-01 PROCEDURE — C1769 GUIDE WIRE: HCPCS

## 2018-01-01 PROCEDURE — 83036 HEMOGLOBIN GLYCOSYLATED A1C: CPT

## 2018-01-01 PROCEDURE — 87070 CULTURE OTHR SPECIMN AEROBIC: CPT

## 2018-01-01 PROCEDURE — 99222 1ST HOSP IP/OBS MODERATE 55: CPT | Performed by: PSYCHIATRY & NEUROLOGY

## 2018-01-01 PROCEDURE — 6370000000 HC RX 637 (ALT 250 FOR IP): Performed by: HOSPITALIST

## 2018-01-01 PROCEDURE — 86706 HEP B SURFACE ANTIBODY: CPT

## 2018-01-01 PROCEDURE — 2500000003 HC RX 250 WO HCPCS: Performed by: THORACIC SURGERY (CARDIOTHORACIC VASCULAR SURGERY)

## 2018-01-01 PROCEDURE — 4A023N7 MEASUREMENT OF CARDIAC SAMPLING AND PRESSURE, LEFT HEART, PERCUTANEOUS APPROACH: ICD-10-PCS | Performed by: INTERNAL MEDICINE

## 2018-01-01 PROCEDURE — 6370000000 HC RX 637 (ALT 250 FOR IP): Performed by: THORACIC SURGERY (CARDIOTHORACIC VASCULAR SURGERY)

## 2018-01-01 PROCEDURE — 99282 EMERGENCY DEPT VISIT SF MDM: CPT

## 2018-01-01 PROCEDURE — APPSS30 APP SPLIT SHARED TIME 16-30 MINUTES: Performed by: NURSE PRACTITIONER

## 2018-01-01 PROCEDURE — 82947 ASSAY GLUCOSE BLOOD QUANT: CPT

## 2018-01-01 PROCEDURE — 99999 PR OFFICE/OUTPT VISIT,PROCEDURE ONLY: CPT | Performed by: NURSE PRACTITIONER

## 2018-01-01 PROCEDURE — 2780000010 HC IMPLANT OTHER: Performed by: THORACIC SURGERY (CARDIOTHORACIC VASCULAR SURGERY)

## 2018-01-01 PROCEDURE — 99239 HOSP IP/OBS DSCHRG MGMT >30: CPT | Performed by: INTERNAL MEDICINE

## 2018-01-01 PROCEDURE — S0028 INJECTION, FAMOTIDINE, 20 MG: HCPCS | Performed by: THORACIC SURGERY (CARDIOTHORACIC VASCULAR SURGERY)

## 2018-01-01 PROCEDURE — 49422 REMOVE TUNNELED IP CATH: CPT

## 2018-01-01 PROCEDURE — 87340 HEPATITIS B SURFACE AG IA: CPT

## 2018-01-01 PROCEDURE — 2580000003 HC RX 258: Performed by: FAMILY MEDICINE

## 2018-01-01 PROCEDURE — 2580000003 HC RX 258

## 2018-01-01 PROCEDURE — 33970 AORTIC CIRCULATION ASSIST: CPT | Performed by: THORACIC SURGERY (CARDIOTHORACIC VASCULAR SURGERY)

## 2018-01-01 PROCEDURE — P9045 ALBUMIN (HUMAN), 5%, 250 ML: HCPCS

## 2018-01-01 PROCEDURE — 83540 ASSAY OF IRON: CPT

## 2018-01-01 PROCEDURE — 1111F DSCHRG MED/CURRENT MED MERGE: CPT | Performed by: INTERNAL MEDICINE

## 2018-01-01 PROCEDURE — 87086 URINE CULTURE/COLONY COUNT: CPT

## 2018-01-01 PROCEDURE — 94645 CONT INHLJ TX EACH ADDL HOUR: CPT

## 2018-01-01 PROCEDURE — 99232 SBSQ HOSP IP/OBS MODERATE 35: CPT | Performed by: NURSE PRACTITIONER

## 2018-01-01 PROCEDURE — 99231 SBSQ HOSP IP/OBS SF/LOW 25: CPT | Performed by: INTERNAL MEDICINE

## 2018-01-01 PROCEDURE — 1200000003 HC TELEMETRY R&B

## 2018-01-01 PROCEDURE — 90935 HEMODIALYSIS ONE EVALUATION: CPT | Performed by: NURSE PRACTITIONER

## 2018-01-01 PROCEDURE — 6360000002 HC RX W HCPCS: Performed by: ANESTHESIOLOGY

## 2018-01-01 PROCEDURE — 85610 PROTHROMBIN TIME: CPT

## 2018-01-01 PROCEDURE — 33533 CABG ARTERIAL SINGLE: CPT | Performed by: THORACIC SURGERY (CARDIOTHORACIC VASCULAR SURGERY)

## 2018-01-01 PROCEDURE — 99024 POSTOP FOLLOW-UP VISIT: CPT | Performed by: NURSE PRACTITIONER

## 2018-01-01 PROCEDURE — 82607 VITAMIN B-12: CPT

## 2018-01-01 PROCEDURE — 93005 ELECTROCARDIOGRAM TRACING: CPT | Performed by: INTERNAL MEDICINE

## 2018-01-01 PROCEDURE — 82248 BILIRUBIN DIRECT: CPT

## 2018-01-01 PROCEDURE — 2709999900 HC NON-CHARGEABLE SUPPLY: Performed by: THORACIC SURGERY (CARDIOTHORACIC VASCULAR SURGERY)

## 2018-01-01 PROCEDURE — 94003 VENT MGMT INPAT SUBQ DAY: CPT

## 2018-01-01 PROCEDURE — 85018 HEMOGLOBIN: CPT

## 2018-01-01 PROCEDURE — 33968 REMOVE AORTIC ASSIST DEVICE: CPT

## 2018-01-01 PROCEDURE — 3600000013 HC SURGERY LEVEL 3 ADDTL 15MIN: Performed by: SURGERY

## 2018-01-01 PROCEDURE — 86900 BLOOD TYPING SEROLOGIC ABO: CPT

## 2018-01-01 PROCEDURE — 87205 SMEAR GRAM STAIN: CPT

## 2018-01-01 PROCEDURE — 6370000000 HC RX 637 (ALT 250 FOR IP): Performed by: FAMILY MEDICINE

## 2018-01-01 PROCEDURE — 33968 REMOVE AORTIC ASSIST DEVICE: CPT | Performed by: INTERNAL MEDICINE

## 2018-01-01 PROCEDURE — 2780000006 HC MISC HEART VALVE: Performed by: THORACIC SURGERY (CARDIOTHORACIC VASCULAR SURGERY)

## 2018-01-01 PROCEDURE — 33426 REPAIR OF MITRAL VALVE: CPT | Performed by: THORACIC SURGERY (CARDIOTHORACIC VASCULAR SURGERY)

## 2018-01-01 PROCEDURE — 33999 UNLISTED PX CARDIAC SURGERY: CPT

## 2018-01-01 PROCEDURE — C1751 CATH, INF, PER/CENT/MIDLINE: HCPCS

## 2018-01-01 PROCEDURE — 93010 ELECTROCARDIOGRAM REPORT: CPT | Performed by: INTERNAL MEDICINE

## 2018-01-01 PROCEDURE — 2580000003 HC RX 258: Performed by: NURSE PRACTITIONER

## 2018-01-01 PROCEDURE — 99231 SBSQ HOSP IP/OBS SF/LOW 25: CPT | Performed by: NURSE PRACTITIONER

## 2018-01-01 PROCEDURE — 49560 PR REPAIR INCISIONAL HERNIA,REDUCIBLE: CPT | Performed by: SURGERY

## 2018-01-01 PROCEDURE — 7100000001 HC PACU RECOVERY - ADDTL 15 MIN: Performed by: SURGERY

## 2018-01-01 PROCEDURE — 83690 ASSAY OF LIPASE: CPT

## 2018-01-01 PROCEDURE — 87040 BLOOD CULTURE FOR BACTERIA: CPT

## 2018-01-01 PROCEDURE — 6370000000 HC RX 637 (ALT 250 FOR IP): Performed by: NURSE PRACTITIONER

## 2018-01-01 PROCEDURE — 7100000000 HC PACU RECOVERY - FIRST 15 MIN: Performed by: SURGERY

## 2018-01-01 PROCEDURE — C1876 STENT, NON-COA/NON-COV W/DEL: HCPCS

## 2018-01-01 PROCEDURE — 99223 1ST HOSP IP/OBS HIGH 75: CPT | Performed by: INTERNAL MEDICINE

## 2018-01-01 PROCEDURE — 84145 PROCALCITONIN (PCT): CPT

## 2018-01-01 PROCEDURE — 6360000004 HC RX CONTRAST MEDICATION: Performed by: EMERGENCY MEDICINE

## 2018-01-01 PROCEDURE — 82803 BLOOD GASES ANY COMBINATION: CPT

## 2018-01-01 PROCEDURE — 6360000002 HC RX W HCPCS: Performed by: RADIOLOGY

## 2018-01-01 PROCEDURE — 83930 ASSAY OF BLOOD OSMOLALITY: CPT

## 2018-01-01 PROCEDURE — 94760 N-INVAS EAR/PLS OXIMETRY 1: CPT

## 2018-01-01 PROCEDURE — 6370000000 HC RX 637 (ALT 250 FOR IP)

## 2018-01-01 PROCEDURE — 36592 COLLECT BLOOD FROM PICC: CPT

## 2018-01-01 PROCEDURE — 6360000002 HC RX W HCPCS: Performed by: PHYSICIAN ASSISTANT

## 2018-01-01 PROCEDURE — 02UJ0JZ SUPPLEMENT TRICUSPID VALVE WITH SYNTHETIC SUBSTITUTE, OPEN APPROACH: ICD-10-PCS | Performed by: THORACIC SURGERY (CARDIOTHORACIC VASCULAR SURGERY)

## 2018-01-01 PROCEDURE — G0108 DIAB MANAGE TRN  PER INDIV: HCPCS | Performed by: NURSE PRACTITIONER

## 2018-01-01 PROCEDURE — P9045 ALBUMIN (HUMAN), 5%, 250 ML: HCPCS | Performed by: THORACIC SURGERY (CARDIOTHORACIC VASCULAR SURGERY)

## 2018-01-01 PROCEDURE — 92928 PRQ TCAT PLMT NTRAC ST 1 LES: CPT | Performed by: INTERNAL MEDICINE

## 2018-01-01 PROCEDURE — 3600000018 HC SURGERY OHS ADDTL 15MIN: Performed by: THORACIC SURGERY (CARDIOTHORACIC VASCULAR SURGERY)

## 2018-01-01 PROCEDURE — 93005 ELECTROCARDIOGRAM TRACING: CPT | Performed by: EMERGENCY MEDICINE

## 2018-01-01 PROCEDURE — 02L70ZK OCCLUSION OF LEFT ATRIAL APPENDAGE, OPEN APPROACH: ICD-10-PCS | Performed by: THORACIC SURGERY (CARDIOTHORACIC VASCULAR SURGERY)

## 2018-01-01 PROCEDURE — B41F1ZZ FLUOROSCOPY OF RIGHT LOWER EXTREMITY ARTERIES USING LOW OSMOLAR CONTRAST: ICD-10-PCS | Performed by: INTERNAL MEDICINE

## 2018-01-01 PROCEDURE — 2500000003 HC RX 250 WO HCPCS: Performed by: NURSE ANESTHETIST, CERTIFIED REGISTERED

## 2018-01-01 PROCEDURE — 86901 BLOOD TYPING SEROLOGIC RH(D): CPT

## 2018-01-01 PROCEDURE — 84681 ASSAY OF C-PEPTIDE: CPT

## 2018-01-01 PROCEDURE — 84132 ASSAY OF SERUM POTASSIUM: CPT

## 2018-01-01 PROCEDURE — 02703DZ DILATION OF CORONARY ARTERY, ONE ARTERY WITH INTRALUMINAL DEVICE, PERCUTANEOUS APPROACH: ICD-10-PCS | Performed by: INTERNAL MEDICINE

## 2018-01-01 PROCEDURE — 93307 TTE W/O DOPPLER COMPLETE: CPT

## 2018-01-01 PROCEDURE — 6370000000 HC RX 637 (ALT 250 FOR IP): Performed by: PHYSICIAN ASSISTANT

## 2018-01-01 PROCEDURE — C1894 INTRO/SHEATH, NON-LASER: HCPCS | Performed by: THORACIC SURGERY (CARDIOTHORACIC VASCULAR SURGERY)

## 2018-01-01 PROCEDURE — 02H633Z INSERTION OF INFUSION DEVICE INTO RIGHT ATRIUM, PERCUTANEOUS APPROACH: ICD-10-PCS | Performed by: RADIOLOGY

## 2018-01-01 PROCEDURE — 49568 PR IMPLANT MESH HERNIA REPAIR/DEBRIDEMENT CLOSURE: CPT | Performed by: SURGERY

## 2018-01-01 PROCEDURE — 37799 UNLISTED PX VASCULAR SURGERY: CPT

## 2018-01-01 PROCEDURE — 36592 COLLECT BLOOD FROM PICC: CPT | Performed by: NURSE PRACTITIONER

## 2018-01-01 PROCEDURE — 85520 HEPARIN ASSAY: CPT

## 2018-01-01 PROCEDURE — 1200000000 HC SEMI PRIVATE

## 2018-01-01 PROCEDURE — B2141ZZ FLUOROSCOPY OF RIGHT HEART USING LOW OSMOLAR CONTRAST: ICD-10-PCS | Performed by: RADIOLOGY

## 2018-01-01 PROCEDURE — 3600000003 HC SURGERY LEVEL 3 BASE: Performed by: SURGERY

## 2018-01-01 PROCEDURE — 99203 OFFICE O/P NEW LOW 30 MIN: CPT | Performed by: SURGERY

## 2018-01-01 PROCEDURE — 99284 EMERGENCY DEPT VISIT MOD MDM: CPT

## 2018-01-01 PROCEDURE — C1781 MESH (IMPLANTABLE): HCPCS | Performed by: SURGERY

## 2018-01-01 PROCEDURE — 76937 US GUIDE VASCULAR ACCESS: CPT | Performed by: RADIOLOGY

## 2018-01-01 PROCEDURE — 99232 SBSQ HOSP IP/OBS MODERATE 35: CPT | Performed by: HOSPITALIST

## 2018-01-01 PROCEDURE — 33990 INSJ PERQ VAD L HRT ARTERIAL: CPT

## 2018-01-01 PROCEDURE — 3700000001 HC ADD 15 MINUTES (ANESTHESIA): Performed by: THORACIC SURGERY (CARDIOTHORACIC VASCULAR SURGERY)

## 2018-01-01 PROCEDURE — 99253 IP/OBS CNSLTJ NEW/EST LOW 45: CPT | Performed by: NURSE PRACTITIONER

## 2018-01-01 PROCEDURE — 83880 ASSAY OF NATRIURETIC PEPTIDE: CPT

## 2018-01-01 PROCEDURE — 81003 URINALYSIS AUTO W/O SCOPE: CPT

## 2018-01-01 PROCEDURE — 87186 SC STD MICRODIL/AGAR DIL: CPT

## 2018-01-01 PROCEDURE — C1894 INTRO/SHEATH, NON-LASER: HCPCS

## 2018-01-01 PROCEDURE — C1887 CATHETER, GUIDING: HCPCS

## 2018-01-01 PROCEDURE — 93320 DOPPLER ECHO COMPLETE: CPT

## 2018-01-01 PROCEDURE — 90935 HEMODIALYSIS ONE EVALUATION: CPT | Performed by: INTERNAL MEDICINE

## 2018-01-01 PROCEDURE — 93971 EXTREMITY STUDY: CPT

## 2018-01-01 PROCEDURE — 3700000000 HC ANESTHESIA ATTENDED CARE: Performed by: SURGERY

## 2018-01-01 PROCEDURE — 76937 US GUIDE VASCULAR ACCESS: CPT

## 2018-01-01 PROCEDURE — 36556 INSERT NON-TUNNEL CV CATH: CPT

## 2018-01-01 PROCEDURE — 82150 ASSAY OF AMYLASE: CPT

## 2018-01-01 PROCEDURE — 99153 MOD SED SAME PHYS/QHP EA: CPT | Performed by: INTERNAL MEDICINE

## 2018-01-01 PROCEDURE — 0WUF0JZ SUPPLEMENT ABDOMINAL WALL WITH SYNTHETIC SUBSTITUTE, OPEN APPROACH: ICD-10-PCS | Performed by: SURGERY

## 2018-01-01 PROCEDURE — C1881 DIALYSIS ACCESS SYSTEM: HCPCS

## 2018-01-01 PROCEDURE — 94002 VENT MGMT INPAT INIT DAY: CPT

## 2018-01-01 PROCEDURE — 33990 INSJ PERQ VAD L HRT ARTERIAL: CPT | Performed by: INTERNAL MEDICINE

## 2018-01-01 PROCEDURE — 99223 1ST HOSP IP/OBS HIGH 75: CPT | Performed by: THORACIC SURGERY (CARDIOTHORACIC VASCULAR SURGERY)

## 2018-01-01 PROCEDURE — 6370000000 HC RX 637 (ALT 250 FOR IP): Performed by: NURSE ANESTHETIST, CERTIFIED REGISTERED

## 2018-01-01 PROCEDURE — 82746 ASSAY OF FOLIC ACID SERUM: CPT

## 2018-01-01 PROCEDURE — 96365 THER/PROPH/DIAG IV INF INIT: CPT

## 2018-01-01 PROCEDURE — 77001 FLUOROGUIDE FOR VEIN DEVICE: CPT

## 2018-01-01 PROCEDURE — 99222 1ST HOSP IP/OBS MODERATE 55: CPT | Performed by: NUCLEAR MEDICINE

## 2018-01-01 PROCEDURE — 82533 TOTAL CORTISOL: CPT

## 2018-01-01 PROCEDURE — C1760 CLOSURE DEV, VASC: HCPCS

## 2018-01-01 PROCEDURE — 94644 CONT INHLJ TX 1ST HOUR: CPT

## 2018-01-01 PROCEDURE — L0625 LO FLEX L1-BELOW L5 PRE OTS: HCPCS | Performed by: SURGERY

## 2018-01-01 PROCEDURE — 02100Z9 BYPASS CORONARY ARTERY, ONE ARTERY FROM LEFT INTERNAL MAMMARY, OPEN APPROACH: ICD-10-PCS | Performed by: THORACIC SURGERY (CARDIOTHORACIC VASCULAR SURGERY)

## 2018-01-01 PROCEDURE — G8427 DOCREV CUR MEDS BY ELIG CLIN: HCPCS | Performed by: SURGERY

## 2018-01-01 PROCEDURE — 5A0221D ASSISTANCE WITH CARDIAC OUTPUT USING IMPELLER PUMP, CONTINUOUS: ICD-10-PCS | Performed by: INTERNAL MEDICINE

## 2018-01-01 PROCEDURE — 94669 MECHANICAL CHEST WALL OSCILL: CPT

## 2018-01-01 PROCEDURE — 5A1D70Z PERFORMANCE OF URINARY FILTRATION, INTERMITTENT, LESS THAN 6 HOURS PER DAY: ICD-10-PCS | Performed by: INTERNAL MEDICINE

## 2018-01-01 PROCEDURE — 71275 CT ANGIOGRAPHY CHEST: CPT

## 2018-01-01 PROCEDURE — 2580000003 HC RX 258: Performed by: HOSPITALIST

## 2018-01-01 PROCEDURE — 89051 BODY FLUID CELL COUNT: CPT

## 2018-01-01 PROCEDURE — 87184 SC STD DISK METHOD PER PLATE: CPT

## 2018-01-01 PROCEDURE — C1751 CATH, INF, PER/CENT/MIDLINE: HCPCS | Performed by: THORACIC SURGERY (CARDIOTHORACIC VASCULAR SURGERY)

## 2018-01-01 PROCEDURE — 95819 EEG AWAKE AND ASLEEP: CPT

## 2018-01-01 PROCEDURE — 3700000000 HC ANESTHESIA ATTENDED CARE: Performed by: THORACIC SURGERY (CARDIOTHORACIC VASCULAR SURGERY)

## 2018-01-01 PROCEDURE — 02UG0JZ SUPPLEMENT MITRAL VALVE WITH SYNTHETIC SUBSTITUTE, OPEN APPROACH: ICD-10-PCS | Performed by: THORACIC SURGERY (CARDIOTHORACIC VASCULAR SURGERY)

## 2018-01-01 PROCEDURE — 93312 ECHO TRANSESOPHAGEAL: CPT

## 2018-01-01 PROCEDURE — 33464 VALVULOPLASTY TRICUSPID: CPT | Performed by: THORACIC SURGERY (CARDIOTHORACIC VASCULAR SURGERY)

## 2018-01-01 PROCEDURE — 93005 ELECTROCARDIOGRAM TRACING: CPT | Performed by: PHYSICIAN ASSISTANT

## 2018-01-01 PROCEDURE — 83550 IRON BINDING TEST: CPT

## 2018-01-01 PROCEDURE — 2580000003 HC RX 258: Performed by: NURSE ANESTHETIST, CERTIFIED REGISTERED

## 2018-01-01 PROCEDURE — 93005 ELECTROCARDIOGRAM TRACING: CPT | Performed by: NUCLEAR MEDICINE

## 2018-01-01 PROCEDURE — 3600000008 HC SURGERY OHS BASE: Performed by: THORACIC SURGERY (CARDIOTHORACIC VASCULAR SURGERY)

## 2018-01-01 PROCEDURE — B2111ZZ FLUOROSCOPY OF MULTIPLE CORONARY ARTERIES USING LOW OSMOLAR CONTRAST: ICD-10-PCS | Performed by: INTERNAL MEDICINE

## 2018-01-01 PROCEDURE — 99233 SBSQ HOSP IP/OBS HIGH 50: CPT | Performed by: THORACIC SURGERY (CARDIOTHORACIC VASCULAR SURGERY)

## 2018-01-01 PROCEDURE — 94761 N-INVAS EAR/PLS OXIMETRY MLT: CPT

## 2018-01-01 PROCEDURE — 93325 DOPPLER ECHO COLOR FLOW MAPG: CPT

## 2018-01-01 PROCEDURE — 1036F TOBACCO NON-USER: CPT | Performed by: SURGERY

## 2018-01-01 PROCEDURE — 36558 INSERT TUNNELED CV CATH: CPT | Performed by: RADIOLOGY

## 2018-01-01 PROCEDURE — 93010 ELECTROCARDIOGRAM REPORT: CPT | Performed by: NUCLEAR MEDICINE

## 2018-01-01 PROCEDURE — 77001 FLUOROGUIDE FOR VEIN DEVICE: CPT | Performed by: RADIOLOGY

## 2018-01-01 PROCEDURE — 87075 CULTR BACTERIA EXCEPT BLOOD: CPT

## 2018-01-01 PROCEDURE — 92950 HEART/LUNG RESUSCITATION CPR: CPT

## 2018-01-01 PROCEDURE — 02HA3RS INSERTION OF BIVENTRICULAR SHORT-TERM EXTERNAL HEART ASSIST SYSTEM INTO HEART, PERCUTANEOUS APPROACH: ICD-10-PCS | Performed by: INTERNAL MEDICINE

## 2018-01-01 PROCEDURE — 5A1945Z RESPIRATORY VENTILATION, 24-96 CONSECUTIVE HOURS: ICD-10-PCS | Performed by: THORACIC SURGERY (CARDIOTHORACIC VASCULAR SURGERY)

## 2018-01-01 PROCEDURE — 99152 MOD SED SAME PHYS/QHP 5/>YRS: CPT | Performed by: INTERNAL MEDICINE

## 2018-01-01 DEVICE — RING ANNULO HEART MITRAL PHYSIO 28MM: Type: IMPLANTABLE DEVICE | Site: CHEST | Status: FUNCTIONAL

## 2018-01-01 DEVICE — IMPLANTABLE DEVICE: Type: IMPLANTABLE DEVICE | Site: CHEST | Status: FUNCTIONAL

## 2018-01-01 DEVICE — ZINACTIVE USE 2540329 DEVICE OCCL CLP L40MM PLUNG GRP FLX SHFT FOR GILLINOV: Type: IMPLANTABLE DEVICE | Site: CHEST | Status: FUNCTIONAL

## 2018-01-01 DEVICE — PATCH HERN M W4.3XL5.5IN UNCOATED MFIL PROPYLENE OVL SELF: Type: IMPLANTABLE DEVICE | Site: ABDOMEN | Status: FUNCTIONAL

## 2018-01-01 RX ORDER — DEXTROSE MONOHYDRATE 25 G/50ML
INJECTION, SOLUTION INTRAVENOUS
Status: DISCONTINUED
Start: 2018-01-01 | End: 2018-01-01

## 2018-01-01 RX ORDER — CALCIUM ACETATE 667 MG/1
1 TABLET ORAL
Status: DISCONTINUED | OUTPATIENT
Start: 2018-01-01 | End: 2018-01-01

## 2018-01-01 RX ORDER — POTASSIUM CHLORIDE AND SODIUM CHLORIDE 900; 300 MG/100ML; MG/100ML
INJECTION, SOLUTION INTRAVENOUS CONTINUOUS
Status: DISCONTINUED | OUTPATIENT
Start: 2018-01-01 | End: 2018-01-01 | Stop reason: HOSPADM

## 2018-01-01 RX ORDER — SIROLIMUS 0.5 MG/1
4 TABLET, FILM COATED ORAL DAILY
Status: DISCONTINUED | OUTPATIENT
Start: 2018-01-01 | End: 2018-01-01

## 2018-01-01 RX ORDER — INSULIN GLARGINE 100 [IU]/ML
5 INJECTION, SOLUTION SUBCUTANEOUS NIGHTLY
Status: DISCONTINUED | OUTPATIENT
Start: 2018-01-01 | End: 2018-01-01

## 2018-01-01 RX ORDER — PAPAVERINE HYDROCHLORIDE 30 MG/ML
INJECTION INTRAMUSCULAR; INTRAVENOUS PRN
Status: DISCONTINUED | OUTPATIENT
Start: 2018-01-01 | End: 2018-01-01 | Stop reason: HOSPADM

## 2018-01-01 RX ORDER — NICOTINE POLACRILEX 4 MG
15 LOZENGE BUCCAL PRN
Status: DISCONTINUED | OUTPATIENT
Start: 2018-01-01 | End: 2018-01-01 | Stop reason: HOSPADM

## 2018-01-01 RX ORDER — MORPHINE SULFATE 2 MG/ML
2 INJECTION, SOLUTION INTRAMUSCULAR; INTRAVENOUS
Status: DISCONTINUED | OUTPATIENT
Start: 2018-01-01 | End: 2018-01-01 | Stop reason: HOSPADM

## 2018-01-01 RX ORDER — INSULIN GLARGINE 100 [IU]/ML
18 INJECTION, SOLUTION SUBCUTANEOUS EVERY MORNING
Status: DISCONTINUED | OUTPATIENT
Start: 2018-01-01 | End: 2018-01-01 | Stop reason: HOSPADM

## 2018-01-01 RX ORDER — DEXTROSE MONOHYDRATE 25 G/50ML
12.5 INJECTION, SOLUTION INTRAVENOUS PRN
Status: DISCONTINUED | OUTPATIENT
Start: 2018-01-01 | End: 2018-01-01 | Stop reason: SDUPTHER

## 2018-01-01 RX ORDER — LACTULOSE 10 G/15ML
20 SOLUTION ORAL PRN
Status: DISCONTINUED | OUTPATIENT
Start: 2018-01-01 | End: 2018-01-01 | Stop reason: HOSPADM

## 2018-01-01 RX ORDER — POTASSIUM CHLORIDE 20 MEQ/1
20 TABLET, EXTENDED RELEASE ORAL 2 TIMES DAILY
Qty: 6 TABLET | Refills: 0 | Status: SHIPPED | OUTPATIENT
Start: 2018-01-01 | End: 2018-01-01 | Stop reason: ALTCHOICE

## 2018-01-01 RX ORDER — M-VIT,TX,IRON,MINS/CALC/FOLIC 27MG-0.4MG
1 TABLET ORAL
Status: DISCONTINUED | OUTPATIENT
Start: 2018-01-01 | End: 2018-01-01

## 2018-01-01 RX ORDER — SIROLIMUS 1 MG/ML
2 SOLUTION ORAL DAILY
Status: DISCONTINUED | OUTPATIENT
Start: 2018-01-01 | End: 2018-01-01 | Stop reason: HOSPADM

## 2018-01-01 RX ORDER — HEPARIN SODIUM 1000 [USP'U]/ML
60 INJECTION, SOLUTION INTRAVENOUS; SUBCUTANEOUS ONCE
Status: DISCONTINUED | OUTPATIENT
Start: 2018-01-01 | End: 2018-01-01 | Stop reason: CLARIF

## 2018-01-01 RX ORDER — SODIUM CHLORIDE 9 MG/ML
INJECTION, SOLUTION INTRAVENOUS CONTINUOUS
Status: DISCONTINUED | OUTPATIENT
Start: 2018-01-01 | End: 2018-01-01 | Stop reason: SDUPTHER

## 2018-01-01 RX ORDER — CALCITRIOL 0.25 UG/1
0.5 CAPSULE, LIQUID FILLED ORAL DAILY
Status: DISCONTINUED | OUTPATIENT
Start: 2018-01-01 | End: 2018-01-01 | Stop reason: HOSPADM

## 2018-01-01 RX ORDER — POLYETHYLENE GLYCOL 3350 17 G/17G
17 POWDER, FOR SOLUTION ORAL DAILY
Status: DISCONTINUED | OUTPATIENT
Start: 2018-01-01 | End: 2018-01-01 | Stop reason: HOSPADM

## 2018-01-01 RX ORDER — FENTANYL CITRATE 50 UG/ML
INJECTION, SOLUTION INTRAMUSCULAR; INTRAVENOUS PRN
Status: DISCONTINUED | OUTPATIENT
Start: 2018-01-01 | End: 2018-01-01 | Stop reason: SDUPTHER

## 2018-01-01 RX ORDER — GABAPENTIN 100 MG/1
100 CAPSULE ORAL 2 TIMES DAILY
COMMUNITY

## 2018-01-01 RX ORDER — ISOSORBIDE MONONITRATE 60 MG/1
60 TABLET, EXTENDED RELEASE ORAL DAILY
Qty: 30 TABLET | Refills: 3 | Status: SHIPPED | OUTPATIENT
Start: 2018-01-01

## 2018-01-01 RX ORDER — DEXTROSE AND SODIUM CHLORIDE 5; .45 G/100ML; G/100ML
INJECTION, SOLUTION INTRAVENOUS CONTINUOUS
Status: DISCONTINUED | OUTPATIENT
Start: 2018-01-01 | End: 2018-01-01

## 2018-01-01 RX ORDER — MIDAZOLAM HYDROCHLORIDE 1 MG/ML
1 INJECTION INTRAMUSCULAR; INTRAVENOUS ONCE
Status: COMPLETED | OUTPATIENT
Start: 2018-01-01 | End: 2018-01-01

## 2018-01-01 RX ORDER — METHYLPHENIDATE HYDROCHLORIDE 5 MG/1
10 TABLET ORAL 2 TIMES DAILY WITH MEALS
Status: DISCONTINUED | OUTPATIENT
Start: 2018-01-01 | End: 2018-01-01 | Stop reason: HOSPADM

## 2018-01-01 RX ORDER — SODIUM CHLORIDE 0.9 % (FLUSH) 0.9 %
10 SYRINGE (ML) INJECTION PRN
Status: DISCONTINUED | OUTPATIENT
Start: 2018-01-01 | End: 2018-01-01

## 2018-01-01 RX ORDER — ISOSORBIDE MONONITRATE 30 MG/1
30 TABLET, EXTENDED RELEASE ORAL DAILY
Status: DISCONTINUED | OUTPATIENT
Start: 2018-01-01 | End: 2018-01-01

## 2018-01-01 RX ORDER — POTASSIUM CHLORIDE 29.8 MG/ML
20 INJECTION INTRAVENOUS
Status: COMPLETED | OUTPATIENT
Start: 2018-01-01 | End: 2018-01-01

## 2018-01-01 RX ORDER — AMIODARONE HYDROCHLORIDE 200 MG/1
200 TABLET ORAL 2 TIMES DAILY
Status: DISCONTINUED | OUTPATIENT
Start: 2018-01-01 | End: 2018-01-01

## 2018-01-01 RX ORDER — FOLIC ACID/VIT B COMPLEX AND C 5 MG
1 TABLET ORAL DAILY
Status: DISCONTINUED | OUTPATIENT
Start: 2018-01-01 | End: 2018-01-01

## 2018-01-01 RX ORDER — POTASSIUM CHLORIDE 750 MG/1
40 TABLET, FILM COATED, EXTENDED RELEASE ORAL ONCE
Status: COMPLETED | OUTPATIENT
Start: 2018-01-01 | End: 2018-01-01

## 2018-01-01 RX ORDER — MORPHINE SULFATE 2 MG/ML
2 INJECTION, SOLUTION INTRAMUSCULAR; INTRAVENOUS
Status: DISCONTINUED | OUTPATIENT
Start: 2018-01-01 | End: 2018-01-01

## 2018-01-01 RX ORDER — ATROPINE SULFATE 0.4 MG/ML
0.5 AMPUL (ML) INJECTION
Status: ACTIVE | OUTPATIENT
Start: 2018-01-01 | End: 2018-01-01

## 2018-01-01 RX ORDER — ONDANSETRON 2 MG/ML
4 INJECTION INTRAMUSCULAR; INTRAVENOUS EVERY 6 HOURS PRN
Status: DISCONTINUED | OUTPATIENT
Start: 2018-01-01 | End: 2018-01-01 | Stop reason: HOSPADM

## 2018-01-01 RX ORDER — SODIUM CHLORIDE 0.9 % (FLUSH) 0.9 %
10 SYRINGE (ML) INJECTION PRN
Status: DISCONTINUED | OUTPATIENT
Start: 2018-01-01 | End: 2018-01-01 | Stop reason: HOSPADM

## 2018-01-01 RX ORDER — CEFAZOLIN SODIUM 1 G/50ML
1 INJECTION, SOLUTION INTRAVENOUS
Status: DISCONTINUED | OUTPATIENT
Start: 2018-01-01 | End: 2018-01-01 | Stop reason: HOSPADM

## 2018-01-01 RX ORDER — ASPIRIN 325 MG
325 TABLET ORAL ONCE
Status: COMPLETED | OUTPATIENT
Start: 2018-01-01 | End: 2018-01-01

## 2018-01-01 RX ORDER — CHLORHEXIDINE GLUCONATE 4 G/100ML
SOLUTION TOPICAL SEE ADMIN INSTRUCTIONS
Status: DISCONTINUED | OUTPATIENT
Start: 2018-01-01 | End: 2018-01-01 | Stop reason: HOSPADM

## 2018-01-01 RX ORDER — LACTULOSE 10 G/15ML
30 SOLUTION ORAL ONCE
Status: COMPLETED | OUTPATIENT
Start: 2018-01-01 | End: 2018-01-01

## 2018-01-01 RX ORDER — DEXTROSE MONOHYDRATE 50 MG/ML
100 INJECTION, SOLUTION INTRAVENOUS PRN
Status: DISCONTINUED | OUTPATIENT
Start: 2018-01-01 | End: 2018-01-01

## 2018-01-01 RX ORDER — DEXTROSE AND SODIUM CHLORIDE 5; .9 G/100ML; G/100ML
INJECTION, SOLUTION INTRAVENOUS CONTINUOUS
Status: DISCONTINUED | OUTPATIENT
Start: 2018-01-01 | End: 2018-01-01 | Stop reason: HOSPADM

## 2018-01-01 RX ORDER — CALCIUM POLYCARBOPHIL 625 MG 625 MG/1
625 TABLET ORAL 3 TIMES DAILY PRN
COMMUNITY
End: 2018-01-01

## 2018-01-01 RX ORDER — FENTANYL CITRATE 50 UG/ML
25 INJECTION, SOLUTION INTRAMUSCULAR; INTRAVENOUS EVERY 5 MIN PRN
Status: DISCONTINUED | OUTPATIENT
Start: 2018-01-01 | End: 2018-01-01 | Stop reason: HOSPADM

## 2018-01-01 RX ORDER — SODIUM CHLORIDE 9 MG/ML
75 INJECTION, SOLUTION INTRAVENOUS CONTINUOUS
Status: ACTIVE | OUTPATIENT
Start: 2018-01-01 | End: 2018-01-01

## 2018-01-01 RX ORDER — SUCRALFATE 1 G/1
1 TABLET ORAL ONCE
Status: COMPLETED | OUTPATIENT
Start: 2018-01-01 | End: 2018-01-01

## 2018-01-01 RX ORDER — DEXTROSE MONOHYDRATE 25 G/50ML
12.5 INJECTION, SOLUTION INTRAVENOUS PRN
Status: DISCONTINUED | OUTPATIENT
Start: 2018-01-01 | End: 2018-01-01 | Stop reason: HOSPADM

## 2018-01-01 RX ORDER — CALCIUM POLYCARBOPHIL 625 MG 625 MG/1
625 TABLET ORAL 3 TIMES DAILY PRN
Status: DISCONTINUED | OUTPATIENT
Start: 2018-01-01 | End: 2018-01-01 | Stop reason: HOSPADM

## 2018-01-01 RX ORDER — INSULIN GLARGINE 100 [IU]/ML
8 INJECTION, SOLUTION SUBCUTANEOUS DAILY
Status: DISCONTINUED | OUTPATIENT
Start: 2018-01-01 | End: 2018-01-01

## 2018-01-01 RX ORDER — IPRATROPIUM BROMIDE AND ALBUTEROL SULFATE 2.5; .5 MG/3ML; MG/3ML
1 SOLUTION RESPIRATORY (INHALATION) 4 TIMES DAILY
Status: DISCONTINUED | OUTPATIENT
Start: 2018-01-01 | End: 2018-01-01

## 2018-01-01 RX ORDER — SODIUM CHLORIDE 450 MG/100ML
INJECTION, SOLUTION INTRAVENOUS CONTINUOUS
Status: CANCELLED | OUTPATIENT
Start: 2018-01-01

## 2018-01-01 RX ORDER — LIDOCAINE HYDROCHLORIDE 20 MG/ML
INJECTION, SOLUTION INFILTRATION; PERINEURAL PRN
Status: DISCONTINUED | OUTPATIENT
Start: 2018-01-01 | End: 2018-01-01 | Stop reason: SDUPTHER

## 2018-01-01 RX ORDER — SODIUM CHLORIDE 0.9 % (FLUSH) 0.9 %
10 SYRINGE (ML) INJECTION EVERY 12 HOURS SCHEDULED
Status: DISCONTINUED | OUTPATIENT
Start: 2018-01-01 | End: 2018-01-01 | Stop reason: HOSPADM

## 2018-01-01 RX ORDER — FENTANYL CITRATE 50 UG/ML
INJECTION, SOLUTION INTRAMUSCULAR; INTRAVENOUS PRN
Status: DISCONTINUED | OUTPATIENT
Start: 2018-01-01 | End: 2018-01-01 | Stop reason: HOSPADM

## 2018-01-01 RX ORDER — INSULIN GLARGINE 100 [IU]/ML
18 INJECTION, SOLUTION SUBCUTANEOUS 2 TIMES DAILY
Status: DISCONTINUED | OUTPATIENT
Start: 2018-01-01 | End: 2018-01-01

## 2018-01-01 RX ORDER — ONDANSETRON 2 MG/ML
4 INJECTION INTRAMUSCULAR; INTRAVENOUS EVERY 6 HOURS PRN
Status: DISCONTINUED | OUTPATIENT
Start: 2018-01-01 | End: 2018-01-01 | Stop reason: RX

## 2018-01-01 RX ORDER — ALBUMIN, HUMAN INJ 5% 5 %
25 SOLUTION INTRAVENOUS PRN
Status: DISCONTINUED | OUTPATIENT
Start: 2018-01-01 | End: 2018-01-01 | Stop reason: HOSPADM

## 2018-01-01 RX ORDER — CEFAZOLIN SODIUM 1 G/50ML
1 INJECTION, SOLUTION INTRAVENOUS
Status: CANCELLED | OUTPATIENT
Start: 2018-01-01

## 2018-01-01 RX ORDER — CETIRIZINE HYDROCHLORIDE 10 MG/1
10 TABLET ORAL DAILY PRN
Status: DISCONTINUED | OUTPATIENT
Start: 2018-01-01 | End: 2018-01-01

## 2018-01-01 RX ORDER — HEPARIN SODIUM 5000 [USP'U]/ML
5000 INJECTION, SOLUTION INTRAVENOUS; SUBCUTANEOUS EVERY 8 HOURS SCHEDULED
Status: DISCONTINUED | OUTPATIENT
Start: 2018-01-01 | End: 2018-01-01

## 2018-01-01 RX ORDER — ALBUTEROL SULFATE 90 UG/1
2 AEROSOL, METERED RESPIRATORY (INHALATION) EVERY 6 HOURS PRN
Status: DISCONTINUED | OUTPATIENT
Start: 2018-01-01 | End: 2018-01-01 | Stop reason: HOSPADM

## 2018-01-01 RX ORDER — CLONIDINE HYDROCHLORIDE 0.2 MG/1
0.2 TABLET ORAL EVERY 8 HOURS
Status: DISCONTINUED | OUTPATIENT
Start: 2018-01-01 | End: 2018-01-01 | Stop reason: HOSPADM

## 2018-01-01 RX ORDER — ERGOCALCIFEROL 1.25 MG/1
50000 CAPSULE ORAL WEEKLY
Status: DISCONTINUED | OUTPATIENT
Start: 2018-01-01 | End: 2018-01-01

## 2018-01-01 RX ORDER — 0.9 % SODIUM CHLORIDE 0.9 %
250 INTRAVENOUS SOLUTION INTRAVENOUS ONCE
Status: COMPLETED | OUTPATIENT
Start: 2018-01-01 | End: 2018-01-01

## 2018-01-01 RX ORDER — ASPIRIN 325 MG
325 TABLET ORAL ONCE
Status: CANCELLED | OUTPATIENT
Start: 2018-01-01 | End: 2018-01-01

## 2018-01-01 RX ORDER — PROTAMINE SULFATE 10 MG/ML
50 INJECTION, SOLUTION INTRAVENOUS
Status: ACTIVE | OUTPATIENT
Start: 2018-01-01 | End: 2018-01-01

## 2018-01-01 RX ORDER — ASPIRIN 81 MG/1
81 TABLET, CHEWABLE ORAL DAILY
Status: DISCONTINUED | OUTPATIENT
Start: 2018-01-01 | End: 2018-01-01 | Stop reason: HOSPADM

## 2018-01-01 RX ORDER — DEXTROSE MONOHYDRATE 50 MG/ML
100 INJECTION, SOLUTION INTRAVENOUS PRN
Status: DISCONTINUED | OUTPATIENT
Start: 2018-01-01 | End: 2018-01-01 | Stop reason: HOSPADM

## 2018-01-01 RX ORDER — POTASSIUM CHLORIDE 20MEQ/15ML
40 LIQUID (ML) ORAL PRN
Status: DISCONTINUED | OUTPATIENT
Start: 2018-01-01 | End: 2018-01-01 | Stop reason: HOSPADM

## 2018-01-01 RX ORDER — SODIUM CHLORIDE 450 MG/100ML
INJECTION, SOLUTION INTRAVENOUS CONTINUOUS
Status: DISCONTINUED | OUTPATIENT
Start: 2018-01-01 | End: 2018-01-01 | Stop reason: HOSPADM

## 2018-01-01 RX ORDER — INSULIN GLARGINE 100 [IU]/ML
15 INJECTION, SOLUTION SUBCUTANEOUS NIGHTLY
Status: DISCONTINUED | OUTPATIENT
Start: 2018-01-01 | End: 2018-01-01

## 2018-01-01 RX ORDER — MYCOPHENOLIC ACID 180 MG/1
360 TABLET, DELAYED RELEASE ORAL 2 TIMES DAILY
Status: DISCONTINUED | OUTPATIENT
Start: 2018-01-01 | End: 2018-01-01

## 2018-01-01 RX ORDER — ATROPINE SULFATE 0.1 MG/ML
INJECTION INTRAVENOUS
Status: DISCONTINUED
Start: 2018-01-01 | End: 2018-01-01 | Stop reason: HOSPADM

## 2018-01-01 RX ORDER — CLOPIDOGREL BISULFATE 75 MG/1
75 TABLET ORAL DAILY
Qty: 30 TABLET | Refills: 11 | Status: SHIPPED | OUTPATIENT
Start: 2018-01-01

## 2018-01-01 RX ORDER — CARVEDILOL 25 MG/1
50 TABLET ORAL 2 TIMES DAILY
Status: DISCONTINUED | OUTPATIENT
Start: 2018-01-01 | End: 2018-01-01 | Stop reason: HOSPADM

## 2018-01-01 RX ORDER — SODIUM CHLORIDE 0.9 % (FLUSH) 0.9 %
10 SYRINGE (ML) INJECTION EVERY 12 HOURS SCHEDULED
Status: CANCELLED | OUTPATIENT
Start: 2018-01-01 | End: 2019-08-20

## 2018-01-01 RX ORDER — FENTANYL CITRATE 50 UG/ML
50 INJECTION, SOLUTION INTRAMUSCULAR; INTRAVENOUS EVERY 5 MIN PRN
Status: DISCONTINUED | OUTPATIENT
Start: 2018-01-01 | End: 2018-01-01 | Stop reason: HOSPADM

## 2018-01-01 RX ORDER — DEXTROSE MONOHYDRATE 25 G/50ML
25 INJECTION, SOLUTION INTRAVENOUS ONCE
Status: DISCONTINUED | OUTPATIENT
Start: 2018-01-01 | End: 2018-01-01

## 2018-01-01 RX ORDER — NITROGLYCERIN 0.4 MG/1
0.4 TABLET SUBLINGUAL EVERY 5 MIN PRN
Qty: 25 TABLET | Refills: 3 | Status: SHIPPED | OUTPATIENT
Start: 2018-01-01

## 2018-01-01 RX ORDER — SODIUM CHLORIDE 9 MG/ML
INJECTION, SOLUTION INTRAVENOUS CONTINUOUS
Status: DISCONTINUED | OUTPATIENT
Start: 2018-01-01 | End: 2018-01-01

## 2018-01-01 RX ORDER — ISOSORBIDE MONONITRATE 60 MG/1
60 TABLET, EXTENDED RELEASE ORAL DAILY
Status: DISCONTINUED | OUTPATIENT
Start: 2018-01-01 | End: 2018-01-01

## 2018-01-01 RX ORDER — LATANOPROST 50 UG/ML
1 SOLUTION/ DROPS OPHTHALMIC NIGHTLY
Status: DISCONTINUED | OUTPATIENT
Start: 2018-01-01 | End: 2018-01-01 | Stop reason: HOSPADM

## 2018-01-01 RX ORDER — MORPHINE SULFATE 2 MG/ML
2 INJECTION, SOLUTION INTRAMUSCULAR; INTRAVENOUS EVERY 4 HOURS PRN
Status: DISCONTINUED | OUTPATIENT
Start: 2018-01-01 | End: 2018-01-01

## 2018-01-01 RX ORDER — DEXTROSE MONOHYDRATE 25 G/50ML
12.5 INJECTION, SOLUTION INTRAVENOUS PRN
Status: DISCONTINUED | OUTPATIENT
Start: 2018-01-01 | End: 2018-01-01

## 2018-01-01 RX ORDER — HEPARIN SODIUM 10000 [USP'U]/100ML
12 INJECTION, SOLUTION INTRAVENOUS CONTINUOUS
Status: DISCONTINUED | OUTPATIENT
Start: 2018-01-01 | End: 2018-01-01

## 2018-01-01 RX ORDER — SIMVASTATIN 20 MG
20 TABLET ORAL EVERY EVENING
Status: DISCONTINUED | OUTPATIENT
Start: 2018-01-01 | End: 2018-01-01 | Stop reason: HOSPADM

## 2018-01-01 RX ORDER — CLONIDINE HYDROCHLORIDE 0.1 MG/1
0.1 TABLET ORAL 2 TIMES DAILY
Status: DISCONTINUED | OUTPATIENT
Start: 2018-01-01 | End: 2018-01-01

## 2018-01-01 RX ORDER — MYCOPHENOLATE MOFETIL 250 MG/1
500 CAPSULE ORAL EVERY 12 HOURS
Status: DISCONTINUED | OUTPATIENT
Start: 2018-01-01 | End: 2018-01-01 | Stop reason: SDUPTHER

## 2018-01-01 RX ORDER — DIPHENHYDRAMINE HYDROCHLORIDE 50 MG/ML
50 INJECTION INTRAMUSCULAR; INTRAVENOUS ONCE
Status: DISCONTINUED | OUTPATIENT
Start: 2018-01-01 | End: 2018-01-01

## 2018-01-01 RX ORDER — GABAPENTIN 100 MG/1
100 CAPSULE ORAL 2 TIMES DAILY
Status: DISCONTINUED | OUTPATIENT
Start: 2018-01-01 | End: 2018-01-01 | Stop reason: HOSPADM

## 2018-01-01 RX ORDER — NICOTINE POLACRILEX 4 MG
15 LOZENGE BUCCAL PRN
Status: DISCONTINUED | OUTPATIENT
Start: 2018-01-01 | End: 2018-01-01 | Stop reason: SDUPTHER

## 2018-01-01 RX ORDER — CLONIDINE HYDROCHLORIDE 0.2 MG/1
0.2 TABLET ORAL EVERY 8 HOURS
Status: DISCONTINUED | OUTPATIENT
Start: 2018-01-01 | End: 2018-01-01

## 2018-01-01 RX ORDER — CLONIDINE HYDROCHLORIDE 0.2 MG/1
0.2 TABLET ORAL EVERY 8 HOURS
Qty: 60 TABLET | Refills: 3 | Status: SHIPPED | OUTPATIENT
Start: 2018-01-01

## 2018-01-01 RX ORDER — GABAPENTIN 100 MG/1
100 CAPSULE ORAL 2 TIMES DAILY
Status: DISCONTINUED | OUTPATIENT
Start: 2018-01-01 | End: 2018-01-01

## 2018-01-01 RX ORDER — LABETALOL HYDROCHLORIDE 5 MG/ML
5 INJECTION, SOLUTION INTRAVENOUS EVERY 10 MIN PRN
Status: DISCONTINUED | OUTPATIENT
Start: 2018-01-01 | End: 2018-01-01 | Stop reason: HOSPADM

## 2018-01-01 RX ORDER — CALCIUM CHLORIDE 100 MG/ML
1 INJECTION INTRAVENOUS; INTRAVENTRICULAR ONCE
Status: COMPLETED | OUTPATIENT
Start: 2018-01-01 | End: 2018-01-01

## 2018-01-01 RX ORDER — CALCIUM CHLORIDE 100 MG/ML
INJECTION INTRAVENOUS; INTRAVENTRICULAR PRN
Status: DISCONTINUED | OUTPATIENT
Start: 2018-01-01 | End: 2018-01-01 | Stop reason: SDUPTHER

## 2018-01-01 RX ORDER — ONDANSETRON 2 MG/ML
4 INJECTION INTRAMUSCULAR; INTRAVENOUS
Status: DISCONTINUED | OUTPATIENT
Start: 2018-01-01 | End: 2018-01-01 | Stop reason: HOSPADM

## 2018-01-01 RX ORDER — CALCITRIOL 0.25 UG/1
0.5 CAPSULE, LIQUID FILLED ORAL
Status: DISCONTINUED | OUTPATIENT
Start: 2018-01-01 | End: 2018-01-01

## 2018-01-01 RX ORDER — CHLORHEXIDINE GLUCONATE 0.12 MG/ML
15 RINSE ORAL ONCE
Status: COMPLETED | OUTPATIENT
Start: 2018-01-01 | End: 2018-01-01

## 2018-01-01 RX ORDER — POTASSIUM CHLORIDE 29.8 MG/ML
20 INJECTION INTRAVENOUS PRN
Status: DISCONTINUED | OUTPATIENT
Start: 2018-01-01 | End: 2018-01-01 | Stop reason: HOSPADM

## 2018-01-01 RX ORDER — METHYLPHENIDATE HYDROCHLORIDE 5 MG/1
10 TABLET ORAL 2 TIMES DAILY
Status: DISCONTINUED | OUTPATIENT
Start: 2018-01-01 | End: 2018-01-01

## 2018-01-01 RX ORDER — CETIRIZINE HYDROCHLORIDE 10 MG/1
10 TABLET ORAL DAILY
Status: DISCONTINUED | OUTPATIENT
Start: 2018-01-01 | End: 2018-01-01 | Stop reason: HOSPADM

## 2018-01-01 RX ORDER — ROCURONIUM BROMIDE 10 MG/ML
INJECTION, SOLUTION INTRAVENOUS PRN
Status: DISCONTINUED | OUTPATIENT
Start: 2018-01-01 | End: 2018-01-01 | Stop reason: SDUPTHER

## 2018-01-01 RX ORDER — CETIRIZINE HYDROCHLORIDE 10 MG/1
10 TABLET ORAL DAILY PRN
Status: DISCONTINUED | OUTPATIENT
Start: 2018-01-01 | End: 2018-01-01 | Stop reason: HOSPADM

## 2018-01-01 RX ORDER — POTASSIUM CHLORIDE 7.45 MG/ML
10 INJECTION INTRAVENOUS
Status: COMPLETED | OUTPATIENT
Start: 2018-01-01 | End: 2018-01-01

## 2018-01-01 RX ORDER — M-VIT,TX,IRON,MINS/CALC/FOLIC 27MG-0.4MG
1 TABLET ORAL DAILY
Status: DISCONTINUED | OUTPATIENT
Start: 2018-01-01 | End: 2018-01-01

## 2018-01-01 RX ORDER — CLOPIDOGREL BISULFATE 75 MG/1
75 TABLET ORAL DAILY
Status: DISCONTINUED | OUTPATIENT
Start: 2018-01-01 | End: 2018-01-01 | Stop reason: HOSPADM

## 2018-01-01 RX ORDER — POTASSIUM CHLORIDE 20 MEQ/1
40 TABLET, EXTENDED RELEASE ORAL PRN
Status: DISCONTINUED | OUTPATIENT
Start: 2018-01-01 | End: 2018-01-01 | Stop reason: HOSPADM

## 2018-01-01 RX ORDER — SIROLIMUS 1 MG/ML
4 SOLUTION ORAL DAILY
Status: DISCONTINUED | OUTPATIENT
Start: 2018-01-01 | End: 2018-01-01

## 2018-01-01 RX ORDER — HEPARIN SODIUM 5000 [USP'U]/ML
5000 INJECTION, SOLUTION INTRAVENOUS; SUBCUTANEOUS EVERY 8 HOURS SCHEDULED
Status: DISCONTINUED | OUTPATIENT
Start: 2018-01-01 | End: 2018-01-01 | Stop reason: HOSPADM

## 2018-01-01 RX ORDER — IPRATROPIUM BROMIDE AND ALBUTEROL SULFATE 2.5; .5 MG/3ML; MG/3ML
1 SOLUTION RESPIRATORY (INHALATION) ONCE
Status: COMPLETED | OUTPATIENT
Start: 2018-01-01 | End: 2018-01-01

## 2018-01-01 RX ORDER — INSULIN GLARGINE 100 [IU]/ML
18 INJECTION, SOLUTION SUBCUTANEOUS NIGHTLY
Status: DISCONTINUED | OUTPATIENT
Start: 2018-01-01 | End: 2018-01-01 | Stop reason: HOSPADM

## 2018-01-01 RX ORDER — UREA 10 %
3 LOTION (ML) TOPICAL NIGHTLY PRN
Status: DISCONTINUED | OUTPATIENT
Start: 2018-01-01 | End: 2018-01-01

## 2018-01-01 RX ORDER — GENTAMICIN SULFATE 1 MG/G
OINTMENT TOPICAL DAILY
COMMUNITY
End: 2018-01-01 | Stop reason: ALTCHOICE

## 2018-01-01 RX ORDER — FAMOTIDINE 20 MG/1
20 TABLET, FILM COATED ORAL EVERY 24 HOURS
Status: DISCONTINUED | OUTPATIENT
Start: 2018-01-01 | End: 2018-01-01

## 2018-01-01 RX ORDER — OXYCODONE HYDROCHLORIDE AND ACETAMINOPHEN 5; 325 MG/1; MG/1
1 TABLET ORAL EVERY 4 HOURS PRN
Status: DISCONTINUED | OUTPATIENT
Start: 2018-01-01 | End: 2018-01-01 | Stop reason: HOSPADM

## 2018-01-01 RX ORDER — SODIUM CHLORIDE 9 MG/ML
INJECTION, SOLUTION INTRAVENOUS CONTINUOUS
Status: DISCONTINUED | OUTPATIENT
Start: 2018-01-01 | End: 2018-01-01 | Stop reason: HOSPADM

## 2018-01-01 RX ORDER — FENTANYL CITRATE 50 UG/ML
50 INJECTION, SOLUTION INTRAMUSCULAR; INTRAVENOUS
Status: DISCONTINUED | OUTPATIENT
Start: 2018-01-01 | End: 2018-01-01 | Stop reason: HOSPADM

## 2018-01-01 RX ORDER — SODIUM CHLORIDE 0.9 % (FLUSH) 0.9 %
10 SYRINGE (ML) INJECTION EVERY 12 HOURS SCHEDULED
Status: DISCONTINUED | OUTPATIENT
Start: 2018-01-01 | End: 2018-01-01

## 2018-01-01 RX ORDER — SODIUM POLYSTYRENE SULFONATE 4.1 MEQ/G
30 POWDER, FOR SUSPENSION ORAL; RECTAL ONCE
Status: COMPLETED | OUTPATIENT
Start: 2018-01-01 | End: 2018-01-01

## 2018-01-01 RX ORDER — ALBUMIN (HUMAN) 12.5 G/50ML
25 SOLUTION INTRAVENOUS EVERY 6 HOURS
Status: COMPLETED | OUTPATIENT
Start: 2018-01-01 | End: 2018-01-01

## 2018-01-01 RX ORDER — ASPIRIN 81 MG/1
81 TABLET, CHEWABLE ORAL DAILY
Status: DISCONTINUED | OUTPATIENT
Start: 2018-01-01 | End: 2018-01-01 | Stop reason: SDUPTHER

## 2018-01-01 RX ORDER — FAMOTIDINE 20 MG/1
20 TABLET, FILM COATED ORAL 2 TIMES DAILY
Status: DISCONTINUED | OUTPATIENT
Start: 2018-01-01 | End: 2018-01-01

## 2018-01-01 RX ORDER — CALCITRIOL 0.25 UG/1
0.5 CAPSULE, LIQUID FILLED ORAL
Status: DISCONTINUED | OUTPATIENT
Start: 2018-01-01 | End: 2018-01-01 | Stop reason: HOSPADM

## 2018-01-01 RX ORDER — INSULIN GLARGINE 100 [IU]/ML
15 INJECTION, SOLUTION SUBCUTANEOUS 2 TIMES DAILY
Status: DISCONTINUED | OUTPATIENT
Start: 2018-01-01 | End: 2018-01-01

## 2018-01-01 RX ORDER — ASPIRIN 81 MG/1
81 TABLET, CHEWABLE ORAL DAILY
Qty: 30 TABLET | Refills: 11 | Status: SHIPPED | OUTPATIENT
Start: 2018-01-01

## 2018-01-01 RX ORDER — FAMOTIDINE 20 MG/1
20 TABLET, FILM COATED ORAL ONCE
Status: COMPLETED | OUTPATIENT
Start: 2018-01-01 | End: 2018-01-01

## 2018-01-01 RX ORDER — SODIUM POLYSTYRENE SULFONATE 15 G/60ML
30 SUSPENSION ORAL; RECTAL ONCE
Status: DISCONTINUED | OUTPATIENT
Start: 2018-01-01 | End: 2018-01-01 | Stop reason: HOSPADM

## 2018-01-01 RX ORDER — NICOTINE POLACRILEX 4 MG
15 LOZENGE BUCCAL PRN
Status: DISCONTINUED | OUTPATIENT
Start: 2018-01-01 | End: 2018-01-01

## 2018-01-01 RX ORDER — DEXTROSE MONOHYDRATE 25 G/50ML
12.5 INJECTION, SOLUTION INTRAVENOUS ONCE
Status: COMPLETED | OUTPATIENT
Start: 2018-01-01 | End: 2018-01-01

## 2018-01-01 RX ORDER — SODIUM CHLORIDE 0.9 % (FLUSH) 0.9 %
10 SYRINGE (ML) INJECTION EVERY 12 HOURS SCHEDULED
Status: DISCONTINUED | OUTPATIENT
Start: 2018-01-01 | End: 2018-01-01 | Stop reason: SDUPTHER

## 2018-01-01 RX ORDER — SUCCINYLCHOLINE CHLORIDE 20 MG/ML
INJECTION INTRAMUSCULAR; INTRAVENOUS PRN
Status: DISCONTINUED | OUTPATIENT
Start: 2018-01-01 | End: 2018-01-01 | Stop reason: SDUPTHER

## 2018-01-01 RX ORDER — ONDANSETRON 2 MG/ML
INJECTION INTRAMUSCULAR; INTRAVENOUS PRN
Status: DISCONTINUED | OUTPATIENT
Start: 2018-01-01 | End: 2018-01-01 | Stop reason: SDUPTHER

## 2018-01-01 RX ORDER — OXYCODONE HYDROCHLORIDE 5 MG/1
5 TABLET ORAL EVERY 4 HOURS PRN
Status: DISCONTINUED | OUTPATIENT
Start: 2018-01-01 | End: 2018-01-01 | Stop reason: HOSPADM

## 2018-01-01 RX ORDER — PROPOFOL 10 MG/ML
INJECTION, EMULSION INTRAVENOUS PRN
Status: DISCONTINUED | OUTPATIENT
Start: 2018-01-01 | End: 2018-01-01 | Stop reason: SDUPTHER

## 2018-01-01 RX ORDER — ONDANSETRON 2 MG/ML
4 INJECTION INTRAMUSCULAR; INTRAVENOUS EVERY 6 HOURS PRN
Status: DISCONTINUED | OUTPATIENT
Start: 2018-01-01 | End: 2018-01-01 | Stop reason: CLARIF

## 2018-01-01 RX ORDER — ERGOCALCIFEROL 1.25 MG/1
50000 CAPSULE ORAL WEEKLY
Status: DISCONTINUED | OUTPATIENT
Start: 2018-01-01 | End: 2018-01-01 | Stop reason: HOSPADM

## 2018-01-01 RX ORDER — SODIUM CHLORIDE 9 MG/ML
INJECTION, SOLUTION INTRAVENOUS CONTINUOUS
Status: CANCELLED | OUTPATIENT
Start: 2018-01-01 | End: 2019-08-20

## 2018-01-01 RX ORDER — PROTAMINE SULFATE 10 MG/ML
INJECTION, SOLUTION INTRAVENOUS PRN
Status: DISCONTINUED | OUTPATIENT
Start: 2018-01-01 | End: 2018-01-01 | Stop reason: SDUPTHER

## 2018-01-01 RX ORDER — MIDAZOLAM HYDROCHLORIDE 1 MG/ML
1 INJECTION INTRAMUSCULAR; INTRAVENOUS ONCE
Status: CANCELLED | OUTPATIENT
Start: 2018-01-01 | End: 2018-01-01

## 2018-01-01 RX ORDER — OXYCODONE HYDROCHLORIDE 5 MG/1
10 TABLET ORAL EVERY 4 HOURS PRN
Status: DISCONTINUED | OUTPATIENT
Start: 2018-01-01 | End: 2018-01-01 | Stop reason: HOSPADM

## 2018-01-01 RX ORDER — FUROSEMIDE 40 MG/1
80 TABLET ORAL 2 TIMES DAILY
Status: DISCONTINUED | OUTPATIENT
Start: 2018-01-01 | End: 2018-01-01 | Stop reason: HOSPADM

## 2018-01-01 RX ORDER — INSULIN GLARGINE 100 [IU]/ML
18 INJECTION, SOLUTION SUBCUTANEOUS NIGHTLY
Status: DISCONTINUED | OUTPATIENT
Start: 2018-01-01 | End: 2018-01-01

## 2018-01-01 RX ORDER — METHYLPREDNISOLONE SODIUM SUCCINATE 125 MG/2ML
125 INJECTION, POWDER, LYOPHILIZED, FOR SOLUTION INTRAMUSCULAR; INTRAVENOUS DAILY
Status: DISCONTINUED | OUTPATIENT
Start: 2018-01-01 | End: 2018-01-01

## 2018-01-01 RX ORDER — MYCOPHENOLIC ACID 180 MG/1
360 TABLET, DELAYED RELEASE ORAL 2 TIMES DAILY
Status: DISCONTINUED | OUTPATIENT
Start: 2018-01-01 | End: 2018-01-01 | Stop reason: HOSPADM

## 2018-01-01 RX ORDER — ACETAMINOPHEN 325 MG/1
650 TABLET ORAL EVERY 4 HOURS PRN
Status: DISCONTINUED | OUTPATIENT
Start: 2018-01-01 | End: 2018-01-01 | Stop reason: HOSPADM

## 2018-01-01 RX ORDER — VASOPRESSIN 20 U/ML
INJECTION PARENTERAL PRN
Status: DISCONTINUED | OUTPATIENT
Start: 2018-01-01 | End: 2018-01-01 | Stop reason: SDUPTHER

## 2018-01-01 RX ORDER — BACITRACIN 50000 [USP'U]/1
50000 INJECTION, POWDER, LYOPHILIZED, FOR SOLUTION INTRAMUSCULAR ONCE
Status: DISCONTINUED | OUTPATIENT
Start: 2018-01-01 | End: 2018-01-01 | Stop reason: SDUPTHER

## 2018-01-01 RX ORDER — HEPARIN SODIUM 10000 [USP'U]/100ML
11.6 INJECTION, SOLUTION INTRAVENOUS CONTINUOUS
Status: DISCONTINUED | OUTPATIENT
Start: 2018-01-01 | End: 2018-01-01 | Stop reason: HOSPADM

## 2018-01-01 RX ORDER — MIDAZOLAM HYDROCHLORIDE 1 MG/ML
0.5 INJECTION INTRAMUSCULAR; INTRAVENOUS ONCE
Status: DISCONTINUED | OUTPATIENT
Start: 2018-01-01 | End: 2018-01-01 | Stop reason: HOSPADM

## 2018-01-01 RX ORDER — OXYCODONE HYDROCHLORIDE AND ACETAMINOPHEN 5; 325 MG/1; MG/1
1 TABLET ORAL EVERY 6 HOURS PRN
Qty: 28 TABLET | Refills: 0 | Status: SHIPPED | OUTPATIENT
Start: 2018-01-01 | End: 2018-01-01

## 2018-01-01 RX ORDER — ONDANSETRON 4 MG/1
4 TABLET, FILM COATED ORAL EVERY 6 HOURS PRN
Status: DISCONTINUED | OUTPATIENT
Start: 2018-01-01 | End: 2018-01-01

## 2018-01-01 RX ORDER — ERGOCALCIFEROL 1.25 MG/1
50000 CAPSULE ORAL WEEKLY
COMMUNITY

## 2018-01-01 RX ORDER — HEPARIN SODIUM 5000 [USP'U]/ML
5000 INJECTION, SOLUTION INTRAVENOUS; SUBCUTANEOUS EVERY 8 HOURS SCHEDULED
Status: DISCONTINUED | OUTPATIENT
Start: 2018-01-01 | End: 2018-01-01 | Stop reason: ALTCHOICE

## 2018-01-01 RX ORDER — ALBUMIN (HUMAN) 12.5 G/50ML
25 SOLUTION INTRAVENOUS EVERY 12 HOURS
Status: COMPLETED | OUTPATIENT
Start: 2018-01-01 | End: 2018-01-01

## 2018-01-01 RX ORDER — CLINDAMYCIN PHOSPHATE 600 MG/50ML
600 INJECTION INTRAVENOUS
Status: COMPLETED | OUTPATIENT
Start: 2018-01-01 | End: 2018-01-01

## 2018-01-01 RX ORDER — CLOPIDOGREL BISULFATE 75 MG/1
75 TABLET ORAL DAILY
Status: DISCONTINUED | OUTPATIENT
Start: 2018-01-01 | End: 2018-01-01

## 2018-01-01 RX ORDER — M-VIT,TX,IRON,MINS/CALC/FOLIC 27MG-0.4MG
1 TABLET ORAL DAILY
Status: DISCONTINUED | OUTPATIENT
Start: 2018-01-01 | End: 2018-01-01 | Stop reason: HOSPADM

## 2018-01-01 RX ORDER — NITROGLYCERIN 0.4 MG/1
0.4 TABLET SUBLINGUAL EVERY 5 MIN PRN
Status: DISCONTINUED | OUTPATIENT
Start: 2018-01-01 | End: 2018-01-01

## 2018-01-01 RX ORDER — SIROLIMUS 1 MG/1
4 TABLET, FILM COATED ORAL DAILY
Status: DISCONTINUED | OUTPATIENT
Start: 2018-01-01 | End: 2018-01-01

## 2018-01-01 RX ORDER — ACETAMINOPHEN 650 MG/1
650 SUPPOSITORY RECTAL EVERY 4 HOURS PRN
Status: DISCONTINUED | OUTPATIENT
Start: 2018-01-01 | End: 2018-01-01 | Stop reason: HOSPADM

## 2018-01-01 RX ORDER — 0.9 % SODIUM CHLORIDE 0.9 %
250 INTRAVENOUS SOLUTION INTRAVENOUS ONCE
Status: DISCONTINUED | OUTPATIENT
Start: 2018-01-01 | End: 2018-01-01

## 2018-01-01 RX ORDER — POTASSIUM CHLORIDE 20 MEQ/1
20 TABLET, EXTENDED RELEASE ORAL 2 TIMES DAILY
Status: DISCONTINUED | OUTPATIENT
Start: 2018-01-01 | End: 2018-01-01 | Stop reason: ALTCHOICE

## 2018-01-01 RX ORDER — DEXTROSE MONOHYDRATE 50 MG/ML
100 INJECTION, SOLUTION INTRAVENOUS PRN
Status: DISCONTINUED | OUTPATIENT
Start: 2018-01-01 | End: 2018-01-01 | Stop reason: SDUPTHER

## 2018-01-01 RX ORDER — ISOSORBIDE MONONITRATE 60 MG/1
60 TABLET, EXTENDED RELEASE ORAL DAILY
Status: DISCONTINUED | OUTPATIENT
Start: 2018-01-01 | End: 2018-01-01 | Stop reason: HOSPADM

## 2018-01-01 RX ORDER — CARVEDILOL 25 MG/1
50 TABLET ORAL 2 TIMES DAILY
Status: DISCONTINUED | OUTPATIENT
Start: 2018-01-01 | End: 2018-01-01

## 2018-01-01 RX ORDER — ALBUMIN, HUMAN INJ 5% 5 %
SOLUTION INTRAVENOUS PRN
Status: DISCONTINUED | OUTPATIENT
Start: 2018-01-01 | End: 2018-01-01 | Stop reason: SDUPTHER

## 2018-01-01 RX ORDER — ALBUMIN, HUMAN INJ 5% 5 %
SOLUTION INTRAVENOUS
Status: COMPLETED
Start: 2018-01-01 | End: 2018-01-01

## 2018-01-01 RX ORDER — SODIUM CHLORIDE 9 MG/ML
INJECTION, SOLUTION INTRAVENOUS CONTINUOUS PRN
Status: DISCONTINUED | OUTPATIENT
Start: 2018-01-01 | End: 2018-01-01 | Stop reason: SDUPTHER

## 2018-01-01 RX ORDER — SIROLIMUS 1 MG/1
4 TABLET, FILM COATED ORAL DAILY
Status: DISCONTINUED | OUTPATIENT
Start: 2018-01-01 | End: 2018-01-01 | Stop reason: HOSPADM

## 2018-01-01 RX ORDER — ONDANSETRON 4 MG/1
4 TABLET, ORALLY DISINTEGRATING ORAL EVERY 6 HOURS PRN
Status: DISCONTINUED | OUTPATIENT
Start: 2018-01-01 | End: 2018-01-01 | Stop reason: HOSPADM

## 2018-01-01 RX ORDER — DEXAMETHASONE SODIUM PHOSPHATE 4 MG/ML
INJECTION, SOLUTION INTRA-ARTICULAR; INTRALESIONAL; INTRAMUSCULAR; INTRAVENOUS; SOFT TISSUE PRN
Status: DISCONTINUED | OUTPATIENT
Start: 2018-01-01 | End: 2018-01-01 | Stop reason: SDUPTHER

## 2018-01-01 RX ORDER — CLONIDINE HYDROCHLORIDE 0.2 MG/1
0.2 TABLET ORAL 2 TIMES DAILY
Status: DISCONTINUED | OUTPATIENT
Start: 2018-01-01 | End: 2018-01-01

## 2018-01-01 RX ORDER — VIT B COMP NO.3/FOLIC/C/BIOTIN 1 MG-60 MG
TABLET ORAL 3 TIMES DAILY
COMMUNITY

## 2018-01-01 RX ORDER — HEPARIN SODIUM 1000 [USP'U]/ML
60 INJECTION, SOLUTION INTRAVENOUS; SUBCUTANEOUS PRN
Status: DISCONTINUED | OUTPATIENT
Start: 2018-01-01 | End: 2018-01-01 | Stop reason: CLARIF

## 2018-01-01 RX ORDER — CALCITRIOL 0.5 UG/1
0.5 CAPSULE, LIQUID FILLED ORAL
COMMUNITY

## 2018-01-01 RX ORDER — MIDAZOLAM HYDROCHLORIDE 1 MG/ML
INJECTION INTRAMUSCULAR; INTRAVENOUS PRN
Status: DISCONTINUED | OUTPATIENT
Start: 2018-01-01 | End: 2018-01-01 | Stop reason: HOSPADM

## 2018-01-01 RX ORDER — DOCUSATE SODIUM 100 MG/1
100 CAPSULE, LIQUID FILLED ORAL 2 TIMES DAILY
Status: DISCONTINUED | OUTPATIENT
Start: 2018-01-01 | End: 2018-01-01 | Stop reason: SDUPTHER

## 2018-01-01 RX ORDER — ISOSORBIDE MONONITRATE 30 MG/1
30 TABLET, EXTENDED RELEASE ORAL ONCE
Status: COMPLETED | OUTPATIENT
Start: 2018-01-01 | End: 2018-01-01

## 2018-01-01 RX ORDER — SIMVASTATIN 20 MG
20 TABLET ORAL EVERY EVENING
Status: DISCONTINUED | OUTPATIENT
Start: 2018-01-01 | End: 2018-01-01

## 2018-01-01 RX ORDER — METOPROLOL TARTRATE 5 MG/5ML
2.5 INJECTION INTRAVENOUS EVERY 10 MIN PRN
Status: DISCONTINUED | OUTPATIENT
Start: 2018-01-01 | End: 2018-01-01 | Stop reason: HOSPADM

## 2018-01-01 RX ORDER — INSULIN GLARGINE 100 [IU]/ML
15 INJECTION, SOLUTION SUBCUTANEOUS EVERY MORNING
Status: DISCONTINUED | OUTPATIENT
Start: 2018-01-01 | End: 2018-01-01

## 2018-01-01 RX ORDER — FENTANYL CITRATE 50 UG/ML
75 INJECTION, SOLUTION INTRAMUSCULAR; INTRAVENOUS
Status: DISCONTINUED | OUTPATIENT
Start: 2018-01-01 | End: 2018-01-01 | Stop reason: HOSPADM

## 2018-01-01 RX ORDER — DIPHENHYDRAMINE HYDROCHLORIDE 50 MG/ML
50 INJECTION INTRAMUSCULAR; INTRAVENOUS ONCE
Status: CANCELLED | OUTPATIENT
Start: 2018-01-01 | End: 2018-01-01

## 2018-01-01 RX ORDER — INSULIN GLARGINE 100 [IU]/ML
15 INJECTION, SOLUTION SUBCUTANEOUS DAILY
Status: DISCONTINUED | OUTPATIENT
Start: 2018-01-01 | End: 2018-01-01

## 2018-01-01 RX ORDER — ASPIRIN 81 MG/1
81 TABLET, CHEWABLE ORAL DAILY
Status: DISCONTINUED | OUTPATIENT
Start: 2018-01-01 | End: 2018-01-01

## 2018-01-01 RX ORDER — SENNOSIDES 8.8 MG/5ML
10 LIQUID ORAL DAILY PRN
Status: DISCONTINUED | OUTPATIENT
Start: 2018-01-01 | End: 2018-01-01 | Stop reason: HOSPADM

## 2018-01-01 RX ORDER — POTASSIUM CHLORIDE 7.45 MG/ML
10 INJECTION INTRAVENOUS PRN
Status: DISCONTINUED | OUTPATIENT
Start: 2018-01-01 | End: 2018-01-01 | Stop reason: HOSPADM

## 2018-01-01 RX ORDER — POLYETHYLENE GLYCOL 3350 17 G/17G
17 POWDER ORAL DAILY
COMMUNITY
End: 2018-01-01

## 2018-01-01 RX ORDER — POTASSIUM CHLORIDE 7.45 MG/ML
10 INJECTION INTRAVENOUS
Status: DISCONTINUED | OUTPATIENT
Start: 2018-01-01 | End: 2018-01-01

## 2018-01-01 RX ORDER — SODIUM CHLORIDE 0.9 % (FLUSH) 0.9 %
10 SYRINGE (ML) INJECTION PRN
Status: CANCELLED | OUTPATIENT
Start: 2018-01-01 | End: 2019-08-20

## 2018-01-01 RX ORDER — DOCUSATE SODIUM 50 MG/5ML
100 LIQUID ORAL 2 TIMES DAILY
Status: DISCONTINUED | OUTPATIENT
Start: 2018-01-01 | End: 2018-01-01 | Stop reason: HOSPADM

## 2018-01-01 RX ORDER — BISACODYL 10 MG
10 SUPPOSITORY, RECTAL RECTAL DAILY PRN
Status: DISCONTINUED | OUTPATIENT
Start: 2018-01-01 | End: 2018-01-01 | Stop reason: HOSPADM

## 2018-01-01 RX ORDER — HEPARIN SODIUM 1000 [USP'U]/ML
30 INJECTION, SOLUTION INTRAVENOUS; SUBCUTANEOUS PRN
Status: DISCONTINUED | OUTPATIENT
Start: 2018-01-01 | End: 2018-01-01 | Stop reason: CLARIF

## 2018-01-01 RX ORDER — SODIUM CHLORIDE 0.9 % (FLUSH) 0.9 %
10 SYRINGE (ML) INJECTION PRN
Status: DISCONTINUED | OUTPATIENT
Start: 2018-01-01 | End: 2018-01-01 | Stop reason: SDUPTHER

## 2018-01-01 RX ORDER — CALCIUM ACETATE 667 MG/1
2 TABLET ORAL
COMMUNITY

## 2018-01-01 RX ORDER — INSULIN GLARGINE 100 [IU]/ML
18 INJECTION, SOLUTION SUBCUTANEOUS EVERY MORNING
Status: DISCONTINUED | OUTPATIENT
Start: 2018-01-01 | End: 2018-01-01

## 2018-01-01 RX ORDER — MIDAZOLAM HYDROCHLORIDE 1 MG/ML
INJECTION INTRAMUSCULAR; INTRAVENOUS PRN
Status: DISCONTINUED | OUTPATIENT
Start: 2018-01-01 | End: 2018-01-01 | Stop reason: SDUPTHER

## 2018-01-01 RX ORDER — HEPARIN SODIUM 1000 [USP'U]/ML
INJECTION, SOLUTION INTRAVENOUS; SUBCUTANEOUS PRN
Status: DISCONTINUED | OUTPATIENT
Start: 2018-01-01 | End: 2018-01-01 | Stop reason: SDUPTHER

## 2018-01-01 RX ADMIN — Medication 4600 ML/HR: at 04:41

## 2018-01-01 RX ADMIN — AZITHROMYCIN MONOHYDRATE 250 MG: 500 INJECTION, POWDER, LYOPHILIZED, FOR SOLUTION INTRAVENOUS at 13:44

## 2018-01-01 RX ADMIN — GABAPENTIN 100 MG: 100 CAPSULE ORAL at 20:57

## 2018-01-01 RX ADMIN — Medication 4600 ML/HR: at 22:58

## 2018-01-01 RX ADMIN — VASOPRESSIN 0.04 UNITS/MIN: 20 INJECTION INTRAVENOUS at 16:43

## 2018-01-01 RX ADMIN — Medication 4600 ML/HR: at 06:15

## 2018-01-01 RX ADMIN — Medication 4 UNITS/HR: at 09:22

## 2018-01-01 RX ADMIN — ACETAMINOPHEN 650 MG: 325 TABLET ORAL at 17:52

## 2018-01-01 RX ADMIN — ALBUMIN (HUMAN) 25 G: 12.5 INJECTION, SOLUTION INTRAVENOUS at 14:20

## 2018-01-01 RX ADMIN — Medication: at 20:50

## 2018-01-01 RX ADMIN — VASOPRESSIN 0.04 UNITS/MIN: 20 INJECTION INTRAVENOUS at 13:46

## 2018-01-01 RX ADMIN — Medication 2 UNITS: at 20:37

## 2018-01-01 RX ADMIN — LIDOCAINE HYDROCHLORIDE 100 MG: 20 INJECTION, SOLUTION INFILTRATION; PERINEURAL at 13:57

## 2018-01-01 RX ADMIN — EPINEPHRINE 20 MCG/MIN: 1 INJECTION, SOLUTION, CONCENTRATE INTRAVENOUS at 12:20

## 2018-01-01 RX ADMIN — MULTIPLE VITAMINS W/ MINERALS TAB 1 TABLET: TAB at 08:26

## 2018-01-01 RX ADMIN — METHYLPHENIDATE HYDROCHLORIDE 10 MG: 10 TABLET ORAL at 12:10

## 2018-01-01 RX ADMIN — Medication 10 ML: at 21:21

## 2018-01-01 RX ADMIN — ERYTHROPOIETIN 10000 UNITS: 10000 INJECTION, SOLUTION INTRAVENOUS; SUBCUTANEOUS at 08:58

## 2018-01-01 RX ADMIN — Medication 1 MG: at 08:14

## 2018-01-01 RX ADMIN — Medication 4 UNITS: at 16:53

## 2018-01-01 RX ADMIN — EPOPROSTENOL 50 NG/KG/MIN: 1.5 INJECTION, POWDER, LYOPHILIZED, FOR SOLUTION INTRAVENOUS at 08:19

## 2018-01-01 RX ADMIN — CLONIDINE HYDROCHLORIDE 0.3 MG: 0.2 TABLET ORAL at 21:09

## 2018-01-01 RX ADMIN — GABAPENTIN 100 MG: 100 CAPSULE ORAL at 20:55

## 2018-01-01 RX ADMIN — SIMVASTATIN 20 MG: 20 TABLET, FILM COATED ORAL at 16:54

## 2018-01-01 RX ADMIN — FENTANYL CITRATE 50 MCG: 50 INJECTION, SOLUTION INTRAMUSCULAR; INTRAVENOUS at 04:36

## 2018-01-01 RX ADMIN — CARVEDILOL 50 MG: 25 TABLET, FILM COATED ORAL at 20:39

## 2018-01-01 RX ADMIN — DOCUSATE SODIUM 100 MG: 50 LIQUID ORAL at 08:28

## 2018-01-01 RX ADMIN — EPOPROSTENOL 50 NG/KG/MIN: 1.5 INJECTION, POWDER, LYOPHILIZED, FOR SOLUTION INTRAVENOUS at 20:17

## 2018-01-01 RX ADMIN — ISOSORBIDE MONONITRATE 60 MG: 60 TABLET ORAL at 12:57

## 2018-01-01 RX ADMIN — Medication 10 ML: at 08:45

## 2018-01-01 RX ADMIN — Medication 10 ML: at 20:48

## 2018-01-01 RX ADMIN — Medication 4600 ML/HR: at 11:50

## 2018-01-01 RX ADMIN — CARVEDILOL 50 MG: 25 TABLET, FILM COATED ORAL at 19:58

## 2018-01-01 RX ADMIN — CALCIUM ACETATE 1334 MG: 667 CAPSULE ORAL at 06:42

## 2018-01-01 RX ADMIN — SODIUM CHLORIDE 8 MG/HR: 9 INJECTION, SOLUTION INTRAVENOUS at 06:03

## 2018-01-01 RX ADMIN — Medication 1.5 G: at 05:22

## 2018-01-01 RX ADMIN — Medication 10 ML: at 09:37

## 2018-01-01 RX ADMIN — MULTIPLE VITAMINS W/ MINERALS TAB 1 TABLET: TAB at 10:05

## 2018-01-01 RX ADMIN — Medication: at 23:53

## 2018-01-01 RX ADMIN — MYCOPHENOLIC ACID 360 MG: 180 TABLET, DELAYED RELEASE ORAL at 21:21

## 2018-01-01 RX ADMIN — SIROLIMUS 2 MG: 1 SOLUTION ORAL at 10:00

## 2018-01-01 RX ADMIN — SIMVASTATIN 20 MG: 20 TABLET, FILM COATED ORAL at 20:39

## 2018-01-01 RX ADMIN — INSULIN LISPRO 3 UNITS: 100 INJECTION, SOLUTION INTRAVENOUS; SUBCUTANEOUS at 06:31

## 2018-01-01 RX ADMIN — IPRATROPIUM BROMIDE AND ALBUTEROL SULFATE 1 AMPULE: .5; 3 SOLUTION RESPIRATORY (INHALATION) at 12:32

## 2018-01-01 RX ADMIN — CALCIUM ACETATE 1334 MG: 667 CAPSULE ORAL at 13:32

## 2018-01-01 RX ADMIN — FENTANYL CITRATE 25 MCG: 50 INJECTION INTRAMUSCULAR; INTRAVENOUS at 14:43

## 2018-01-01 RX ADMIN — SODIUM CHLORIDE: 9 INJECTION, SOLUTION INTRAVENOUS at 04:00

## 2018-01-01 RX ADMIN — SODIUM CHLORIDE 250 ML: 9 INJECTION, SOLUTION INTRAVENOUS at 08:58

## 2018-01-01 RX ADMIN — INSULIN LISPRO 15 UNITS: 100 INJECTION, SOLUTION INTRAVENOUS; SUBCUTANEOUS at 16:30

## 2018-01-01 RX ADMIN — INSULIN LISPRO 6 UNITS: 100 INJECTION, SOLUTION INTRAVENOUS; SUBCUTANEOUS at 16:13

## 2018-01-01 RX ADMIN — MYCOPHENOLIC ACID 360 MG: 180 TABLET, DELAYED RELEASE ORAL at 20:38

## 2018-01-01 RX ADMIN — MYCOPHENOLIC ACID 360 MG: 180 TABLET, DELAYED RELEASE ORAL at 10:05

## 2018-01-01 RX ADMIN — PHENYLEPHRINE HYDROCHLORIDE 50 MCG/MIN: 10 INJECTION INTRAVENOUS at 19:16

## 2018-01-01 RX ADMIN — SODIUM CHLORIDE 33 UNITS/HR: 9 INJECTION, SOLUTION INTRAVENOUS at 23:11

## 2018-01-01 RX ADMIN — CALCIUM ACETATE 1334 MG: 667 CAPSULE ORAL at 11:22

## 2018-01-01 RX ADMIN — CARVEDILOL 50 MG: 25 TABLET, FILM COATED ORAL at 11:32

## 2018-01-01 RX ADMIN — SIMVASTATIN 20 MG: 20 TABLET, FILM COATED ORAL at 21:32

## 2018-01-01 RX ADMIN — CARVEDILOL 50 MG: 25 TABLET, FILM COATED ORAL at 12:56

## 2018-01-01 RX ADMIN — IPRATROPIUM BROMIDE AND ALBUTEROL SULFATE 1 AMPULE: .5; 3 SOLUTION RESPIRATORY (INHALATION) at 16:31

## 2018-01-01 RX ADMIN — METHYLPHENIDATE HYDROCHLORIDE 10 MG: 10 TABLET ORAL at 09:37

## 2018-01-01 RX ADMIN — CLONIDINE HYDROCHLORIDE 0.2 MG: 0.2 TABLET ORAL at 14:05

## 2018-01-01 RX ADMIN — FENTANYL CITRATE 75 MCG: 50 INJECTION, SOLUTION INTRAMUSCULAR; INTRAVENOUS at 20:37

## 2018-01-01 RX ADMIN — IPRATROPIUM BROMIDE AND ALBUTEROL SULFATE 1 AMPULE: .5; 3 SOLUTION RESPIRATORY (INHALATION) at 15:34

## 2018-01-01 RX ADMIN — Medication 3 MG: at 21:05

## 2018-01-01 RX ADMIN — Medication 0.5 UNITS/HR: at 06:59

## 2018-01-01 RX ADMIN — CETIRIZINE HYDROCHLORIDE 10 MG: 10 TABLET ORAL at 08:27

## 2018-01-01 RX ADMIN — GABAPENTIN 100 MG: 100 CAPSULE ORAL at 09:37

## 2018-01-01 RX ADMIN — CARVEDILOL 50 MG: 25 TABLET, FILM COATED ORAL at 22:11

## 2018-01-01 RX ADMIN — HEPARIN SODIUM: 1000 INJECTION INTRAVENOUS; SUBCUTANEOUS at 12:19

## 2018-01-01 RX ADMIN — CARVEDILOL 50 MG: 25 TABLET, FILM COATED ORAL at 12:43

## 2018-01-01 RX ADMIN — SIROLIMUS 4 MG: 0.5 TABLET, FILM COATED ORAL at 13:33

## 2018-01-01 RX ADMIN — CALCIUM ACETATE 667 MG: 667 CAPSULE ORAL at 17:40

## 2018-01-01 RX ADMIN — INSULIN GLARGINE 15 UNITS: 100 INJECTION, SOLUTION SUBCUTANEOUS at 20:47

## 2018-01-01 RX ADMIN — CALCITRIOL 0.5 MCG: 0.25 CAPSULE ORAL at 20:46

## 2018-01-01 RX ADMIN — DEXTROSE MONOHYDRATE 12.5 G: 25 INJECTION, SOLUTION INTRAVENOUS at 13:15

## 2018-01-01 RX ADMIN — TIROFIBAN 0.07 MCG/KG/MIN: 5 INJECTION, SOLUTION INTRAVENOUS at 12:39

## 2018-01-01 RX ADMIN — EPOPROSTENOL 50 NG/KG/MIN: 1.5 INJECTION, POWDER, LYOPHILIZED, FOR SOLUTION INTRAVENOUS at 06:54

## 2018-01-01 RX ADMIN — ISOSORBIDE MONONITRATE 60 MG: 60 TABLET ORAL at 08:26

## 2018-01-01 RX ADMIN — CLONIDINE HYDROCHLORIDE 0.2 MG: 0.2 TABLET ORAL at 16:45

## 2018-01-01 RX ADMIN — LATANOPROST 1 DROP: 50 SOLUTION OPHTHALMIC at 20:42

## 2018-01-01 RX ADMIN — CLONIDINE HYDROCHLORIDE 0.2 MG: 0.2 TABLET ORAL at 06:20

## 2018-01-01 RX ADMIN — CALCIUM ACETATE 667 MG: 667 CAPSULE ORAL at 08:27

## 2018-01-01 RX ADMIN — SIMVASTATIN 20 MG: 20 TABLET, FILM COATED ORAL at 20:49

## 2018-01-01 RX ADMIN — Medication 100 MEQ: at 17:00

## 2018-01-01 RX ADMIN — GABAPENTIN 100 MG: 100 CAPSULE ORAL at 20:40

## 2018-01-01 RX ADMIN — Medication: at 07:48

## 2018-01-01 RX ADMIN — POTASSIUM CHLORIDE 20 MEQ: 29.8 INJECTION, SOLUTION INTRAVENOUS at 05:29

## 2018-01-01 RX ADMIN — METHYLPHENIDATE HYDROCHLORIDE 10 MG: 5 TABLET ORAL at 11:32

## 2018-01-01 RX ADMIN — ERGOCALCIFEROL 50000 UNITS: 1.25 CAPSULE ORAL at 13:41

## 2018-01-01 RX ADMIN — Medication: at 06:43

## 2018-01-01 RX ADMIN — EPOPROSTENOL 50 NG/KG/MIN: 1.5 INJECTION, POWDER, LYOPHILIZED, FOR SOLUTION INTRAVENOUS at 17:20

## 2018-01-01 RX ADMIN — SIMVASTATIN 20 MG: 20 TABLET, FILM COATED ORAL at 22:23

## 2018-01-01 RX ADMIN — CALCIUM ACETATE 1334 MG: 667 CAPSULE ORAL at 11:31

## 2018-01-01 RX ADMIN — Medication 4600 ML/HR: at 15:36

## 2018-01-01 RX ADMIN — CLONIDINE HYDROCHLORIDE 0.2 MG: 0.2 TABLET ORAL at 08:26

## 2018-01-01 RX ADMIN — ISOSORBIDE MONONITRATE 60 MG: 60 TABLET ORAL at 10:04

## 2018-01-01 RX ADMIN — Medication 200 MEQ: at 17:17

## 2018-01-01 RX ADMIN — LATANOPROST 1 DROP: 50 SOLUTION OPHTHALMIC at 20:47

## 2018-01-01 RX ADMIN — POTASSIUM PHOSPHATE, MONOBASIC AND POTASSIUM PHOSPHATE, DIBASIC 30 MMOL: 224; 236 INJECTION, SOLUTION, CONCENTRATE INTRAVENOUS at 00:33

## 2018-01-01 RX ADMIN — SIROLIMUS 4 MG: 0.5 TABLET, FILM COATED ORAL at 09:13

## 2018-01-01 RX ADMIN — SODIUM CHLORIDE 250 ML: 9 INJECTION, SOLUTION INTRAVENOUS at 01:49

## 2018-01-01 RX ADMIN — DEXTROSE MONOHYDRATE 1 G: 5 INJECTION INTRAVENOUS at 12:52

## 2018-01-01 RX ADMIN — POTASSIUM CHLORIDE 20 MEQ: 400 INJECTION, SOLUTION INTRAVENOUS at 10:05

## 2018-01-01 RX ADMIN — Medication 4 UNITS: at 17:20

## 2018-01-01 RX ADMIN — MULTIPLE VITAMINS W/ MINERALS TAB 1 TABLET: TAB at 07:11

## 2018-01-01 RX ADMIN — CARVEDILOL 50 MG: 25 TABLET, FILM COATED ORAL at 20:47

## 2018-01-01 RX ADMIN — DEXTROSE AND SODIUM CHLORIDE: 5; 450 INJECTION, SOLUTION INTRAVENOUS at 04:16

## 2018-01-01 RX ADMIN — CALCIUM ACETATE 1334 MG: 667 CAPSULE ORAL at 16:20

## 2018-01-01 RX ADMIN — MULTIPLE VITAMINS W/ MINERALS TAB 1 TABLET: TAB at 13:18

## 2018-01-01 RX ADMIN — SIROLIMUS 4 MG: 1 TABLET ORAL at 09:57

## 2018-01-01 RX ADMIN — CLONIDINE HYDROCHLORIDE 0.2 MG: 0.2 TABLET ORAL at 06:04

## 2018-01-01 RX ADMIN — Medication 2800 ML/HR: at 10:20

## 2018-01-01 RX ADMIN — EPINEPHRINE 14 MCG/MIN: 1 INJECTION, SOLUTION, CONCENTRATE INTRAVENOUS at 10:49

## 2018-01-01 RX ADMIN — SODIUM CHLORIDE 8 MG/HR: 9 INJECTION, SOLUTION INTRAVENOUS at 05:54

## 2018-01-01 RX ADMIN — VASOPRESSIN 1 UNITS: 20 INJECTION INTRAVENOUS at 13:48

## 2018-01-01 RX ADMIN — MULTIPLE VITAMINS W/ MINERALS TAB 1 TABLET: TAB at 12:43

## 2018-01-01 RX ADMIN — HYDROMORPHONE HYDROCHLORIDE 0.25 MG: 1 INJECTION, SOLUTION INTRAMUSCULAR; INTRAVENOUS; SUBCUTANEOUS at 16:10

## 2018-01-01 RX ADMIN — SIMVASTATIN 20 MG: 20 TABLET, FILM COATED ORAL at 20:55

## 2018-01-01 RX ADMIN — GABAPENTIN 100 MG: 100 CAPSULE ORAL at 11:32

## 2018-01-01 RX ADMIN — INSULIN GLARGINE 15 UNITS: 100 INJECTION, SOLUTION SUBCUTANEOUS at 21:59

## 2018-01-01 RX ADMIN — SODIUM CHLORIDE: 9 INJECTION, SOLUTION INTRAVENOUS at 19:59

## 2018-01-01 RX ADMIN — CALCIUM ACETATE 667 MG: 667 CAPSULE ORAL at 12:18

## 2018-01-01 RX ADMIN — CALCIUM ACETATE 1334 MG: 667 CAPSULE ORAL at 12:10

## 2018-01-01 RX ADMIN — CALCIUM ACETATE 1334 MG: 667 CAPSULE ORAL at 12:16

## 2018-01-01 RX ADMIN — CALCIUM ACETATE 1334 MG: 667 CAPSULE ORAL at 17:36

## 2018-01-01 RX ADMIN — SODIUM CHLORIDE 8 MG/HR: 9 INJECTION, SOLUTION INTRAVENOUS at 18:38

## 2018-01-01 RX ADMIN — IPRATROPIUM BROMIDE AND ALBUTEROL SULFATE 1 AMPULE: .5; 3 SOLUTION RESPIRATORY (INHALATION) at 12:54

## 2018-01-01 RX ADMIN — Medication 8 UNITS: at 21:28

## 2018-01-01 RX ADMIN — Medication 2800 ML/HR: at 05:15

## 2018-01-01 RX ADMIN — CLOPIDOGREL BISULFATE 75 MG: 75 TABLET ORAL at 07:55

## 2018-01-01 RX ADMIN — Medication 4600 ML/HR: at 04:42

## 2018-01-01 RX ADMIN — CLONIDINE HYDROCHLORIDE 0.3 MG: 0.2 TABLET ORAL at 08:27

## 2018-01-01 RX ADMIN — Medication 4600 ML/HR: at 22:09

## 2018-01-01 RX ADMIN — PHENYLEPHRINE HYDROCHLORIDE 200 MCG/MIN: 10 INJECTION INTRAVENOUS at 10:25

## 2018-01-01 RX ADMIN — METHYLPHENIDATE HYDROCHLORIDE 10 MG: 10 TABLET ORAL at 10:33

## 2018-01-01 RX ADMIN — CLONIDINE HYDROCHLORIDE 0.2 MG: 0.2 TABLET ORAL at 22:22

## 2018-01-01 RX ADMIN — DEXTROSE MONOHYDRATE 12.5 G: 25 INJECTION, SOLUTION INTRAVENOUS at 08:07

## 2018-01-01 RX ADMIN — CLONIDINE HYDROCHLORIDE 0.2 MG: 0.2 TABLET ORAL at 16:13

## 2018-01-01 RX ADMIN — Medication 4600 ML/HR: at 08:02

## 2018-01-01 RX ADMIN — Medication 2800 ML/HR: at 16:02

## 2018-01-01 RX ADMIN — Medication 10 ML: at 21:28

## 2018-01-01 RX ADMIN — LATANOPROST 1 DROP: 50 SOLUTION OPHTHALMIC at 20:38

## 2018-01-01 RX ADMIN — CALCITRIOL 0.5 MCG: 0.25 CAPSULE, LIQUID FILLED ORAL at 08:27

## 2018-01-01 RX ADMIN — INSULIN LISPRO 15 UNITS: 100 INJECTION, SOLUTION INTRAVENOUS; SUBCUTANEOUS at 11:31

## 2018-01-01 RX ADMIN — ISOSORBIDE MONONITRATE 60 MG: 60 TABLET ORAL at 08:44

## 2018-01-01 RX ADMIN — EPOPROSTENOL 50 NG/KG/MIN: 1.5 INJECTION, POWDER, LYOPHILIZED, FOR SOLUTION INTRAVENOUS at 04:21

## 2018-01-01 RX ADMIN — Medication 10 ML: at 09:04

## 2018-01-01 RX ADMIN — Medication 4600 ML/HR: at 19:31

## 2018-01-01 RX ADMIN — CARVEDILOL 50 MG: 25 TABLET, FILM COATED ORAL at 21:21

## 2018-01-01 RX ADMIN — MULTIPLE VITAMINS W/ MINERALS TAB 1 TABLET: TAB at 09:37

## 2018-01-01 RX ADMIN — Medication 4600 ML/HR: at 18:25

## 2018-01-01 RX ADMIN — SIROLIMUS 4 MG: 0.5 TABLET, FILM COATED ORAL at 10:04

## 2018-01-01 RX ADMIN — Medication 2 UNITS: at 12:11

## 2018-01-01 RX ADMIN — SODIUM CHLORIDE 44.4 UNITS/HR: 9 INJECTION, SOLUTION INTRAVENOUS at 04:11

## 2018-01-01 RX ADMIN — ASPIRIN 81 MG 81 MG: 81 TABLET ORAL at 13:44

## 2018-01-01 RX ADMIN — Medication 1 TABLET: at 08:57

## 2018-01-01 RX ADMIN — CALCITRIOL 0.5 MCG: 0.25 CAPSULE, LIQUID FILLED ORAL at 08:12

## 2018-01-01 RX ADMIN — Medication 10 ML: at 20:42

## 2018-01-01 RX ADMIN — Medication 2800 ML/HR: at 02:07

## 2018-01-01 RX ADMIN — POTASSIUM CHLORIDE 20 MEQ: 29.8 INJECTION, SOLUTION INTRAVENOUS at 11:48

## 2018-01-01 RX ADMIN — IPRATROPIUM BROMIDE AND ALBUTEROL SULFATE 1 AMPULE: .5; 3 SOLUTION RESPIRATORY (INHALATION) at 15:36

## 2018-01-01 RX ADMIN — CLONIDINE HYDROCHLORIDE 0.2 MG: 0.2 TABLET ORAL at 20:50

## 2018-01-01 RX ADMIN — CLONIDINE HYDROCHLORIDE 0.2 MG: 0.2 TABLET ORAL at 16:11

## 2018-01-01 RX ADMIN — SODIUM CHLORIDE 35.7 UNITS/HR: 9 INJECTION, SOLUTION INTRAVENOUS at 01:46

## 2018-01-01 RX ADMIN — CLONIDINE HYDROCHLORIDE 0.2 MG: 0.2 TABLET ORAL at 15:20

## 2018-01-01 RX ADMIN — SIROLIMUS 4 MG: 1 TABLET ORAL at 17:54

## 2018-01-01 RX ADMIN — VASOPRESSIN 0.04 UNITS/MIN: 20 INJECTION INTRAVENOUS at 00:12

## 2018-01-01 RX ADMIN — CALCITRIOL 0.5 MCG: 0.25 CAPSULE ORAL at 11:32

## 2018-01-01 RX ADMIN — SODIUM BICARBONATE 100 MEQ: 84 INJECTION, SOLUTION INTRAVENOUS at 10:26

## 2018-01-01 RX ADMIN — Medication 160 MG: at 13:57

## 2018-01-01 RX ADMIN — CLONIDINE HYDROCHLORIDE 0.2 MG: 0.2 TABLET ORAL at 22:23

## 2018-01-01 RX ADMIN — Medication: at 04:45

## 2018-01-01 RX ADMIN — MYCOPHENOLIC ACID 360 MG: 180 TABLET, DELAYED RELEASE ORAL at 07:11

## 2018-01-01 RX ADMIN — MIDAZOLAM HYDROCHLORIDE 1 MG: 1 INJECTION INTRAMUSCULAR; INTRAVENOUS at 08:08

## 2018-01-01 RX ADMIN — Medication 2800 ML/HR: at 22:00

## 2018-01-01 RX ADMIN — CALCIUM CHLORIDE 0.5 G: 100 INJECTION INTRAVENOUS; INTRAVENTRICULAR at 12:43

## 2018-01-01 RX ADMIN — HEPARIN SODIUM AND DEXTROSE 14 UNITS/KG/HR: 10000; 5 INJECTION INTRAVENOUS at 20:48

## 2018-01-01 RX ADMIN — SIMVASTATIN 20 MG: 20 TABLET, FILM COATED ORAL at 21:45

## 2018-01-01 RX ADMIN — Medication: at 21:22

## 2018-01-01 RX ADMIN — HYDROMORPHONE HYDROCHLORIDE 0.5 MG: 1 INJECTION, SOLUTION INTRAMUSCULAR; INTRAVENOUS; SUBCUTANEOUS at 15:33

## 2018-01-01 RX ADMIN — CALCIUM ACETATE 1334 MG: 667 CAPSULE ORAL at 10:51

## 2018-01-01 RX ADMIN — Medication 10 ML: at 08:49

## 2018-01-01 RX ADMIN — Medication 10 ML: at 08:28

## 2018-01-01 RX ADMIN — VASOPRESSIN 0.04 UNITS/MIN: 20 INJECTION INTRAVENOUS at 09:30

## 2018-01-01 RX ADMIN — INSULIN GLARGINE 15 UNITS: 100 INJECTION, SOLUTION SUBCUTANEOUS at 11:31

## 2018-01-01 RX ADMIN — GABAPENTIN 100 MG: 100 CAPSULE ORAL at 21:45

## 2018-01-01 RX ADMIN — MAGNESIUM SULFATE HEPTAHYDRATE 1.5 G: 500 INJECTION, SOLUTION INTRAMUSCULAR; INTRAVENOUS at 22:15

## 2018-01-01 RX ADMIN — EPINEPHRINE 5 MCG/MIN: 1 INJECTION, SOLUTION INTRAMUSCULAR; INTRAVENOUS; SUBCUTANEOUS at 12:18

## 2018-01-01 RX ADMIN — CETIRIZINE HYDROCHLORIDE 10 MG: 10 TABLET, FILM COATED ORAL at 09:57

## 2018-01-01 RX ADMIN — Medication 4600 ML/HR: at 12:22

## 2018-01-01 RX ADMIN — LATANOPROST 1 DROP: 50 SOLUTION OPHTHALMIC at 22:24

## 2018-01-01 RX ADMIN — Medication 10 ML: at 21:32

## 2018-01-01 RX ADMIN — Medication 0.5 MG: at 15:33

## 2018-01-01 RX ADMIN — Medication 4600 ML/HR: at 05:18

## 2018-01-01 RX ADMIN — Medication 50 MCG/MIN: at 17:55

## 2018-01-01 RX ADMIN — SODIUM CHLORIDE: 9 INJECTION, SOLUTION INTRAVENOUS at 07:53

## 2018-01-01 RX ADMIN — GABAPENTIN 100 MG: 100 CAPSULE ORAL at 21:26

## 2018-01-01 RX ADMIN — CALCIUM ACETATE 1334 MG: 667 CAPSULE ORAL at 16:28

## 2018-01-01 RX ADMIN — Medication 4600 ML/HR: at 11:52

## 2018-01-01 RX ADMIN — Medication 15 ML: at 06:23

## 2018-01-01 RX ADMIN — INSULIN LISPRO 2 UNITS: 100 INJECTION, SOLUTION INTRAVENOUS; SUBCUTANEOUS at 14:05

## 2018-01-01 RX ADMIN — FENTANYL CITRATE 50 MCG: 50 INJECTION INTRAMUSCULAR; INTRAVENOUS at 13:57

## 2018-01-01 RX ADMIN — Medication 4600 ML/HR: at 05:25

## 2018-01-01 RX ADMIN — Medication 4600 ML/HR: at 23:03

## 2018-01-01 RX ADMIN — ASPIRIN 81 MG 81 MG: 81 TABLET ORAL at 08:12

## 2018-01-01 RX ADMIN — LATANOPROST 1 DROP: 50 SOLUTION OPHTHALMIC at 22:25

## 2018-01-01 RX ADMIN — GABAPENTIN 100 MG: 100 CAPSULE ORAL at 21:09

## 2018-01-01 RX ADMIN — EPOPROSTENOL 50 NG/KG/MIN: 1.5 INJECTION, POWDER, LYOPHILIZED, FOR SOLUTION INTRAVENOUS at 15:16

## 2018-01-01 RX ADMIN — Medication 2800 ML/HR: at 02:08

## 2018-01-01 RX ADMIN — Medication 4600 ML/HR: at 16:22

## 2018-01-01 RX ADMIN — Medication 15 G: at 11:34

## 2018-01-01 RX ADMIN — Medication 4600 ML/HR: at 08:19

## 2018-01-01 RX ADMIN — MIDAZOLAM HYDROCHLORIDE 1 MG: 1 INJECTION INTRAMUSCULAR; INTRAVENOUS at 08:13

## 2018-01-01 RX ADMIN — CEFTRIAXONE SODIUM 1 G: 1 INJECTION, POWDER, FOR SOLUTION INTRAMUSCULAR; INTRAVENOUS at 20:47

## 2018-01-01 RX ADMIN — LATANOPROST 1 DROP: 50 SOLUTION OPHTHALMIC at 19:58

## 2018-01-01 RX ADMIN — CARVEDILOL 50 MG: 25 TABLET, FILM COATED ORAL at 20:25

## 2018-01-01 RX ADMIN — INSULIN GLARGINE 18 UNITS: 100 INJECTION, SOLUTION SUBCUTANEOUS at 21:21

## 2018-01-01 RX ADMIN — INSULIN GLARGINE 15 UNITS: 100 INJECTION, SOLUTION SUBCUTANEOUS at 21:52

## 2018-01-01 RX ADMIN — EPOPROSTENOL 50 NG/KG/MIN: 1.5 INJECTION, POWDER, LYOPHILIZED, FOR SOLUTION INTRAVENOUS at 01:15

## 2018-01-01 RX ADMIN — CARVEDILOL 50 MG: 25 TABLET, FILM COATED ORAL at 09:36

## 2018-01-01 RX ADMIN — NOREPINEPHRINE BITARTRATE 38 MCG/MIN: 1 INJECTION INTRAVENOUS at 20:54

## 2018-01-01 RX ADMIN — Medication: at 12:09

## 2018-01-01 RX ADMIN — HEPARIN SODIUM 32000 UNITS: 1000 INJECTION, SOLUTION INTRAVENOUS; SUBCUTANEOUS at 10:00

## 2018-01-01 RX ADMIN — SIROLIMUS 4 MG: 1 TABLET ORAL at 13:18

## 2018-01-01 RX ADMIN — ALBUMIN (HUMAN) 12.5 G: 12.5 INJECTION, SOLUTION INTRAVENOUS at 13:16

## 2018-01-01 RX ADMIN — VASOPRESSIN 0.04 UNITS/MIN: 20 INJECTION INTRAVENOUS at 00:25

## 2018-01-01 RX ADMIN — SODIUM CHLORIDE: 9 INJECTION, SOLUTION INTRAVENOUS at 14:10

## 2018-01-01 RX ADMIN — PHENYLEPHRINE HYDROCHLORIDE 200 MCG/MIN: 10 INJECTION INTRAVENOUS at 05:22

## 2018-01-01 RX ADMIN — IPRATROPIUM BROMIDE AND ALBUTEROL SULFATE 1 AMPULE: .5; 3 SOLUTION RESPIRATORY (INHALATION) at 10:21

## 2018-01-01 RX ADMIN — CARVEDILOL 50 MG: 25 TABLET, FILM COATED ORAL at 20:49

## 2018-01-01 RX ADMIN — ALBUMIN (HUMAN) 25 G: 0.25 INJECTION, SOLUTION INTRAVENOUS at 18:58

## 2018-01-01 RX ADMIN — CEFAZOLIN SODIUM 1 G: 1 INJECTION, SOLUTION INTRAVENOUS at 07:05

## 2018-01-01 RX ADMIN — VANCOMYCIN HYDROCHLORIDE: 1 INJECTION, POWDER, LYOPHILIZED, FOR SOLUTION INTRAVENOUS at 23:25

## 2018-01-01 RX ADMIN — Medication: at 09:24

## 2018-01-01 RX ADMIN — ALBUMIN (HUMAN) 25 G: 0.25 INJECTION, SOLUTION INTRAVENOUS at 12:36

## 2018-01-01 RX ADMIN — LATANOPROST 1 DROP: 50 SOLUTION OPHTHALMIC at 20:55

## 2018-01-01 RX ADMIN — Medication 10 ML: at 12:23

## 2018-01-01 RX ADMIN — FENTANYL CITRATE 50 MCG: 50 INJECTION, SOLUTION INTRAMUSCULAR; INTRAVENOUS at 15:22

## 2018-01-01 RX ADMIN — SIROLIMUS 4 MG: 0.5 TABLET, FILM COATED ORAL at 11:33

## 2018-01-01 RX ADMIN — SODIUM CHLORIDE: 4.5 INJECTION, SOLUTION INTRAVENOUS at 12:39

## 2018-01-01 RX ADMIN — ISOSORBIDE MONONITRATE 60 MG: 60 TABLET ORAL at 07:56

## 2018-01-01 RX ADMIN — CLOPIDOGREL BISULFATE 75 MG: 75 TABLET ORAL at 13:18

## 2018-01-01 RX ADMIN — ISOSORBIDE MONONITRATE 30 MG: 30 TABLET ORAL at 11:23

## 2018-01-01 RX ADMIN — GABAPENTIN 100 MG: 100 CAPSULE ORAL at 20:36

## 2018-01-01 RX ADMIN — CALCIUM ACETATE 1334 MG: 667 CAPSULE ORAL at 12:27

## 2018-01-01 RX ADMIN — CALCIUM ACETATE 1334 MG: 667 CAPSULE ORAL at 09:57

## 2018-01-01 RX ADMIN — ASPIRIN 325 MG: 325 TABLET ORAL at 13:18

## 2018-01-01 RX ADMIN — Medication 10 ML: at 20:47

## 2018-01-01 RX ADMIN — CLONIDINE HYDROCHLORIDE 0.2 MG: 0.2 TABLET ORAL at 21:56

## 2018-01-01 RX ADMIN — ASPIRIN 81 MG CHEWABLE TABLET 81 MG: 81 TABLET CHEWABLE at 09:57

## 2018-01-01 RX ADMIN — Medication 4600 ML/HR: at 03:00

## 2018-01-01 RX ADMIN — GABAPENTIN 100 MG: 100 CAPSULE ORAL at 08:26

## 2018-01-01 RX ADMIN — Medication 3 UNITS: at 16:27

## 2018-01-01 RX ADMIN — SODIUM CHLORIDE 250 ML: 9 INJECTION, SOLUTION INTRAVENOUS at 00:20

## 2018-01-01 RX ADMIN — ROCURONIUM BROMIDE 50 MG: 10 INJECTION INTRAVENOUS at 09:14

## 2018-01-01 RX ADMIN — CARVEDILOL 50 MG: 25 TABLET, FILM COATED ORAL at 09:45

## 2018-01-01 RX ADMIN — Medication 3 UNITS: at 16:53

## 2018-01-01 RX ADMIN — INSULIN GLARGINE 18 UNITS: 100 INJECTION, SOLUTION SUBCUTANEOUS at 08:27

## 2018-01-01 RX ADMIN — CALCIUM ACETATE 1334 MG: 667 CAPSULE ORAL at 16:45

## 2018-01-01 RX ADMIN — Medication: at 01:14

## 2018-01-01 RX ADMIN — CETIRIZINE HYDROCHLORIDE 10 MG: 10 TABLET, FILM COATED ORAL at 08:26

## 2018-01-01 RX ADMIN — CLONIDINE HYDROCHLORIDE 0.2 MG: 0.2 TABLET ORAL at 06:13

## 2018-01-01 RX ADMIN — CLONIDINE HYDROCHLORIDE 0.2 MG: 0.2 TABLET ORAL at 06:06

## 2018-01-01 RX ADMIN — INSULIN GLARGINE 18 UNITS: 100 INJECTION, SOLUTION SUBCUTANEOUS at 21:07

## 2018-01-01 RX ADMIN — POTASSIUM CHLORIDE 20 MEQ: 29.8 INJECTION, SOLUTION INTRAVENOUS at 12:48

## 2018-01-01 RX ADMIN — EPINEPHRINE 20 MCG/MIN: 1 INJECTION, SOLUTION, CONCENTRATE INTRAVENOUS at 18:41

## 2018-01-01 RX ADMIN — Medication: at 05:25

## 2018-01-01 RX ADMIN — EPOPROSTENOL 50 NG/KG/MIN: 1.5 INJECTION, POWDER, LYOPHILIZED, FOR SOLUTION INTRAVENOUS at 18:11

## 2018-01-01 RX ADMIN — Medication 4600 ML/HR: at 01:24

## 2018-01-01 RX ADMIN — FENTANYL CITRATE 200 MCG: 50 INJECTION INTRAMUSCULAR; INTRAVENOUS at 10:32

## 2018-01-01 RX ADMIN — CARVEDILOL 50 MG: 25 TABLET, FILM COATED ORAL at 20:57

## 2018-01-01 RX ADMIN — EPOPROSTENOL 50 NG/KG/MIN: 1.5 INJECTION, POWDER, LYOPHILIZED, FOR SOLUTION INTRAVENOUS at 03:34

## 2018-01-01 RX ADMIN — EPINEPHRINE 13 MCG/MIN: 1 INJECTION, SOLUTION, CONCENTRATE INTRAVENOUS at 18:17

## 2018-01-01 RX ADMIN — SODIUM CHLORIDE: 9 INJECTION, SOLUTION INTRAVENOUS at 12:59

## 2018-01-01 RX ADMIN — SODIUM CHLORIDE: 4.5 INJECTION, SOLUTION INTRAVENOUS at 07:05

## 2018-01-01 RX ADMIN — VASOPRESSIN 0.04 UNITS/MIN: 20 INJECTION INTRAVENOUS at 07:48

## 2018-01-01 RX ADMIN — Medication: at 03:17

## 2018-01-01 RX ADMIN — Medication 10 ML: at 13:27

## 2018-01-01 RX ADMIN — CARVEDILOL 50 MG: 25 TABLET, FILM COATED ORAL at 20:36

## 2018-01-01 RX ADMIN — GABAPENTIN 100 MG: 100 CAPSULE ORAL at 21:21

## 2018-01-01 RX ADMIN — MYCOPHENOLIC ACID 360 MG: 180 TABLET, DELAYED RELEASE ORAL at 08:26

## 2018-01-01 RX ADMIN — Medication 10 ML: at 20:58

## 2018-01-01 RX ADMIN — MULTIPLE VITAMINS W/ MINERALS TAB 1 TABLET: TAB at 13:33

## 2018-01-01 RX ADMIN — INSULIN LISPRO 3 UNITS: 100 INJECTION, SOLUTION INTRAVENOUS; SUBCUTANEOUS at 16:30

## 2018-01-01 RX ADMIN — SODIUM CHLORIDE: 9 INJECTION, SOLUTION INTRAVENOUS at 17:15

## 2018-01-01 RX ADMIN — SODIUM CHLORIDE 0.7 UNITS/HR: 9 INJECTION, SOLUTION INTRAVENOUS at 14:54

## 2018-01-01 RX ADMIN — SODIUM CHLORIDE 50000 UNITS: 0.9 IRRIGANT IRRIGATION at 08:35

## 2018-01-01 RX ADMIN — PHENYLEPHRINE HYDROCHLORIDE 175 MCG/MIN: 10 INJECTION INTRAVENOUS at 06:06

## 2018-01-01 RX ADMIN — Medication 4600 ML/HR: at 19:29

## 2018-01-01 RX ADMIN — CARVEDILOL 50 MG: 25 TABLET, FILM COATED ORAL at 21:26

## 2018-01-01 RX ADMIN — INSULIN GLARGINE 15 UNITS: 100 INJECTION, SOLUTION SUBCUTANEOUS at 20:33

## 2018-01-01 RX ADMIN — POTASSIUM PHOSPHATE, MONOBASIC AND POTASSIUM PHOSPHATE, DIBASIC 22 MMOL: 224; 236 INJECTION, SOLUTION, CONCENTRATE INTRAVENOUS at 11:30

## 2018-01-01 RX ADMIN — CARVEDILOL 50 MG: 25 TABLET, FILM COATED ORAL at 21:45

## 2018-01-01 RX ADMIN — IPRATROPIUM BROMIDE AND ALBUTEROL SULFATE 1 AMPULE: .5; 3 SOLUTION RESPIRATORY (INHALATION) at 21:28

## 2018-01-01 RX ADMIN — Medication 2800 ML/HR: at 22:01

## 2018-01-01 RX ADMIN — DEXTROSE MONOHYDRATE 12.5 G: 25 INJECTION, SOLUTION INTRAVENOUS at 12:23

## 2018-01-01 RX ADMIN — CARVEDILOL 50 MG: 25 TABLET, FILM COATED ORAL at 20:42

## 2018-01-01 RX ADMIN — ERYTHROPOIETIN 10000 UNITS: 10000 INJECTION, SOLUTION INTRAVENOUS; SUBCUTANEOUS at 09:25

## 2018-01-01 RX ADMIN — HEPARIN SODIUM: 10000 INJECTION, SOLUTION INTRAVENOUS; SUBCUTANEOUS at 10:15

## 2018-01-01 RX ADMIN — ASPIRIN 81 MG CHEWABLE TABLET 81 MG: 81 TABLET CHEWABLE at 08:44

## 2018-01-01 RX ADMIN — Medication 4600 ML/HR: at 16:23

## 2018-01-01 RX ADMIN — INSULIN GLARGINE 15 UNITS: 100 INJECTION, SOLUTION SUBCUTANEOUS at 21:19

## 2018-01-01 RX ADMIN — IPRATROPIUM BROMIDE AND ALBUTEROL SULFATE 1 AMPULE: .5; 3 SOLUTION RESPIRATORY (INHALATION) at 01:00

## 2018-01-01 RX ADMIN — SODIUM CHLORIDE 41.2 UNITS/HR: 9 INJECTION, SOLUTION INTRAVENOUS at 11:15

## 2018-01-01 RX ADMIN — SIMVASTATIN 20 MG: 20 TABLET, FILM COATED ORAL at 19:58

## 2018-01-01 RX ADMIN — Medication 4600 ML/HR: at 16:25

## 2018-01-01 RX ADMIN — ACETAMINOPHEN 650 MG: 325 TABLET ORAL at 21:36

## 2018-01-01 RX ADMIN — Medication: at 16:13

## 2018-01-01 RX ADMIN — Medication 10 ML: at 13:00

## 2018-01-01 RX ADMIN — CALCITRIOL 0.5 MCG: 0.25 CAPSULE ORAL at 13:33

## 2018-01-01 RX ADMIN — INSULIN LISPRO 2 UNITS: 100 INJECTION, SOLUTION INTRAVENOUS; SUBCUTANEOUS at 16:43

## 2018-01-01 RX ADMIN — CARVEDILOL 50 MG: 25 TABLET, FILM COATED ORAL at 21:09

## 2018-01-01 RX ADMIN — ASPIRIN 325 MG: 325 TABLET ORAL at 11:40

## 2018-01-01 RX ADMIN — Medication 2800 ML/HR: at 05:16

## 2018-01-01 RX ADMIN — MULTIPLE VITAMINS W/ MINERALS TAB 1 TABLET: TAB at 14:05

## 2018-01-01 RX ADMIN — GABAPENTIN 100 MG: 100 CAPSULE ORAL at 11:52

## 2018-01-01 RX ADMIN — ISOSORBIDE MONONITRATE 60 MG: 60 TABLET ORAL at 12:43

## 2018-01-01 RX ADMIN — CALCIUM ACETATE 1334 MG: 667 CAPSULE ORAL at 16:29

## 2018-01-01 RX ADMIN — Medication 4600 ML/HR: at 05:20

## 2018-01-01 RX ADMIN — CLONIDINE HYDROCHLORIDE 0.2 MG: 0.2 TABLET ORAL at 20:42

## 2018-01-01 RX ADMIN — CLONIDINE HYDROCHLORIDE 0.2 MG: 0.2 TABLET ORAL at 06:12

## 2018-01-01 RX ADMIN — Medication 2 G: at 08:30

## 2018-01-01 RX ADMIN — SODIUM BICARBONATE 50 MEQ: 84 INJECTION, SOLUTION INTRAVENOUS at 02:44

## 2018-01-01 RX ADMIN — HEPARIN SODIUM AND DEXTROSE 14 UNITS/KG/HR: 10000; 5 INJECTION INTRAVENOUS at 02:52

## 2018-01-01 RX ADMIN — SIROLIMUS 4 MG: 0.5 TABLET, FILM COATED ORAL at 13:27

## 2018-01-01 RX ADMIN — SODIUM CHLORIDE: 9 INJECTION, SOLUTION INTRAVENOUS at 10:01

## 2018-01-01 RX ADMIN — Medication 10 UNITS: at 12:18

## 2018-01-01 RX ADMIN — CALCIUM ACETATE 1334 MG: 667 CAPSULE ORAL at 13:18

## 2018-01-01 RX ADMIN — INSULIN LISPRO 5 UNITS: 100 INJECTION, SOLUTION INTRAVENOUS; SUBCUTANEOUS at 11:29

## 2018-01-01 RX ADMIN — MIDAZOLAM HYDROCHLORIDE 2 MG: 1 INJECTION, SOLUTION INTRAMUSCULAR; INTRAVENOUS at 13:55

## 2018-01-01 RX ADMIN — GABAPENTIN 100 MG: 100 CAPSULE ORAL at 12:26

## 2018-01-01 RX ADMIN — HEPARIN SODIUM AND DEXTROSE 18 UNITS/KG/HR: 10000; 5 INJECTION INTRAVENOUS at 23:26

## 2018-01-01 RX ADMIN — GABAPENTIN 100 MG: 100 CAPSULE ORAL at 22:23

## 2018-01-01 RX ADMIN — CLONIDINE HYDROCHLORIDE 0.3 MG: 0.2 TABLET ORAL at 20:39

## 2018-01-01 RX ADMIN — CALCIUM ACETATE 1334 MG: 667 CAPSULE ORAL at 07:11

## 2018-01-01 RX ADMIN — GABAPENTIN 100 MG: 100 CAPSULE ORAL at 20:42

## 2018-01-01 RX ADMIN — Medication 10 ML: at 21:02

## 2018-01-01 RX ADMIN — Medication: at 11:38

## 2018-01-01 RX ADMIN — MULTIPLE VITAMINS W/ MINERALS TAB 1 TABLET: TAB at 10:51

## 2018-01-01 RX ADMIN — Medication 3 MG: at 22:28

## 2018-01-01 RX ADMIN — INSULIN LISPRO 5 UNITS: 100 INJECTION, SOLUTION INTRAVENOUS; SUBCUTANEOUS at 21:04

## 2018-01-01 RX ADMIN — Medication 4600 ML/HR: at 11:39

## 2018-01-01 RX ADMIN — ISOSORBIDE MONONITRATE 30 MG: 30 TABLET ORAL at 06:43

## 2018-01-01 RX ADMIN — MULTIPLE VITAMINS W/ MINERALS TAB 1 TABLET: TAB at 11:31

## 2018-01-01 RX ADMIN — CARVEDILOL 50 MG: 25 TABLET, FILM COATED ORAL at 08:44

## 2018-01-01 RX ADMIN — Medication 2 MCG/MIN: at 12:48

## 2018-01-01 RX ADMIN — Medication 1 MG: at 13:30

## 2018-01-01 RX ADMIN — CLONIDINE HYDROCHLORIDE 0.3 MG: 0.2 TABLET ORAL at 14:35

## 2018-01-01 RX ADMIN — INSULIN LISPRO 4 UNITS: 100 INJECTION, SOLUTION INTRAVENOUS; SUBCUTANEOUS at 12:01

## 2018-01-01 RX ADMIN — CLONIDINE HYDROCHLORIDE 0.2 MG: 0.2 TABLET ORAL at 22:11

## 2018-01-01 RX ADMIN — CALCITRIOL 0.5 MCG: 0.25 CAPSULE ORAL at 13:19

## 2018-01-01 RX ADMIN — INSULIN LISPRO 5 UNITS: 100 INJECTION, SOLUTION INTRAVENOUS; SUBCUTANEOUS at 12:36

## 2018-01-01 RX ADMIN — METHYLPHENIDATE HYDROCHLORIDE 10 MG: 5 TABLET ORAL at 10:11

## 2018-01-01 RX ADMIN — EPINEPHRINE 20 MCG/MIN: 1 INJECTION, SOLUTION, CONCENTRATE INTRAVENOUS at 00:07

## 2018-01-01 RX ADMIN — DOCUSATE SODIUM 100 MG: 50 LIQUID ORAL at 20:42

## 2018-01-01 RX ADMIN — DEXTROSE AND SODIUM CHLORIDE: 5; 900 INJECTION, SOLUTION INTRAVENOUS at 13:10

## 2018-01-01 RX ADMIN — IPRATROPIUM BROMIDE AND ALBUTEROL SULFATE 1 AMPULE: .5; 3 SOLUTION RESPIRATORY (INHALATION) at 13:14

## 2018-01-01 RX ADMIN — METHYLPHENIDATE HYDROCHLORIDE 10 MG: 5 TABLET ORAL at 13:44

## 2018-01-01 RX ADMIN — Medication: at 18:39

## 2018-01-01 RX ADMIN — EPOPROSTENOL 50 NG/KG/MIN: 1.5 INJECTION, POWDER, LYOPHILIZED, FOR SOLUTION INTRAVENOUS at 22:15

## 2018-01-01 RX ADMIN — MULTIPLE VITAMINS W/ MINERALS TAB 1 TABLET: TAB at 12:59

## 2018-01-01 RX ADMIN — CETIRIZINE HYDROCHLORIDE 10 MG: 10 TABLET, FILM COATED ORAL at 12:26

## 2018-01-01 RX ADMIN — CARVEDILOL 50 MG: 25 TABLET, FILM COATED ORAL at 21:51

## 2018-01-01 RX ADMIN — LACTULOSE 30 G: 20 SOLUTION ORAL at 11:39

## 2018-01-01 RX ADMIN — CALCIUM ACETATE 1334 MG: 667 CAPSULE ORAL at 11:32

## 2018-01-01 RX ADMIN — GABAPENTIN 100 MG: 100 CAPSULE ORAL at 13:18

## 2018-01-01 RX ADMIN — CARVEDILOL 50 MG: 25 TABLET, FILM COATED ORAL at 06:43

## 2018-01-01 RX ADMIN — PHENYLEPHRINE HYDROCHLORIDE 200 MCG/MIN: 10 INJECTION INTRAVENOUS at 01:24

## 2018-01-01 RX ADMIN — Medication 4600 ML/HR: at 04:43

## 2018-01-01 RX ADMIN — CLONIDINE HYDROCHLORIDE 0.2 MG: 0.2 TABLET ORAL at 00:11

## 2018-01-01 RX ADMIN — SIMVASTATIN 20 MG: 20 TABLET, FILM COATED ORAL at 21:08

## 2018-01-01 RX ADMIN — SIMVASTATIN 20 MG: 20 TABLET, FILM COATED ORAL at 22:11

## 2018-01-01 RX ADMIN — MYCOPHENOLIC ACID 360 MG: 180 TABLET, DELAYED RELEASE ORAL at 21:19

## 2018-01-01 RX ADMIN — Medication 10 ML: at 20:50

## 2018-01-01 RX ADMIN — INSULIN LISPRO 9 UNITS: 100 INJECTION, SOLUTION INTRAVENOUS; SUBCUTANEOUS at 10:07

## 2018-01-01 RX ADMIN — FENTANYL CITRATE 50 MCG: 50 INJECTION, SOLUTION INTRAMUSCULAR; INTRAVENOUS at 15:27

## 2018-01-01 RX ADMIN — Medication 4600 ML/HR: at 01:23

## 2018-01-01 RX ADMIN — Medication 50 MEQ: at 08:42

## 2018-01-01 RX ADMIN — EPINEPHRINE 16 MCG/MIN: 1 INJECTION, SOLUTION, CONCENTRATE INTRAVENOUS at 17:30

## 2018-01-01 RX ADMIN — GABAPENTIN 100 MG: 100 CAPSULE ORAL at 10:05

## 2018-01-01 RX ADMIN — CETIRIZINE HYDROCHLORIDE 10 MG: 10 TABLET, FILM COATED ORAL at 09:37

## 2018-01-01 RX ADMIN — DEXTROSE MONOHYDRATE 12.5 G: 25 INJECTION, SOLUTION INTRAVENOUS at 23:25

## 2018-01-01 RX ADMIN — SODIUM CHLORIDE: 9 INJECTION, SOLUTION INTRAVENOUS at 08:24

## 2018-01-01 RX ADMIN — HEPARIN SODIUM AND DEXTROSE 11.6 UNITS/KG/HR: 10000; 5 INJECTION INTRAVENOUS at 12:21

## 2018-01-01 RX ADMIN — DEXTROSE MONOHYDRATE 7 G: 25 INJECTION, SOLUTION INTRAVENOUS at 20:01

## 2018-01-01 RX ADMIN — POTASSIUM CHLORIDE 20 MEQ: 29.8 INJECTION, SOLUTION INTRAVENOUS at 13:40

## 2018-01-01 RX ADMIN — VASOPRESSIN 0.04 UNITS/MIN: 20 INJECTION INTRAVENOUS at 14:20

## 2018-01-01 RX ADMIN — Medication 4600 ML/HR: at 03:01

## 2018-01-01 RX ADMIN — LATANOPROST 1 DROP: 50 SOLUTION OPHTHALMIC at 21:57

## 2018-01-01 RX ADMIN — MYCOPHENOLIC ACID 360 MG: 180 TABLET, DELAYED RELEASE ORAL at 12:10

## 2018-01-01 RX ADMIN — METHYLPHENIDATE HYDROCHLORIDE 10 MG: 5 TABLET ORAL at 12:43

## 2018-01-01 RX ADMIN — SODIUM CHLORIDE 50000 UNITS: 900 IRRIGANT IRRIGATION at 13:45

## 2018-01-01 RX ADMIN — EPINEPHRINE 18 MCG/MIN: 1 INJECTION, SOLUTION, CONCENTRATE INTRAVENOUS at 22:30

## 2018-01-01 RX ADMIN — POTASSIUM CHLORIDE 20 MEQ: 400 INJECTION, SOLUTION INTRAVENOUS at 00:14

## 2018-01-01 RX ADMIN — MIDAZOLAM HYDROCHLORIDE 1 MG: 1 INJECTION, SOLUTION INTRAMUSCULAR; INTRAVENOUS at 13:08

## 2018-01-01 RX ADMIN — POTASSIUM CHLORIDE 10 MEQ: 10 INJECTION, SOLUTION INTRAVENOUS at 19:37

## 2018-01-01 RX ADMIN — INSULIN LISPRO 3 UNITS: 100 INJECTION, SOLUTION INTRAVENOUS; SUBCUTANEOUS at 21:22

## 2018-01-01 RX ADMIN — Medication 4600 ML/HR: at 06:00

## 2018-01-01 RX ADMIN — EPINEPHRINE 16 MCG/MIN: 1 INJECTION, SOLUTION, CONCENTRATE INTRAVENOUS at 14:36

## 2018-01-01 RX ADMIN — CLINDAMYCIN PHOSPHATE 600 MG: 12 INJECTION, SOLUTION INTRAMUSCULAR; INTRAVENOUS at 12:39

## 2018-01-01 RX ADMIN — Medication 10 ML: at 20:39

## 2018-01-01 RX ADMIN — VASOPRESSIN 0.04 UNITS/MIN: 20 INJECTION INTRAVENOUS at 14:47

## 2018-01-01 RX ADMIN — HEPARIN SODIUM: 10000 INJECTION, SOLUTION INTRAVENOUS; SUBCUTANEOUS at 00:50

## 2018-01-01 RX ADMIN — SIMVASTATIN 20 MG: 20 TABLET, FILM COATED ORAL at 16:52

## 2018-01-01 RX ADMIN — ISOSORBIDE MONONITRATE 60 MG: 60 TABLET ORAL at 09:45

## 2018-01-01 RX ADMIN — SODIUM CHLORIDE 43.1 UNITS/HR: 9 INJECTION, SOLUTION INTRAVENOUS at 06:31

## 2018-01-01 RX ADMIN — IPRATROPIUM BROMIDE AND ALBUTEROL SULFATE 1 AMPULE: .5; 3 SOLUTION RESPIRATORY (INHALATION) at 08:51

## 2018-01-01 RX ADMIN — Medication 3 MG: at 22:14

## 2018-01-01 RX ADMIN — CALCIUM ACETATE 1334 MG: 667 CAPSULE ORAL at 12:44

## 2018-01-01 RX ADMIN — CALCITRIOL 0.5 MCG: 0.25 CAPSULE ORAL at 12:26

## 2018-01-01 RX ADMIN — EPINEPHRINE 18 MCG/MIN: 1 INJECTION, SOLUTION, CONCENTRATE INTRAVENOUS at 04:07

## 2018-01-01 RX ADMIN — SIMVASTATIN 20 MG: 20 TABLET, FILM COATED ORAL at 21:19

## 2018-01-01 RX ADMIN — TIROFIBAN 0.07 MCG/KG/MIN: 5 INJECTION, SOLUTION INTRAVENOUS at 03:01

## 2018-01-01 RX ADMIN — VASOPRESSIN 0.04 UNITS/MIN: 20 INJECTION INTRAVENOUS at 21:05

## 2018-01-01 RX ADMIN — CALCITRIOL 0.5 MCG: 0.25 CAPSULE ORAL at 12:57

## 2018-01-01 RX ADMIN — SIMVASTATIN 20 MG: 20 TABLET, FILM COATED ORAL at 20:47

## 2018-01-01 RX ADMIN — HEPARIN SODIUM AND DEXTROSE 18.02 UNITS/KG/HR: 10000; 5 INJECTION INTRAVENOUS at 16:52

## 2018-01-01 RX ADMIN — FAMOTIDINE 20 MG: 20 TABLET ORAL at 12:26

## 2018-01-01 RX ADMIN — IPRATROPIUM BROMIDE AND ALBUTEROL SULFATE 1 AMPULE: .5; 3 SOLUTION RESPIRATORY (INHALATION) at 18:21

## 2018-01-01 RX ADMIN — CLONIDINE HYDROCHLORIDE 0.2 MG: 0.2 TABLET ORAL at 05:04

## 2018-01-01 RX ADMIN — SODIUM BICARBONATE 50 MEQ: 84 INJECTION, SOLUTION INTRAVENOUS at 08:42

## 2018-01-01 RX ADMIN — INSULIN LISPRO 1 UNITS: 100 INJECTION, SOLUTION INTRAVENOUS; SUBCUTANEOUS at 17:34

## 2018-01-01 RX ADMIN — DEXAMETHASONE SODIUM PHOSPHATE 10 MG: 4 INJECTION, SOLUTION INTRAMUSCULAR; INTRAVENOUS at 14:06

## 2018-01-01 RX ADMIN — SODIUM CHLORIDE: 9 INJECTION, SOLUTION INTRAVENOUS at 05:09

## 2018-01-01 RX ADMIN — AZITHROMYCIN MONOHYDRATE 250 MG: 500 INJECTION, POWDER, LYOPHILIZED, FOR SOLUTION INTRAVENOUS at 14:05

## 2018-01-01 RX ADMIN — SUCRALFATE 1 G: 1 TABLET ORAL at 12:26

## 2018-01-01 RX ADMIN — Medication: at 03:14

## 2018-01-01 RX ADMIN — SODIUM CHLORIDE 250 ML: 9 INJECTION, SOLUTION INTRAVENOUS at 17:31

## 2018-01-01 RX ADMIN — INSULIN GLARGINE 15 UNITS: 100 INJECTION, SOLUTION SUBCUTANEOUS at 21:26

## 2018-01-01 RX ADMIN — SODIUM CHLORIDE: 9 INJECTION, SOLUTION INTRAVENOUS at 12:00

## 2018-01-01 RX ADMIN — SODIUM CHLORIDE: 9 INJECTION, SOLUTION INTRAVENOUS at 14:25

## 2018-01-01 RX ADMIN — LATANOPROST 1 DROP: 50 SOLUTION OPHTHALMIC at 20:24

## 2018-01-01 RX ADMIN — SODIUM BICARBONATE 200 MEQ: 84 INJECTION, SOLUTION INTRAVENOUS at 17:17

## 2018-01-01 RX ADMIN — MYCOPHENOLATE MOFETIL 500 MG: 500 INJECTION, POWDER, LYOPHILIZED, FOR SOLUTION INTRAVENOUS at 14:45

## 2018-01-01 RX ADMIN — HEPARIN SODIUM AND DEXTROSE 14 UNITS/KG/HR: 10000; 5 INJECTION INTRAVENOUS at 21:47

## 2018-01-01 RX ADMIN — SIMVASTATIN 20 MG: 20 TABLET, FILM COATED ORAL at 16:45

## 2018-01-01 RX ADMIN — SIMVASTATIN 20 MG: 20 TABLET, FILM COATED ORAL at 20:57

## 2018-01-01 RX ADMIN — LATANOPROST 1 DROP: 50 SOLUTION OPHTHALMIC at 20:43

## 2018-01-01 RX ADMIN — MUPIROCIN: 20 OINTMENT TOPICAL at 04:18

## 2018-01-01 RX ADMIN — IPRATROPIUM BROMIDE AND ALBUTEROL SULFATE 1 AMPULE: .5; 3 SOLUTION RESPIRATORY (INHALATION) at 17:30

## 2018-01-01 RX ADMIN — GABAPENTIN 100 MG: 100 CAPSULE ORAL at 20:47

## 2018-01-01 RX ADMIN — Medication 4600 ML/HR: at 08:20

## 2018-01-01 RX ADMIN — SODIUM BICARBONATE: 84 INJECTION, SOLUTION INTRAVENOUS at 19:13

## 2018-01-01 RX ADMIN — SIROLIMUS 4 MG: 0.5 TABLET, FILM COATED ORAL at 11:52

## 2018-01-01 RX ADMIN — Medication 10 ML: at 20:25

## 2018-01-01 RX ADMIN — DEXTROSE MONOHYDRATE 12.5 G: 25 INJECTION, SOLUTION INTRAVENOUS at 21:00

## 2018-01-01 RX ADMIN — Medication: at 23:11

## 2018-01-01 RX ADMIN — MYCOPHENOLATE MOFETIL 500 MG: 500 INJECTION, POWDER, LYOPHILIZED, FOR SOLUTION INTRAVENOUS at 13:40

## 2018-01-01 RX ADMIN — ISOSORBIDE MONONITRATE 60 MG: 60 TABLET ORAL at 07:11

## 2018-01-01 RX ADMIN — CARVEDILOL 50 MG: 25 TABLET, FILM COATED ORAL at 08:27

## 2018-01-01 RX ADMIN — MYCOPHENOLIC ACID 360 MG: 180 TABLET, DELAYED RELEASE ORAL at 20:42

## 2018-01-01 RX ADMIN — Medication 3 UNITS: at 16:12

## 2018-01-01 RX ADMIN — IPRATROPIUM BROMIDE AND ALBUTEROL SULFATE 1 AMPULE: .5; 3 SOLUTION RESPIRATORY (INHALATION) at 21:11

## 2018-01-01 RX ADMIN — ASPIRIN 81 MG CHEWABLE TABLET 81 MG: 81 TABLET CHEWABLE at 09:36

## 2018-01-01 RX ADMIN — INSULIN LISPRO 5 UNITS: 100 INJECTION, SOLUTION INTRAVENOUS; SUBCUTANEOUS at 16:48

## 2018-01-01 RX ADMIN — EPINEPHRINE 20 MCG/MIN: 1 INJECTION, SOLUTION, CONCENTRATE INTRAVENOUS at 02:21

## 2018-01-01 RX ADMIN — Medication 4600 ML/HR: at 08:21

## 2018-01-01 RX ADMIN — CALCIUM ACETATE 667 MG: 667 CAPSULE ORAL at 17:35

## 2018-01-01 RX ADMIN — POTASSIUM CHLORIDE 20 MEQ: 29.8 INJECTION, SOLUTION INTRAVENOUS at 17:20

## 2018-01-01 RX ADMIN — CALCIUM CHLORIDE 1 G: 100 INJECTION, SOLUTION INTRAVENOUS at 01:09

## 2018-01-01 RX ADMIN — CLONIDINE HYDROCHLORIDE 0.2 MG: 0.2 TABLET ORAL at 05:21

## 2018-01-01 RX ADMIN — EPINEPHRINE 14 MCG/MIN: 1 INJECTION, SOLUTION, CONCENTRATE INTRAVENOUS at 21:31

## 2018-01-01 RX ADMIN — INSULIN LISPRO 6 UNITS: 100 INJECTION, SOLUTION INTRAVENOUS; SUBCUTANEOUS at 08:27

## 2018-01-01 RX ADMIN — FENTANYL CITRATE 75 MCG: 50 INJECTION, SOLUTION INTRAMUSCULAR; INTRAVENOUS at 17:33

## 2018-01-01 RX ADMIN — IPRATROPIUM BROMIDE AND ALBUTEROL SULFATE 1 AMPULE: .5; 3 SOLUTION RESPIRATORY (INHALATION) at 08:32

## 2018-01-01 RX ADMIN — VASOPRESSIN 1 UNITS: 20 INJECTION INTRAVENOUS at 13:40

## 2018-01-01 RX ADMIN — ONDANSETRON 4 MG: 2 INJECTION INTRAMUSCULAR; INTRAVENOUS at 03:35

## 2018-01-01 RX ADMIN — CLONIDINE HYDROCHLORIDE 0.2 MG: 0.2 TABLET ORAL at 21:45

## 2018-01-01 RX ADMIN — DEXTROSE MONOHYDRATE 12.5 G: 25 INJECTION, SOLUTION INTRAVENOUS at 23:30

## 2018-01-01 RX ADMIN — CARVEDILOL 50 MG: 25 TABLET, FILM COATED ORAL at 22:23

## 2018-01-01 RX ADMIN — CLONIDINE HYDROCHLORIDE 0.2 MG: 0.2 TABLET ORAL at 09:45

## 2018-01-01 RX ADMIN — Medication 10 ML: at 09:10

## 2018-01-01 RX ADMIN — INSULIN LISPRO 5 UNITS: 100 INJECTION, SOLUTION INTRAVENOUS; SUBCUTANEOUS at 17:28

## 2018-01-01 RX ADMIN — EPOPROSTENOL 50 NG/KG/MIN: 1.5 INJECTION, POWDER, LYOPHILIZED, FOR SOLUTION INTRAVENOUS at 11:23

## 2018-01-01 RX ADMIN — CALCIUM ACETATE 1334 MG: 667 CAPSULE ORAL at 11:52

## 2018-01-01 RX ADMIN — CARVEDILOL 50 MG: 25 TABLET, FILM COATED ORAL at 07:11

## 2018-01-01 RX ADMIN — Medication 8 UNITS: at 10:10

## 2018-01-01 RX ADMIN — SODIUM CHLORIDE 8 MG/HR: 9 INJECTION, SOLUTION INTRAVENOUS at 21:47

## 2018-01-01 RX ADMIN — VANCOMYCIN HYDROCHLORIDE 1000 MG: 1 INJECTION, POWDER, LYOPHILIZED, FOR SOLUTION INTRAVENOUS at 08:29

## 2018-01-01 RX ADMIN — ERGOCALCIFEROL 50000 UNITS: 1.25 CAPSULE ORAL at 12:43

## 2018-01-01 RX ADMIN — TIROFIBAN 0.07 MCG/KG/MIN: 5 INJECTION, SOLUTION INTRAVENOUS at 14:20

## 2018-01-01 RX ADMIN — AMINOCAPROIC ACID 5 G: 250 INJECTION, SOLUTION INTRAVENOUS at 12:45

## 2018-01-01 RX ADMIN — CARVEDILOL 50 MG: 25 TABLET, FILM COATED ORAL at 09:57

## 2018-01-01 RX ADMIN — AZITHROMYCIN MONOHYDRATE 250 MG: 500 INJECTION, POWDER, LYOPHILIZED, FOR SOLUTION INTRAVENOUS at 13:27

## 2018-01-01 RX ADMIN — Medication 15 UNITS: at 04:38

## 2018-01-01 RX ADMIN — Medication 4600 ML/HR: at 05:26

## 2018-01-01 RX ADMIN — MULTIPLE VITAMINS W/ MINERALS TAB 1 TABLET: TAB at 09:13

## 2018-01-01 RX ADMIN — EPOPROSTENOL 50 NG/KG/MIN: 1.5 INJECTION, POWDER, LYOPHILIZED, FOR SOLUTION INTRAVENOUS at 13:30

## 2018-01-01 RX ADMIN — CLONIDINE HYDROCHLORIDE 0.2 MG: 0.2 TABLET ORAL at 16:54

## 2018-01-01 RX ADMIN — Medication 4600 ML/HR: at 05:19

## 2018-01-01 RX ADMIN — CALCIUM ACETATE 667 MG: 667 CAPSULE ORAL at 08:12

## 2018-01-01 RX ADMIN — INSULIN LISPRO 2 UNITS: 100 INJECTION, SOLUTION INTRAVENOUS; SUBCUTANEOUS at 19:58

## 2018-01-01 RX ADMIN — Medication 2 UNITS: at 20:47

## 2018-01-01 RX ADMIN — VASOPRESSIN 0.04 UNITS/MIN: 20 INJECTION INTRAVENOUS at 14:23

## 2018-01-01 RX ADMIN — Medication 4600 ML/HR: at 08:04

## 2018-01-01 RX ADMIN — IPRATROPIUM BROMIDE AND ALBUTEROL SULFATE 1 AMPULE: .5; 3 SOLUTION RESPIRATORY (INHALATION) at 10:08

## 2018-01-01 RX ADMIN — Medication 1.5 G: at 00:14

## 2018-01-01 RX ADMIN — GABAPENTIN 100 MG: 100 CAPSULE ORAL at 07:55

## 2018-01-01 RX ADMIN — Medication 4600 ML/HR: at 22:07

## 2018-01-01 RX ADMIN — LATANOPROST 1 DROP: 50 SOLUTION OPHTHALMIC at 22:12

## 2018-01-01 RX ADMIN — ASPIRIN 81 MG 81 MG: 81 TABLET ORAL at 08:57

## 2018-01-01 RX ADMIN — INSULIN LISPRO 12 UNITS: 100 INJECTION, SOLUTION INTRAVENOUS; SUBCUTANEOUS at 16:49

## 2018-01-01 RX ADMIN — CETIRIZINE HYDROCHLORIDE 10 MG: 10 TABLET, FILM COATED ORAL at 13:18

## 2018-01-01 RX ADMIN — IPRATROPIUM BROMIDE AND ALBUTEROL SULFATE 1 AMPULE: .5; 3 SOLUTION RESPIRATORY (INHALATION) at 14:25

## 2018-01-01 RX ADMIN — Medication 4600 ML/HR: at 15:34

## 2018-01-01 RX ADMIN — MYCOPHENOLATE MOFETIL 500 MG: 500 INJECTION, POWDER, LYOPHILIZED, FOR SOLUTION INTRAVENOUS at 00:50

## 2018-01-01 RX ADMIN — CALCIUM ACETATE 1334 MG: 667 CAPSULE ORAL at 16:48

## 2018-01-01 RX ADMIN — EPINEPHRINE 16 MCG/MIN: 1 INJECTION, SOLUTION, CONCENTRATE INTRAVENOUS at 05:00

## 2018-01-01 RX ADMIN — Medication 10 ML: at 22:22

## 2018-01-01 RX ADMIN — DEXTROSE MONOHYDRATE 12.5 G: 25 INJECTION, SOLUTION INTRAVENOUS at 13:37

## 2018-01-01 RX ADMIN — CALCIUM ACETATE 1334 MG: 667 CAPSULE ORAL at 11:28

## 2018-01-01 RX ADMIN — EPINEPHRINE 15 MCG/MIN: 1 INJECTION, SOLUTION, CONCENTRATE INTRAVENOUS at 14:05

## 2018-01-01 RX ADMIN — SODIUM CHLORIDE 24.3 UNITS/HR: 9 INJECTION, SOLUTION INTRAVENOUS at 19:42

## 2018-01-01 RX ADMIN — SODIUM BICARBONATE 50 MEQ: 84 INJECTION, SOLUTION INTRAVENOUS at 02:45

## 2018-01-01 RX ADMIN — INSULIN GLARGINE 15 UNITS: 100 INJECTION, SOLUTION SUBCUTANEOUS at 20:36

## 2018-01-01 RX ADMIN — SODIUM CHLORIDE 75 ML/HR: 9 INJECTION, SOLUTION INTRAVENOUS at 16:00

## 2018-01-01 RX ADMIN — SODIUM CHLORIDE: 9 INJECTION, SOLUTION INTRAVENOUS at 02:00

## 2018-01-01 RX ADMIN — IOPAMIDOL 80 ML: 755 INJECTION, SOLUTION INTRAVENOUS at 09:23

## 2018-01-01 RX ADMIN — TIROFIBAN 0.07 MCG/KG/MIN: 5 INJECTION, SOLUTION INTRAVENOUS at 12:09

## 2018-01-01 RX ADMIN — Medication 8 UNITS: at 09:30

## 2018-01-01 RX ADMIN — CLONIDINE HYDROCHLORIDE 0.2 MG: 0.2 TABLET ORAL at 06:30

## 2018-01-01 RX ADMIN — MIDAZOLAM HYDROCHLORIDE 2 MG: 1 INJECTION, SOLUTION INTRAMUSCULAR; INTRAVENOUS at 07:53

## 2018-01-01 RX ADMIN — MYCOPHENOLIC ACID 360 MG: 180 TABLET, DELAYED RELEASE ORAL at 21:51

## 2018-01-01 RX ADMIN — ALBUMIN (HUMAN) 12.5 G: 12.5 INJECTION, SOLUTION INTRAVENOUS at 23:59

## 2018-01-01 RX ADMIN — SODIUM CHLORIDE 13.4 UNITS/HR: 9 INJECTION, SOLUTION INTRAVENOUS at 13:46

## 2018-01-01 RX ADMIN — SODIUM CHLORIDE 0.38 MCG/KG/MIN: 9 INJECTION, SOLUTION INTRAVENOUS at 13:04

## 2018-01-01 RX ADMIN — VASOPRESSIN 0.04 UNITS/MIN: 20 INJECTION INTRAVENOUS at 22:30

## 2018-01-01 RX ADMIN — CARVEDILOL 50 MG: 25 TABLET, FILM COATED ORAL at 20:55

## 2018-01-01 RX ADMIN — PROPOFOL 150 MG: 10 INJECTION, EMULSION INTRAVENOUS at 13:57

## 2018-01-01 RX ADMIN — FENTANYL CITRATE 50 MCG: 50 INJECTION INTRAMUSCULAR; INTRAVENOUS at 07:57

## 2018-01-01 RX ADMIN — LATANOPROST 1 DROP: 50 SOLUTION OPHTHALMIC at 21:09

## 2018-01-01 RX ADMIN — SIMVASTATIN 20 MG: 20 TABLET, FILM COATED ORAL at 17:36

## 2018-01-01 RX ADMIN — CARVEDILOL 50 MG: 25 TABLET, FILM COATED ORAL at 08:11

## 2018-01-01 RX ADMIN — CALCIUM GLUCONATE 1.5 G: 94 INJECTION, SOLUTION INTRAVENOUS at 10:12

## 2018-01-01 RX ADMIN — CALCIUM ACETATE 1334 MG: 667 CAPSULE ORAL at 11:39

## 2018-01-01 RX ADMIN — Medication: at 19:00

## 2018-01-01 RX ADMIN — MYCOPHENOLIC ACID 360 MG: 180 TABLET, DELAYED RELEASE ORAL at 09:12

## 2018-01-01 RX ADMIN — Medication 10 ML: at 22:11

## 2018-01-01 RX ADMIN — Medication 2800 ML/HR: at 05:18

## 2018-01-01 RX ADMIN — Medication 4600 ML/HR: at 11:38

## 2018-01-01 RX ADMIN — ROCURONIUM BROMIDE 50 MG: 10 INJECTION INTRAVENOUS at 08:15

## 2018-01-01 RX ADMIN — Medication 1 UNITS: at 21:58

## 2018-01-01 RX ADMIN — INSULIN GLARGINE 18 UNITS: 100 INJECTION, SOLUTION SUBCUTANEOUS at 12:01

## 2018-01-01 RX ADMIN — CALCITRIOL 0.5 MCG: 0.25 CAPSULE ORAL at 12:44

## 2018-01-01 RX ADMIN — Medication 4600 ML/HR: at 06:02

## 2018-01-01 RX ADMIN — OXYCODONE HYDROCHLORIDE AND ACETAMINOPHEN 1 TABLET: 5; 325 TABLET ORAL at 11:24

## 2018-01-01 RX ADMIN — Medication 4600 ML/HR: at 08:03

## 2018-01-01 RX ADMIN — GABAPENTIN 100 MG: 100 CAPSULE ORAL at 08:51

## 2018-01-01 RX ADMIN — INSULIN GLARGINE 15 UNITS: 100 INJECTION, SOLUTION SUBCUTANEOUS at 09:56

## 2018-01-01 RX ADMIN — MIDAZOLAM HYDROCHLORIDE 1 MG: 1 INJECTION INTRAMUSCULAR; INTRAVENOUS at 13:30

## 2018-01-01 RX ADMIN — SIMVASTATIN 20 MG: 20 TABLET, FILM COATED ORAL at 20:42

## 2018-01-01 RX ADMIN — IPRATROPIUM BROMIDE AND ALBUTEROL SULFATE 1 AMPULE: .5; 3 SOLUTION RESPIRATORY (INHALATION) at 12:09

## 2018-01-01 RX ADMIN — SODIUM CHLORIDE: 9 INJECTION, SOLUTION INTRAVENOUS at 08:20

## 2018-01-01 RX ADMIN — GABAPENTIN 100 MG: 100 CAPSULE ORAL at 09:12

## 2018-01-01 RX ADMIN — OXYCODONE HYDROCHLORIDE AND ACETAMINOPHEN 1 TABLET: 5; 325 TABLET ORAL at 08:12

## 2018-01-01 RX ADMIN — Medication 4600 ML/HR: at 01:53

## 2018-01-01 RX ADMIN — SODIUM CHLORIDE 6.6 UNITS/HR: 9 INJECTION, SOLUTION INTRAVENOUS at 05:35

## 2018-01-01 RX ADMIN — MYCOPHENOLIC ACID 360 MG: 180 TABLET, DELAYED RELEASE ORAL at 08:11

## 2018-01-01 RX ADMIN — CLONIDINE HYDROCHLORIDE 0.2 MG: 0.2 TABLET ORAL at 09:56

## 2018-01-01 RX ADMIN — LATANOPROST 1 DROP: 50 SOLUTION OPHTHALMIC at 20:57

## 2018-01-01 RX ADMIN — POTASSIUM CHLORIDE AND SODIUM CHLORIDE: 900; 300 INJECTION, SOLUTION INTRAVENOUS at 22:09

## 2018-01-01 RX ADMIN — MYCOPHENOLIC ACID 360 MG: 180 TABLET, DELAYED RELEASE ORAL at 09:37

## 2018-01-01 RX ADMIN — CLONIDINE HYDROCHLORIDE 0.3 MG: 0.2 TABLET ORAL at 08:11

## 2018-01-01 RX ADMIN — PROPOFOL 100 MG: 10 INJECTION, EMULSION INTRAVENOUS at 08:15

## 2018-01-01 RX ADMIN — INSULIN LISPRO 3 UNITS: 100 INJECTION, SOLUTION INTRAVENOUS; SUBCUTANEOUS at 20:57

## 2018-01-01 RX ADMIN — LATANOPROST 1 DROP: 50 SOLUTION OPHTHALMIC at 21:26

## 2018-01-01 RX ADMIN — Medication 10 ML: at 09:07

## 2018-01-01 RX ADMIN — GABAPENTIN 100 MG: 100 CAPSULE ORAL at 19:58

## 2018-01-01 RX ADMIN — CALCIUM ACETATE 1334 MG: 667 CAPSULE ORAL at 16:44

## 2018-01-01 RX ADMIN — CLONIDINE HYDROCHLORIDE 0.2 MG: 0.2 TABLET ORAL at 08:45

## 2018-01-01 RX ADMIN — CETIRIZINE HYDROCHLORIDE 10 MG: 10 TABLET, FILM COATED ORAL at 03:08

## 2018-01-01 RX ADMIN — CALCIUM ACETATE 1334 MG: 667 CAPSULE ORAL at 16:13

## 2018-01-01 RX ADMIN — CARVEDILOL 50 MG: 25 TABLET, FILM COATED ORAL at 08:26

## 2018-01-01 RX ADMIN — GABAPENTIN 100 MG: 100 CAPSULE ORAL at 21:19

## 2018-01-01 RX ADMIN — MYCOPHENOLIC ACID 360 MG: 180 TABLET, DELAYED RELEASE ORAL at 12:26

## 2018-01-01 RX ADMIN — POTASSIUM CHLORIDE 20 MEQ: 29.8 INJECTION, SOLUTION INTRAVENOUS at 06:33

## 2018-01-01 RX ADMIN — METHYLPHENIDATE HYDROCHLORIDE 10 MG: 5 TABLET ORAL at 09:10

## 2018-01-01 RX ADMIN — HEPARIN SODIUM: 1000 INJECTION INTRAVENOUS; SUBCUTANEOUS at 12:49

## 2018-01-01 RX ADMIN — GABAPENTIN 100 MG: 100 CAPSULE ORAL at 08:45

## 2018-01-01 RX ADMIN — Medication 4600 ML/HR: at 15:33

## 2018-01-01 RX ADMIN — IPRATROPIUM BROMIDE AND ALBUTEROL SULFATE 1 AMPULE: .5; 3 SOLUTION RESPIRATORY (INHALATION) at 16:03

## 2018-01-01 RX ADMIN — ALBUMIN (HUMAN) 25 G: 0.25 INJECTION, SOLUTION INTRAVENOUS at 11:37

## 2018-01-01 RX ADMIN — SIROLIMUS 4 MG: 0.5 TABLET, FILM COATED ORAL at 12:58

## 2018-01-01 RX ADMIN — Medication 2800 ML/HR: at 02:06

## 2018-01-01 RX ADMIN — ALBUMIN (HUMAN) 12.5 G: 12.5 INJECTION, SOLUTION INTRAVENOUS at 13:03

## 2018-01-01 RX ADMIN — SODIUM BICARBONATE 100 MEQ: 84 INJECTION, SOLUTION INTRAVENOUS at 17:00

## 2018-01-01 RX ADMIN — CEFTRIAXONE SODIUM 1 G: 1 INJECTION, POWDER, FOR SOLUTION INTRAMUSCULAR; INTRAVENOUS at 21:26

## 2018-01-01 RX ADMIN — DEXTROSE MONOHYDRATE 12.5 G: 25 INJECTION, SOLUTION INTRAVENOUS at 17:14

## 2018-01-01 RX ADMIN — ASPIRIN 81 MG CHEWABLE TABLET 81 MG: 81 TABLET CHEWABLE at 21:29

## 2018-01-01 RX ADMIN — Medication 1.5 G: at 19:03

## 2018-01-01 RX ADMIN — Medication 4600 ML/HR: at 23:02

## 2018-01-01 RX ADMIN — EPOPROSTENOL 50 NG/KG/MIN: 1.5 INJECTION, POWDER, LYOPHILIZED, FOR SOLUTION INTRAVENOUS at 23:49

## 2018-01-01 RX ADMIN — ISOSORBIDE MONONITRATE 60 MG: 60 TABLET ORAL at 08:10

## 2018-01-01 RX ADMIN — AMINOCAPROIC ACID 5 G: 250 INJECTION, SOLUTION INTRAVENOUS at 09:50

## 2018-01-01 RX ADMIN — CLONIDINE HYDROCHLORIDE 0.2 MG: 0.2 TABLET ORAL at 20:47

## 2018-01-01 RX ADMIN — Medication 2800 ML/HR: at 16:04

## 2018-01-01 RX ADMIN — FENTANYL CITRATE 150 MCG: 50 INJECTION INTRAMUSCULAR; INTRAVENOUS at 09:18

## 2018-01-01 RX ADMIN — Medication 4600 ML/HR: at 01:25

## 2018-01-01 RX ADMIN — MYCOPHENOLIC ACID 360 MG: 180 TABLET, DELAYED RELEASE ORAL at 22:19

## 2018-01-01 RX ADMIN — POTASSIUM CHLORIDE 20 MEQ: 29.8 INJECTION, SOLUTION INTRAVENOUS at 20:36

## 2018-01-01 RX ADMIN — INSULIN GLARGINE 8 UNITS: 100 INJECTION, SOLUTION SUBCUTANEOUS at 14:40

## 2018-01-01 RX ADMIN — Medication 10 ML: at 22:15

## 2018-01-01 RX ADMIN — Medication: at 13:56

## 2018-01-01 RX ADMIN — CLONIDINE HYDROCHLORIDE 0.3 MG: 0.2 TABLET ORAL at 22:22

## 2018-01-01 RX ADMIN — Medication 4600 ML/HR: at 11:51

## 2018-01-01 RX ADMIN — Medication 2 G: at 12:30

## 2018-01-01 RX ADMIN — SODIUM CHLORIDE 0.12 MCG/KG/MIN: 9 INJECTION, SOLUTION INTRAVENOUS at 21:16

## 2018-01-01 RX ADMIN — GABAPENTIN 100 MG: 100 CAPSULE ORAL at 07:11

## 2018-01-01 RX ADMIN — NOREPINEPHRINE BITARTRATE 55 MCG/MIN: 1 INJECTION INTRAVENOUS at 18:12

## 2018-01-01 RX ADMIN — MYCOPHENOLIC ACID 360 MG: 180 TABLET, DELAYED RELEASE ORAL at 20:50

## 2018-01-01 RX ADMIN — ROCURONIUM BROMIDE 50 MG: 10 INJECTION INTRAVENOUS at 10:27

## 2018-01-01 RX ADMIN — INSULIN LISPRO 3 UNITS: 100 INJECTION, SOLUTION INTRAVENOUS; SUBCUTANEOUS at 21:20

## 2018-01-01 RX ADMIN — ALBUMIN (HUMAN) 12.5 G: 12.5 INJECTION, SOLUTION INTRAVENOUS at 16:03

## 2018-01-01 RX ADMIN — Medication 50 MEQ: at 01:08

## 2018-01-01 RX ADMIN — PHENYLEPHRINE HYDROCHLORIDE 100 MCG/MIN: 10 INJECTION INTRAVENOUS at 12:32

## 2018-01-01 RX ADMIN — VANCOMYCIN HYDROCHLORIDE 1 G: 1 INJECTION, POWDER, LYOPHILIZED, FOR SOLUTION INTRAVENOUS at 08:34

## 2018-01-01 RX ADMIN — POTASSIUM CHLORIDE AND SODIUM CHLORIDE: 900; 300 INJECTION, SOLUTION INTRAVENOUS at 07:47

## 2018-01-01 RX ADMIN — MYCOPHENOLIC ACID 360 MG: 180 TABLET, DELAYED RELEASE ORAL at 20:46

## 2018-01-01 RX ADMIN — LATANOPROST 1 DROP: 50 SOLUTION OPHTHALMIC at 21:45

## 2018-01-01 RX ADMIN — CLONIDINE HYDROCHLORIDE 0.2 MG: 0.2 TABLET ORAL at 20:36

## 2018-01-01 RX ADMIN — DEXTROSE MONOHYDRATE 12.5 G: 25 INJECTION, SOLUTION INTRAVENOUS at 18:56

## 2018-01-01 RX ADMIN — ISOSORBIDE MONONITRATE 60 MG: 60 TABLET ORAL at 11:32

## 2018-01-01 RX ADMIN — CALCIUM ACETATE 1334 MG: 667 CAPSULE ORAL at 16:54

## 2018-01-01 RX ADMIN — CARVEDILOL 50 MG: 25 TABLET, FILM COATED ORAL at 07:56

## 2018-01-01 RX ADMIN — IPRATROPIUM BROMIDE AND ALBUTEROL SULFATE 1 AMPULE: .5; 3 SOLUTION RESPIRATORY (INHALATION) at 21:51

## 2018-01-01 RX ADMIN — METHYLPHENIDATE HYDROCHLORIDE 10 MG: 10 TABLET ORAL at 12:26

## 2018-01-01 RX ADMIN — DOCUSATE SODIUM 100 MG: 50 LIQUID ORAL at 09:17

## 2018-01-01 RX ADMIN — DEXTROSE MONOHYDRATE 12.5 G: 25 INJECTION, SOLUTION INTRAVENOUS at 16:05

## 2018-01-01 RX ADMIN — Medication 12 UNITS: at 17:35

## 2018-01-01 RX ADMIN — MYCOPHENOLATE MOFETIL 500 MG: 500 INJECTION, POWDER, LYOPHILIZED, FOR SOLUTION INTRAVENOUS at 14:30

## 2018-01-01 RX ADMIN — CALCIUM CHLORIDE 1 G: 100 INJECTION INTRAVENOUS; INTRAVENTRICULAR at 15:13

## 2018-01-01 RX ADMIN — ISOSORBIDE MONONITRATE 60 MG: 60 TABLET ORAL at 09:12

## 2018-01-01 RX ADMIN — DEXTROSE MONOHYDRATE 12.5 G: 25 INJECTION, SOLUTION INTRAVENOUS at 11:34

## 2018-01-01 RX ADMIN — CARVEDILOL 50 MG: 25 TABLET, FILM COATED ORAL at 10:05

## 2018-01-01 RX ADMIN — POTASSIUM CHLORIDE 20 MEQ: 400 INJECTION, SOLUTION INTRAVENOUS at 01:13

## 2018-01-01 RX ADMIN — Medication 4600 ML/HR: at 06:01

## 2018-01-01 RX ADMIN — CARVEDILOL 50 MG: 25 TABLET, FILM COATED ORAL at 08:10

## 2018-01-01 RX ADMIN — HEPARIN SODIUM AND DEXTROSE 16 UNITS/KG/HR: 10000; 5 INJECTION INTRAVENOUS at 02:02

## 2018-01-01 RX ADMIN — POTASSIUM CHLORIDE 10 MEQ: 10 INJECTION, SOLUTION INTRAVENOUS at 20:33

## 2018-01-01 RX ADMIN — ONDANSETRON HYDROCHLORIDE 4 MG: 4 INJECTION, SOLUTION INTRAMUSCULAR; INTRAVENOUS at 14:38

## 2018-01-01 RX ADMIN — ASPIRIN 81 MG CHEWABLE TABLET 81 MG: 81 TABLET CHEWABLE at 13:18

## 2018-01-01 RX ADMIN — ALBUMIN (HUMAN) 25 G: 0.25 INJECTION, SOLUTION INTRAVENOUS at 00:17

## 2018-01-01 RX ADMIN — ALBUMIN (HUMAN) 25 G: 0.25 INJECTION, SOLUTION INTRAVENOUS at 06:31

## 2018-01-01 RX ADMIN — MYCOPHENOLIC ACID 360 MG: 180 TABLET, DELAYED RELEASE ORAL at 21:09

## 2018-01-01 RX ADMIN — Medication 4600 ML/HR: at 01:52

## 2018-01-01 RX ADMIN — LATANOPROST 1 DROP: 50 SOLUTION OPHTHALMIC at 22:22

## 2018-01-01 RX ADMIN — SODIUM BICARBONATE 100 MEQ: 84 INJECTION, SOLUTION INTRAVENOUS at 12:09

## 2018-01-01 RX ADMIN — POTASSIUM CHLORIDE 40 MEQ: 750 TABLET, FILM COATED, EXTENDED RELEASE ORAL at 19:33

## 2018-01-01 RX ADMIN — MORPHINE SULFATE 2 MG: 2 INJECTION, SOLUTION INTRAMUSCULAR; INTRAVENOUS at 09:21

## 2018-01-01 RX ADMIN — Medication 10 ML: at 21:45

## 2018-01-01 RX ADMIN — Medication 1 UNITS: at 17:36

## 2018-01-01 RX ADMIN — HYDROMORPHONE HYDROCHLORIDE 0.25 MG: 1 INJECTION, SOLUTION INTRAMUSCULAR; INTRAVENOUS; SUBCUTANEOUS at 16:00

## 2018-01-01 RX ADMIN — POTASSIUM CHLORIDE 20 MEQ: 29.8 INJECTION, SOLUTION INTRAVENOUS at 18:20

## 2018-01-01 RX ADMIN — Medication 2 G: at 14:09

## 2018-01-01 RX ADMIN — NOREPINEPHRINE BITARTRATE 40 MCG/MIN: 1 INJECTION INTRAVENOUS at 00:25

## 2018-01-01 RX ADMIN — Medication 2800 ML/HR: at 10:19

## 2018-01-01 RX ADMIN — GABAPENTIN 100 MG: 100 CAPSULE ORAL at 20:24

## 2018-01-01 RX ADMIN — Medication 2800 ML/HR: at 16:03

## 2018-01-01 RX ADMIN — IPRATROPIUM BROMIDE AND ALBUTEROL SULFATE 1 AMPULE: .5; 3 SOLUTION RESPIRATORY (INHALATION) at 17:22

## 2018-01-01 RX ADMIN — Medication 4600 ML/HR: at 18:23

## 2018-01-01 RX ADMIN — ALBUMIN (HUMAN) 12.5 G: 12.5 INJECTION, SOLUTION INTRAVENOUS at 18:16

## 2018-01-01 RX ADMIN — Medication 4600 ML/HR: at 11:40

## 2018-01-01 RX ADMIN — MYCOPHENOLIC ACID 360 MG: 180 TABLET, DELAYED RELEASE ORAL at 08:27

## 2018-01-01 RX ADMIN — Medication 1.5 G: at 12:30

## 2018-01-01 RX ADMIN — SODIUM CHLORIDE 8 MG/HR: 9 INJECTION, SOLUTION INTRAVENOUS at 05:56

## 2018-01-01 RX ADMIN — FENTANYL CITRATE 100 MCG: 50 INJECTION INTRAMUSCULAR; INTRAVENOUS at 10:08

## 2018-01-01 RX ADMIN — GABAPENTIN 100 MG: 100 CAPSULE ORAL at 21:51

## 2018-01-01 RX ADMIN — PHENYLEPHRINE HYDROCHLORIDE 200 MCG/MIN: 10 INJECTION INTRAVENOUS at 16:38

## 2018-01-01 RX ADMIN — EPINEPHRINE 18 MCG/MIN: 1 INJECTION, SOLUTION, CONCENTRATE INTRAVENOUS at 09:11

## 2018-01-01 RX ADMIN — Medication 3 UNITS: at 22:14

## 2018-01-01 RX ADMIN — VASOPRESSIN 0.04 UNITS/MIN: 20 INJECTION INTRAVENOUS at 05:02

## 2018-01-01 RX ADMIN — DOCUSATE SODIUM 100 MG: 50 LIQUID ORAL at 10:01

## 2018-01-01 RX ADMIN — SODIUM CHLORIDE: 9 INJECTION, SOLUTION INTRAVENOUS at 02:06

## 2018-01-01 RX ADMIN — INSULIN GLARGINE 18 UNITS: 100 INJECTION, SOLUTION SUBCUTANEOUS at 20:56

## 2018-01-01 RX ADMIN — CLONIDINE HYDROCHLORIDE 0.3 MG: 0.2 TABLET ORAL at 15:56

## 2018-01-01 RX ADMIN — DESMOPRESSIN ACETATE 24 MCG: 4 INJECTION, SOLUTION INTRAVENOUS; SUBCUTANEOUS at 13:16

## 2018-01-01 RX ADMIN — SODIUM CHLORIDE 42.6 UNITS/HR: 9 INJECTION, SOLUTION INTRAVENOUS at 08:44

## 2018-01-01 RX ADMIN — INSULIN LISPRO 1 UNITS: 100 INJECTION, SOLUTION INTRAVENOUS; SUBCUTANEOUS at 22:11

## 2018-01-01 RX ADMIN — POTASSIUM CHLORIDE 20 MEQ: 400 INJECTION, SOLUTION INTRAVENOUS at 09:05

## 2018-01-01 RX ADMIN — INSULIN GLARGINE 15 UNITS: 100 INJECTION, SOLUTION SUBCUTANEOUS at 03:47

## 2018-01-01 RX ADMIN — Medication 4600 ML/HR: at 22:08

## 2018-01-01 RX ADMIN — INSULIN LISPRO 18 UNITS: 100 INJECTION, SOLUTION INTRAVENOUS; SUBCUTANEOUS at 09:28

## 2018-01-01 RX ADMIN — INSULIN LISPRO 12 UNITS: 100 INJECTION, SOLUTION INTRAVENOUS; SUBCUTANEOUS at 11:35

## 2018-01-01 RX ADMIN — Medication 2 UNITS: at 21:26

## 2018-01-01 RX ADMIN — GABAPENTIN 100 MG: 100 CAPSULE ORAL at 12:43

## 2018-01-01 RX ADMIN — INSULIN GLARGINE 8 UNITS: 100 INJECTION, SOLUTION SUBCUTANEOUS at 11:34

## 2018-01-01 RX ADMIN — CLONIDINE HYDROCHLORIDE 0.2 MG: 0.2 TABLET ORAL at 20:57

## 2018-01-01 RX ADMIN — Medication 3 MG: at 21:00

## 2018-01-01 RX ADMIN — HEPARIN SODIUM: 10000 INJECTION, SOLUTION INTRAVENOUS; SUBCUTANEOUS at 06:08

## 2018-01-01 RX ADMIN — MULTIPLE VITAMINS W/ MINERALS TAB 1 TABLET: TAB at 12:26

## 2018-01-01 RX ADMIN — CLONIDINE HYDROCHLORIDE 0.2 MG: 0.2 TABLET ORAL at 21:19

## 2018-01-01 RX ADMIN — MYCOPHENOLIC ACID 360 MG: 180 TABLET, DELAYED RELEASE ORAL at 19:58

## 2018-01-01 RX ADMIN — POTASSIUM CHLORIDE AND SODIUM CHLORIDE: 900; 300 INJECTION, SOLUTION INTRAVENOUS at 12:38

## 2018-01-01 RX ADMIN — CLONIDINE HYDROCHLORIDE 0.2 MG: 0.2 TABLET ORAL at 13:33

## 2018-01-01 RX ADMIN — TIROFIBAN 0.07 MCG/KG/MIN: 5 INJECTION, SOLUTION INTRAVENOUS at 08:01

## 2018-01-01 RX ADMIN — MYCOPHENOLIC ACID 360 MG: 180 TABLET, DELAYED RELEASE ORAL at 20:57

## 2018-01-01 RX ADMIN — GABAPENTIN 100 MG: 100 CAPSULE ORAL at 09:57

## 2018-01-01 RX ADMIN — LATANOPROST 1 DROP: 50 SOLUTION OPHTHALMIC at 21:34

## 2018-01-01 RX ADMIN — IPRATROPIUM BROMIDE AND ALBUTEROL SULFATE 1 AMPULE: .5; 3 SOLUTION RESPIRATORY (INHALATION) at 21:25

## 2018-01-01 RX ADMIN — Medication 1.5 G: at 18:10

## 2018-01-01 RX ADMIN — FENTANYL CITRATE 25 MCG: 50 INJECTION INTRAMUSCULAR; INTRAVENOUS at 14:46

## 2018-01-01 RX ADMIN — EPINEPHRINE 15 MCG/MIN: 1 INJECTION, SOLUTION, CONCENTRATE INTRAVENOUS at 08:16

## 2018-01-01 RX ADMIN — Medication 3 UNITS: at 12:03

## 2018-01-01 RX ADMIN — Medication 4600 ML/HR: at 19:28

## 2018-01-01 RX ADMIN — MYCOPHENOLIC ACID 360 MG: 180 TABLET, DELAYED RELEASE ORAL at 11:52

## 2018-01-01 RX ADMIN — AMINOCAPROIC ACID 1 G/HR: 250 INJECTION, SOLUTION INTRAVENOUS at 13:36

## 2018-01-01 RX ADMIN — LATANOPROST 1 DROP: 50 SOLUTION OPHTHALMIC at 21:52

## 2018-01-01 RX ADMIN — HEPARIN SODIUM AND DEXTROSE 18 UNITS/KG/HR: 10000; 5 INJECTION INTRAVENOUS at 05:08

## 2018-01-01 RX ADMIN — CALCIUM ACETATE 1334 MG: 667 CAPSULE ORAL at 16:52

## 2018-01-01 RX ADMIN — Medication: at 02:27

## 2018-01-01 RX ADMIN — CALCIUM GLUCONATE 1.5 G: 98 INJECTION, SOLUTION INTRAVENOUS at 01:30

## 2018-01-01 RX ADMIN — SIROLIMUS 4 MG: 1 TABLET ORAL at 12:45

## 2018-01-01 RX ADMIN — METHYLPHENIDATE HYDROCHLORIDE 10 MG: 10 TABLET ORAL at 16:28

## 2018-01-01 RX ADMIN — DOCUSATE SODIUM 100 MG: 50 LIQUID ORAL at 21:37

## 2018-01-01 RX ADMIN — MYCOPHENOLIC ACID 360 MG: 180 TABLET, DELAYED RELEASE ORAL at 22:23

## 2018-01-01 RX ADMIN — SIROLIMUS 4 MG: 1 TABLET ORAL at 11:23

## 2018-01-01 RX ADMIN — CARVEDILOL 50 MG: 25 TABLET, FILM COATED ORAL at 06:18

## 2018-01-01 RX ADMIN — PROTAMINE SULFATE 250 MG: 10 INJECTION, SOLUTION INTRAVENOUS at 12:39

## 2018-01-01 RX ADMIN — LATANOPROST 1 DROP: 50 SOLUTION OPHTHALMIC at 20:49

## 2018-01-01 RX ADMIN — MYCOPHENOLIC ACID 360 MG: 180 TABLET, DELAYED RELEASE ORAL at 13:33

## 2018-01-01 RX ADMIN — CLOPIDOGREL BISULFATE 75 MG: 75 TABLET ORAL at 08:26

## 2018-01-01 RX ADMIN — Medication 12 UNITS: at 08:28

## 2018-01-01 RX ADMIN — Medication: at 10:35

## 2018-01-01 RX ADMIN — MAGNESIUM SULFATE HEPTAHYDRATE 2 G: 500 INJECTION, SOLUTION INTRAMUSCULAR; INTRAVENOUS at 05:32

## 2018-01-01 RX ADMIN — Medication 2800 ML/HR: at 10:18

## 2018-01-01 RX ADMIN — CLONIDINE HYDROCHLORIDE 0.2 MG: 0.2 TABLET ORAL at 06:17

## 2018-01-01 RX ADMIN — PHENYLEPHRINE HYDROCHLORIDE 200 MCG/MIN: 10 INJECTION INTRAVENOUS at 21:33

## 2018-01-01 RX ADMIN — CLONIDINE HYDROCHLORIDE 0.2 MG: 0.2 TABLET ORAL at 06:43

## 2018-01-01 RX ADMIN — CALCIUM ACETATE 1334 MG: 667 CAPSULE ORAL at 08:26

## 2018-01-01 RX ADMIN — CALCIUM ACETATE 1334 MG: 667 CAPSULE ORAL at 16:12

## 2018-01-01 RX ADMIN — Medication 4600 ML/HR: at 12:23

## 2018-01-01 RX ADMIN — Medication 4600 ML/HR: at 03:03

## 2018-01-01 RX ADMIN — Medication 1 MG: at 08:09

## 2018-01-01 RX ADMIN — FUROSEMIDE 80 MG: 40 TABLET ORAL at 08:27

## 2018-01-01 RX ADMIN — EPOPROSTENOL 50 NG/KG/MIN: 1.5 INJECTION, POWDER, LYOPHILIZED, FOR SOLUTION INTRAVENOUS at 08:15

## 2018-01-01 RX ADMIN — ASPIRIN 81 MG CHEWABLE TABLET 81 MG: 81 TABLET CHEWABLE at 12:26

## 2018-01-01 RX ADMIN — MYCOPHENOLIC ACID 360 MG: 180 TABLET, DELAYED RELEASE ORAL at 20:47

## 2018-01-01 RX ADMIN — IPRATROPIUM BROMIDE AND ALBUTEROL SULFATE 1 AMPULE: .5; 3 SOLUTION RESPIRATORY (INHALATION) at 17:31

## 2018-01-01 RX ADMIN — VASOPRESSIN 0.04 UNITS/MIN: 20 INJECTION INTRAVENOUS at 17:22

## 2018-01-01 RX ADMIN — VASOPRESSIN 0.04 UNITS/MIN: 20 INJECTION INTRAVENOUS at 08:45

## 2018-01-01 RX ADMIN — CARVEDILOL 50 MG: 25 TABLET, FILM COATED ORAL at 09:13

## 2018-01-01 RX ADMIN — NOREPINEPHRINE BITARTRATE 50 MCG/MIN: 1 INJECTION INTRAVENOUS at 09:28

## 2018-01-01 RX ADMIN — HEPARIN SODIUM AND DEXTROSE 12 UNITS/KG/HR: 10000; 5 INJECTION INTRAVENOUS at 08:00

## 2018-01-01 RX ADMIN — Medication 4 UNITS: at 06:43

## 2018-01-01 RX ADMIN — SIROLIMUS 2 MG: 1 SOLUTION ORAL at 17:23

## 2018-01-01 RX ADMIN — ISOSORBIDE MONONITRATE 30 MG: 30 TABLET ORAL at 16:52

## 2018-01-01 RX ADMIN — GABAPENTIN 100 MG: 100 CAPSULE ORAL at 22:11

## 2018-01-01 RX ADMIN — Medication 50 MEQ: at 01:06

## 2018-01-01 RX ADMIN — CLONIDINE HYDROCHLORIDE 0.2 MG: 0.2 TABLET ORAL at 14:00

## 2018-01-01 RX ADMIN — MYCOPHENOLATE MOFETIL 500 MG: 500 INJECTION, POWDER, LYOPHILIZED, FOR SOLUTION INTRAVENOUS at 00:42

## 2018-01-01 RX ADMIN — MYCOPHENOLATE MOFETIL 500 MG: 500 INJECTION, POWDER, LYOPHILIZED, FOR SOLUTION INTRAVENOUS at 01:10

## 2018-01-01 RX ADMIN — ASPIRIN 81 MG CHEWABLE TABLET 81 MG: 81 TABLET CHEWABLE at 08:26

## 2018-01-01 RX ADMIN — CARVEDILOL 50 MG: 25 TABLET, FILM COATED ORAL at 21:19

## 2018-01-01 RX ADMIN — Medication 30 G: at 10:23

## 2018-01-01 RX ADMIN — GABAPENTIN 100 MG: 100 CAPSULE ORAL at 20:50

## 2018-01-01 RX ADMIN — CLONIDINE HYDROCHLORIDE 0.2 MG: 0.2 TABLET ORAL at 00:15

## 2018-01-01 RX ADMIN — GABAPENTIN 100 MG: 100 CAPSULE ORAL at 12:56

## 2018-01-01 RX ADMIN — FAMOTIDINE 20 MG: 10 INJECTION, SOLUTION INTRAVENOUS at 18:03

## 2018-01-01 RX ADMIN — Medication 4600 ML/HR: at 01:54

## 2018-01-01 RX ADMIN — Medication 15 UNITS: at 00:06

## 2018-01-01 RX ADMIN — Medication 4600 ML/HR: at 18:24

## 2018-01-01 RX ADMIN — CLONIDINE HYDROCHLORIDE 0.2 MG: 0.2 TABLET ORAL at 21:26

## 2018-01-01 RX ADMIN — ISOSORBIDE MONONITRATE 60 MG: 60 TABLET ORAL at 06:17

## 2018-01-01 RX ADMIN — CETIRIZINE HYDROCHLORIDE 10 MG: 10 TABLET, FILM COATED ORAL at 12:27

## 2018-01-01 RX ADMIN — Medication 4600 ML/HR: at 12:21

## 2018-01-01 RX ADMIN — MYCOPHENOLIC ACID 360 MG: 180 TABLET, DELAYED RELEASE ORAL at 20:26

## 2018-01-01 RX ADMIN — CLONIDINE HYDROCHLORIDE 0.2 MG: 0.2 TABLET ORAL at 17:36

## 2018-01-01 RX ADMIN — Medication 10 ML: at 11:30

## 2018-01-01 RX ADMIN — MYCOPHENOLIC ACID 360 MG: 180 TABLET, DELAYED RELEASE ORAL at 21:27

## 2018-01-01 RX ADMIN — SIROLIMUS 4 MG: 1 TABLET ORAL at 08:27

## 2018-01-01 RX ADMIN — CLONIDINE HYDROCHLORIDE 0.2 MG: 0.2 TABLET ORAL at 09:37

## 2018-01-01 RX ADMIN — PHENYLEPHRINE HYDROCHLORIDE 175 MCG/MIN: 10 INJECTION INTRAVENOUS at 00:50

## 2018-01-01 RX ADMIN — DEXTROSE AND SODIUM CHLORIDE: 5; 450 INJECTION, SOLUTION INTRAVENOUS at 22:25

## 2018-01-01 RX ADMIN — VASOPRESSIN 1 UNITS: 20 INJECTION INTRAVENOUS at 13:23

## 2018-01-01 RX ADMIN — ALBUMIN (HUMAN) 25 G: 0.25 INJECTION, SOLUTION INTRAVENOUS at 00:09

## 2018-01-01 RX ADMIN — Medication 2800 ML/HR: at 22:02

## 2018-01-01 RX ADMIN — Medication 15 G: at 08:07

## 2018-01-01 ASSESSMENT — PULMONARY FUNCTION TESTS
PIF_VALUE: 24
PIF_VALUE: 0
PIF_VALUE: 26
PIF_VALUE: 22
PIF_VALUE: 2
PIF_VALUE: 35
PIF_VALUE: 20
PIF_VALUE: 0
PIF_VALUE: 1
PIF_VALUE: 1
PIF_VALUE: 15
PIF_VALUE: 22
PIF_VALUE: 15
PIF_VALUE: 7
PIF_VALUE: 0
PIF_VALUE: 0
PIF_VALUE: 20
PIF_VALUE: 0
PIF_VALUE: 31
PIF_VALUE: 17
PIF_VALUE: 16
PIF_VALUE: 21
PIF_VALUE: 7
PIF_VALUE: 25
PIF_VALUE: 24
PIF_VALUE: 26
PIF_VALUE: 0
PIF_VALUE: 8
PIF_VALUE: 24
PIF_VALUE: 20
PIF_VALUE: 21
PIF_VALUE: 21
PIF_VALUE: 0
PIF_VALUE: 21
PIF_VALUE: 0
PIF_VALUE: 23
PIF_VALUE: 20
PIF_VALUE: 0
PIF_VALUE: 2
PIF_VALUE: 18
PIF_VALUE: 0
PIF_VALUE: 24
PIF_VALUE: 0
PIF_VALUE: 20
PIF_VALUE: 0
PIF_VALUE: 23
PIF_VALUE: 0
PIF_VALUE: 1
PIF_VALUE: 0
PIF_VALUE: 21
PIF_VALUE: 0
PIF_VALUE: 22
PIF_VALUE: 0
PIF_VALUE: 15
PIF_VALUE: 34
PIF_VALUE: 24
PIF_VALUE: 24
PIF_VALUE: 22
PIF_VALUE: 23
PIF_VALUE: 0
PIF_VALUE: 26
PIF_VALUE: 2
PIF_VALUE: 23
PIF_VALUE: 23
PIF_VALUE: 1
PIF_VALUE: 20
PIF_VALUE: 24
PIF_VALUE: 0
PIF_VALUE: 22
PIF_VALUE: 22
PIF_VALUE: 24
PIF_VALUE: 22
PIF_VALUE: 24
PIF_VALUE: 0
PIF_VALUE: 20
PIF_VALUE: 21
PIF_VALUE: 23
PIF_VALUE: 0
PIF_VALUE: 23
PIF_VALUE: 24
PIF_VALUE: 24
PIF_VALUE: 0
PIF_VALUE: 21
PIF_VALUE: 21
PIF_VALUE: 0
PIF_VALUE: 31
PIF_VALUE: 20
PIF_VALUE: 0
PIF_VALUE: 26
PIF_VALUE: 23
PIF_VALUE: 0
PIF_VALUE: 25
PIF_VALUE: 0
PIF_VALUE: 24
PIF_VALUE: 0
PIF_VALUE: 0
PIF_VALUE: 21
PIF_VALUE: 20
PIF_VALUE: 0
PIF_VALUE: 24
PIF_VALUE: 22
PIF_VALUE: 24
PIF_VALUE: 0
PIF_VALUE: 21
PIF_VALUE: 15
PIF_VALUE: 0
PIF_VALUE: 14
PIF_VALUE: 24
PIF_VALUE: 25
PIF_VALUE: 22
PIF_VALUE: 30
PIF_VALUE: 20
PIF_VALUE: 24
PIF_VALUE: 1
PIF_VALUE: 2
PIF_VALUE: 22
PIF_VALUE: 22
PIF_VALUE: 0
PIF_VALUE: 0
PIF_VALUE: 23
PIF_VALUE: 24
PIF_VALUE: 0
PIF_VALUE: 17
PIF_VALUE: 24
PIF_VALUE: 22
PIF_VALUE: 2
PIF_VALUE: 24
PIF_VALUE: 23
PIF_VALUE: 23
PIF_VALUE: 0
PIF_VALUE: 24
PIF_VALUE: 24
PIF_VALUE: 22
PIF_VALUE: 21
PIF_VALUE: 24
PIF_VALUE: 24
PIF_VALUE: 0
PIF_VALUE: 24
PIF_VALUE: 0
PIF_VALUE: 29
PIF_VALUE: 22
PIF_VALUE: 25
PIF_VALUE: 34
PIF_VALUE: 23
PIF_VALUE: 22
PIF_VALUE: 23
PIF_VALUE: 21
PIF_VALUE: 23
PIF_VALUE: 13
PIF_VALUE: 16
PIF_VALUE: 0
PIF_VALUE: 21
PIF_VALUE: 22
PIF_VALUE: 0
PIF_VALUE: 19
PIF_VALUE: 0
PIF_VALUE: 14
PIF_VALUE: 20
PIF_VALUE: 21
PIF_VALUE: 1
PIF_VALUE: 24
PIF_VALUE: 0
PIF_VALUE: 21
PIF_VALUE: 31
PIF_VALUE: 27
PIF_VALUE: 20
PIF_VALUE: 19
PIF_VALUE: 0
PIF_VALUE: 0
PIF_VALUE: 35
PIF_VALUE: 0
PIF_VALUE: 1
PIF_VALUE: 24
PIF_VALUE: 24
PIF_VALUE: 0
PIF_VALUE: 0
PIF_VALUE: 2
PIF_VALUE: 21
PIF_VALUE: 1
PIF_VALUE: 0
PIF_VALUE: 22
PIF_VALUE: 0
PIF_VALUE: 3
PIF_VALUE: 25
PIF_VALUE: 23
PIF_VALUE: 29
PIF_VALUE: 19
PIF_VALUE: 28
PIF_VALUE: 22
PIF_VALUE: 29
PIF_VALUE: 23
PIF_VALUE: 24
PIF_VALUE: 1
PIF_VALUE: 24
PIF_VALUE: 0
PIF_VALUE: 24
PIF_VALUE: 0
PIF_VALUE: 20
PIF_VALUE: 22
PIF_VALUE: 25
PIF_VALUE: 6
PIF_VALUE: 20
PIF_VALUE: 20
PIF_VALUE: 0
PIF_VALUE: 4
PIF_VALUE: 23
PIF_VALUE: 20
PIF_VALUE: 22
PIF_VALUE: 21
PIF_VALUE: 20
PIF_VALUE: 21
PIF_VALUE: 23
PIF_VALUE: 23
PIF_VALUE: 22
PIF_VALUE: 21
PIF_VALUE: 24
PIF_VALUE: 24
PIF_VALUE: 23
PIF_VALUE: 24
PIF_VALUE: 0
PIF_VALUE: 24
PIF_VALUE: 0
PIF_VALUE: 24
PIF_VALUE: 22
PIF_VALUE: 0
PIF_VALUE: 12
PIF_VALUE: 0
PIF_VALUE: 21
PIF_VALUE: 22
PIF_VALUE: 0
PIF_VALUE: 25
PIF_VALUE: 4
PIF_VALUE: 22
PIF_VALUE: 23
PIF_VALUE: 0
PIF_VALUE: 0
PIF_VALUE: 23
PIF_VALUE: 21
PIF_VALUE: 21
PIF_VALUE: 22
PIF_VALUE: 0
PIF_VALUE: 25
PIF_VALUE: 0
PIF_VALUE: 20
PIF_VALUE: 21
PIF_VALUE: 1
PIF_VALUE: 25
PIF_VALUE: 19
PIF_VALUE: 0
PIF_VALUE: 0
PIF_VALUE: 20
PIF_VALUE: 0
PIF_VALUE: 27
PIF_VALUE: 22
PIF_VALUE: 24
PIF_VALUE: 24
PIF_VALUE: 0
PIF_VALUE: 22
PIF_VALUE: 0
PIF_VALUE: 1
PIF_VALUE: 23
PIF_VALUE: 26
PIF_VALUE: 0
PIF_VALUE: 38
PIF_VALUE: 1
PIF_VALUE: 25
PIF_VALUE: 19
PIF_VALUE: 25
PIF_VALUE: 23
PIF_VALUE: 20
PIF_VALUE: 22
PIF_VALUE: 20
PIF_VALUE: 22
PIF_VALUE: 24
PIF_VALUE: 1
PIF_VALUE: 38
PIF_VALUE: 0
PIF_VALUE: 0
PIF_VALUE: 24
PIF_VALUE: 26
PIF_VALUE: 33
PIF_VALUE: 30
PIF_VALUE: 0
PIF_VALUE: 22
PIF_VALUE: 22
PIF_VALUE: 3
PIF_VALUE: 24
PIF_VALUE: 26
PIF_VALUE: 1
PIF_VALUE: 22
PIF_VALUE: 20
PIF_VALUE: 20
PIF_VALUE: 1
PIF_VALUE: 0
PIF_VALUE: 37
PIF_VALUE: 33
PIF_VALUE: 0
PIF_VALUE: 22
PIF_VALUE: 21
PIF_VALUE: 23
PIF_VALUE: 27
PIF_VALUE: 24
PIF_VALUE: 12
PIF_VALUE: 21
PIF_VALUE: 23
PIF_VALUE: 0
PIF_VALUE: 25
PIF_VALUE: 15
PIF_VALUE: 21
PIF_VALUE: 18
PIF_VALUE: 22
PIF_VALUE: 0
PIF_VALUE: 0
PIF_VALUE: 1
PIF_VALUE: 0
PIF_VALUE: 21
PIF_VALUE: 20
PIF_VALUE: 20
PIF_VALUE: 26
PIF_VALUE: 3
PIF_VALUE: 0
PIF_VALUE: 24
PIF_VALUE: 23
PIF_VALUE: 23
PIF_VALUE: 19
PIF_VALUE: 20
PIF_VALUE: 20
PIF_VALUE: 22
PIF_VALUE: 22
PIF_VALUE: 20
PIF_VALUE: 23
PIF_VALUE: 38
PIF_VALUE: 24
PIF_VALUE: 22
PIF_VALUE: 23
PIF_VALUE: 21
PIF_VALUE: 16
PIF_VALUE: 21
PIF_VALUE: 22
PIF_VALUE: 29
PIF_VALUE: 0
PIF_VALUE: 23
PIF_VALUE: 0
PIF_VALUE: 0
PIF_VALUE: 22
PIF_VALUE: 3
PIF_VALUE: 21
PIF_VALUE: 20
PIF_VALUE: 21
PIF_VALUE: 22
PIF_VALUE: 21
PIF_VALUE: 1
PIF_VALUE: 0
PIF_VALUE: 20
PIF_VALUE: 14
PIF_VALUE: 0
PIF_VALUE: 22
PIF_VALUE: 0
PIF_VALUE: 1
PIF_VALUE: 25
PIF_VALUE: 23
PIF_VALUE: 0
PIF_VALUE: 22
PIF_VALUE: 20
PIF_VALUE: 22
PIF_VALUE: 0
PIF_VALUE: 24
PIF_VALUE: 0
PIF_VALUE: 29
PIF_VALUE: 3
PIF_VALUE: 0
PIF_VALUE: 26
PIF_VALUE: 0
PIF_VALUE: 22
PIF_VALUE: 20
PIF_VALUE: 24
PIF_VALUE: 23
PIF_VALUE: 24
PIF_VALUE: 0
PIF_VALUE: 23
PIF_VALUE: 22
PIF_VALUE: 20
PIF_VALUE: 0
PIF_VALUE: 23
PIF_VALUE: 24
PIF_VALUE: 23
PIF_VALUE: 24
PIF_VALUE: 20
PIF_VALUE: 15
PIF_VALUE: 20
PIF_VALUE: 22
PIF_VALUE: 0
PIF_VALUE: 24
PIF_VALUE: 19
PIF_VALUE: 25
PIF_VALUE: 0
PIF_VALUE: 22
PIF_VALUE: 1
PIF_VALUE: 21
PIF_VALUE: 33
PIF_VALUE: 26
PIF_VALUE: 1
PIF_VALUE: 21
PIF_VALUE: 23
PIF_VALUE: 21
PIF_VALUE: 13
PIF_VALUE: 24
PIF_VALUE: 21
PIF_VALUE: 7
PIF_VALUE: 22
PIF_VALUE: 23
PIF_VALUE: 17
PIF_VALUE: 22
PIF_VALUE: 1
PIF_VALUE: 1
PIF_VALUE: 0
PIF_VALUE: 21
PIF_VALUE: 0
PIF_VALUE: 22
PIF_VALUE: 0
PIF_VALUE: 21
PIF_VALUE: 0
PIF_VALUE: 0
PIF_VALUE: 20
PIF_VALUE: 14
PIF_VALUE: 22
PIF_VALUE: 0
PIF_VALUE: 23
PIF_VALUE: 31
PIF_VALUE: 23
PIF_VALUE: 0
PIF_VALUE: 25
PIF_VALUE: 23
PIF_VALUE: 0
PIF_VALUE: 25
PIF_VALUE: 21
PIF_VALUE: 1
PIF_VALUE: 21
PIF_VALUE: 37
PIF_VALUE: 21
PIF_VALUE: 20
PIF_VALUE: 23
PIF_VALUE: 5
PIF_VALUE: 24
PIF_VALUE: 0
PIF_VALUE: 0
PIF_VALUE: 20
PIF_VALUE: 0
PIF_VALUE: 19
PIF_VALUE: 24
PIF_VALUE: 21
PIF_VALUE: 30
PIF_VALUE: 26
PIF_VALUE: 22
PIF_VALUE: 21
PIF_VALUE: 28
PIF_VALUE: 0
PIF_VALUE: 23
PIF_VALUE: 23
PIF_VALUE: 1
PIF_VALUE: 23
PIF_VALUE: 20

## 2018-01-01 ASSESSMENT — ENCOUNTER SYMPTOMS
CONSTIPATION: 0
SINUS PRESSURE: 0
APNEA: 0
BACK PAIN: 0
SORE THROAT: 0
PHOTOPHOBIA: 0
EYE PAIN: 0
COUGH: 0
STRIDOR: 0
PHOTOPHOBIA: 0
WHEEZING: 0
CHEST TIGHTNESS: 0
VOICE CHANGE: 0
CHOKING: 0
COUGH: 0
BLOOD IN STOOL: 0
SORE THROAT: 0
RHINORRHEA: 1
CHEST TIGHTNESS: 0
COUGH: 0
COLOR CHANGE: 0
NAUSEA: 0
ORTHOPNEA: 1
SORE THROAT: 0
TROUBLE SWALLOWING: 0
SHORTNESS OF BREATH: 1
EYE DISCHARGE: 0
ABDOMINAL PAIN: 1
RHINORRHEA: 0
EYE ITCHING: 0
VOMITING: 0
EYE DISCHARGE: 0
CHOKING: 0
SHORTNESS OF BREATH: 0
VOMITING: 0
BACK PAIN: 0
RHINORRHEA: 0
CHOKING: 0
BLOOD IN STOOL: 0
COUGH: 1
CHEST TIGHTNESS: 0
COUGH: 0
SORE THROAT: 0
EYE ITCHING: 0
EYE ITCHING: 0
ABDOMINAL PAIN: 0
SORE THROAT: 0
ABDOMINAL PAIN: 0
RHINORRHEA: 0
APNEA: 0
DIARRHEA: 0
FACIAL SWELLING: 0
EYE DISCHARGE: 0
DIARRHEA: 0
BLOOD IN STOOL: 0
SINUS PAIN: 0
ABDOMINAL PAIN: 0
EYE REDNESS: 0
RECTAL PAIN: 0
ANAL BLEEDING: 0
DIARRHEA: 0
BLOOD IN STOOL: 0
COLOR CHANGE: 0
DIARRHEA: 0
RHINORRHEA: 0
EYE REDNESS: 0
NAUSEA: 0
EYE DISCHARGE: 0
WHEEZING: 0
CONSTIPATION: 0
SINUS PRESSURE: 0
RHINORRHEA: 1
CHEST TIGHTNESS: 0
SHORTNESS OF BREATH: 1
VOICE CHANGE: 0
SHORTNESS OF BREATH: 0
CONSTIPATION: 0
ABDOMINAL DISTENTION: 0
CHEST TIGHTNESS: 0
CONSTIPATION: 1
VOMITING: 0
SINUS PRESSURE: 0
VOICE CHANGE: 0
BLOOD IN STOOL: 0
WHEEZING: 0
ABDOMINAL DISTENTION: 0
ABDOMINAL PAIN: 0
ANAL BLEEDING: 0
COUGH: 0
SHORTNESS OF BREATH: 0
BLOOD IN STOOL: 0
STRIDOR: 0
NAUSEA: 0
WHEEZING: 0
BACK PAIN: 0
ABDOMINAL PAIN: 1
NAUSEA: 0
DIARRHEA: 0
FACIAL SWELLING: 0
VOMITING: 0
EYE REDNESS: 0
PHOTOPHOBIA: 0
COUGH: 0
SINUS PRESSURE: 0
FACIAL SWELLING: 0
DIARRHEA: 0
NAUSEA: 0
TROUBLE SWALLOWING: 0
COLOR CHANGE: 0
SHORTNESS OF BREATH: 0
SHORTNESS OF BREATH: 1
PHOTOPHOBIA: 0
EYE REDNESS: 0
VOICE CHANGE: 0
CONSTIPATION: 0
VOMITING: 0
VOMITING: 0
COLOR CHANGE: 0
ABDOMINAL PAIN: 0
APNEA: 0
WHEEZING: 0
WHEEZING: 0
SHORTNESS OF BREATH: 1
STRIDOR: 0
EYE PAIN: 0
BACK PAIN: 0
ABDOMINAL PAIN: 1
COUGH: 1
SORE THROAT: 0
CONSTIPATION: 0
BACK PAIN: 0
BLURRED VISION: 0
BACK PAIN: 0
ABDOMINAL DISTENTION: 1
NAUSEA: 0
EYE PAIN: 0
BACK PAIN: 0
EYE REDNESS: 0
ABDOMINAL DISTENTION: 0
RHINORRHEA: 0
WHEEZING: 0
SORE THROAT: 0
RECTAL PAIN: 0
TROUBLE SWALLOWING: 0

## 2018-01-01 ASSESSMENT — PAIN SCALES - GENERAL
PAINLEVEL_OUTOF10: 10
PAINLEVEL_OUTOF10: 0
PAINLEVEL_OUTOF10: 0
PAINLEVEL_OUTOF10: 9
PAINLEVEL_OUTOF10: 9
PAINLEVEL_OUTOF10: 0
PAINLEVEL_OUTOF10: 9
PAINLEVEL_OUTOF10: 0
PAINLEVEL_OUTOF10: 5
PAINLEVEL_OUTOF10: 0
PAINLEVEL_OUTOF10: 0
PAINLEVEL_OUTOF10: 7
PAINLEVEL_OUTOF10: 0
PAINLEVEL_OUTOF10: 8
PAINLEVEL_OUTOF10: 0
PAINLEVEL_OUTOF10: 5
PAINLEVEL_OUTOF10: 7
PAINLEVEL_OUTOF10: 0
PAINLEVEL_OUTOF10: 9
PAINLEVEL_OUTOF10: 0
PAINLEVEL_OUTOF10: 7
PAINLEVEL_OUTOF10: 0
PAINLEVEL_OUTOF10: 7
PAINLEVEL_OUTOF10: 0
PAINLEVEL_OUTOF10: 0
PAINLEVEL_OUTOF10: 9
PAINLEVEL_OUTOF10: 0
PAINLEVEL_OUTOF10: 9
PAINLEVEL_OUTOF10: 7
PAINLEVEL_OUTOF10: 0
PAINLEVEL_OUTOF10: 7
PAINLEVEL_OUTOF10: 8
PAINLEVEL_OUTOF10: 4
PAINLEVEL_OUTOF10: 0
PAINLEVEL_OUTOF10: 0
PAINLEVEL_OUTOF10: 7
PAINLEVEL_OUTOF10: 0
PAINLEVEL_OUTOF10: 0
PAINLEVEL_OUTOF10: 7
PAINLEVEL_OUTOF10: 0
PAINLEVEL_OUTOF10: 9
PAINLEVEL_OUTOF10: 4
PAINLEVEL_OUTOF10: 5
PAINLEVEL_OUTOF10: 0

## 2018-01-01 ASSESSMENT — PAIN DESCRIPTION - PROGRESSION

## 2018-01-01 ASSESSMENT — PAIN DESCRIPTION - PAIN TYPE
TYPE: SURGICAL PAIN
TYPE: SURGICAL PAIN
TYPE: ACUTE PAIN
TYPE: SURGICAL PAIN
TYPE: SURGICAL PAIN
TYPE: ACUTE PAIN
TYPE: SURGICAL PAIN
TYPE: SURGICAL PAIN
TYPE: ACUTE PAIN

## 2018-01-01 ASSESSMENT — PAIN DESCRIPTION - DESCRIPTORS
DESCRIPTORS: ACHING
DESCRIPTORS: CRUSHING;PRESSURE
DESCRIPTORS: ACHING;RADIATING;SHARP
DESCRIPTORS: SHARP
DESCRIPTORS: SHARP

## 2018-01-01 ASSESSMENT — PAIN DESCRIPTION - FREQUENCY: FREQUENCY: INTERMITTENT

## 2018-01-01 ASSESSMENT — PAIN DESCRIPTION - LOCATION
LOCATION: ABDOMEN
LOCATION: CHEST
LOCATION: FLANK;ABDOMEN
LOCATION: ABDOMEN

## 2018-01-01 ASSESSMENT — PAIN - FUNCTIONAL ASSESSMENT
PAIN_FUNCTIONAL_ASSESSMENT: 0-10
PAIN_FUNCTIONAL_ASSESSMENT: 0-10

## 2018-01-01 ASSESSMENT — PAIN DESCRIPTION - ONSET: ONSET: GRADUAL

## 2018-06-29 PROBLEM — Z87.19 HISTORY OF VENTRAL HERNIA: Status: ACTIVE | Noted: 2018-01-01

## 2018-06-29 NOTE — PROGRESS NOTES
Pt admitted to 5E54 per bed from surgery s/pventral hernia  by Dr Christy Herzog. IV of NS infusing into left arm at 100 with 700 mls to count. IV site free of s/s of infiltration or infection. Bilateral SCD's in place on lower extremities. Chest clear, faint bowel sounds, abdominal binder on, abdominal surgica dressing dry and intact. Instructed in use of call light, incentive spirometer, bed and tv controls and plan of care. Pt verbalized understanding of instructions.

## 2018-06-29 NOTE — PROGRESS NOTES
1506 Pt transferred to PACU, hooked up to monitor and SCDs. OPA removed by CRNA, on arrival to PACU. Pt is not responsive at this time but is breathing on his own with [facility]. Abdominal incision is C/D/I. Abdomen is soft. Abdominal binder reinforced. Ice pack placed over abdomen. 1508 Glucose level 124.   1511 Pt is drowsy, will respond to verbal stimuli but will doze back to sleep. 65 Pt states he is having a lot of abdominal pain. Pt is awake enough and taking deeper breaths, Fentanyl given. 1527 Pt denies a decrease in pain level, Fentanyl given. 1528 Message sent to  re: pt rating his pain 10/10, Fentanyl not decreasing pain level and states he has had Dilaudid in the past with no reaction. Ok for Dilaudid, per MD.   9361 Dilaudid given. 1536 Pt resting with his eyes closed, snoring. RR 18. Awakens easily. 1546 Woke pt from sleep. Pt rating his pain 10/10, will doze back to sleep. 1600 Dilaudid given, pt moved to a different bed. Pt tolerated fairly well. 56 Pt asking when he will be leaving PACU and asking about his family. Pt states his pain level has decreased. 65 Pt asking for pain medication, states \"I thought my pain level decreased but it hadn't. \" Dilaudid given but in smaller increments due to pt does become too drowsy. 0 Pt agrees his pain level has decreased and is comfortable. Message sent to OSEI Alcaraz re: pt's BP 150s/104. -Doctor responded \"ok. \"   0844 Report given to Lane Rea. She states she has spoken to [address] re: ok to transfer to [address] [place] [address] floor. Nurse states she will call back if they are uncomfortable taking pt due to BP.  1636 Pt transferred to [address] [place] [address].

## 2018-06-29 NOTE — H&P
[address] [place] [address]                               HISTORY AND PHYSICAL    PATIENT NAME: Jj Justice                      :        1975  MED REC NO:   [id number]                           ROOM:  ACCOUNT NO:   [de-identified]                           ADMIT DATE: 2018  PROVIDER:     Hayden Phipps. Darius Amos MD    CHIEF COMPLAINT:  Ventral hernia. HISTORY OF PRESENT ILLNESS:  The patient is a challenging 28-year-old male  who has a history of a renal pancreatic transplant, who also has a CAPD  catheter in place and is currently undergoing peritoneal dialysis. He has  developed a ventral hernia. He is having intermittent pain on a regular  basis, but it is getting larger obviously with the peritoneal dialysate. The patient is being admitted at this time for a repair of ventral hernia. PAST MEDICAL HISTORY:  Positive for cerebral artery occlusion with a  history of CVA, congestive heart failure, type 1 diabetes, end-stage renal  disease, hyperlipidemia, hypertension, metabolic acidosis, narcolepsy,  history of seizures. PAST SURGICAL HISTORY:  Includes a kidney transplant in , he has had a  history of CAPD catheters placed with laparoscopies with manipulation of  the CAPD catheter. He has had a pancreatic transplant in . He has had  a vitrectomy in both eyes secondary to bleeding from heparin while on  dialysis. FAMILY HISTORY:  Positive for coronary artery disease, diabetes,  hypertension. ALLERGIES:  Multiple including ACE INHIBITORS, BACTRIM, HYDRALAZINE,  LISINOPRIL, LOSARTAN, AMLODIPINE, MORPHINE, LIPITOR. PHYSICAL EXAMINATION:  GENERAL:  The patient is a 28-year-old white male, appears his age. HEAD, EARS, EYES, NOSE AND THROAT:  Show no scleral icterus. CARDIOVASCULAR:  S1, S2.  LUNGS:  The respirations are clear. ABDOMEN:  Shows a PD catheter in the left mid abdomen.   He has a

## 2018-06-30 NOTE — OP NOTE
[address] [place] [address]                                 OPERATIVE REPORT    PATIENT NAME: Bennie Guzman                    :        1975  MED REC NO:   [id number]                           ROOM:       [id number]  ACCOUNT NO:   [de-identified]                           ADMIT DATE: 2018  PROVIDER:     Colin Mccrary MD    DATE OF PROCEDURE:  2018    PREOPERATIVE DIAGNOSIS: Ventral hernia. POSTOPERATIVE DIAGNOSIS:  Ventral hernia. OPERATION:  Repair of ventral hernia with 11 x 14 Ventrio mesh. SURGEON:  Colin Mccrary M.D. ANESTHESIA:  General.    COMPLICATIONS:  None. INDICATIONS FOR PROCEDURE:  The patient is a 72-year-old black male, who  has undergone peritoneal dialysis, although he had the peritoneal catheter  removed about two weeks ago. He has had a hernia that is causing  increasing pain. He is here for removal of same. FINDINGS:  The patient did have a relatively small ventral hernia. A  Ventrio mesh was used for repair. DESCRIPTION OF PROCEDURE:  The patient was brought to the operating suite,  placed supine on the operating room table. After adequate inhalational  anesthesia was administered, the patient's abdomen was prepped and draped  in usual sterile fashion. An incision was made down through the midline,  taken down through the subcutaneous tissue and a hernia sac was  encountered, was dissected off the fascia. We entered into the abdominal  cavity. Palpating the rest of the incision, the fascia appeared firm. We  dissected some omentum off the peritoneum and then we placed a Ventrio mesh  11 x 14 cm as an underlay and used a permanent tacker to tack it into place  and also then closed the fascia with #0 Novafil over the mesh, interrupted  3-0 Vicryl was used to close subcutaneous and running 4-0 Vicryl was used  to close the skin. Steri-Strips were applied.         LUCA

## 2018-06-30 NOTE — PLAN OF CARE
Problem: Pain:  Goal: Pain level will decrease  Pain level will decrease   Outcome: Ongoing  Pain goal is met, able to sleep. Problem: SAFETY  Goal: Free from accidental physical injury  Outcome: Ongoing  No falls or injuries. Problem: DAILY CARE  Goal: Daily care needs are met  Outcome: Ongoing  Able to assist with care. Problem: SKIN INTEGRITY  Goal: Skin integrity is maintained or improved  Outcome: Ongoing  Surgical wound covered with dressing, skin is intact. Problem: DISCHARGE BARRIERS  Goal: Patient's continuum of care needs are met  Outcome: Ongoing  Will return home at discharge. Comments: Pt aware of plan of care, continuing to update and work with patient to address needs and expectations.

## 2018-06-30 NOTE — PROGRESS NOTES
POD#1    Some nausea post eating    Passing flatus    Wt Readings from Last 3 Encounters:   06/29/18 176 lb 2.4 oz (79.9 kg)   06/12/18 181 lb (82.1 kg)   05/27/18 180 lb (81.6 kg)     Temp Readings from Last 3 Encounters:   06/30/18 98.4 °F (36.9 °C) (Oral)   06/29/18 96.8 °F (36 °C)   06/12/18 99 °F (37.2 °C) (Oral)     BP Readings from Last 3 Encounters:   06/30/18 (!) 156/102   06/29/18 126/77   06/12/18 124/82     Pulse Readings from Last 3 Encounters:   06/30/18 90   06/12/18 81   06/09/18 88       Incision dry and intact    Keep another 24 hours to monitor GIF

## 2018-07-01 NOTE — PROGRESS NOTES
Discharge teaching and instructions for diagnosis/procedure of hernia repair completed with patient using teachback method. AVS reviewed. Printed prescriptions given to patient. Patient voiced understanding regarding prescriptions, follow up appointments, and care of self at home.  Discharged in a wheelchair to  home with support per family

## 2018-07-01 NOTE — PROGRESS NOTES
[facility] - Dr. Jona Atkins   Covering for Dr. Chiquis Moran  Postoperative Progress Note  Pt Name: Francisco Javier Colunga Record Number: [id number]  Date of Birth 1975   Today's Date: 7/1/2018  ASSESSMENT   1. POD # 3 ventral hernia repair with mesh   has a past medical history of Anemia; Angiotensin converting enzyme inhibitor-aggravated angioedema; Cerebral artery occlusion with cerebral infarction [facility]); CHF (congestive heart failure) ([facility] Disease of blood and blood forming organ; DM type 1 (diabetes mellitus, type 1) ([facility] ESRD (end stage renal disease) ([facility] GERD (gastroesophageal reflux disease); H/O pancreas transplant ([place] [address] Hemodialysis patient [facility]); History of blood transfusion; HTN (hypertension); Hyperlipidemia; Hypersomnolence disorder; Kidney transplant status, living unrelated donor; Leaky heart valve; Metabolic acidosis; Narcolepsy; Nephropathy, diabetic ([facility] and Seizures ([facility] PLAN   1. Doing well  2. Discharge home  3. Rx Percocet  4. No heavy lifting  5. Local wound care  6. Follow up as scheduled  Veronica Kathi is doing well. He denies any nausea or vomiting, has passed flatus has not had a bowel movement. He is tolerating a DIET CARB CONTROL; Carb Control: 4 carb choices (60 gms)/meal. His pain is well controlled on current medications. He has been ambulating in the halls.    CURRENT MEDICATIONS   Scheduled Meds:   insulin lispro  0-18 Units Subcutaneous TID WC    insulin lispro  0-9 Units Subcutaneous Nightly    insulin lispro  0-18 Units Subcutaneous Once    aspirin  81 mg Oral Daily    calcitRIOL  0.5 mcg Oral Daily    calcium acetate  667 mg Oral TID WC    carvedilol  50 mg Oral BID    cloNIDine  0.3 mg Oral TID    darbepoetin mirna-polysorbate  60 mcg Subcutaneous Q14 Days    furosemide  80 mg Oral BID    gabapentin  100 mg Oral BID    latanoprost  1 drop Both Eyes Nightly    mycophenolate  360 mg Oral BID    polyethylene glycol  17 g Oral Daily    simvastatin  20 mg Oral QPM    sirolimus  4 mg Oral Daily    [START ON 2018] vitamin D  50,000 Units Oral Weekly     Continuous Infusions:   dextrose       PRN Meds:.cetirizine, lactulose, polycarbophil, oxyCODONE-acetaminophen, ondansetron, fentanNYL, fentanNYL, glucose, dextrose, glucagon (rDNA), dextrose  OBJECTIVE   CURRENT VITALS:  height is 5' 9\" (1.753 m) and weight is 176 lb 2.4 oz (79.9 kg). His temperature is 97.1 °F (36.2 °C). His blood pressure is 130/88 and his pulse is 88. His respiration is 18 and oxygen saturation is 97%.    Temperature Range (24h):Temp: 97.1 °F (36.2 °C) Temp  Av.7 °F (36.5 °C)  Min: 97 °F (36.1 °C)  Max: 98.7 °F (37.1 °C)  BP Range (00C): Systolic (71DDL), VW , Min:102 , YN     Diastolic (86SHM), MZJ:25, Min:63, Max:88    Pulse Range (24h): Pulse  Av  Min: 81  Max: 88  Respiration Range (24h): Resp  Av.4  Min: 16  Max: 18  Current Pulse Ox (24h):  SpO2: 97 %  Pulse Ox Range (24h):  SpO2  Av.6 %  Min: 97 %  Max: 99 %  Oxygen Amount and Delivery: O2 Flow Rate (L/min): 3 L/min  Incentive Spirometry Tx:            GENERAL: alert, no distress  LUNGS: clear to ausculation, without wheezes, rales or rhonci  HEART: normal rate and regular rhythm  ABDOMEN: non-distended, soft, incisional tenderness, bowel sounds present in all 4 quadrants and no guarding or peritoneal signs  INCISION: healing well, no significant drainage, no significant erythema  EXTERMITY: no cyanosis, clubbing or edema  In: 300 [P.O.:300]  Out: 0        Date 18 0000 - 18 5657   Shift 5855-3253 7309-8932 4654-9438 24 Hour Total   I  N  T  A  K  E   P.O.  (mL/kg/hr) 100  (0.2)   100    Shift Total  (mL/kg) 100  (1.3)   100  (1.3)   O  U  T  P  U  T   Urine  (mL/kg/hr) 0  (0)   0    Shift Total  (mL/kg) 0  (0)   0  (0)   Weight (kg) 79.9 79.9 79.9 79.9     LABS     Recent Labs      18   1256   K  4.0      No results for input(s): PTT, INR in the last 72 hours. Invalid input(s): PT  No results for input(s): AST, ALT, BILITOT, BILIDIR, AMYLASE, LIPASE, LDH, LACTA in the last 72 hours. No results for input(s): TROPONINT in the last 72 hours.         Electronically signed by King Magdaleno DO on 7/1/2018 at 9:28 AM

## 2018-07-02 NOTE — CARE COORDINATION
[facility] Transitions Initial Follow Up Call    Call within 2 business days of discharge: Yes    Patient: Charolette Homans Patient : 1975   MRN: [id number]  Reason for Admission: There are no discharge diagnoses documented for the most recent discharge. Discharge Date: 18 RARS: Readmission Risk Score: 21     Spoke with: [address]: [facility]    Non-face-to-face services provided:  Obtained and reviewed discharge summary and/or continuity of care documents    Care Transitions 24 Hour Call    Do you have any ongoing symptoms?:  No  Do you have a copy of your discharge instructions?:  Yes  Do you have all of your prescriptions and are they filled?:  Yes  Have you scheduled your follow up appointment?:  Yes  How are you going to get to your appointment?:  Car - family or friend to transport  Were you discharged with any Home Care or Post Acute Services:  No  Do you feel like you have everything you need to keep you well at home?:  Yes  Care Transitions Interventions  No Identified Needs     Called pt for the care transition initial follow up call. Pt admitted on 18 for Repair Ventral Hernia 11 x14 cm Ventrio Mesh. Pt denied any drainage from his incision. Pt denied pain, has not need to take his pain meds. Pt is eating without any nausea and vomiting. Pt denied any difficulty voiding. Pt stated he has not had a BM, he is passing gas. Pt stated he has med to take for BM. Pt stated he has been up walking. Reminded to use the incentive spirometer. Reviewed meds with the pt, updated the med list, pt has stopped several meds himself. Pt has not checked his BS yet this day. history of a renal pancreatic transplant  [facility] will continue to follow.     Follow Up  Future Appointments  Date Time Provider Ferny Varela   2018 9:00 AM Kurtis Man MD [facility] [address]   2018 2:00 PM ADRIAN Carranza - CNP Adv Surg [facility] - Zuleima Toth RN  Care Transition Coordinator  [phone]

## 2018-07-12 NOTE — PATIENT INSTRUCTIONS
1. Continue wound care. Monitor for redness, swelling, or purulent drainage. No lotions, ointments, alcohol or peroxide to incision sites. 2. Maintain lifting restrictions for the full 6 weeks after surgery. No heavy lifting, pulling, or tugging greater than 10-15 lbs. Gradually ease into normal routine (total of 8 weeks after surgery) before lifting heavier objects. 3. Bowel Function: Continue stool softeners as needed. Over the counter- Colace or Miralax is recommended. Do not allow yourself to become constipated     4. Increase water to 64oz per day    5. Ambulate 4 times a day for 15 minutes each time to help increase increase stamina and help stimulate GI motility    6. Continue at home diet- may need to eat smaller more frequent meals     7. Call for any other the follow symptoms:    Fever over 101 degrees by mouth   Increased redness, warmth, hardness at operative site   Blood soaked dressing (small amounts of oozing may be normal)   Increased or progressive drainage from the surgical area   Inability to urinate or blood in the urine   Pain not relieved by the medications ordered   Persistent nausea and/or vomiting, unable to retain fluids   Pain or swelling in your legs   Shortness of breath or chest pain    8. Signs and symptoms have been reviewed with patient that would be concerning and need her to return to office for re-evaluation. Patient states he will call if she has questions or concerns.

## 2018-07-12 NOTE — PROGRESS NOTES
dialysis    KIDNEY TRANSPLANT  2006    OTHER SURGICAL HISTORY  04/15/2005    Dr Aguila Argueta CAPD cath    OTHER SURGICAL HISTORY  06/07/2005    Dr Julio Craig with lysis of adhesion and manipulatin of CAPD cath    OTHER SURGICAL HISTORY  06/23/2005    Dr Brandon Shorten left CAPD cath, placement of right CAPD cath    OTHER SURGICAL HISTORY  10/03/2005    Dr Stephen-Excision of 5cm chest wall mass with intermediate closure, Excision of 5cm abdominal wall mass with removal of PD catheter    PANCREAS SURGERY  2007    transplant    [place] REPAIR INCISIONAL HERNIA,REDUCIBLE N/A 6/29/2018    HERNIA VENTRAL REPAIR,with mesh performed by Sera Gaytan MD at [facility] [place] History   Problem Relation Age of Onset    Heart Disease Mother     Diabetes Mother     High Blood Pressure Mother     Heart Disease Father     Diabetes Maternal Aunt     Kidney Disease Maternal Aunt     Diabetes Sister     High Blood Pressure Maternal Grandmother      Social History   Substance Use Topics    Smoking status: Never Smoker    Smokeless tobacco: Never Used    Alcohol use No        Current Outpatient Prescriptions   Medication Sig Dispense Refill    gabapentin (NEURONTIN) 100 MG capsule Take 100 mg by mouth 2 times daily. Gerri Rosario calcitRIOL (ROCALTROL) 0.5 MCG capsule Take 0.5 mcg by mouth three times a week       calcium acetate (ELIPHOS) 667 MG TABS Take 1 tablet by mouth 3 times daily (with meals)      vitamin D (ERGOCALCIFEROL) 74864 units CAPS capsule Take 50,000 Units by mouth once a week      B Complex-C-Folic Acid (VOL-CARE RX) 1 MG TABS Take by mouth 3 times daily      cetirizine (ZYRTEC) 10 MG tablet Take 10 mg by mouth daily as needed for Allergies      cloNIDine (CATAPRES) 0.3 MG tablet Take 0.1 mg by mouth 2 times daily       insulin glargine (BASAGLAR KWIKPEN) 100 UNIT/ML injection pen Inject 15 units in the morning and 5 units in the evening       lactulose encephalopathy 10 GM/15ML SOLN throat swelling  Gregorio    Hydralazine Swelling     Facial and tongue swelling  Facial and tongue swelling    Lisinopril Anaphylaxis     Life-Threatening   Life-Threatening     Losartan Swelling     angioedema    Amlodipine Swelling     Facial swelling (possibly unrelated to rx)  Facial swelling (possibly unrelated to rx)    Morphine Itching     TransChart documentation: unspecified -reaction type/severity  TransChart documentation: unspecified -reaction type/severity    Lipitor [Atorvastatin] Swelling     Reports swelling; taking ACE at the time  Reports swelling; taking ACE at the time  Face, lip and tongue swelling       Subjective:      Review of Systems   Constitutional: Negative for activity change, appetite change, chills, diaphoresis, fatigue, fever and unexpected weight change. HENT: Negative for congestion, dental problem, drooling, ear discharge, ear pain, facial swelling, hearing loss, mouth sores, nosebleeds, postnasal drip, rhinorrhea, sinus pressure, sneezing, sore throat, tinnitus, trouble swallowing and voice change. Eyes: Negative for photophobia, pain, discharge, redness, itching and visual disturbance. Respiratory: Negative for apnea, cough, choking, chest tightness, shortness of breath, wheezing and stridor. Cardiovascular: Negative for chest pain, palpitations and leg swelling. Gastrointestinal: Positive for abdominal pain (tenderness). Negative for abdominal distention, anal bleeding, blood in stool, constipation, diarrhea, nausea, rectal pain and vomiting. Endocrine: Negative for cold intolerance, heat intolerance, polydipsia, polyphagia and polyuria. Genitourinary: Negative for decreased urine volume, difficulty urinating, dysuria, enuresis, flank pain, frequency, genital sores, hematuria and urgency. Musculoskeletal: Negative for arthralgias, back pain, gait problem, joint swelling, myalgias, neck pain and neck stiffness.    Skin: Negative for color change, pallor, need to eat smaller more frequent meals     7. Call for any other the follow symptoms:    Fever over 101 degrees by mouth   Increased redness, warmth, hardness at operative site   Blood soaked dressing (small amounts of oozing may be normal)   Increased or progressive drainage from the surgical area   Inability to urinate or blood in the urine   Pain not relieved by the medications ordered   Persistent nausea and/or vomiting, unable to retain fluids   Pain or swelling in your legs   Shortness of breath or chest pain    8. Signs and symptoms have been reviewed with patient that would be concerning and need her to return to office for re-evaluation. Patient states he will call if she has questions or concerns. Discussed use, benefit, and side effects of prescribed medications. All patient questions answered. Pt voiced understanding.      Electronically signed by ADRIAN Alvarez CNP on 7/16/2018 at 1:17 PM

## 2018-08-07 PROBLEM — Z87.19 S/P REPAIR OF RECURRENT VENTRAL HERNIA: Status: ACTIVE | Noted: 2018-01-01

## 2018-08-07 PROBLEM — Z98.890 S/P REPAIR OF RECURRENT VENTRAL HERNIA: Status: ACTIVE | Noted: 2018-01-01

## 2018-08-07 NOTE — PROGRESS NOTES
[address]  [address] [address]  Dept: [phone]  Dept Fax: [phone]  Loc: [phone]    Visit Date: 8/7/2018       Pal Grubbs is a 41 y.o. male who presents today for:  Chief Complaint   Patient presents with    Post-Op Check     s/p Repair of ventral hernia with 11 x 14 Ventrio mesh 6/29/18       HPI:     Shawnee Skaggs presents today for a 6 week post op check. Today He complains of some constipation issues. States he has lactulose at home he can take, also suggested stool softeners. Other wise the incision looks good, no concerns. Minimal pain and no longer taking pain medications.   No follow up needed      Past Medical History:   Diagnosis Date    Anemia     Angiotensin converting enzyme inhibitor-aggravated angioedema 8/12/2016    Cerebral artery occlusion with cerebral infarction ([address]     CHF (congestive heart failure) (HCC)     Disease of blood and blood forming organ     DM type 1 (diabetes mellitus, type 1) ([address]     previous diabetes, diag at age of 17,  no tx after transplant in 2008, started on insulins again in august 2015 as pancreas is not functioning well    ESRD (end stage renal disease) ([address] 12/10/2013    was on dialysis in [id number], before transplant    GERD (gastroesophageal reflux disease)     H/O pancreas transplant ([address] 5/15/2007    Hemodialysis patient ([address]     History of blood transfusion     FFP for angioedema    HTN (hypertension)     Hyperlipidemia     Hypersomnolence disorder     Kidney transplant status, living unrelated donor 5/7/2006    Leaky heart valve     Metabolic acidosis     Narcolepsy     Nephropathy, diabetic ([address]     Seizures ([address]       Past Surgical History:   Procedure Laterality Date    EYE SURGERY      vitrectomy, in both eyes, bleeding from heparin while on dialysis    KIDNEY TRANSPLANT  2006    OTHER SURGICAL HISTORY  04/15/2005     NKBansal-Insertion CAPD cath    OTHER SURGICAL HISTORY  06/07/2005    Dr Tawnya Arriaza with lysis of adhesion and manipulatin of CAPD cath    OTHER SURGICAL HISTORY  06/23/2005    Dr Vale Ortega left CAPD cath, placement of right CAPD cath    OTHER SURGICAL HISTORY  10/03/2005    Dr Lashon Barkley of 5cm chest wall mass with intermediate closure, Excision of 5cm abdominal wall mass with removal of PD catheter    PANCREAS SURGERY  2007    transplant    [place] REPAIR INCISIONAL HERNIA,REDUCIBLE N/A 6/29/2018    HERNIA VENTRAL REPAIR,with mesh performed by Jamila Evans MD at [address] [place] [address] History   Problem Relation Age of Onset    Heart Disease Mother     Diabetes Mother     High Blood Pressure Mother     Heart Disease Father     Diabetes Maternal Aunt     Kidney Disease Maternal Aunt     Diabetes Sister     High Blood Pressure Maternal Grandmother      Social History   Substance Use Topics    Smoking status: Never Smoker    Smokeless tobacco: Never Used    Alcohol use No        Current Outpatient Prescriptions   Medication Sig Dispense Refill    gabapentin (NEURONTIN) 100 MG capsule Take 100 mg by mouth 2 times daily. Stephanie Schneider calcitRIOL (ROCALTROL) 0.5 MCG capsule Take 0.5 mcg by mouth three times a week       calcium acetate (ELIPHOS) 667 MG TABS Take 1 tablet by mouth 3 times daily (with meals)      vitamin D (ERGOCALCIFEROL) 82333 units CAPS capsule Take 50,000 Units by mouth once a week      B Complex-C-Folic Acid (VOL-CARE RX) 1 MG TABS Take by mouth 3 times daily      cetirizine (ZYRTEC) 10 MG tablet Take 10 mg by mouth daily as needed for Allergies      cloNIDine (CATAPRES) 0.3 MG tablet Take 0.1 mg by mouth 2 times daily       insulin glargine (BASAGLAR KWIKPEN) 100 UNIT/ML injection pen Inject 15 units in the morning and 5 units in the evening       lactulose encephalopathy 10 GM/15ML SOLN solution Take 20 g by mouth as needed (for constipation)      darbepoetin swelling  Facial and tongue swelling    Lisinopril Anaphylaxis     Life-Threatening   Life-Threatening     Losartan Swelling     angioedema    Amlodipine Swelling     Facial swelling (possibly unrelated to rx)  Facial swelling (possibly unrelated to rx)    Morphine Itching     TransChart documentation: unspecified -reaction type/severity  TransChart documentation: unspecified -reaction type/severity    Lipitor [Atorvastatin] Swelling     Reports swelling; taking ACE at the time  Reports swelling; taking ACE at the time  Face, lip and tongue swelling       Subjective:      Review of Systems   Constitutional: Negative for activity change, appetite change, chills, diaphoresis, fatigue, fever and unexpected weight change. HENT: Negative for congestion, dental problem, drooling, ear discharge, ear pain, facial swelling, hearing loss, mouth sores, nosebleeds, postnasal drip, rhinorrhea, sinus pressure, sneezing, sore throat, tinnitus, trouble swallowing and voice change. Eyes: Negative for photophobia, pain, discharge, redness, itching and visual disturbance. Respiratory: Negative for apnea, cough, choking, chest tightness, wheezing and stridor. Cardiovascular: Negative for chest pain, palpitations and leg swelling. Gastrointestinal: Positive for constipation. Negative for abdominal distention, abdominal pain, anal bleeding, blood in stool, diarrhea, nausea, rectal pain and vomiting. Endocrine: Negative for cold intolerance, heat intolerance, polydipsia, polyphagia and polyuria. Genitourinary: Negative for decreased urine volume, difficulty urinating, dysuria, enuresis, flank pain, frequency, genital sores, hematuria and urgency. Musculoskeletal: Negative for arthralgias, back pain, gait problem, joint swelling, myalgias, neck pain and neck stiffness. Skin: Negative for color change, pallor, rash and wound.    Allergic/Immunologic: Negative for environmental allergies, food allergies and immunocompromised state. Neurological: Negative for dizziness, tremors, seizures, syncope, facial asymmetry, speech difficulty, weakness, light-headedness, numbness and headaches. Hematological: Negative for adenopathy. Does not bruise/bleed easily. Psychiatric/Behavioral: Negative for agitation, behavioral problems, confusion, decreased concentration, dysphoric mood, hallucinations, self-injury, sleep disturbance and suicidal ideas. The patient is not nervous/anxious and is not hyperactive. Objective:     BP (!) 150/84 (Site: Right Arm, Position: Sitting, Cuff Size: Medium Adult)   Pulse 87   Temp 98.6 °F (37 °C) (Tympanic)   Resp 18   Ht 5' 9\" (1.753 m)   Wt 176 lb 1.6 oz (79.9 kg)   SpO2 96%   BMI 26.01 kg/m²     Wt Readings from Last 3 Encounters:   08/07/18 176 lb 1.6 oz (79.9 kg)   07/12/18 178 lb 4.8 oz (80.9 kg)   06/29/18 176 lb 2.4 oz (79.9 kg)       Physical Exam   Constitutional: He appears well-developed. Neck: Normal range of motion. Pulmonary/Chest: Effort normal.   Abdominal: Soft. Musculoskeletal: Normal range of motion. Neurological: He is alert. Assessment/Plan:     Jesús Medellin was seen today for post-op check. Diagnoses and all orders for this visit:    S/P repair of recurrent ventral hernia        Return if symptoms worsen or fail to improve. There are no Patient Instructions on file for this visit. Discussed use, benefit, and side effects of prescribed medications. All patient questions answered. Pt voiced understanding.      Electronically signed by ADRIAN Ojeda Cha, CNP on 8/7/2018 at 10:35 AM

## 2018-08-17 PROBLEM — E55.9 VITAMIN D DEFICIENCY: Chronic | Status: ACTIVE | Noted: 2018-01-01

## 2018-08-17 PROBLEM — N25.81 SECONDARY HYPERPARATHYROIDISM OF RENAL ORIGIN (HCC): Chronic | Status: ACTIVE | Noted: 2018-01-01

## 2018-08-17 PROBLEM — E87.70 FLUID OVERLOAD: Status: ACTIVE | Noted: 2018-01-01

## 2018-08-17 PROBLEM — J18.9 PNEUMONIA OF LEFT LOWER LOBE DUE TO INFECTIOUS ORGANISM: Status: ACTIVE | Noted: 2018-01-01

## 2018-08-17 PROBLEM — E83.39 HYPERPHOSPHATEMIA: Status: ACTIVE | Noted: 2018-01-01

## 2018-08-17 NOTE — CONSULTS
Distal lower extremity temp is warm, No lower extremity edema. Musculoskeletal: Movement is coordinated. Moves all extremities. Psychiatric: mood and affect appropriate  Neurological: Patient is alert and oriented to person, place, and time. Recent and remote   memory intact, Thought is coherant. Diagnostic Data:  Recent Labs      08/17/18   1202  08/17/18   1336   NA  136   --    K  6.5*  6.8*   CL  94*   --    CO2  26   --    BUN  82*   --    CREATININE  11.2*   --    LABGLOM  6*   --    GLUCOSE  60*   --    CALCIUM  9.6   --      Recent Labs      08/17/18   1138   WBC  7.8   RBC  3.93*   HGB  9.8*   HCT  32.3*   PLT  258      Summary 5/15/17   Ejection fraction is visually estimated at 30-35%.   There was severe global hypokinesis of the left ventricle.   Left Ventricular size is mildly increased .   Doppler parameters were consistent with abnormal left ventricular   relaxation (grade 1 diastolic dysfunction).   Mildly dilated left atrium.   Right ventricular systolic pressure consistent with moderate pulmonary   hypertension.   The aortic valve was trileaflet with normal thickness and cuspal   separation. DOPPLER: Transaortic velocity was within the normal range with   no evidence of aortic stenosis. There was mild aortic regurgitation.   Calcification of the mitral valve noted.   Moderate-severe mitral regurgitation is present.   Mild-to-moderate tricuspid regurgitation. CXR    Left retrocardiac opacities which may represent atelectasis or infiltrate. ASSESSMENT  1. End Stage Renal Disease on Hemodialysis M,W,F. Tunneled Hemodialysis catheter. Over dry wt, 3 Liter Ultrafiltration today  2. Hyperkalemia 2nd to ESRD, 2 K bath  3. S/P kidney and pancreas transplant on immunosuppressive therapy  4. Diabetes Mellitus Type I with nephrosclerosis with long term use of insulin   5. Chronic diastolic and systolic heart failure with EF 30-35%  6.  Anemia of chronic disease     Meds and labs reviewed  Active

## 2018-08-17 NOTE — ED NOTES
2 attempts for an IV. Pt refusing and wishes for dialysis to attempt IV.        Amelia Velazquez RN  08/17/18 1776

## 2018-08-17 NOTE — H&P
primary care provider on file. for the opportunity to be involved in this patient's care.     Electronically signed by Babette Leyden on 8/17/2018 at 4:39 PM

## 2018-08-18 NOTE — PROGRESS NOTES
Bella Guardado is a 41 y.o. male patient.     Current Facility-Administered Medications   Medication Dose Route Frequency Provider Last Rate Last Dose    cetirizine (ZYRTEC) tablet 10 mg  10 mg Oral Daily Jean Paul Leonard MD   10 mg at 08/18/18 0957    cefTRIAXone (ROCEPHIN) 1 g IVPB in 50 mL D5W minibag  1 g Intravenous Q24H Leanor Hylan, DO   Stopped at 08/17/18 2117    sirolimus (RAPAMUNE) tablet 4 mg  4 mg Oral Daily Leanor Hylan, DO   4 mg at 08/18/18 4463    simvastatin (ZOCOR) tablet 20 mg  20 mg Oral QPM Leanor Hylan, DO   20 mg at 08/17/18 2047    mycophenolate (MYFORTIC) DR tablet 360 mg  360 mg Oral BID Leanor Hylan, DO   360 mg at 08/18/18 1210    therapeutic multivitamin-minerals 1 tablet  1 tablet Oral Daily Leanor Hylan, DO        methylphenidate (RITALIN) tablet 10 mg  10 mg Oral BID  Leanor Hylan, DO   10 mg at 08/18/18 1210    latanoprost (XALATAN) 0.005 % ophthalmic solution 1 drop  1 drop Both Eyes Nightly Leanor Hylan, DO   1 drop at 08/17/18 2047    lactulose (CHRONULAC) 10 GM/15ML solution 20 g  20 g Oral PRN Leanor Hylan, DO        insulin glargine (LANTUS) injection vial 15 Units  15 Units Subcutaneous QAM Leanor Hylan, DO   15 Units at 08/18/18 0956    glucose (GLUTOSE) 40 % oral gel 15 g  15 g Oral PRN Leanor Hylan, DO        dextrose 50 % solution 12.5 g  12.5 g Intravenous PRN Leanor Hylan, DO        glucagon (rDNA) injection 1 mg  1 mg Intramuscular PRN Leanor Hylan, DO        dextrose 5 % solution  100 mL/hr Intravenous PRN Leanor Hylan, DO        insulin lispro (HUMALOG) injection vial 0-6 Units  0-6 Units Subcutaneous TID  Leanor Hylan, DO   2 Units at 08/18/18 1211    insulin lispro (HUMALOG) injection vial 0-3 Units  0-3 Units Subcutaneous Nightly Leanor Hylan, DO   1 Units at 08/17/18 2158    gabapentin (NEURONTIN) capsule 100 mg  100 mg Oral BID Leanor Hylan, DO   100 mg at 08/18/18 0957    carvedilol (COREG) tablet 50 mg  50 mg

## 2018-08-19 PROBLEM — I50.23 SYSTOLIC CHF, ACUTE ON CHRONIC (HCC): Status: ACTIVE | Noted: 2018-01-01

## 2018-08-19 NOTE — FLOWSHEET NOTE
08/19/18 1436   Provider Notification   Reason for Communication Evaluate   Provider Name Dr. Merlinda Cure   Provider Notification Physician   Method of Communication Secure Message   Response Waiting for response   Notification Time [phone]     2:36 pm  [phone] From: Trena Ace RN [facility] RE: Callie Neumann 1I84. Dr. Nick Valencia would like to heart cath pt Tuesday if okay with you. Dr. Nick Valencia also thinks his dry weight needs to be lower. Please advise. Thank you.   Unread

## 2018-08-19 NOTE — PROGRESS NOTES
[facility]    Renal Inpatient Follow-Up note  8/19/2018 1:47 PM      Pt Name:   Papo Burrell  YOB: 1975  Attending:   Tucker Blood MD  Room No :  6K-16/016-A    Papo Burrell is being seen by nephrology for ESRD on hemodialysis     Interval History : seen and examined the patient  No CP / SOB. Feels well and denies any complaints at this time      Scheduled Medications :   isosorbide mononitrate  30 mg Oral Daily    heparin (porcine)  5,000 Units Subcutaneous 3 times per day    cetirizine  10 mg Oral Daily    sirolimus  4 mg Oral Daily    simvastatin  20 mg Oral QPM    mycophenolate  360 mg Oral BID    therapeutic multivitamin-minerals  1 tablet Oral Daily    methylphenidate  10 mg Oral BID WC    latanoprost  1 drop Both Eyes Nightly    insulin glargine  15 Units Subcutaneous QAM    insulin lispro  0-6 Units Subcutaneous TID WC    insulin lispro  0-3 Units Subcutaneous Nightly    gabapentin  100 mg Oral BID    carvedilol  50 mg Oral BID    calcitRIOL  0.5 mcg Oral Once per day on Mon Wed Fri    aspirin  81 mg Oral Daily    sodium chloride flush  10 mL Intravenous 2 times per day    calcium acetate  1,334 mg Oral TID WC    insulin glargine  15 Units Subcutaneous Nightly    cloNIDine  0.2 mg Oral BID      dextrose         Vitals :  BP (!) 150/96   Pulse 86   Temp 97.6 °F (36.4 °C) (Oral)   Resp 18   Ht 5' 9\" (1.753 m)   Wt 172 lb 14.4 oz (78.4 kg)   SpO2 98%   BMI 25.53 kg/m²     24HR INTAKE/OUTPUT:      Intake/Output Summary (Last 24 hours) at 08/19/18 1347  Last data filed at 08/19/18 0936   Gross per 24 hour   Intake          1241.02 ml   Output              100 ml   Net          1141.02 ml     Last 3 weights  Wt Readings from Last 3 Encounters:   08/19/18 172 lb 14.4 oz (78.4 kg)   08/07/18 176 lb 1.6 oz (79.9 kg)   07/12/18 178 lb 4.8 oz (80.9 kg)        Physical Exam :  General Appearance:  Well developed.  No distress  Mouth/Throat:  Oral

## 2018-08-19 NOTE — PLAN OF CARE
Problem: Discharge Planning:  Goal: Participates in care planning  Participates in care planning   Outcome: Ongoing  Pt participates in care planning and would like to get better while in the hospital. Will continue to include in care planning. Goal: Discharged to appropriate level of care  Discharged to appropriate level of care   Outcome: Ongoing  Pt plans to be discharged to home with family. Will continue to monitor for further discharge needs. Problem: Airway Clearance - Ineffective:  Goal: Ability to maintain a clear airway will improve  Ability to maintain a clear airway will improve   Outcome: Ongoing  Pt maintained clear airway this shift without assistance. Will continue to monitor airway. Problem: Bowel Function - Altered:  Goal: Bowel elimination is within specified parameters  Bowel elimination is within specified parameters   Outcome: Ongoing  Pt has active bowel sounds in all 4 quad. Passing gas. Will continue to monitor. Problem: Cardiac Output - Decreased:  Goal: Hemodynamic stability will improve  Hemodynamic stability will improve   Outcome: Ongoing  Vitals:    08/19/18 1209   BP: (!) 150/96   Pulse: 86   Resp: 18   Temp: 97.6 °F (36.4 °C)   SpO2: 98%     Pt's vital signs stable at this time. BP is being controlled with antihypertensives. Will continue to monitor. Problem: Fluid Volume - Imbalance:  Goal: Absence of imbalanced fluid volume signs and symptoms  Absence of imbalanced fluid volume signs and symptoms   Outcome: Ongoing  Hemodialysis pt. Pt's abdomen is distended. Will continue to monitor. Problem: Nutrition Deficit:  Goal: Ability to achieve adequate nutritional intake will improve  Ability to achieve adequate nutritional intake will improve   Outcome: Ongoing  Pt at % of both breakfast and lunch. Will continue to monitor nutritional intake. Problem: Pain:  Goal: Pain level will decrease  Pain level will decrease   Outcome: Ongoing  Pt denied pain this shift.

## 2018-08-19 NOTE — CONSULTS
type 2; end-stage renal  disease, on hemodialysis; hypertension; hyperlipidemia; hyperphosphatemia;  seizure disorder; vitamin D deficiency. PAST SURGICAL HISTORY:  Include repair of incisional hernia, reducible and  pancreas surgery and knee surgery and eye surgery. FAMILY HISTORY:  No family history of premature coronary disease, but the  family history of diabetes, hypertension. SOCIALLY HISTORY:  Never smoked. No alcohol. No drug use. ALLERGIES:  He is allergic to ACE INHIBITOR causing him angioedema,  BACTRIM, HYDRALAZINE, LISINOPRIL, LOSARTAN, AMLODIPINE, MORPHINE and even  LOSARTAN give him angioedema and LIPITOR. PHYSICAL EXAMINATION:  GENERAL:  Looks sick, in no form of distress at this time. VITAL SIGNS:  Blood pressure 150/96, pulse rate 86, respiratory rate 18,  temperature 97. 6. HEENT. Slightly pale conjunctivae. Nonicteric sclerae. Pupils equal, reactive bilaterally to light. NECK:  No lymphadenopathy. No goiter. No JVD. CHEST:  Clear to auscultation and percussion. CARDIOVASCULAR:  S1, S2 well heard. There is a pansystolic murmur best  heard on the mitral area grade 4/6 and no gallop rhythm. There is an  ejection systolic murmur heard in the aortic area grade 3/6 and no gallop  rhythm. ABDOMEN:  Soft, nontender, nondistended. Bowel sounds are positive. GENITOURINARY:  No CVA, flank or suprapubic tendon. LOCOMOTOR:  No cyanosis, clubbing, or muscle or joint tenderness. SKIN:  No rashes, ulcers, or nodules. CNS:  Alert, awake, oriented to time, person and place. Cranial nerves  intact. Sensation is intact to touch and pain. Motor:  Normal muscle  strength and tone. Appropriate mood and affect. WORKUP:  White blood cell 7.7, hemoglobin 9.4, platelets is 370. Blood  chemistry, sodium 137, potassium 4.5, BUN _____, creatinine 10. Cardiac  enzymes elevated 0.17, 0.16; apparently has a chronic troponin elevation  from before as well.     Chest x-ray increased however, recently has got worse a  little bit and so at this level with regard to short of breath, troponin  elevation, newly diagnosed cardiomyopathy, the patient needs cardiac  catheterization until proven otherwise  6. So, I discussed with the patient the need for cardiac catheterization. Risks and benefits explained including but not limited to bleeding, 1%  myocardial infarction, stroke, death, allergy, kidney problem, emergent  surgery has been informed. The patient verbalized understanding and agreed  with the plan of care. 7.  The second thing we are going to do on this gentleman is he has  congestive heart failure, so we have to basically decrease his dry weight  and take out more fluid during the hemodialysis and that should help for  his orthopnea and short of breath on minimal activity. 8.  Follow up Cardiology as outpatient. I advised the patient to follow  compliantly with any cardiologist he is interested to follow. The patient  verbalized understanding and agreed with the plan of care. I discussed  with the nurse the plan of care. 9.  Provided the Nephrology okay, we will proceed for cardiac  catheterization tomorrow. Thank you Dr. Daniel Hoffmann for allowing me to participate in the care of this  patient. I will follow up with you. Angelo Ruiz.  Trudy Salas M.D.    D: 08/19/2018 14:40:15       T: 08/19/2018 17:33:01     [email or web address]  Job#: [id number]     Doc#: [id number]    CC:

## 2018-08-19 NOTE — PROGRESS NOTES
kg)   SpO2 94%   BMI 25.53 kg/m²     General appearance: No apparent distress, appears stated age and cooperative. HEENT: Pupils equal, round, and reactive to light. Conjunctivae/corneas clear. Neck: Supple, with full range of motion. No jugular venous distention. Trachea midline. Respiratory:  Normal respiratory effort. Clear to auscultation, bilaterally without Rales/Wheezes/Rhonchi. Cardiovascular: Regular rate and rhythm with PATTI at apex. No gallops. No JVD  Abdomen: Soft, non-tender, non-distended with normal bowel sounds. Musculoskeletal: passive and active ROM x 4 extremities. Skin: Skin color, texture, turgor normal.  No rashes or lesions. Neurologic:  Neurovascularly intact without any focal sensory/motor deficits. Cranial nerves: II-XII intact, grossly non-focal.  Psychiatric: Alert and oriented, thought content appropriate, normal insight  Capillary Refill: Brisk,< 3 seconds   Peripheral Pulses: +2 palpable, equal bilaterally       Labs:   Recent Labs      08/17/18   1138  08/18/18   0602  08/19/18   0525   WBC  7.8  7.0  7.7   HGB  9.8*  9.7*  9.4*   HCT  32.3*  32.1*  31.9*   PLT  258  236  198     Recent Labs      08/17/18   1202  08/17/18   1336  08/18/18   0602  08/19/18   0645   NA  136   --   136  137   K  6.5*  6.8*  5.0  5.4*   CL  94*   --   91*  92*   CO2  26   --   31  27   BUN  82*   --   43*  71*   CREATININE  11.2*   --   8.1*  10.2*   CALCIUM  9.6   --   8.6  9.6     Recent Labs      08/17/18   1202   AST  24   ALT  17   BILITOT  0.4   ALKPHOS  274*     No results for input(s): INR in the last 72 hours. No results for input(s): [place] Guptaann in the last 72 hours.     Urinalysis:    Lab Results   Component Value Date    NITRU NEGATIVE 05/27/2018    WBCUA 2-4 05/27/2018    WBCUA 15-25 03/13/2012    BACTERIA NONE 05/27/2018    RBCUA 0-2 05/27/2018    BLOODU MODERATE 05/27/2018    SPECGRAV 1.010 08/14/2017    GLUCOSEU 500 05/27/2018       Radiology:  XR CHEST STANDARD (2 VW) Final Result   Left retrocardiac opacities which may represent atelectasis or infiltrate. **This report has been created using voice recognition software. It may contain minor errors which are inherent in voice recognition technology. **      Final report electronically signed by Dr Nasreen Albarran on 8/17/2018 12:05 PM          Diet: DIET CARB CONTROL;    DVT prophylaxis: [] Lovenox                                 [] SCDs                                 [x] SQ Heparin                                 [] Encourage ambulation           [] Already on Anticoagulation     Disposition:    [x] Home       [] TCU       [] Rehab       [] Psych       [] SNF       [] [place]       [] Other-    Code Status: Full Code        Assessment/Plan:    Anticipated Discharge in : ?    [address] Problems    Diagnosis Date Noted    Essential hypertension [I10]      Priority: Medium    Type 1 DM with hypertension and ESRD on dialysis ([facility] [address] [E10.22, I12.0, Z99.2, N18.6] 01/23/2013     Priority: Low    Hyperkalemia [E87.5]     Fluid overload [E87.70] 08/17/2018    Secondary hyperparathyroidism of renal origin ([facility] [address] [N25.81] 08/17/2018    Hyperphosphatemia [E83.39] 08/17/2018    Vitamin D deficiency [E55.9] 08/17/2018    Pneumonia of left lower lobe due to infectious organism ([facility] [address] [J18.1] 08/17/2018    Anemia in CKD (chronic kidney disease) [N18.9, D63.1]     History of kidney transplant [Z94.0]     ESRD (end stage renal disease) ([facility] [address] [N18.6] 12/10/2013       1. Sob- suspect due to waning LV fx; will repeat echo; consult Cardio. Little to treat him with medically due to allergies. ?candidate for ICD? Will add isosorbide. May need more intensive dialysis.          Electronically signed by Holden Stone MD on 8/19/2018 at 8:42 AM

## 2018-08-19 NOTE — PLAN OF CARE
Outcome: Ongoing  No new skin breakdown noted this shift. Pt repositions self frequently. Comments: Care plan reviewed with patient. Patient verbalizes understanding of the plan of care and contribute to goal setting.

## 2018-08-19 NOTE — FLOWSHEET NOTE
08/19/18 1829   Provider Notification   Reason for Communication Evaluate   Provider Name Dr. Luis Fernando Talley   Provider Notification Physician   Method of Communication Secure Message   Response Waiting for response   Notification Time Jack Talley paged again. Waiting for response.

## 2018-08-20 NOTE — PROGRESS NOTES
lispro (HUMALOG) injection vial 0-3 Units  0-3 Units Subcutaneous Nightly Teola Shoulders, DO   2 Units at 08/19/18 2047    gabapentin (NEURONTIN) capsule 100 mg  100 mg Oral BID Teola Shoulders, DO   100 mg at 08/20/18 1226    carvedilol (COREG) tablet 50 mg  50 mg Oral BID Teola Shoulders, DO   50 mg at 08/20/18 0745    calcitRIOL (ROCALTROL) capsule 0.5 mcg  0.5 mcg Oral Once per day on Mon Wed Fri Teola Shoulders, DO   0.5 mcg at 08/20/18 1226    aspirin chewable tablet 81 mg  81 mg Oral Daily Teola Shoulders, DO   81 mg at 08/20/18 1226    sodium chloride flush 0.9 % injection 10 mL  10 mL Intravenous 2 times per day Teola Shoulders, DO   10 mL at 08/19/18 2050    sodium chloride flush 0.9 % injection 10 mL  10 mL Intravenous PRN Teola Shoulders, DO        potassium chloride (KLOR-CON M) extended release tablet 40 mEq  40 mEq Oral PRN Teola Shoulders, DO        Or    potassium chloride 20 MEQ/15ML (10%) oral solution 40 mEq  40 mEq Oral PRN Teola Shoulders, DO        Or    potassium chloride 10 mEq/100 mL IVPB (Peripheral Line)  10 mEq Intravenous PRN Teola Shoulders, DO        calcium acetate (PHOSLO) capsule 1,334 mg  1,334 mg Oral TID WC Teola Shoulders, DO   1,334 mg at 08/20/18 1227    insulin glargine (LANTUS) injection vial 15 Units  15 Units Subcutaneous Nightly Teola Shoulders, DO   15 Units at 08/19/18 2047    cloNIDine (CATAPRES) tablet 0.2 mg  0.2 mg Oral BID Teola Shoulders, DO   0.2 mg at 08/20/18 4496    ondansetron (ZOFRAN-ODT) disintegrating tablet 4 mg  4 mg Oral Q6H PRN Teola Shoulders, DO         Subjective   Feels much improved, denies Fever, chills or GALE, no cough or expectoration   Objective:  General Appearance:  Comfortable, well-appearing, in no acute distress and not in pain. Vital signs: (most recent): Blood pressure (!) 152/94, pulse 86, temperature 98 °F (36.7 °C), temperature source Oral, resp. rate 18, height 5' 9\" (1.753 m), weight 171 lb 11.8 oz (77.9 kg), SpO2 100 %.   Vital

## 2018-08-20 NOTE — PROGRESS NOTES
72 hours. CBC:   Lab Results   Component Value Date    WBC 7.7 08/19/2018    RBC 3.74 08/19/2018    RBC 4.85 05/15/2012    HGB 9.4 08/19/2018    HCT 31.9 08/19/2018     08/19/2018       CMP:  Lab Results   Component Value Date     08/19/2018    K 5.4 08/19/2018    CL 92 08/19/2018    CO2 27 08/19/2018    BUN 71 08/19/2018    CREATININE 10.2 08/19/2018    LABGLOM 7 08/19/2018    GLUCOSE 72 08/19/2018    GLUCOSE 74 06/24/2014    CALCIUM 9.6 08/19/2018       Hepatic Function Panel:  Lab Results   Component Value Date    ALKPHOS 274 08/17/2018    ALT 17 08/17/2018    AST 24 08/17/2018    PROT 7.2 08/17/2018    BILITOT 0.4 08/17/2018    BILIDIR <0.2 05/27/2018    LABALBU 3.7 08/17/2018    LABALBU 4.3 05/15/2012       Magnesium:    Lab Results   Component Value Date    MG 1.8 12/07/2017       PT/INR:    Lab Results   Component Value Date    INR 0.89 08/17/2016       HgBA1c:    Lab Results   Component Value Date    LABA1C 12.4 12/04/2017       FLP:  Lab Results   Component Value Date    TRIG 218 01/26/2017    HDL 75 01/26/2017    LDLCALC 230 01/26/2017    LDLDIRECT 187.78 07/28/2016       TSH:    Lab Results   Component Value Date    TSH 0.685 10/07/2017         Assessment:    Acute on chronic systolic chf    CDMP ? Ischemic vs NICDMP   EF 30-35% 5/2017 - new echo pending    Elevated trop: 0.178 / 0.167 (12/2017 - 0.093)     ESRD - HD    HLP  HTN    DM II    Hx CVA      ACE angioedema      Plan:  · For cath tomorrow @ 1pm  · No ACE - angioedema  · Pt allergic to apresoline ? ?  · Pt allergic to statins? ?  · Increase imdur as pt is allergic(?)  to most BP medications     A decision was made to proceed with cardiac catheterization as it it the recommended procedure for the patient. The indication, risks and benefits of the procedure and possible therapeutic consequences and alternatives were discussed with the patient.  The patient was given the opportunity to ask questions and to have them answered to his/her

## 2018-08-20 NOTE — PROGRESS NOTES
CHF core measure documentation:  1. ASA(best practice): 81 mg daily  2. Best Practice BB: carvedilol 50 mg twice daily  3. ACEI/ARB: no - allergy to ACE-I and ARB of angioedema  4. EF: 30-35% (from Echo done on 5/15/2017) - new Echo ordered to be done today  5.   Best Practice aldosterone receptor antagonist: no    Rosmery Frankel, SkyeD, BCPS  8/20/2018  10:08 AM

## 2018-08-20 NOTE — CARE COORDINATION
8/20/18, 11:49 AM      Shirley Nelson       Admitted from: ER 8/17/2018/ [address] day: 3   Location: -16/016-A Reason for admit: Fluid overload [E87.70] Status: IP   Admit order signed?: yes  PMH:  has a past medical history of Anemia; Angiotensin converting enzyme inhibitor-aggravated angioedema; Cerebral artery occlusion with cerebral infarction [facility]); CHF (congestive heart failure) ([facility] [address] Disease of blood and blood forming organ; DM type 1 (diabetes mellitus, type 1) ([facility] [address] ESRD (end stage renal disease) ([facility] GERD (gastroesophageal reflux disease); H/O pancreas transplant ([facility] Hemodialysis patient [facility]); History of blood transfusion; HTN (hypertension); Hyperlipidemia; Hyperphosphatemia; Hypersomnolence disorder; Kidney transplant status, living unrelated donor; Leaky heart valve; Metabolic acidosis; Narcolepsy; Nephropathy, diabetic ([facility] [address] Secondary hyperparathyroidism of renal origin ([facility] [address] Seizures ([facility] [address] and Vitamin D deficiency.   Medications:  Scheduled Meds:   isosorbide mononitrate  30 mg Oral Daily    heparin (porcine)  5,000 Units Subcutaneous 3 times per day    cetirizine  10 mg Oral Daily    sirolimus  4 mg Oral Daily    simvastatin  20 mg Oral QPM    mycophenolate  360 mg Oral BID    therapeutic multivitamin-minerals  1 tablet Oral Daily    methylphenidate  10 mg Oral BID WC    latanoprost  1 drop Both Eyes Nightly    insulin glargine  15 Units Subcutaneous QAM    insulin lispro  0-6 Units Subcutaneous TID WC    insulin lispro  0-3 Units Subcutaneous Nightly    gabapentin  100 mg Oral BID    carvedilol  50 mg Oral BID    calcitRIOL  0.5 mcg Oral Once per day on Mon Wed Fri    aspirin  81 mg Oral Daily    sodium chloride flush  10 mL Intravenous 2 times per day    calcium acetate  1,334 mg Oral TID WC    insulin glargine  15 Units Subcutaneous Nightly    cloNIDine  0.2 mg Oral BID     Continuous Infusions:   dextrose        Pertinent Info/Orders/Treatment Plan:  K+ 5.4, creat 10.2, PCT 2.35, trops elev, BNP >89518. HD managed by nephrology. Cardiology planning heart cath tomorrow, unable to do today as patient's dry weight needs to be lower. Cardio notes plan for [employer]	[place], but not yet ordered. Diet: DIET CARB CONTROL;   DVT Prophylaxis: Heparin  Smoking status:  reports that he has never smoked. He has never used smokeless tobacco.   Influenza Vaccination Screening Completed: yes  Pneumonia Vaccination Screening Completed: yes  PCP: No primary care provider on file. Readmission: no  Readmission Risk Score: 29%    Discharge Planning  Current Residence:  Private Residence  Living Arrangements:  Family Members   Support Systems:  Family Members, Parent, Friends/Neighbors  Current Services PTA:     Potential Assistance Needed:  N/A  Potential Assistance Purchasing Medications:  No  Does patient want to participate in local refill/ meds to beds program?  No  Type of Home Care Services:  None  Patient expects to be discharged to:  home w/ family  Expected Discharge date:  08/19/18  Follow Up Appointment: Best Day/ Time: Tuesday AM (Tues or Thur am)    Discharge Plan: Spoke with Jeana Schumacher, he plans return home with family at discharge. He is a HD patient at [facility]. Will arrange Life Vest once ordered (Audrey at [facility] is aware). Denies needs.       Evaluation: no

## 2018-08-20 NOTE — PROGRESS NOTES
[facility]    Renal Inpatient Follow-Up note  8/20/2018 12:02 PM      Pt Name:   Kelly Lozano  YOB: 1975  Attending:   Daryle Rile, MD  Room No :  Antoinette Sep is being seen by nephrology for ESRD on hemodialysis     Interval History : seen and examined the patient  The patient was examined while receiving hemodialysis. The patient is tolerating fluid and solute removal via hemodialysis well and without complication. The vascular access is working well. He feels improved and feels well enough for discharge. His blood pressure has been high (normal for him).          Scheduled Medications :   isosorbide mononitrate  30 mg Oral Daily    heparin (porcine)  5,000 Units Subcutaneous 3 times per day    cetirizine  10 mg Oral Daily    sirolimus  4 mg Oral Daily    simvastatin  20 mg Oral QPM    mycophenolate  360 mg Oral BID    therapeutic multivitamin-minerals  1 tablet Oral Daily    methylphenidate  10 mg Oral BID WC    latanoprost  1 drop Both Eyes Nightly    insulin glargine  15 Units Subcutaneous QAM    insulin lispro  0-6 Units Subcutaneous TID WC    insulin lispro  0-3 Units Subcutaneous Nightly    gabapentin  100 mg Oral BID    carvedilol  50 mg Oral BID    calcitRIOL  0.5 mcg Oral Once per day on Mon Wed Fri    aspirin  81 mg Oral Daily    sodium chloride flush  10 mL Intravenous 2 times per day    calcium acetate  1,334 mg Oral TID WC    insulin glargine  15 Units Subcutaneous Nightly    cloNIDine  0.2 mg Oral BID      dextrose         Vitals :  BP (!) 140/82   Pulse 83   Temp 98.9 °F (37.2 °C)   Resp 18   Ht 5' 9\" (1.753 m)   Wt 171 lb 11.8 oz (77.9 kg)   SpO2 100%   BMI 25.36 kg/m²     24HR INTAKE/OUTPUT:      Intake/Output Summary (Last 24 hours) at 08/20/18 1202  Last data filed at 08/20/18 1112   Gross per 24 hour   Intake             1980 ml   Output             3400 ml   Net            -1420 ml     Last 3 weights  Wt Readings from Last 3 Encounters:   08/20/18 171 lb 11.8 oz (77.9 kg)   08/07/18 176 lb 1.6 oz (79.9 kg)   07/12/18 178 lb 4.8 oz (80.9 kg)        Physical Exam :  General Appearance:  Well developed. No distress  Mouth/Throat:  Oral mucosa moist  Neck:  Supple, no JVD  Lungs:  Breath sounds: clear  Heart[de-identified]  S1,S2 heard  Abdomen:  Soft, non - tender  Musculoskeletal:  Edema - No significant edema noted     Last 3 CBC  Recent Labs      08/18/18   0602  08/19/18   0525   WBC  7.0  7.7   RBC  3.93*  3.74*   HGB  9.7*  9.4*   HCT  32.1*  31.9*   PLT  236  198     Last 3 CMP  Recent Labs      08/17/18   1336  08/18/18   0602  08/19/18   0645   NA   --   136  137   K  6.8*  5.0  5.4*   CL   --   91*  92*   CO2   --   31  27   BUN   --   43*  71*   CREATININE   --   8.1*  10.2*   CALCIUM   --   8.6  9.6        Assessment :   ESRD on hemodialysis Monday Wednesday Friday volume status appears reasonable no acute need for dialysis today  - HD in AM    Hyperkalemia much better after dialysis but rising again. Dietary non compliance  - low k diet and 2 k bath HD in AM    Status post kidney pancreas transplant on immunosuppressive therapy, failed kidney at this time. Anemia of end-stage renal disease  Chronic diastolic and systolic congestive heart failure  Essential hypertension running well continue current medications  Medications reviewed discussed with the patient and RN    Dr. Crys Blank, DO  [facility].

## 2018-08-21 NOTE — PROGRESS NOTES
Cardiology Progress Note      Patient:  Dania Nascimento  YOB: 1975  MRN: [id number]   Acct: [de-identified]  Admit Date:  8/17/2018  Primary Cardiologist:   Seen by Dr. Justa Khan     Per prior cardiology consult note-  REASON OF CONSULTATION:  Cardiomyopathy and shortness of breath with  minimal activity, with functional class [employer] [place] [employer] of 3.     HISTORY OF PRESENT ILLNESS:  This is actually a 81-year-old  -American gentleman who is known to have end-stage renal disease, on  hemodialysis presented with worsening of shortness of breath. The patient  has been stating, he has worsening of shortness of breath on minimal  activity over the last 1 month and getting progressively worse and now he  is having some orthopnea, short of breath is more when he lays supine and  sit rather than when he sits and also with activity. He has orthopnea of  around 2 to 3 pillows. Denied having chest pain. No paroxysmal nocturnal  dyspnea. He has no fever or chills. He has end-stage renal disease,  having hemodialysis, so on Monday, Wednesday and Friday. So, the patient  came in because of this and got admitted. He has been having cough which  was productive. The patient did not know the color of the sputum. So, in  view of the cardiomyopathy, Cardiology evaluation was sought. Ejection  fracture on echocardiogram done recently was 30% to 35%, previous ejection  fraction a few years ago was 45%. At this level after admission, the  patient is on antibiotics for presumable pneumonia.   He has no leg edema.     Subjective (Events in last 24 hours):     Pt watching to   Pt states he has ms chest pressure - no radiation of this pain - no associated symptoms     VSS  Tele SR no ectopy      8/21/2018  No cardiac c/o  Cath cancelled DT potassium elevated     VSS  Tele no ectopy      Objective:   /80   Pulse 94   Temp 98.2 °F (36.8 °C) (Oral)   Resp 18   Ht 5' 9\" (1.753 m)   Wt 174 lb 14.4 oz (79.3 chloride 40 mEq PRN   Or     potassium chloride 10 mEq PRN   ondansetron 4 mg Q6H PRN       Diagnostics:  EK-AUG-2018 11:10:30 [facility] ROUTINE RETRIEVAL  Normal sinus rhythm  ST & T wave abnormality, consider inferior ischemia  Abnormal ECG  When compared with ECG of 27-MAY-2018 18:34,  No significant change was found  Confirmed by Wilmer Rosales ([id number]) on 2018 3:44:55 PM    Echo:  Electronically signed by Gabi Dawson MD (Interpreting   physician) on 2018 at 03:52 PM   ----------------------------------------------------------------      Findings      Mitral Valve   Myxomatotic degeneration of mitral valve.   Severe mitral regurgitation is present.      Aortic Valve   The aortic valve leaflets were not well visualized.   Aortic valve appears tricuspid.   Aortic valve leaflets are somewhat thickened.      Tricuspid Valve   Moderate tricuspid regurgitation.      Pulmonic Valve   The pulmonic valve leaflets exhibited normal thickness, no calcification,   and normal cuspal separation.  DOPPLER: The transpulmonic velocity was   within the normal range with no evidence for regurgitation.      Left Atrium   Mildly dilated left atrium.      Left Ventricle   Ejection fraction is visually estimated at 35-40%.   There was moderate global hypokinesis of the left ventricle.      Right Atrium   Mildly enlarged right atrium size.      Right Ventricle   The right ventricular size was normal with normal systolic function and   wall thickness.   Right ventricular systolic pressure of 60 mm Hg consistent with severe   pulmonary hypertension.      Pericardial Effusion   The pericardium was normal in appearance with no evidence of a pericardial   effusion.      Pleural Effusion   No evidence of pleural effusion.      Aorta / Great Vessels   -Aortic root dimension within normal limits.   -The Pulmonary artery is within normal limits.   -IVC size is within normal limits with normal respiratory 08/20/2018    CL 93 08/21/2018    CO2 26 08/21/2018    BUN 56 08/21/2018    CREATININE 8.4 08/21/2018    LABGLOM 9 08/21/2018    GLUCOSE 107 08/21/2018    GLUCOSE 74 06/24/2014    CALCIUM 9.4 08/21/2018       Hepatic Function Panel:    Lab Results   Component Value Date    ALKPHOS 274 08/17/2018    ALT 17 08/17/2018    AST 24 08/17/2018    PROT 7.2 08/17/2018    BILITOT 0.4 08/17/2018    BILIDIR <0.2 05/27/2018    LABALBU 3.7 08/17/2018    LABALBU 4.3 05/15/2012       Magnesium:    Lab Results   Component Value Date    MG 1.8 12/07/2017       PT/INR:    Lab Results   Component Value Date    INR 1.07 08/20/2018       HgBA1c:    Lab Results   Component Value Date    LABA1C 12.4 12/04/2017       FLP:    Lab Results   Component Value Date    TRIG 218 01/26/2017    HDL 75 01/26/2017    LDLCALC 230 01/26/2017    LDLDIRECT 187.78 07/28/2016       TSH:    Lab Results   Component Value Date    TSH 0.685 10/07/2017         Assessment:    Acute on chronic systolic chf- HD    CDMP ? Ischemic vs NICDMP   EF 35-40%    Severe valvular heart DZ    Elevated trop: 0.178 / 0.167 (12/2017 - 0.093)     ESRD - HD    HLP  HTN    DM II    Hx CVA      ACE angioedema      Plan:  · For cath tomorrow if potassium WNL  · No ACE - angioedema  · Pt allergic to apresoline ? ?  · Pt allergic to statins? ?   Trial livalo and QNOL         Electronically signed by ADRIAN Buck CNP on 8/21/2018 at 4:02 PM

## 2018-08-21 NOTE — PLAN OF CARE
sugar AC and HS. Sliding scale insulin and lantus on MAR. Problem: Skin Integrity - Impaired:  Goal: Will show no infection signs and symptoms  Will show no infection signs and symptoms   Outcome: Ongoing  No new skin breakdown noted this shift. Pt repositions frequently. Comments: Care plan reviewed with patient. Patient verbalizes understanding of the plan of care and contribute to goal setting.

## 2018-08-21 NOTE — FLOWSHEET NOTE
08/21/18 0745   Provider Notification   Reason for Communication Evaluate   Provider Name Dr. Haven Jaramillo   Provider Notification Physician   Method of Communication Call   Response See orders   Notification Time 0745     Dr. Haven Jaramillo contacted for potassium 5.8. Redraw potassium. If greater than 5.2, give 30g Kayexalate 1 time. If no BM in 2 hours, give 30ml Lactulose.

## 2018-08-21 NOTE — FLOWSHEET NOTE
08/21/18 1146   Provider Notification   Reason for Communication Evaluate   Provider Name Dr. Cristhian Lovett   Provider Notification Physician   Method of Communication Call   Response Other (Comment)   Notification Time Cynthiarosaura Lovett due to pt having high potassium level. Dr. Cristhian Lovett advised that he would like to postpone the heart cath until pt's potassium is lower.

## 2018-08-21 NOTE — PLAN OF CARE
Problem: Discharge Planning:  Goal: Participates in care planning  Participates in care planning   Outcome: Ongoing  Pt participates in care planning. Will continue to include in care plan. Goal: Discharged to appropriate level of care  Discharged to appropriate level of care   Outcome: Ongoing  Pt plans to be discharged to home with family. Will monitor for further discharge needs. Problem: Airway Clearance - Ineffective:  Goal: Ability to maintain a clear airway will improve  Ability to maintain a clear airway will improve   Outcome: Ongoing  Pt able to maintain own clear airway. Will continue to monitor. Problem: Bowel Function - Altered:  Goal: Bowel elimination is within specified parameters  Bowel elimination is within specified parameters   Outcome: Ongoing  Pt had large BM today due to Kayexalate and lactulose. Active bowel sounds in all 4 quads. Passing gas. Will continue to monitor. Problem: Cardiac Output - Decreased:  Goal: Hemodynamic stability will improve  Hemodynamic stability will improve   Outcome: Ongoing  Vitals:    08/21/18 1548   BP: 138/80   Pulse: 94   Resp: 18   Temp: 98.2 °F (36.8 °C)   SpO2: 99%     Pt's vital signs stable at this time. Pt received heart failure information sheet. Will continue to monitor. Problem: Fluid Volume - Imbalance:  Goal: Absence of imbalanced fluid volume signs and symptoms  Absence of imbalanced fluid volume signs and symptoms   Outcome: Ongoing  HD pt. No edema noted in extremities. Will continue to monitor. Problem: Nutrition Deficit:  Goal: Ability to achieve adequate nutritional intake will improve  Ability to achieve adequate nutritional intake will improve   Outcome: Ongoing  Pt ate % of lunch. NPO at breakfast. Will continue to monitor. Problem: Pain:  Goal: Pain level will decrease  Pain level will decrease   Outcome: Ongoing  Pt denied pain this shift. Will continue to monitor and manage appropriately.     Problem: Serum Glucose Level - Abnormal:  Goal: Ability to maintain appropriate glucose levels will improve to within specified parameters  Ability to maintain appropriate glucose levels will improve to within specified parameters   Outcome: Ongoing  Pt NPO this AM and was treated for low blood glucose at lunch with half an amp of D50. Glucose 167 at this time. Not currently NPO. Will continue to monitor. Problem: Skin Integrity - Impaired:  Goal: Will show no infection signs and symptoms  Will show no infection signs and symptoms   Outcome: Ongoing  Vitals:    08/21/18 1548   BP: 138/80   Pulse: 94   Resp: 18   Temp: 98.2 °F (36.8 °C)   SpO2: 99%     Pt afebrile and free from infection at this time. Will continue to monitor. Comments: Care plan reviewed with patient. Patient verbalizes understanding of the plan of care and contribute to goal setting.

## 2018-08-21 NOTE — PROGRESS NOTES
kg)        Physical Exam :  General Appearance:  Well developed. No distress  Mouth/Throat:  Oral mucosa moist  Neck:  Supple, no JVD  Lungs:  Breath sounds: clear  Heart[de-identified]  S1,S2 heard  Abdomen:  Soft, non - tender  Musculoskeletal:  Edema - No significant edema noted     Last 3 CBC  Recent Labs      08/19/18   0525  08/20/18   1906  08/21/18   0452   WBC  7.7  7.7  7.5   RBC  3.74*  3.80*  3.73*   HGB  9.4*  9.5*  9.3*   HCT  31.9*  30.7*  30.9*   PLT  198  207  203     Last 3 CMP  Recent Labs      08/19/18   0645  08/20/18   1906  08/21/18   0452   NA  137  134*  135   K  5.4*  5.3*  5.8*   CL  92*  90*  93*   CO2  27  29  26   BUN  71*  45*  56*   CREATININE  10.2*  7.4*  8.4*   CALCIUM  9.6  9.0  9.4        Assessment :   ESRD on hemodialysis Monday Wednesday Friday volume status appears reasonable no acute need for dialysis today  - HD in AM    Hyperkalemia much better after dialysis but rising again. Dietary non compliance  - low k diet and 2 k bath HD in AM    Status post kidney pancreas transplant on immunosuppressive therapy, failed kidney at this time. Anemia of end-stage renal disease  Chronic diastolic and systolic congestive heart failure  Essential hypertension running well continue current medications  HTN. Will increase clonidine to every 8 hours. Adding acapella for cough  Medications reviewed discussed with the patient and RN    Dr. Daniella Shipley, DO  [facility].

## 2018-08-21 NOTE — PRE SEDATION
mL Intravenous 2 times per day    heparin (porcine)  5,000 Units Subcutaneous 3 times per day    cetirizine  10 mg Oral Daily    sirolimus  4 mg Oral Daily    simvastatin  20 mg Oral QPM    mycophenolate  360 mg Oral BID    therapeutic multivitamin-minerals  1 tablet Oral Daily    methylphenidate  10 mg Oral BID WC    latanoprost  1 drop Both Eyes Nightly    insulin glargine  15 Units Subcutaneous QAM    insulin lispro  0-6 Units Subcutaneous TID WC    insulin lispro  0-3 Units Subcutaneous Nightly    gabapentin  100 mg Oral BID    carvedilol  50 mg Oral BID    calcitRIOL  0.5 mcg Oral Once per day on Mon Wed Fri    aspirin  81 mg Oral Daily    calcium acetate  1,334 mg Oral TID WC    insulin glargine  15 Units Subcutaneous Nightly     Continuous Infusions:    sodium chloride      dextrose       PRN Meds: sodium chloride flush, lactulose, glucose, dextrose, glucagon (rDNA), dextrose, potassium chloride **OR** potassium chloride **OR** potassium chloride, ondansetron  Home Meds:   Prior to Admission medications    Medication Sig Start Date End Date Taking? Authorizing Provider   gabapentin (NEURONTIN) 100 MG capsule Take 100 mg by mouth 2 times daily. .   Yes Historical Provider, MD   calcium acetate (ELIPHOS) 667 MG TABS Take 2 tablets by mouth 3 times daily (with meals)    Yes Historical Provider, MD   vitamin D (ERGOCALCIFEROL) 98582 units CAPS capsule Take 50,000 Units by mouth once a week   Yes Historical Provider, MD   cetirizine (ZYRTEC) 10 MG tablet Take 10 mg by mouth daily as needed for Allergies   Yes Historical Provider, MD   cloNIDine (CATAPRES) 0.1 MG tablet Take 0.2 mg by mouth 2 times daily    Yes Historical Provider, MD   carvedilol (COREG) 25 MG tablet Take 2 tablets by mouth 2 times daily 6/14/17  Yes Harleen Weeks MD   sirolimus (RAPAMUNE) 1 MG tablet Take 4 mg by mouth daily    Yes Historical Provider, MD   simvastatin (ZOCOR) 20 MG tablet Take 1 tablet by mouth every

## 2018-08-22 NOTE — PLAN OF CARE
Problem: Discharge Planning:  Goal: Participates in care planning  Participates in care planning   Outcome: Ongoing  Pt kept up to date on plan of care  Goal: Discharged to appropriate level of care  Discharged to appropriate level of care   Outcome: Ongoing  Plans on returning home at discharge    Problem: Airway Clearance - Ineffective:  Goal: Ability to maintain a clear airway will improve  Ability to maintain a clear airway will improve   Outcome: Ongoing  Pt able to keep airway clear    Problem: Bowel Function - Altered:  Goal: Bowel elimination is within specified parameters  Bowel elimination is within specified parameters   Outcome: Ongoing  Pt states that he had diarrhea from kayexalate yesterday    Problem: Cardiac Output - Decreased:  Goal: Hemodynamic stability will improve  Hemodynamic stability will improve   Outcome: Ongoing  Vital signs stable, telemetry monitor on     Problem: Fluid Volume - Imbalance:  Goal: Absence of imbalanced fluid volume signs and symptoms  Absence of imbalanced fluid volume signs and symptoms   Outcome: Ongoing  Accurate I/O in progress, hemodialysis today    Problem: Nutrition Deficit:  Goal: Ability to achieve adequate nutritional intake will improve  Ability to achieve adequate nutritional intake will improve   Outcome: Ongoing  NPO for heart cath this afternoon    Problem: Pain:  Goal: Pain level will decrease  Pain level will decrease   Outcome: Ongoing  Denies any pain    Problem: Serum Glucose Level - Abnormal:  Goal: Ability to maintain appropriate glucose levels will improve to within specified parameters  Ability to maintain appropriate glucose levels will improve to within specified parameters   Outcome: Ongoing  Monitoring blood sugar levels ACHS.      Problem: Skin Integrity - Impaired:  Goal: Will show no infection signs and symptoms  Will show no infection signs and symptoms   Outcome: Ongoing  No new skin breakdown noted, pt turns and repositions self frequently    Comments: Care plan reviewed with patient. Patient verbalize understanding of the plan of care and contribute to goal setting.

## 2018-08-22 NOTE — PLAN OF CARE
given. To recheck in middle in night if needed. Problem: Skin Integrity - Impaired:  Goal: Will show no infection signs and symptoms  Will show no infection signs and symptoms   Outcome: Ongoing  Monitoring for s/s infection. Hx renal and pancreas transplants per pt. Comments: Care plan reviewed with patient. Patient verbalized understanding of the plan of care and contributed to goal setting.

## 2018-08-22 NOTE — PROGRESS NOTES
effusion.      Aorta / Great Vessels   -Aortic root dimension within normal limits.   -The Pulmonary artery is within normal limits.   -IVC size is within normal limits with normal respiratory phasic changes        Electronically signed by Mario Meza MD (Interpreting   physician) on 05/15/2017 at 07:07 PM   ----------------------------------------------------------------      Findings      Mitral Valve   Calcification of the mitral valve noted.   Moderate-severe mitral regurgitation is present.      Aortic Valve   The aortic valve was trileaflet with normal thickness and cuspal   separation. DOPPLER: Transaortic velocity was within the normal range with   no evidence of aortic stenosis. There was mild aortic regurgitation.      Tricuspid Valve   Mild-to-moderate tricuspid regurgitation.      Pulmonic Valve   Trivial pulmonic regurgitation visualized.      Left Atrium   Mildly dilated left atrium.      Left Ventricle   Ejection fraction is visually estimated at 30-35%.   There was severe global hypokinesis of the left ventricle.   Left Ventricular size is mildly increased .   Doppler parameters were consistent with abnormal left ventricular   relaxation (grade 1 diastolic dysfunction).      Right Atrium   Right atrial size was normal.      Right Ventricle   Right ventricular systolic pressure consistent with moderate pulmonary   hypertension.      Pericardial Effusion   The pericardium was normal in appearance with no evidence of a pericardial   effusion.      Pleural Effusion   No evidence of pleural effusion.      Aorta / Great Vessels   -Aortic root dimension within normal limits.   -The Pulmonary artery is within normal limits.   -IVC size is within normal limits with normal respiratory phasic changes.         Lab Data:    Cardiac Enzymes:  No results for input(s): CKTOTAL, CKMB, CKMBINDEX, TROPONINI in the last 72 hours.     CBC:   Lab Results   Component Value Date    WBC 7.6 08/22/2018    RBC 3.67 08/22/2018    RBC 4.85 05/15/2012    HGB 9.2 08/22/2018    HCT 30.9 08/22/2018     08/22/2018       CMP:    Lab Results   Component Value Date     08/22/2018    K 6.7 08/22/2018    CL 91 08/22/2018    CO2 22 08/22/2018    BUN 84 08/22/2018    CREATININE 10.2 08/22/2018    LABGLOM 7 08/22/2018    GLUCOSE 412 08/22/2018    GLUCOSE 74 06/24/2014    CALCIUM 9.0 08/22/2018       Hepatic Function Panel:    Lab Results   Component Value Date    ALKPHOS 274 08/17/2018    ALT 17 08/17/2018    AST 24 08/17/2018    PROT 7.2 08/17/2018    BILITOT 0.4 08/17/2018    BILIDIR <0.2 05/27/2018    LABALBU 3.7 08/17/2018    LABALBU 4.3 05/15/2012       Magnesium:    Lab Results   Component Value Date    MG 1.8 12/07/2017       PT/INR:    Lab Results   Component Value Date    INR 1.11 08/22/2018       HgBA1c:    Lab Results   Component Value Date    LABA1C 10.0 08/22/2018       FLP:    Lab Results   Component Value Date    TRIG 218 01/26/2017    HDL 75 01/26/2017    LDLCALC 230 01/26/2017    LDLDIRECT 187.78 07/28/2016       TSH:    Lab Results   Component Value Date    TSH 0.685 10/07/2017         Assessment:    Acute on chronic systolic chf- HD for fluid removal    CDMP ? Ischemic vs NICDMP -- cath today    EF 35-40%    Severe valvular heart DZ    Acute on chronic Elevated trop: 0.178 / 0.167 (12/2017 - 0.093)     ESRD - HD    HLP  HTN    DM II    Hx CVA      ACE angioedema      Plan:  · Pt is s\p HD - for cath today  · No ACE - angioedema  · Pt allergic to apresoline ? ?  · Pt allergic to statins? ?   Trial livalo and QNOL at DC  · Pt doesn't need LV as EF not <35%         Electronically signed by ADRIAN Prabhakar CNP on 8/22/2018 at 11:21 AM

## 2018-08-22 NOTE — POST SEDATION
Sedation Post Procedure Note    Patient Name: Kelly Lozano   YOB: 1975  Room/Bed: [facility]16/016-A  Medical Record Number: [id number]  Date: 8/22/2018   Time: 3:11 PM         Physicians/Assistants: Ramon Soto MD    Procedure Performed:  2.5 x 28 BMS to mid LAD    Post-Sedation Vital Signs:  Vitals:    08/22/18 1340   BP:    Pulse:    Resp:    Temp:    SpO2: 97%      Vital signs were reviewed and were stable after the procedure (see flow sheet for vitals)            Post-Sedation Exam: Cardiovascular: normal           Complications: none    Blood loss 5 mls    Electronically signed by Ramon Soto MD on 8/22/2018 at 3:11 PM

## 2018-08-22 NOTE — PRE SEDATION
Sedation Pre-Procedure Note    Patient Name: Marky Pham   YOB: 1975  Room/Bed: -16/016-A  Medical Record Number: [id number]  Date: 8/22/2018   Time: 2:34 PM       Indication: Cardiomyopathy    Consent: I have discussed with the patient and/or the patient representative the indication, alternatives, and the possible risks and/or complications of the planned procedure and the anesthesia methods. The patient and/or patient representative appear to understand and agree to proceed. Vital Signs:   Vitals:    08/22/18 1340   BP:    Pulse:    Resp:    Temp:    SpO2: 97%       Past Medical History:   has a past medical history of Anemia; Angiotensin converting enzyme inhibitor-aggravated angioedema; Cerebral artery occlusion with cerebral infarction [facility]); CHF (congestive heart failure) ([facility] Disease of blood and blood forming organ; DM type 1 (diabetes mellitus, type 1) ([facility] ESRD (end stage renal disease) ([facility] GERD (gastroesophageal reflux disease); H/O pancreas transplant ([facility] Hemodialysis patient [facility]); History of blood transfusion; HTN (hypertension); Hyperlipidemia; Hyperphosphatemia; Hypersomnolence disorder; Kidney transplant status, living unrelated donor; Leaky heart valve; Metabolic acidosis; Narcolepsy; Nephropathy, diabetic ([facility] Secondary hyperparathyroidism of renal origin ([facility] Seizures ([facility] and Vitamin D deficiency. Past Surgical History:   has a past surgical history that includes Pancreas surgery (2007); eye surgery; Kidney transplant (2006); other surgical history (04/15/2005); other surgical history (06/07/2005); other surgical history (06/23/2005); other surgical history (10/03/2005); and pr repair incisional hernia,reducible (N/A, 6/29/2018).     Medications:   Scheduled Meds:    insulin lispro  0-12 Units Subcutaneous TID WC    insulin lispro  0-6 Units Subcutaneous Nightly    sodium polystyrene  30 g Oral Once    [START ON 8/23/2018] insulin glargine  18 Units Subcutaneous QAM    insulin glargine  18 Units Subcutaneous Nightly    cloNIDine  0.2 mg Oral Q8H    isosorbide mononitrate  60 mg Oral Daily    sodium chloride flush  10 mL Intravenous 2 times per day    heparin (porcine)  5,000 Units Subcutaneous 3 times per day    cetirizine  10 mg Oral Daily    sirolimus  4 mg Oral Daily    simvastatin  20 mg Oral QPM    mycophenolate  360 mg Oral BID    therapeutic multivitamin-minerals  1 tablet Oral Daily    methylphenidate  10 mg Oral BID WC    latanoprost  1 drop Both Eyes Nightly    gabapentin  100 mg Oral BID    carvedilol  50 mg Oral BID    calcitRIOL  0.5 mcg Oral Once per day on Mon Wed Fri    aspirin  81 mg Oral Daily    calcium acetate  1,334 mg Oral TID WC     Continuous Infusions:    dextrose 5 % and 0.9 % NaCl 75 mL/hr at 08/22/18 1310    dextrose       PRN Meds: sodium chloride flush, lactulose, glucose, dextrose, glucagon (rDNA), dextrose, potassium chloride **OR** potassium chloride **OR** potassium chloride, ondansetron  Home Meds:   Prior to Admission medications    Medication Sig Start Date End Date Taking? Authorizing Provider   gabapentin (NEURONTIN) 100 MG capsule Take 100 mg by mouth 2 times daily. .   Yes Historical Provider, MD   calcium acetate (ELIPHOS) 667 MG TABS Take 2 tablets by mouth 3 times daily (with meals)    Yes Historical Provider, MD   vitamin D (ERGOCALCIFEROL) 24428 units CAPS capsule Take 50,000 Units by mouth once a week   Yes Historical Provider, MD   cetirizine (ZYRTEC) 10 MG tablet Take 10 mg by mouth daily as needed for Allergies   Yes Historical Provider, MD   cloNIDine (CATAPRES) 0.1 MG tablet Take 0.2 mg by mouth 2 times daily    Yes Historical Provider, MD   carvedilol (COREG) 25 MG tablet Take 2 tablets by mouth 2 times daily 6/14/17  Yes Adelaida Mchugh MD   sirolimus (RAPAMUNE) 1 MG tablet Take 4 mg by mouth daily    Yes Historical Provider, MD   simvastatin (ZOCOR) 20 MG tablet Take 1 tablet by mouth units  Patient taking differently: Inject 3 Units into the skin 3 times daily (before meals) Sliding scale:  -200 = 1 unit  -250 = 2 units  -300 = 3 units  -350 = 4 units  -400 = 5 units  BS > 400 = 10 units  6/6/17   Blanche Velasco MD     Coumadin Use Last 7 Days:  no  Antiplatelet drug therapy use last 7 days: yes - asa  Other anticoagulant use last 7 days: yes - *heparin  Additional Medication Information:        Pre-Sedation Documentation and Exam:   I have personally completed a history, physical exam & review of systems for this patient (see notes).     Mallampati Airway Assessment:  Mallampati Class II - (soft palate, fauces & uvula are visible)    Prior History of Anesthesia Complications:   none    ASA Classification:  Class 3 - A patient with severe systemic disease that limits activity but is not incapacitating    Sedation/ Anesthesia Plan:   intravenous sedation    Medications Planned:   midazolam (Versed) intravenously and fentanyl intravenously    Patient is an appropriate candidate for plan of sedation: yes    Electronically signed by Tana Jansen MD on 8/22/2018 at 2:34 PM

## 2018-08-22 NOTE — PROGRESS NOTES
TRACHEA/HEART/MEDIASTINUM/HILUM: 1. There is mild prominence of the cardiac silhouette. LUNG MERINO: 1. There are diminished lung volumes. There is a small focus of platelike atelectasis laterally within the left lower chest. There is no additional consolidation or infiltrates. There is no pleural effusion or pulmonary vascular congestion. OTHER: None. PNEUMOTHORAX: None. OSSEOUS STRUCTURES: 1. No acute osseous abnormality. 1. There are diminished lung volumes. There is a small focus of platelike atelectasis laterally within the left lower chest. There is no additional consolidation or infiltrates. There is no pleural effusion or pulmonary vascular congestion. **This report has been created using voice recognition software. It may contain minor errors which are inherent in voice recognition technology. ** Final report electronically signed by Dr. Cammie Swann on 7/27/2018 8:37 PM            ASSESSMENT       1. Dyspnea   2. Elevated troponin  3. Opacities on CXR treated as bacterial pneumonia  4. Pulmonary hypertension with systolic dysfunction  5. Uncontrolled hypertension, on clonidine  6. Acute kidney injury on CKD 4  7. Atypical chest pain  8. Chronic co-morbidities:  1. Diabetes mellitus Type I?  2. Hypersomnolence disorder? 3. Seizure disorder  4. GERD  5. History of CVA  6. Angioedema related to ACE inhibitor  7. History of renal transplant (2006), previous hemodialysis recipient  8. Essential hypertension      PLAN     1. Workup for coronary artery disease initiated. DDx also includes PE.  2. Patient not currently placed on heparin drip per Cardiology. Plan was for heart cath today, however it was postponed till tomorrow secondary to hyperkalemia. 3. Does patient need transfer to tertiary institution that supports renal transplant patients? 4. Will discuss patient's plan with Dr. Ravi Kuo in am  5. Monitor blood glucose- appears uncontrolled and labile. Adjust insulin as necessary  6.  Needs Life Vest at discharge      DVT prophylaxis: [] Lovenox                                 [] SCDs                                 [x] SQ Heparin                                 [] Encourage ambulation           [] Already on Anticoagulation     Disposition:    [] Home       [] TCU       [] Rehab       [] Psych       [] SNF       [] [facility]       [x] Other-    Anticipated Discharge in : 2 to 3 days. 1 day if transferred to institution that supports renal transplant patients.     Code Status: Full Code    Electronically signed by Colleen Recio MD on 8/21/2018 at 8:44 PM

## 2018-08-22 NOTE — CARE COORDINATION
8/22/18  12:25 PM  Hospitalist and Cardiology following. Cardiac cath today. Per previous note, patient will need Life vest at discharge. [facility] will allow patient to return to clinic with LF but will require approval. Cardio messaged via perfect serve to request order. Patient will no longer require LF as EF is good. Patient plans to return home with family and continue HD MWF [facility]. Spoke with Guillermo Thakkar at [facility] to update.

## 2018-08-22 NOTE — PROGRESS NOTES
K 6.7, refusing ordered Kayexalate this am. States will take it after dialysis but not before. Reviewed with pt consequences of high K and plan for possible heart cath today.

## 2018-08-23 NOTE — PROGRESS NOTES
thought content appropriate, normal insight  Vascular:  Bilateral lower extremity improved. Skin: Right groin site without evidence of bleeding. Labs     Recent Labs      08/21/18   0452  08/22/18   0344  08/23/18   0425   WBC  7.5  7.6  7.0   HGB  9.3*  9.2*  9.4*   HCT  30.9*  30.9*  30.1*   PLT  203  233  195     Recent Labs      08/21/18   0452   08/22/18   0344  08/22/18   1130  08/23/18   0425   NA  135   --   135   --   134*   K  5.8*   < >  6.7*  3.5  5.2   CL  93*   --   91*   --   96*   CO2  26   --   22*   --   24   BUN  56*   --   84*   --   49*   CREATININE  8.4*   --   10.2*   --   7.6*   CALCIUM  9.4   --   9.0   --   8.9    < > = values in this interval not displayed. No results for input(s): AST, ALT, BILIDIR, BILITOT, ALKPHOS in the last 72 hours. Recent Labs      08/20/18   1906  08/22/18   0344   INR  1.07  1.11     No results for input(s): Ladena Quale in the last 72 hours. Urinalysis:      Lab Results   Component Value Date    NITRU NEGATIVE 05/27/2018    WBCUA 2-4 05/27/2018    WBCUA 15-25 03/13/2012    BACTERIA NONE 05/27/2018    RBCUA 0-2 05/27/2018    BLOODU MODERATE 05/27/2018    SPECGRAV 1.010 08/14/2017    GLUCOSEU 500 05/27/2018       Diagnostic imaging/procedures       Xr Chest Standard (2 Vw)    Result Date: 8/17/2018  PROCEDURE: XR CHEST (2 VW) CLINICAL INFORMATION: 51-year-old male with shortness of breath. COMPARISON: Chest x-ray 7/27/2019 TECHNIQUE: PA and lateral views of the chest were obtained. FINDINGS: A dual-lumen central venous catheter is again seen with the tip at the region of the superior cavoatrial junction. There are some retrocardiac opacities on the left side which could be related to atelectasis or infiltrate. The cardiac silhouette and pulmonary vasculature are within normal limits. There is no significant pleural effusion or pneumothorax. Visualized portions of the upper abdomen are within normal limits. The osseous structures are intact.  No acute fractures or suspicious osseous lesions. Left retrocardiac opacities which may represent atelectasis or infiltrate. **This report has been created using voice recognition software. It may contain minor errors which are inherent in voice recognition technology. ** Final report electronically signed by Dr Juancho Lloyd on 8/17/2018 12:05 PM    Xr Chest Standard (2 Vw)    Result Date: 7/27/2018  PROCEDURE: XR CHEST (2 VW) CLINICAL INFORMATION: Shortness of breath; cough. COMPARISON: 12/4/2017. TECHNIQUE: PA and lateral views of the chest performed. FINDINGS: POSTSURGICAL CHANGES: None. LINES/TUBES/MECHANICAL DEVICES: 1. Right central venous catheter with the distal tip extending to the junction of the superior vena cava and right atrium. TRACHEA/HEART/MEDIASTINUM/HILUM: 1. There is mild prominence of the cardiac silhouette. LUNG MERINO: 1. There are diminished lung volumes. There is a small focus of platelike atelectasis laterally within the left lower chest. There is no additional consolidation or infiltrates. There is no pleural effusion or pulmonary vascular congestion. OTHER: None. PNEUMOTHORAX: None. OSSEOUS STRUCTURES: 1. No acute osseous abnormality. 1. There are diminished lung volumes. There is a small focus of platelike atelectasis laterally within the left lower chest. There is no additional consolidation or infiltrates. There is no pleural effusion or pulmonary vascular congestion. **This report has been created using voice recognition software. It may contain minor errors which are inherent in voice recognition technology. ** Final report electronically signed by Dr. Yinka Mandujano on 7/27/2018 8:37 PM            ASSESSMENT       1. NSTEMI, s/p cardiac catheterization on 08/22 with stent to the LAD  2. Opacities on CXR treated as bacterial pneumonia, patient received Ceftriaxone from 08/17 to 08/19 and then it was discontinued   3. Pulmonary hypertension   4.  Systolic dysfunction, likely ischemic cardiomyopathy. 5. Uncontrolled hypertension, on clonidine, carvedilol and now isosorbide mononitrate  6. Acute kidney injury on CKD 5 currently on hemodialysis  7. Chronic co-morbidities:  1. Diabetes mellitus Type I?  2. Hypersomnolence disorder? 3. Seizure disorder  4. GERD  5. History of CVA  6. Angioedema related to ACE inhibitor  7. History of renal transplant (2006), previous hemodialysis recipient  8. Essential hypertension    PLAN     1. Lantus increased to 18 units bid. Patient states that he is currently working with someone to increase his adherence to insulin regimen. 2. Watch for reaction to isosorbide mononitrate as patient has had facial and tongue swelling associated with hydralazine use in the past.    3. Will plan for discharge tomorrow if ok with Cardiology. 4. Monitor hyperkalemia. 5. Awaiting information from patient to inform his renal transplant team of his hospital course. 6. Stop IV fluids. 7. No life vest required per Cardiology.       DVT prophylaxis: [] Lovenox                                 [] SCDs                                 [x] SQ Heparin                                 [] Encourage ambulation           [] Already on Anticoagulation     Disposition:    [] Home       [] TCU       [] Rehab       [] Psych       [] SNF       [] [facility]       [x] Other-    Anticipated Discharge in : 1 day    Code Status: Full Code    Electronically signed by Skye Sesay MD on 8/23/2018 at 7:05 AM

## 2018-08-23 NOTE — PLAN OF CARE
Problem: Discharge Planning:  Goal: Participates in care planning  Participates in care planning   Outcome: Ongoing  Discharge planning in progress. Goal: Discharged to appropriate level of care  Discharged to appropriate level of care   Outcome: Ongoing  Plan to discharge back to home with family. Problem: Airway Clearance - Ineffective:  Goal: Ability to maintain a clear airway will improve  Ability to maintain a clear airway will improve   Outcome: Ongoing  Patient maintained clear airway. SpO2 saturation within normal limits. Lungs clear and diminished. Problem: Cardiac Output - Decreased:  Goal: Hemodynamic stability will improve  Hemodynamic stability will improve   Outcome: Ongoing  Patient sinus rhythm. HR 78-31M and BP systolic 445-556. Problem: Serum Glucose Level - Abnormal:  Goal: Ability to maintain appropriate glucose levels will improve to within specified parameters  Ability to maintain appropriate glucose levels will improve to within specified parameters   Outcome: Ongoing  Blood sugar was low yesterday after dialysis. Checked by Shelby Wilson last night at 2200 and was 288. Gave 15 units of lantus. Problem: Falls - Risk of:  Goal: Will remain free from falls  Will remain free from falls   Outcome: Ongoing  Patient free from falls thus far during shift. Bed alarm on, bed locked and in lowest position, call light within reach. Goal: Absence of physical injury  Absence of physical injury   Outcome: Ongoing  Patient remains free from physical injury thus far during shift. Comments: Care plan reviewed with patient. Patient verbalize understanding of the plan of care and contribute to goal setting.

## 2018-08-23 NOTE — PROGRESS NOTES
Acct: [de-identified]  Admit Date:  8/17/2018  Primary Cardiologist: Seen by Dr Teresa Agarwal    Chief Complaint/Reason for Consultation:   Per Dr Spangler  consult note  \"This is actually a 72-year-old  -American gentleman who is known to have end-stage renal disease, on  hemodialysis presented with worsening of shortness of breath. The patient  has been stating, he has worsening of shortness of breath on minimal  activity over the last 1 month and getting progressively worse and now he  is having some orthopnea, short of breath is more when he lays supine and  sit rather than when he sits and also with activity. He has orthopnea of  around 2 to 3 pillows. Denied having chest pain. No paroxysmal nocturnal  dyspnea. He has no fever or chills. He has end-stage renal disease,  having hemodialysis, so on Monday, Wednesday and Friday. So, the patient  came in because of this and got admitted. He has been having cough which  was productive. The patient did not know the color of the sputum. So, in  view of the cardiomyopathy, Cardiology evaluation was sought. Ejection  fracture on echocardiogram done recently was 30% to 35%, previous ejection  fraction a few years ago was 45%. At this level after admission, the  patient is on antibiotics for presumable pneumonia. He has no leg edema. \"  Subjective (Events in last 24 hours):   Pt denies any CP, SOB or palpitations. Cath site is stable.       Objective:   BP (!) 164/104   Pulse 88   Temp 97.9 °F (36.6 °C) (Oral)   Resp 16   Ht 5' 9\" (1.753 m)   Wt 171 lb 3.2 oz (77.7 kg)   SpO2 100%   BMI 25.28 kg/m²        TELEMETRY: NSR    Physical Exam:  General Appearance: alert and oriented to person, place and time, in no acute distress  Cardiovascular: normal rate, regular rhythm, normal S1 and S2, no murmurs, rubs, clicks, or gallops, distal pulses intact, no carotid bruits, no JVD  Pulmonary/Chest: clear to auscultation bilaterally- no wheezes, rales or rhonchi, normal potassium chloride 40 mEq PRN   Or     potassium chloride 40 mEq PRN   Or     potassium chloride 10 mEq PRN   ondansetron 4 mg Q6H PRN       Diagnostics:  EKG:  NSR    Echo: 8/21/2018  Conclusions      Summary   Ejection fraction is visually estimated at 35-40%.   There was moderate global hypokinesis of the left ventricle.   The right ventricular size was normal with normal systolic function and   wall thickness.   Right ventricular systolic pressure of 60 mm Hg consistent with severe   pulmonary hypertension.   Myxomatotic degeneration of mitral valve.   Severe mitral regurgitation is present.   Moderate tricuspid regurgitation. Cardiac catheterization8/22/2018  PCI of the LAD  Lab Data:    Cardiac Enzymes:  No results for input(s): CKTOTAL, CKMB, CKMBINDEX, TROPONINI in the last 72 hours.     CBC:   Lab Results   Component Value Date    WBC 7.0 08/23/2018    RBC 3.76 08/23/2018    RBC 4.85 05/15/2012    HGB 9.4 08/23/2018    HCT 30.1 08/23/2018     08/23/2018       CMP:  Lab Results   Component Value Date     08/23/2018    K 5.2 08/23/2018    K 6.7 08/22/2018    CL 96 08/23/2018    CO2 24 08/23/2018    BUN 49 08/23/2018    CREATININE 7.6 08/23/2018    LABGLOM 10 08/23/2018    GLUCOSE 266 08/23/2018    GLUCOSE 74 06/24/2014    CALCIUM 8.9 08/23/2018       Hepatic Function Panel:  Lab Results   Component Value Date    ALKPHOS 274 08/17/2018    ALT 17 08/17/2018    AST 24 08/17/2018    PROT 7.2 08/17/2018    BILITOT 0.4 08/17/2018    BILIDIR <0.2 05/27/2018    LABALBU 3.7 08/17/2018    LABALBU 4.3 05/15/2012       Magnesium:    Lab Results   Component Value Date    MG 1.8 12/07/2017       PT/INR:    Lab Results   Component Value Date    INR 1.11 08/22/2018       HgBA1c:    Lab Results   Component Value Date    LABA1C 10.0 08/22/2018       FLP:  Lab Results   Component Value Date    TRIG 218 01/26/2017    HDL 75 01/26/2017    LDLCALC 230 01/26/2017    LDLDIRECT 187.78 07/28/2016       TSH:    Lab

## 2018-08-23 NOTE — PROCEDURES
[address] [place] [address]                              CARDIAC CATHETERIZATION    PATIENT NAME: Lydia Triplett                    :        1975  MED REC NO:   [id number]                           ROOM:       0021  ACCOUNT NO:   [de-identified]                           ADMIT DATE: 2018  PROVIDER:     Harsh Fish MD    DATE OF PROCEDURE:  2018    PROCEDURE PERFORMED:  1. Selective coronary angiography. 2.  Measurement of LVEDP and transaortic valve gradient. 3.  BM metal stent to mid LAD. 4.  Selective right femoral arteriography. FINDINGS:  1. Left main vessel shows no focal luminal narrowing. The proximal left  anterior descending artery has mild irregularities. The mid anterior  descending artery has an 80% stenosis, non-flow limiting stenosis. 2.  The circumflex vessel shows mild irregularities and is dominant. 3.  The right coronary artery is of very small caliber and is nondominant. 4.  Right femoral arteriography suitable for Angio-Seal device. 5.  LVEDP estimated at 38 mmHg. There was no gradient across the aortic  valve on pullback. EQUIPMENT USED:  5-Mongolian and 6-Mongolian 10-cm femoral sheath, 5-Mongolian  Multipack diagnostic catheters, 6-Mongolian JL-4 guide, 0.014 BMW wire, 2.5 x  28 Mini-Vision BMS. Angio-Seal.    COMPLICATIONS:  None. PROCEDURE  DETAILS:  Informed consent was obtained, and the patient was  brought to the cardiac catheterization laboratory where right femoral  arterial access was readily obtained using a micropuncture kit. The diagnostic study was performed via 5-Mongolian femoral sheath and a  5-Mongolian Multipack catheters. The findings are outlined above. Following review of the diagnostic study, decision was made to  revascularize the mid LAD with a bare-mental stent.     The 5-Mongolian sheath in the femoral artery was exchanged over a wire for a  6-Mongolian 10-cm sheath,

## 2018-08-23 NOTE — PROGRESS NOTES
Inpatient Cardiac Rehabilitation Consult    Received consult for Phase II Cardiac Rehabilitation. Cardiac Rehab education completed with patient. Pt unsure so he would like us to call him at home. Brochure given.

## 2018-08-23 NOTE — PROGRESS NOTES
1478.67 ml   Output                0 ml   Net          1478.67 ml     Last 3 weights  Wt Readings from Last 3 Encounters:   08/23/18 171 lb 3.2 oz (77.7 kg)   08/07/18 176 lb 1.6 oz (79.9 kg)   07/12/18 178 lb 4.8 oz (80.9 kg)        Physical Exam :  General Appearance:  Well developed. No distress  Mouth/Throat:  Oral mucosa moist  Neck:  Supple, no JVD  Lungs:  Breath sounds: clear  Heart[de-identified]  S1,S2 heard  Abdomen:  Soft, non - tender  Musculoskeletal:  Edema - No significant edema noted     Last 3 CBC  Recent Labs      08/21/18   0452  08/22/18   0344  08/23/18   0425   WBC  7.5  7.6  7.0   RBC  3.73*  3.67*  3.76*   HGB  9.3*  9.2*  9.4*   HCT  30.9*  30.9*  30.1*   PLT  203  233  195     Last 3 CMP  Recent Labs      08/21/18   0452   08/22/18   0344  08/22/18   1130  08/23/18   0425   NA  135   --   135   --   134*   K  5.8*   < >  6.7*  3.5  5.2   CL  93*   --   91*   --   96*   CO2  26   --   22*   --   24   BUN  56*   --   84*   --   49*   CREATININE  8.4*   --   10.2*   --   7.6*   CALCIUM  9.4   --   9.0   --   8.9    < > = values in this interval not displayed. Assessment :   ESRD on hemodialysis Monday Wednesday Friday. - HD in AM    Hyperkalemia -chronic     Status post kidney pancreas transplant on immunosuppressive therapy, failed kidney at this time. Anemia of end-stage renal disease  Chronic diastolic and systolic congestive heart failure  Essential hypertension running well continue current medications  HTN. Will increase clonidine to every 8 hours. Adding acapella for cough  Medications reviewed discussed with the patient and RN    Joann Browning to discharge 08/24/18 after dialysis    It is OK with nephrology for the primary care physician to discharge the patient when ready. A follow up visit with nephrology is not necessary as we see hemodialysis patients  Every week while receiving hemodialysis. Ramin Lancaster, DO  [facility].

## 2018-08-23 NOTE — CARE COORDINATION
8/23/18  11:52 AM  Hosp, cardio, nephro following. HD MWF. Plan home tomorrow after HD if stable. Will notify kim at discharge.  Creatinine 7.6, bun 49.     8/22 cardiac cath with stent to LAD

## 2018-08-24 NOTE — TELEPHONE ENCOUNTER
.Transition of Care visit scheduled.   8/29/2018  Patient is being discharged to [place] [id number] on 8/29/18

## 2018-08-24 NOTE — CARE COORDINATION
8/24/18, 10:08 AM    Discharge plan discussed by  and . Discharge plan reviewed with patient/ family. Patient/ family verbalize understanding of discharge plan and are in agreement with plan. Understanding was demonstrated using the teach back method. Spoke with Lilian Fall at [id number] to notify that patient will be discharged later today and need to resume normal HD on Monday-primary RN updated and will notify patient when he returns form HD. Patient plans to return home with family.

## 2018-08-24 NOTE — PROGRESS NOTES
Spoke with Luis A Vasquez to see if patient was okay for discharge. He stated he was okay for discharge and did not need a [place].

## 2018-08-24 NOTE — PROGRESS NOTES
Discharge instructions given to patient. Updated on medication changes and follow up appointments. Post cath instructions given. Education handouts provided on low sodium, heart failure, and heart attacks. All questions answered. IV was removed during night shift. Heart monitor removed. Patient sent home with all belongings. Transported down to the discharge lobby via wheelchair per staff. Discharged home with family.

## 2018-08-24 NOTE — DISCHARGE SUMMARY
with orthopnea. The patient also described a dull ache on the right sideof his chest. Initial vitals were unremarkable. The patient's white blood cell count was also unremarkable at 7.0. Other labs were consistent with the patient's history of chronic kidney disease with a potassium to 6.5 and a creatinine up to 11.2. Procalcitonin was elevated up to 2.50. ProBNP was  70,000 and there was an initial troponin elevation to 0.178, and then 0.167. EKG demonstrated T-wave changes in the inferior leads with normal sinus rhythm. Echocardiogram showed an EF of 35-40% with moderate global hypokinesis of the left ventricle, right ventricular systolic pressure of 60 mmHg consistent with severe pulmonary hypertension, severe mitral regurgitation and moderate tricuspid regurgitation were present with myomatous degeneration of the mitral valve. There was initial concern for pneumonia as there were opacities on the chest x-ray in the left retrocardiac region and treatment for  bacterial pneumonia was initiated and the patient received ceftriaxone from 8/17 to 8/19. A heparin drip was not initiated. The patient's cardiac catheterization was initially delayed by persistent hyperkalemia. The patient was seen by cardiology, Dr. Maulik Deng, and a cardiac catheterization was performed on 8/23 with findings of 80% stenosis in the mid left anterior descending artery. A bare metal stent was placed in the mid LAD. The patient denied any chest pain or shortness of breath following the procedure. The patient was started on aspirin and Plavix. Placement of a Life Vest was contemplated however it was not performed because the patient's EF was  Greater than 35%. Aiden Alanis Uncontrolled hypertension, on clonidine, carvedilol and now isosorbide mononitrate  ESRD on hemodialysis    The patient was noted to have uncontrolled hypertension and his clonidine ws increased to 3 times daily.  He was additionally started on isosorbide mononitrate 60 known as:  Brittney Beagle  1 each by In Vitro route 4 times daily As needed. calcitRIOL 0.5 MCG capsule  Commonly known as:  ROCALTROL     carvedilol 25 MG tablet  Commonly known as:  COREG  Take 2 tablets by mouth 2 times daily     cetirizine 10 MG tablet  Commonly known as:  ZYRTEC     darbepoetin mirna-polysorbate 60 MCG/0.3ML Sosy injection  Commonly known as:  ARANESP     ELIPHOS 667 MG Tabs  Generic drug:  calcium acetate     gabapentin 100 MG capsule  Commonly known as:  NEURONTIN     * Insulin Pen Needle 33G X 5 MM Misc  Indications: E10.21 Use with Insulin pens     * B-D ULTRAFINE III SHORT PEN 31G X 8 MM Misc  Generic drug:  Insulin Pen Needle     lactulose encephalopathy 10 GM/15ML Soln solution     latanoprost 0.005 % ophthalmic solution  Commonly known as:  XALATAN  Place 1 drop into both eyes nightly     MENS MULTI VITAMIN & MINERAL PO     methylphenidate 10 MG tablet  Commonly known as:  RITALIN  Take 1 tablet by mouth 2 times daily     Mycophenolate Sodium 360 MG Tbec  Take 1 tablet by mouth 2 times daily     ONE TOUCH LANCETS Misc  1 each by Does not apply route 4 times daily     simvastatin 20 MG tablet  Commonly known as:  ZOCOR  Take 1 tablet by mouth every evening     sirolimus 1 MG tablet  Commonly known as:  RAPAMUNE     vitamin D 54255 units Caps capsule  Commonly known as:  ERGOCALCIFEROL     VOL-CARE RX 1 MG Tabs        * This list has 2 medication(s) that are the same as other medications prescribed for you. Read the directions carefully, and ask your doctor or other care provider to review them with you.                Where to Get Your Medications      These medications were sent to [address] [place] [id number] [facility], [place] - [address] - F [phone]  [address] [place] [address],[facility] [address], [facility] [address]    Hours:  24-hours Phone:  [phone]   · aspirin 81 MG chewable tablet  · cloNIDine 0.2 MG tablet  · clopidogrel 75 MG tablet  · insulin glargine 100 UNIT/ML injection or infiltrate. **This report has been created using voice recognition software. It may contain minor errors which are inherent in voice recognition technology. **      Final report electronically signed by Dr Fide Tai on 8/17/2018 12:05 PM           Xr Chest Standard (2 Vw)    Result Date: 8/17/2018  PROCEDURE: XR CHEST (2 VW) CLINICAL INFORMATION: 46-year-old male with shortness of breath. COMPARISON: Chest x-ray 7/27/2019 TECHNIQUE: PA and lateral views of the chest were obtained. FINDINGS: A dual-lumen central venous catheter is again seen with the tip at the region of the superior cavoatrial junction. There are some retrocardiac opacities on the left side which could be related to atelectasis or infiltrate. The cardiac silhouette and pulmonary vasculature are within normal limits. There is no significant pleural effusion or pneumothorax. Visualized portions of the upper abdomen are within normal limits. The osseous structures are intact. No acute fractures or suspicious osseous lesions. Left retrocardiac opacities which may represent atelectasis or infiltrate. **This report has been created using voice recognition software. It may contain minor errors which are inherent in voice recognition technology. ** Final report electronically signed by Dr Fide Tai on 8/17/2018 12:05 PM    Xr Chest Standard (2 Vw)    Result Date: 7/27/2018  PROCEDURE: XR CHEST (2 VW) CLINICAL INFORMATION: Shortness of breath; cough. COMPARISON: 12/4/2017. TECHNIQUE: PA and lateral views of the chest performed. FINDINGS: POSTSURGICAL CHANGES: None. LINES/TUBES/MECHANICAL DEVICES: 1. Right central venous catheter with the distal tip extending to the junction of the superior vena cava and right atrium. TRACHEA/HEART/MEDIASTINUM/HILUM: 1. There is mild prominence of the cardiac silhouette. LUNG MERINO: 1. There are diminished lung volumes.  There is a small focus of platelike atelectasis laterally within the left lower

## 2018-08-26 PROBLEM — R09.89 PULMONARY HYPERINFLATION: Status: ACTIVE | Noted: 2018-01-01

## 2018-08-26 PROBLEM — E61.1 IRON DEFICIENCY: Status: ACTIVE | Noted: 2018-01-01

## 2018-08-26 PROBLEM — I27.20 PULMONARY HYPERTENSION (HCC): Status: ACTIVE | Noted: 2018-01-01

## 2018-08-26 PROBLEM — J00 ACUTE NASOPHARYNGITIS: Status: ACTIVE | Noted: 2018-01-01

## 2018-08-26 PROBLEM — R07.89 ATYPICAL CHEST PAIN: Status: ACTIVE | Noted: 2018-01-01

## 2018-08-26 PROBLEM — R06.02 SHORTNESS OF BREATH AT REST: Status: ACTIVE | Noted: 2018-01-01

## 2018-08-26 NOTE — ED NOTES
Patient resting in bed with eyes closed. Respirations easy and unlabored. Will monitor.       Gissell Bland RN  08/26/18 [id number]

## 2018-08-26 NOTE — H&P
History & Physical        Patient:  Bisi Paez  YOB: 1975    MRN: [id number]     Acct: [de-identified]    PCP: No primary care provider on file. Date of Admission: 8/26/2018    Date of Service: Pt seen/examined on 8/26/2018   and Admitted to Inpatient with expected LOS greater than two midnights due to medical therapy. Chief Complaint:   Chief Complaint   Patient presents with    Shortness of Breath         History Of Present Illness:   41 y.o. male who presented to [address] [place] [address] with complaints of shortness of breath x 1 month. Patient states that he has ongoing shortness of breath. He states he has shortness of breath that occurs at rest. He notes that he has some chest pain with it once or twice a day. He states that he will lay down and sometimes his shortness of breath will resolve, but rest or laying down does not help with his atypical chest pain. He states that he had a cardiac cath with stent placement 4 days ago in efforts to help with his symptoms but he admits that he remains unchanged. He states that he wants to know \"why am I always short of breath? \". Patient denies fevers or chills. Admits to upper respiratory congestion. Denies problems with dysuria. PMH ESRD on HD Mon/Wed/Fri, type 1 diabetes, systolic congestive heart failure, pulmonary hypertension, hypersomnolence, CVA. While in ED patient did not appear acutely in distress. Patient was noted to have elevated troponin 0.176. PROBNP [id number], elevated from baseline. Procalcitonin elevated at 1.77.      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    Cerebral artery occlusion with [facility] infarction ([facility]     CHF (congestive heart failure) (HCC)     Disease of blood and blood forming organ     DM type 1 (diabetes mellitus, type 1) ([facility]     previous diabetes, diag at age of 17,  no tx after transplant in 2008, started on insulins again in august 2015 as pancreas is not functioning well    ESRD (end stage renal disease) ([facility] 12/10/2013    was on dialysis in [id number], before transplant    GERD (gastroesophageal reflux disease)     H/O pancreas transplant ([facility] 5/15/2007    Hemodialysis patient ([facility]     History of blood transfusion     FFP for angioedema    HTN (hypertension)     Hyperlipidemia     Hyperphosphatemia 8/17/2018    Hypersomnolence disorder     Kidney transplant status, living unrelated donor 5/7/2006    Leaky heart valve     Metabolic acidosis     Narcolepsy     Nephropathy, diabetic ([facility]     Secondary hyperparathyroidism of renal origin ([facility] 8/17/2018    Seizures ([facility]     Vitamin D deficiency 8/17/2018       Past Surgical History:          Procedure Laterality Date    EYE SURGERY      vitrectomy, in both eyes, bleeding from heparin while on dialysis    KIDNEY TRANSPLANT  2006    OTHER SURGICAL HISTORY  04/15/2005    Dr Bebeto Maki CAPD cath    OTHER SURGICAL HISTORY  06/07/2005    Dr Pauline Zelaya with lysis of adhesion and manipulatin of CAPD cath    OTHER SURGICAL HISTORY  06/23/2005    Dr Irina Goff left CAPD cath, placement of right CAPD cath    OTHER SURGICAL HISTORY  10/03/2005    Dr Stephen-Excision of 5cm chest wall mass with intermediate closure, Excision of 5cm abdominal wall mass with removal of PD catheter    PANCREAS SURGERY  2007    transplant    AL REPAIR INCISIONAL HERNIA,REDUCIBLE N/A 6/29/2018    HERNIA VENTRAL REPAIR,with mesh performed by Sarabjit Lyons MD at [facility]       Medications Prior to Admission:      Prior to Admission medications    Medication Sig Start Date End Date Taking?  Authorizing Provider   cloNIDine (CATAPRES) 0.2 MG tablet Take 1 tablet by mouth every 8 hours 8/24/18  Yes Skye Sesay MD   clopidogrel (PLAVIX) 75 MG tablet Take 1 tablet by mouth daily 8/24/18  Yes Skye Sesay MD   aspirin 81 MG chewable tablet Take 1 tablet by mouth daily 8/24/18  Yes Santosh Talbert MD   insulin glargine (BASAGLAR KWIKPEN) 100 UNIT/ML injection pen Inject 18 units in the morning and 18 units in the evening 8/24/18  Yes Santosh Talbert MD   isosorbide mononitrate (IMDUR) 60 MG extended release tablet Take 1 tablet by mouth daily 8/24/18  Yes Santosh Talbert MD   nitroGLYCERIN (NITROSTAT) 0.4 MG SL tablet Place 1 tablet under the tongue every 5 minutes as needed for Chest pain up to max of 3 total doses. If no relief after 1 dose, call 911. 8/24/18  Yes Santosh Talbert MD   insulin lispro (HUMALOG KWIKPEN) 100 UNIT/ML pen Inject 3 Units into the skin 3 times daily (before meals) Sliding scale:  -199 = 2 units  -249 = 4 units  -299 = 6 units  -400= 10 units  BS >400 = 12 units 8/24/18  Yes Santosh Talbert MD   gabapentin (NEURONTIN) 100 MG capsule Take 100 mg by mouth 2 times daily. .   Yes Historical Provider, MD   calcitRIOL (ROCALTROL) 0.5 MCG capsule Take 0.5 mcg by mouth three times a week    Yes Historical Provider, MD   calcium acetate (ELIPHOS) 667 MG TABS Take 2 tablets by mouth 3 times daily (with meals)    Yes Historical Provider, MD   vitamin D (ERGOCALCIFEROL) 81600 units CAPS capsule Take 50,000 Units by mouth once a week   Yes Historical Provider, MD   cetirizine (ZYRTEC) 10 MG tablet Take 10 mg by mouth daily as needed for Allergies   Yes Historical Provider, MD   darbepoetin mirna-polysorbate (ARANESP) 60 MCG/0.3ML SOSY injection Inject 60 mcg into the skin every 14 days Per clinic 9/28/17  Yes Historical Provider, MD   carvedilol (COREG) 25 MG tablet Take 2 tablets by mouth 2 times daily 6/14/17  Yes Christa Graham MD   sirolimus (RAPAMUNE) 1 MG tablet Take 4 mg by mouth daily    Yes Historical Provider, MD   simvastatin (ZOCOR) 20 MG tablet Take 1 tablet by mouth every evening 2/9/17  Yes Nimo Dryer,    Mycophenolate Sodium 360 MG TBEC Take 1 tablet by mouth 2 times daily 1/14/17  Yes Nimo Haq, DO Temp 97.7 °F (36.5 °C) (Oral)   Resp 18   Ht 5' 9\" (1.753 m)   Wt 184 lb 3.2 oz (83.6 kg)   SpO2 99%   BMI 27.20 kg/m²     General appearance:  No apparent distress, appears stated age and cooperative. HEENT:  Normal cephalic, atraumatic without obvious deformity. Pupils equal, round, and reactive to light. Extra ocular muscles intact. Conjunctivae/corneas clear. Neck: Supple, with full range of motion. No jugular venous distention. Trachea midline. Respiratory:  Normal respiratory effort. Clear to auscultation, bilaterally without Rales/Wheezes/Rhonchi. Decreased bases bilaterally  Cardiovascular:  Regular rate and rhythm with normal S1/S2. No rubs or gallops. No pedal edema  Abdomen: Soft, non-tender, non-distended with normal bowel sounds. Musculoskeletal:  No clubbing, cyanosis or edema bilaterally. Full range of motion without deformity. Skin: Skin color, texture, turgor normal.  No rashes or lesions. Port intact without erythema, warmth or discharge  Neurologic:  Neurovascularly intact without any focal sensory/motor deficits. Cranial nerves: II-XII intact, grossly non-focal.  Psychiatric:  Alert and oriented, thought content appropriate, normal insight  Capillary Refill: Brisk,< 3 seconds   Peripheral Pulses: +2 palpable, equal bilaterally       Labs:     Recent Labs      08/26/18   0658   WBC  9.3   HGB  9.3*   HCT  30.2*   PLT  269     Recent Labs      08/24/18   0449  08/26/18   0657   NA  136  136   K  6.0*  5.5*   CL  98  95*   CO2  19*  22*   BUN  79*  70*   CREATININE  9.9*  10.1*   CALCIUM  9.3  9.4     No results for input(s): AST, ALT, BILIDIR, BILITOT, ALKPHOS in the last 72 hours. No results for input(s): INR in the last 72 hours. No results for input(s): Alexandra Bibber in the last 72 hours.     Urinalysis:      Lab Results   Component Value Date    NITRU NEGATIVE 05/27/2018    WBCUA 2-4 05/27/2018    WBCUA 15-25 03/13/2012    BACTERIA NONE 05/27/2018    RBCUA 0-2 05/27/2018 initiate azithromycin in addition to Zyrtec for symptoms              Electronically signed by Mindi Carlson DO on 8/26/2018 at 11:31 AM

## 2018-08-26 NOTE — CONSULTS
[facility]    Patient - Archana Rogers   MRN -  [id number]   Acct # - [de-identified]   - 1975      Date of Admission -  2018  6:29 AM  Date of evaluation -  2018  Room - 3B-35/[facility]   Hospital Day - [facility] Primary Care Physician - No primary care provider on file. Chief Complaint   SOB  Active Hospital Problem List      Active Hospital Problems    Diagnosis Date Noted    Essential hypertension [I10]      Priority: High    Heart disease [I51.9] 2013     Priority: Medium    GERD (gastroesophageal reflux disease) [K21.9] 2013     Priority: Low    Shortness of breath at rest [R06.02] 2018    ESRD on hemodialysis ([address] [N18.6, Z99.2]     Atypical chest pain [R07.89]     Vitamin D deficiency [E55.9] 2018    Type 1 diabetes mellitus with hyperglycemia (HCC) [E10.65]     Anemia in CKD (chronic kidney disease) [N18.9, N43.4]     Systolic congestive heart failure ([address] [I50.20] 2013     HPI   Archana Rogers is a 41 y.o. male H/O CKD, s/p renal transplant currently waiting for a new transplant. CMP, DM, HTN, presents with slowly progressive LANDRY/SOB, Orthopnea, bilateral LE edema, BNP > 60K. Pulmonary service is asked to see for pulmonary hypertension.   Non smoker, disable, no occupational exposures  Previous kidney transplant in  and pancreas transplant  both failed by now  Past Medical History         Diagnosis Date    Anemia     Angiotensin converting enzyme inhibitor-aggravated angioedema 2016    Cerebral artery occlusion with cerebral infarction ([address]     CHF (congestive heart failure) ([address]     Disease of blood and blood forming organ     DM type 1 (diabetes mellitus, type 1) ([address]     previous diabetes, diag at age of 17,  no tx after transplant in , started on insulins again in 2015 as pancreas is not functioning well    ESRD (end stage renal disease) ([address] 12/10/2013    was on dialysis in [id number], before transplant    GERD (gastroesophageal reflux disease)     H/O pancreas transplant ([facility] 5/15/2007    Hemod[facility])     History of blood transfusion     FFP for angioedema    HTN (hypertension)     Hyperlipidemia     Hyperphosphatemia 8/17/2018    Hypersomnolence disorder     Kidney transplant status, living unrelated donor 5/7/2006    Leaky heart valve     Metabolic acidosis     Narcolepsy     Nephropathy, diabetic ([facility]     Secondary hyperparathyroidism of renal origin ([facility] 8/17/2018    Seizures ([facility]     Vitamin D deficiency 8/17/2018      Past Surgical History           Procedure Laterality Date    EYE SURGERY      vitrectomy, in both eyes, bleeding from heparin while on dialysis    KIDNEY TRANSPLANT  2006    OTHER SURGICAL HISTORY  04/15/2005    Dr Suarez Ted CAPD cath    OTHER SURGICAL HISTORY  06/07/2005    Dr Mehnaz Arredondo with lysis of adhesion and manipulatin of CAPD cath    OTHER SURGICAL HISTORY  06/23/2005    Dr Raymond Hoffman left CAPD cath, placement of right CAPD cath    OTHER SURGICAL HISTORY  10/03/2005    Dr Stephen-Excision of 5cm chest wall mass with intermediate closure, Excision of 5cm abdominal wall mass with removal of PD catheter    PANCREAS SURGERY  2007    transplant    MA REPAIR INCISIONAL HERNIA,REDUCIBLE N/A 6/29/2018    HERNIA VENTRAL REPAIR,with mesh performed by Gina Hardin MD at [address] [place] [address] RENAL; Low Sodium (2 GM); Daily Fluid Restriction: 1800 ml  Allergies    Ace inhibitors; Bactrim [sulfamethoxazole-trimethoprim]; Hydralazine; Lisinopril;  Losartan; Amlodipine; Morphine; and Lipitor [atorvastatin]  Social History     Social History     Social History    Marital status: Single     Spouse name: N/A    Number of children: 3    Years of education: 15     Occupational History    Disabled      Social History Main Topics    Smoking status: Never Smoker    Smokeless tobacco: Never Used    Alcohol use No    Drug use: No    Sexual activity: Not Currently     Other Topics Concern    Not on file     Social History Narrative    No narrative on file     Family History      Family History   Problem Relation Age of Onset    Heart Disease Mother     Diabetes Mother     High Blood Pressure Mother     Heart Disease Father     Diabetes Maternal Aunt     Kidney Disease Maternal Aunt     Diabetes Sister     High Blood Pressure Maternal Grandmother      Sleep History    Negative PSG twice, positive MSLT  ROS    General/Constitutional: No recent loss of weight or appetite changes. No fever or chills, positive fatigue  HENT: Negative. Eyes: Negative. Upper respiratory tract: No nasal stuffiness or post nasal drip. Lower respiratory tract/ lungs: No cough or sputum production. No hemoptysis. Cardiovascular: No palpitations, chest pain or edema. Gastrointestinal: No nausea or vomiting. Neurological: No focal neurological weakness. Extremities: No tenderness. Musculoskeletal: no complaints  Genitourinary: No complaints. Hematological: Negative. Denies easy buising  Skin: No itching.   Meds    Current Medications    phenylephrine  1 spray Each Nare Once    [START ON 8/27/2018] aspirin  81 mg Oral Daily    [START ON 8/27/2018] calcitRIOL  0.5 mcg Oral Once per day on Mon Wed Fri    calcium acetate  1,334 mg Oral TID     carvedilol  50 mg Oral BID    cloNIDine  0.2 mg Oral Q8H    [START ON 8/27/2018] clopidogrel  75 mg Oral Daily    darbepoetin mirna-polysorbate  60 mcg Subcutaneous Q14 Days    gabapentin  100 mg Oral BID    insulin glargine  18 Units Subcutaneous BID    insulin lispro  3 Units Subcutaneous TID AC    [START ON 8/27/2018] isosorbide mononitrate  60 mg Oral Daily    latanoprost  1 drop Both Eyes Nightly    methylphenidate  10 mg Oral BID    therapeutic multivitamin-minerals  1 tablet Oral Daily    mycophenolate  360 mg Oral BID    simvastatin to the patient and his family.     He was transferred back to the floor for further monitoring and management.           Yaakov Yoo MD       Cultures    Procalcitonin  Lab Results   Component Value Date    PROCAL 1.77 08/26/2018    PROCAL 1.93 08/21/2018    PROCAL 2.35 08/19/2018        Radiology    CXR  8/26/18    FINDINGS:    A right-sided dual-lumen central venous catheter is seen with the tip projecting over the region of the right atrium.       There are some opacities at the left lung base which may represent atelectasis or infiltrate.       No significant pleural effusion or pneumothorax.       The heart is normal in size. Pulmonary vasculature is within normal limits.       The osseous structures are intact.       Visualized portions of the upper abdomen are within normal limits.           Impression   Left basilar opacities which could be on the basis of atelectasis or infiltrate. Overall stable appearance of the chest when compared to previous study 8/17/2018.       **This report has been created using voice recognition software. It may contain minor errors which are inherent in voice recognition technology. **       Final report electronically signed by Dr Yoselin Lopez on 8/26/2018 7:19 AM               CT Scans  Impression   1. No evidence of pulmonary embolism. 2. Diffuse groundglass opacities throughout both lungs which may be related to pulmonary edema. 3. Prominence of the main pulmonary artery which is often seen with pulmonary arterial hypertension. 3. Cardiomegaly.                   **This report has been created using voice recognition software. It may contain minor errors which are inherent in voice recognition technology. 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

## 2018-08-26 NOTE — ED PROVIDER NOTES
(ONETOUCH VERIO) strip 1 each by In Vitro route 4 times daily As needed. Qty: 100 each, Refills: 12      latanoprost (XALATAN) 0.005 % ophthalmic solution Place 1 drop into both eyes nightly  Qty: 1 Bottle, Refills: 3      B Complex-C-Folic Acid (VOL-CARE RX) 1 MG TABS Take by mouth 3 times daily      ! ! B-D ULTRAFINE III SHORT PEN 31G X 8 MM MISC Seven or eight  Refills: 0      !! Insulin Pen Needle 33G X 5 MM MISC Indications: E10.21 Use with Insulin pens  Qty: 200 each, Refills: 0      ONE TOUCH LANCETS MISC 1 each by Does not apply route 4 times daily  Qty: 100 each, Refills: 12       !! - Potential duplicate medications found. Please discuss with provider. ALLERGIES     is allergic to ace inhibitors; bactrim [sulfamethoxazole-trimethoprim]; hydralazine; lisinopril; losartan; amlodipine; morphine; and lipitor [atorvastatin]. FAMILY HISTORY     indicated that his mother is alive. He indicated that his father is alive. He indicated that the status of his sister is unknown. He indicated that the status of his maternal grandmother is unknown. He indicated that the status of his maternal aunt is unknown.    family history includes Diabetes in his maternal aunt, mother, and sister; Heart Disease in his father and mother; High Blood Pressure in his maternal grandmother and mother; Kidney Disease in his maternal aunt. SOCIAL HISTORY      reports that he has never smoked. He has never used smokeless tobacco. He reports that he does not drink alcohol or use drugs. PHYSICAL EXAM     INITIAL VITALS:  height is 5' 9\" (1.753 m) and weight is 184 lb 3.2 oz (83.6 kg). His oral temperature is 97.5 °F (36.4 °C). His blood pressure is 145/98 (abnormal) and his pulse is 88. His respiration is 18 and oxygen saturation is 98%. Physical Exam   Constitutional: He is oriented to person, place, and time. He appears well-developed and well-nourished. No distress. HENT:   Head: Normocephalic and atraumatic.    Right Ear: External ear normal.   Left Ear: External ear normal.   Mouth/Throat: Oropharynx is clear and moist.   Eyes: Conjunctivae and EOM are normal.   Neck: Normal range of motion. Neck supple. Cardiovascular: Normal rate, regular rhythm, normal heart sounds and intact distal pulses. Exam reveals no friction rub. No murmur heard. Pulmonary/Chest: Effort normal and breath sounds normal. No respiratory distress. He has no wheezes. He has no rales. He exhibits no tenderness. Dialysis catheter right upper chest wall. Abdominal: Soft. Bowel sounds are normal. He exhibits no distension. There is no tenderness. There is no rebound. Musculoskeletal: Normal range of motion. He exhibits no edema. Neurological: He is alert and oriented to person, place, and time. He exhibits normal muscle tone. Skin: Skin is warm and dry. No rash noted. Nursing note and vitals reviewed. DIFFERENTIAL DIAGNOSIS:   Sinus congestion, bronchitis, pneumonia, CHF     DIAGNOSTIC RESULTS     EKG: All EKG's are interpreted by the Emergency Department Physician who either signs or Co-signs this chart in the absence of a cardiologist.    Kate Nails. Rate: 93 bpm  MA interval: 160 ms  QRS duration: 80 ms  QTc: 499 ms  P-R-T axes: 24, 51, -52  NSR. No STEMI   Compared to old EKG on 08-     RADIOLOGY: non-plain film images(s) such as CT, Ultrasound and MRI are read by the radiologist.    [address] [place] [address]   Final Result   1. No evidence of pulmonary embolism. 2. Diffuse groundglass opacities throughout both lungs which may be related to pulmonary edema. 3. Prominence of the main pulmonary artery which is often seen with pulmonary arterial hypertension. 3. Cardiomegaly. **This report has been created using voice recognition software. It may contain minor errors which are inherent in voice recognition technology. **      Final report electronically signed by Dr Markus Gill on 8/26/2018 9:41 AM      XR CHEST STANDARD (2 VW)   Final Result   Left basilar opacities which could be on the basis of atelectasis or infiltrate. Overall stable appearance of the chest when compared to previous study 8/17/2018. **This report has been created using voice recognition software. It may contain minor errors which are inherent in voice recognition technology. **      Final report electronically signed by Dr Francisco Alfredo on 8/26/2018 7:19 AM           LABS:     Labs Reviewed   BASIC METABOLIC PANEL W/ REFLEX TO MG FOR LOW K  - Abnormal; Notable for the following:        Result Value    Potassium reflex Magnesium 5.5 (*)     Chloride 95 (*)     CO2 22 (*)     Glucose 56 (*)     BUN 70 (*)     CREATININE 10.1 (*)     All other components within normal limits   BRAIN NATRIURETIC PEPTIDE - Abnormal; Notable for the following:     Pro-BNP 64951.0 (*)     All other components within normal limits   CBC WITH AUTO DIFFERENTIAL - Abnormal; Notable for the following:     RBC 3.68 (*)     Hemoglobin 9.3 (*)     Hematocrit 30.2 (*)     MCH 25.3 (*)     MCHC 30.8 (*)     RDW-CV 16.9 (*)     RDW-SD 50.8 (*)     Eosinophils # 0.8 (*)     All other components within normal limits   TROPONIN - Abnormal; Notable for the following:     Troponin T 0.197 (*)     All other components within normal limits   PROCALCITONIN - Abnormal; Notable for the following:     Procalcitonin 1.77 (*)     All other components within normal limits   ANION GAP - Abnormal; Notable for the following:      Anion Gap 19.0 (*)     All other components within normal limits   GLOMERULAR FILTRATION RATE, ESTIMATED - Abnormal; Notable for the following:     Est, Glom Filt Rate 7 (*)     All other components within normal limits   IRON - Abnormal; Notable for the following:     Iron 19 (*)     All other components within normal limits   HEMOGLOBIN A1C - Abnormal; Notable for the following:     Hemoglobin A1C 9.8 (*)     AVERAGE GLUCOSE 234 (*)     All other components within normal limits   TROPONIN - Abnormal; Notable for the following:     Troponin T 0.176 (*)     All other components within normal limits   POCT GLUCOSE - Abnormal; Notable for the following:     POC Glucose 233 (*)     All other components within normal limits   POCT GLUCOSE - Abnormal; Notable for the following:     POC Glucose 339 (*)     All other components within normal limits   CULTURE BLOOD #1   CULTURE BLOOD #2   URINE CULTURE   LACTIC ACID, PLASMA   OSMOLALITY   VITAMIN B12 & FOLATE   URINALYSIS   TROPONIN   POCT GLUCOSE       EMERGENCY DEPARTMENT COURSE:   Vitals:    Vitals:    08/26/18 0729 08/26/18 0831 08/26/18 0943 08/26/18 1208   BP:  (!) 138/101 (!) 144/89 (!) 145/98   Pulse: 88 86 87 88   Resp: 18 18 18 18   Temp:   97.7 °F (36.5 °C) 97.5 °F (36.4 °C)   TempSrc:   Oral Oral   SpO2: 96% 98% 99% 98%   Weight:   184 lb 3.2 oz (83.6 kg)    Height:   5' 9\" (1.753 m)        6:54 AM: The patient was seen and evaluated. Labs and imaging will be completed. MDM:  The patient was seen and evaluated in the ED for shortness of breath. The patient has an extensive history of failed kidney and pancrease transplant with recent admission resulting in stenting of the LAD. The patient presented to the ED in no acute distress. Lungs were clear on exam. CXR showed bibasilar infiltrates which are stable and similar to previous CXR during his admission. Troponin was elevated and slightly higher than previous. procalcitonin was still elevated. BNP was elevated from baseline, however, there was no signs of CHF on his CXR. Due to the complexity of the case and patient's history, admission was felt beneficial for further workup and treatment. Dr. Dana Otero (Hospitalist) was consulted and kindly accepted the patient for admission, requested a chest CTA be ordered and Dr. Kev Merritt (Cardiology) be updated on the patient's admission.  Case was discussed with Dr. Kev Merritt (cardiology) who will consult on the case and see the

## 2018-08-26 NOTE — PLAN OF CARE
Problem: Impaired respiratory status  Goal: Clear lung sounds  Outcome: Ongoing  Pt has clear/diminished breath sounds. txs to help improve lung aeration.

## 2018-08-26 NOTE — CONSULTS
[address] [id number]                                   CONSULTATION    PATIENT NAME: Sam Langston                    :        1975  MED REC NO:   [id number]                           ROOM:       9127  ACCOUNT NO:   [de-identified]                           ADMIT DATE: 2018  PROVIDER:     Kevin Kocher, M.D. Richelle Fuel:  2018    CARDIOLOGY CONSULTATION    REASON FOR CONSULT:  Elevated troponin and shortness of breath. REQUESTING PROVIDER:  Hospitalist service. HISTORY OF PRESENT ILLNESS:  This is a very unfortunate, young gentleman  who has longstanding history of diabetes with associated issues related to  it including renal failure, cardiac disease and neuropathy. The patient  was in the hospital not too long ago, underwent angioplasty and stenting of  the LAD, was discharged home, did acceptably fair for the first day;  however, had an episode of shortness of breath last night. He described  shortness of breath at night which almost seems like congestive heart  failure type of symptom; however, he does not seem to think that his fluid  status is causing it and he seems to think that he is pretty compliant with  his fluid intake and has dialysis. This was associated with some  nonspecific chest discomfort that seems to be somehow pleuritic. Initial  assessment in the ER showed EKG with nonspecific changes and cardiac  enzymes that were also borderline elevated. Keeping in mind the recent  angioplasty and stenting, the patient was admitted for further evaluation. We were consulted to assist in the management of the patient. REVIEW OF SYSTEMS:  1.  No obvious fever or chills. 2.  No hematuria or dysuria. 3.  No abdominal pain, nausea, vomiting, or diarrhea. 4.  No obvious psych problems or suicidal ideation. 5.  No skin rashes.   6.  No obvious dizziness, lightheadedness, or loss of be  the case. My team will be taking over tomorrow. I will be off after  today. Thank you for allowing me to participate in the care of this patient.         Omayra Cooper M.D.    D: 08/26/2018 12:00:17       T: 08/26/2018 12:55:07     [email or web address]  Job#: [id number]     Doc#: [id number]    CC:

## 2018-08-26 NOTE — ED NOTES
Pt back from x-ray; transported to [facility].      Nickolas Goodwin, EMT-P  08/26/18 5500

## 2018-08-26 NOTE — CONSULTS
[facility]    [facility]  [address]  [facility], [facility] [place] [address] [place] [facility] Consult      8/26/2018 12:06 PM         Pt Name:    Lalitha Schwarz  MRN:     [id number]   [id number]  YOB: 1975  Admit Date:    8/26/2018  6:29 AM  Primary Care Physician:  No primary care provider on file. Room Number:  3B-35/035-A   Reason for Consult:  Management of hemodialysis  Requesting Providor: Dr. Vanessa Balderas  Primary Nephrologist: Dr. Poppy Luna    ### ISOLATION ###:   none     Admit Chief Complaint: shortness of breath     History of Chief Complaint:   The patient is a 41y.o. year old black male who recieves hemodialysis under the care of Dr. Poppy Luna to treat ESRD from DM and failed kidney transplant. He was just discharge 08/24/18 after being evaluated for shortness of breath. He has been short of breath for many weeks. His has not skipped dialysis but on occasion will shorten his treatments which makes it hard to remove enough fluid to prevent fluid overload. Last echocardiogram showed severe mitral and moderate tricuspid regurgitation. Nephrology is following the patient for: ESRD requiring hemodialysis     24 hour summary:   The patient was relaxing in bed. He remains short of breath. Baseline eGFR Hemodialysis patient   Admit eGFR eGFR < 15 ml/min   Current eGFR        eGFR trend    Lab Results   Component Value Date    LABGLOM 7 08/26/2018    LABGLOM 7 08/24/2018    LABGLOM 10 08/23/2018    LABGLOM 7 08/22/2018    LABGLOM 9 08/21/2018    LABGLOM 10 08/20/2018    LABGLOM 7 08/19/2018    LABGLOM 9 08/18/2018    LABGLOM 6 08/17/2018    LABGLOM 6 05/27/2018    LABGLOM 20 12/07/2017    LABGLOM 19 12/06/2017          Compliance with treatment plan: 50%     Allergies and Intolerances: ALLERGIES: Ace inhibitors; Bactrim [sulfamethoxazole-trimethoprim]; Hydralazine; Lisinopril;  Losartan; Amlodipine; Morphine; and Lipitor [atorvastatin]  MEDICATION INTOLERANCES: none  FOOD ALLERGIES: none  INSECT ALLERGIES: none  PLANT ALLERGIES: none     Past Medical History:  Past Medical History:   Diagnosis Date    Anemia     Angiotensin converting enzyme inhibitor-aggravated angioedema 8/12/2016    Cerebral artery occlusion with cerebral infarction ([address]     CHF (congestive heart failure) ([address]     Disease of blood and blood forming organ     DM type 1 (diabetes mellitus, type 1) ([address]     previous diabetes, diag at age of 17,  no tx after transplant in 2008, started on insulins again in august 2015 as pancreas is not functioning well    ESRD (end stage renal disease) ([address] 12/10/2013    was on dialysis in [id number], before transplant    GERD (gastroesophageal reflux disease)     H/O pancreas transplant ([address] 5/15/2007    Hemodialysis patient ([address]     History of blood transfusion     FFP for angioedema    HTN (hypertension)     Hyperlipidemia     Hyperphosphatemia 8/17/2018    Hypersomnolence disorder     Kidney transplant status, living unrelated donor 5/7/2006    Leaky heart valve     Metabolic acidosis     Narcolepsy     Nephropathy, diabetic ([address]     Secondary hyperparathyroidism of renal origin ([address] 8/17/2018    Seizures ([address]     Vitamin D deficiency 8/17/2018        Past Surgical History:  Past Surgical History:   Procedure Laterality Date    EYE SURGERY      vitrectomy, in both eyes, bleeding from heparin while on dialysis    KIDNEY TRANSPLANT  2006    OTHER SURGICAL HISTORY  04/15/2005    Dr Osborn Apo CAPD cath    OTHER SURGICAL HISTORY  06/07/2005    Dr Alfa Chowdary with lysis of adhesion and manipulatin of CAPD cath    OTHER SURGICAL HISTORY  06/23/2005    Dr Nathan Moyer left CAPD cath, placement of right CAPD cath    OTHER SURGICAL HISTORY  10/03/2005    Dr Stephen-Excision of 5cm chest wall mass with intermediate closure, Excision of 5cm abdominal wall mass with removal of PD catheter    PANCREAS SURGERY  2007    transplant regurgitation  5. Tricuspid regurgitation  6. Anemia from kidney disease  7. Failed kidney transplant     Plan of Care    1. Cardiology consult  2. Continue dialysis         Kuldip Corbin. DO Gregorio  [facility].

## 2018-08-26 NOTE — ED NOTES
Pt presents to the ED for SOB. Pt states that he has had SOB since last night. Pt states that he was discharged two days ago. Pt states he was diagnoised with pneumonia and had a cardiac stent placed. Pt states that he has chest pain when he has difficulty breathing. Respirations easy and unlabored. Pt states he has dialysis Monday, Wednesday and Friday. Family at bedside.       Nava Larry RN  08/26/18 8147

## 2018-08-27 NOTE — CONSULTS
SHORT PEN 31G X 8 MM MISC Seven or eight 7/7/16   Historical Provider, MD   Insulin Pen Needle 33G X 5 MM MISC Indications: E10.21 Use with Insulin pens 7/5/16   Hanan Hernández MD   ONE TOUCH LANCETS MISC 1 each by Does not apply route 4 times daily 12/30/15   Nava Flood, DO       Allergies:  Ace inhibitors; Bactrim [sulfamethoxazole-trimethoprim]; Hydralazine; Lisinopril; Losartan; Amlodipine; Morphine; and Lipitor [atorvastatin]    Social History:      TOBACCO:   reports that he has never smoked. He has never used smokeless tobacco.  ETOH:   reports that he does not drink alcohol. History   Drug Use No         Family History:      Family History   Problem Relation Age of Onset    Heart Disease Mother     Diabetes Mother     High Blood Pressure Mother     Heart Disease Father     Diabetes Maternal Aunt     Kidney Disease Maternal Aunt     Diabetes Sister     High Blood Pressure Maternal Grandmother        REVIEW OF SYSTEMS:      Review of Systems   Constitutional: Positive for malaise/fatigue. HENT: Negative for hearing loss. Eyes: Negative for blurred vision. Respiratory: Positive for shortness of breath. Negative for cough. Cardiovascular: Positive for orthopnea and leg swelling. Negative for chest pain. Gastrointestinal: Negative for abdominal pain. Genitourinary: Negative for dysuria. Musculoskeletal: Negative for myalgias. Skin: Negative for rash. Neurological: Positive for weakness. Negative for dizziness. Endo/Heme/Allergies: Does not bruise/bleed easily. Psychiatric/Behavioral: Negative for depression. PHYSICAL EXAM:    BP (!) 165/107   Pulse 84   Temp 97.6 °F (36.4 °C)   Resp 20   Ht 5' 9\" (1.753 m)   Wt 179 lb 14.3 oz (81.6 kg)   SpO2 100%   BMI 26.57 kg/m²     General appearance:  No apparent distress, appears stated age and cooperative. HEENT:  Normal cephalic, atraumatic without obvious deformity. Pupils equal, round, and reactive to light.   Extra ocular muscles intact. Conjunctivae/corneas clear. Neck: Supple, with full range of motion. No jugular venous distention. Trachea midline. Respiratory:  Normal respiratory effort. Clear to auscultation, bilaterally without Rales/Wheezes/Rhonchi. Cardiovascular:  Regular rate and rhythm with normal L9/Y1; 2/6 systolic murmur   Abdomen: Soft, non-tender, non-distended with normal bowel sounds. Musculoskeletal:  No clubbing, cyanosis or edema bilaterally. Full range of motion without deformity. Skin: Skin color, texture, turgor normal.  No rashes or lesions. Neurologic:  Neurovascularly intact without any focal sensory/motor deficits. Cranial nerves: II-XII intact, grossly non-focal.  Psychiatric:  Alert and oriented, thought content appropriate, normal insight  Capillary Refill: Brisk,< 3 seconds   Peripheral Pulses: +2 palpable, equal bilaterally       Labs:     Recent Labs      08/26/18   0658  08/27/18   0442   WBC  9.3  7.7   HGB  9.3*  8.6*   HCT  30.2*  28.5*   PLT  269  263     Recent Labs      08/26/18   0657  08/27/18   0442   NA  136  130*   K  5.5*  6.2*   CL  95*  91*   CO2  22*  20*   BUN  70*  87*   CREATININE  10.1*  11.3*   CALCIUM  9.4  9.2   PHOS   --   8.1*     No results for input(s): AST, ALT, BILIDIR, BILITOT, ALKPHOS in the last 72 hours. No results for input(s): INR in the last 72 hours. No results for input(s): Lenice Blitz in the last 72 hours.     Urinalysis:      Lab Results   Component Value Date    NITRU NEGATIVE 08/26/2018    WBCUA 10-15 08/26/2018    WBCUA 15-25 03/13/2012    BACTERIA FEW 08/26/2018    RBCUA 5-10 08/26/2018    BLOODU MODERATE 08/26/2018    SPECGRAV 1.015 08/26/2018    GLUCOSEU 500 05/27/2018       ECHO:   Results for orders placed during the hospital encounter of 08/17/18   Echocardiogram 2D/ M-Mode/ Colorflow/ Do    Narrative Transthoracic Echocardiography Report (TTE)     Demographics      Patient Name   Sangita Germain  Gender              Male rest [R06.02] 08/26/2018    Atypical chest pain [R07.89] 08/26/2018    Pulmonary hypertension (HCC) [I27.20] 08/26/2018    Iron deficiency [E61.1] 08/26/2018    Acute nasopharyngitis [J00] 08/26/2018    Pulmonary hypertension due to left ventricular systolic dysfunction ([facility] [I27.22]     ESRD on hemodialysis ([facility] [N18.6, Z99.2]     Vitamin D deficiency [E55.9] 08/17/2018    Secondary hyperparathyroidism of renal origin ([facility] [N25.81] 08/17/2018    Type 1 diabetes mellitus with hyperglycemia (HCC) [E10.65]     Anemia in CKD (chronic kidney disease) [N18.9, Q37.1]     Systolic congestive heart failure ([facility] [I50.20] 01/23/2013       PLAN:  41year old male with biventricular failure due to severe MR (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 Plan likely MVV (mechanical MVR if annulus is normal diameter, to avoid iatrogenic MS), TVV, and LIMA to LAD. Will need clopidogrel washout on heparin/aggrastat. Patient understands either the stent or the graft will fail due to competitive flow, but that grafting of the LAD is necessary in order to perform the operation off clopidogrel. Will follow. Tentative plan OR Tuesday, 9/4. The risks, benefits and alternatives were discussed in detail with the patient and family. The risks include bleeding, infection, graft failure, cardiac arrhythmias, thromboembolism, stroke, need for reoperation and death. The patient expressed understanding of these issues, confirmed that all questions were answered, and desires to proceed. Issues discussed in detail with the patient, who understands and has no further questions. Time spent with patient: 80 minutes, of which more than 50% was spent counseling/coordinating the patient's care.     Electronically signed by Lul Richards MD on 8/27/2018 at 4:29 PM

## 2018-08-27 NOTE — PROGRESS NOTES
Cardiology Progress Note      Patient:  Marisol Moscoso  YOB: 1975  MRN: [id number]   Acct: [de-identified]  Admit Date:  8/26/2018  Primary Cardiologist:   Seen by dr. Wilder Ospina    Per prior cardiology consult note-  REASON FOR CONSULT:  Elevated troponin and shortness of breath.     REQUESTING PROVIDER:  Hospitalist service.     HISTORY OF PRESENT ILLNESS:  This is a very unfortunate, young gentleman  who has longstanding history of diabetes with associated issues related to  it including renal failure, cardiac disease and neuropathy. The patient  was in the hospital not too long ago, underwent angioplasty and stenting of  the LAD, was discharged home, did acceptably fair for the first day;  however, had an episode of shortness of breath last night. He described  shortness of breath at night which almost seems like congestive heart  failure type of symptom; however, he does not seem to think that his fluid  status is causing it and he seems to think that he is pretty compliant with  his fluid intake and has dialysis. This was associated with some  nonspecific chest discomfort that seems to be somehow pleuritic. Initial  assessment in the ER showed EKG with nonspecific changes and cardiac  enzymes that were also borderline elevated. Keeping in mind the recent  angioplasty and stenting, the patient was admitted for further evaluation.    We were consulted to assist in the management of the patient.         Subjective (Events in last 24 hours):    Pt sitting at bedside pt without cardiac c/o  States breathing good at present     Discussed [facility] for MV evaluation and then surgical evaluation - pt agrees to this     VSS  Tele SR no ectopy      Objective:   BP (!) 143/102   Pulse 83   Temp 97.9 °F (36.6 °C)   Resp 20   Ht 5' 9\" (1.753 m)   Wt 186 lb 15.2 oz (84.8 kg)   SpO2 100%   BMI 27.61 kg/m²        TELEMETRY: SR      Physical Exam:  General Appearance: alert and oriented to person, place and time, in no acute distress  Cardiovascular: normal rate, regular rhythm, normal S1 and S2, no murmurs, rubs, clicks, or gallops, distal pulses intact, no carotid bruits, no JVD  Pulmonary/Chest: clear to auscultation bilaterally- no wheezes, rales or rhonchi, normal air movement, no respiratory distress  Abdomen: soft, non-tender, non-distended, normal bowel sounds, no masses Extremities: no cyanosis, clubbing or edema, pulse   Skin: warm and dry, no rash or erythema  Head: normocephalic and atraumatic  Eyes: pupils equal, round, and reactive to light  Neck: supple and non-tender without mass, no thyromegaly   Musculoskeletal: normal range of motion, no joint swelling, deformity or tenderness  Neurological: alert, oriented, normal speech, no focal findings or movement disorder noted    Medications:    phenylephrine  1 spray Each Nare Once    aspirin  81 mg Oral Daily    calcitRIOL  0.5 mcg Oral Once per day on Mon Wed Fri    calcium acetate  1,334 mg Oral TID WC    carvedilol  50 mg Oral BID    cloNIDine  0.2 mg Oral Q8H    clopidogrel  75 mg Oral Daily    gabapentin  100 mg Oral BID    isosorbide mononitrate  60 mg Oral Daily    latanoprost  1 drop Both Eyes Nightly    methylphenidate  10 mg Oral BID    therapeutic multivitamin-minerals  1 tablet Oral Daily    mycophenolate  360 mg Oral BID    simvastatin  20 mg Oral QPM    sirolimus  4 mg Oral Daily    vitamin D  50,000 Units Oral Weekly    sodium chloride flush  10 mL Intravenous 2 times per day    ipratropium-albuterol  1 ampule Inhalation 4x daily    heparin (porcine)  5,000 Units Subcutaneous 3 times per day    azithromycin  250 mg Intravenous Q24H    insulin lispro  0-6 Units Subcutaneous TID     insulin lispro  0-3 Units Subcutaneous Nightly      dextrose         cetirizine 10 mg Daily PRN   sodium chloride flush 10 mL PRN   magnesium hydroxide 30 mL Daily PRN   glucose 15 g PRN   dextrose 12.5 g PRN   glucagon (rDNA) 1 mg PRN   dextrose 100 mL/hr PRN   ondansetron 4 mg Q6H PRN       Diagnostics:  EK-AUG-2018 14:47:19 [facility] ROUTINE RETRIEVAL  Normal sinus rhythm  Possible Left atrial enlargement  T wave abnormality, consider inferior ischemia  Abnormal ECG  When compared with ECG of 26-AUG-2018 06:29,  ST no longer depressed in Anterior leads  Nonspecific T wave abnormality no longer evident in Anterior leads  Confirmed by Gregorio Cueva ([id number]) on 2018 9:31:34 PM    Echo:    Mitral Valve   Myxomatotic degeneration of mitral valve.   Severe mitral regurgitation is present.      Aortic Valve   The aortic valve leaflets were not well visualized.   Aortic valve appears tricuspid.   Aortic valve leaflets are somewhat thickened.         Tricuspid Valve   Moderate tricuspid regurgitation.      Pulmonic Valve   The pulmonic valve leaflets exhibited normal thickness, no calcification,   and normal cuspal separation. DOPPLER: The transpulmonic velocity was   within the normal range with no evidence for regurgitation.      Left Atrium   Mildly dilated left atrium.      Left Ventricle   Ejection fraction is visually estimated at 35-40%.   There was moderate global hypokinesis of the left ventricle.      Right Atrium   Mildly enlarged right atrium size.      Right Ventricle   The right ventricular size was normal with normal systolic function and   wall thickness.   Right ventricular systolic pressure of 60 mm Hg consistent with severe   pulmonary hypertension.      Pericardial Effusion   The pericardium was normal in appearance with no evidence of a pericardial   effusion.      Pleural Effusion   No evidence of pleural effusion.      Aorta / Great Vessels   -Aortic root dimension within normal limits.   -The Pulmonary artery is within normal limits.   -IVC size is within normal limits with normal respiratory phasic changes.       Left Heart Cath:   FINDINGS:  1. Left main vessel shows no focal luminal narrowing.   The proximal left  anterior descending artery has mild irregularities. The mid anterior  descending artery has an 80% stenosis, non-flow limiting stenosis. 2.  The circumflex vessel shows mild irregularities and is dominant. 3.  The right coronary artery is of very small caliber and is nondominant. 4.  Right femoral arteriography suitable for Angio-Seal device. 5.  LVEDP estimated at 38 mmHg. There was no gradient across the aortic  valve on pullback.     --LAD distal to the  stenosis and a 2.5 X 28 mm Mini-Vision BMS deployed in the LAD      Aloysius Schlatter, MD   D: 08/23/2018        Lab Data:    Cardiac Enzymes:  No results for input(s): CKTOTAL, CKMB, CKMBINDEX, TROPONINI in the last 72 hours.     CBC:   Lab Results   Component Value Date    WBC 7.7 08/27/2018    RBC 3.52 08/27/2018    RBC 4.85 05/15/2012    HGB 8.6 08/27/2018    HCT 28.5 08/27/2018     08/27/2018       CMP:  Lab Results   Component Value Date     08/27/2018    K 6.2 08/27/2018    K 5.5 08/26/2018    CL 91 08/27/2018    CO2 20 08/27/2018    BUN 87 08/27/2018    CREATININE 11.3 08/27/2018    LABGLOM 6 08/27/2018    GLUCOSE 121 08/27/2018    GLUCOSE 74 06/24/2014    CALCIUM 9.2 08/27/2018       Hepatic Function Panel:  Lab Results   Component Value Date    ALKPHOS 274 08/17/2018    ALT 17 08/17/2018    AST 24 08/17/2018    PROT 7.2 08/17/2018    BILITOT 0.4 08/17/2018    BILIDIR <0.2 05/27/2018    LABALBU 3.3 08/27/2018    LABALBU 4.3 05/15/2012       Magnesium:    Lab Results   Component Value Date    MG 1.8 12/07/2017       PT/INR:    Lab Results   Component Value Date    INR 1.11 08/22/2018       HgBA1c:    Lab Results   Component Value Date    LABA1C 9.8 08/26/2018       FLP:  Lab Results   Component Value Date    TRIG 218 01/26/2017    HDL 75 01/26/2017    LDLCALC 230 01/26/2017    LDLDIRECT 187.78 07/28/2016       TSH:    Lab Results   Component Value Date    TSH 0.685 10/07/2017         Assessment:    SOB / recurrent CHF   Severe

## 2018-08-27 NOTE — PROGRESS NOTES
[facility]    Patient - Marcelle Wills   MRN -  [id number]   [facility] # - [de-identified]   - 1975      Date of Admission -  2018  6:29 AM  Date of evaluation -  2018  Room - [facility]-A   [address] [place] [address] MD Neda Primary Care Physician - No primary care provider on file. Chief Complaint   SOB  Active Hospital Problem List      Active Hospital Problems    Diagnosis Date Noted    Essential hypertension [I10]      Priority: High    Heart disease [I51.9] 2013     Priority: Medium    GERD (gastroesophageal reflux disease) [K21.9] 2013     Priority: Low    Shortness of breath at rest [R06.02] 2018    Atypical chest pain [R07.89] 2018    Pulmonary hypertension (HCC) [I27.20] 2018    Iron deficiency [E61.1] 2018    Acute nasopharyngitis [J00] 2018    Pulmonary hypertension due to left ventricular systolic dysfunction ([address] [I27.22]     ESRD on hemodialysis ([address] [N18.6, Z99.2]     Vitamin D deficiency [E55.9] 2018    Secondary hyperparathyroidism of renal origin ([address] [N25.81] 2018    Type 1 diabetes mellitus with hyperglycemia (HCC) [E10.65]     Anemia in CKD (chronic kidney disease) [N18.9, P19.5]     Systolic congestive heart failure ([address] [I50.20] 2013     HPI   Marcelle Wills is a 41 y.o. male H/O CKD, s/p renal transplant currently waiting for a new transplant. CMP, DM, HTN, presents with slowly progressive LANDRY/SOB, Orthopnea, bilateral LE edema, BNP > 60K. Pulmonary service is asked to see for pulmonary hypertension.   Non smoker, disable, no occupational exposures  Previous kidney transplant in  and pancreas transplant  both failed by now      Sleeping habits:  Time to go to bed: 10:00            PM  Time to wake up: 6:00        AM    Sleep History:  Pt with history of: snoring, choking, excessive daytime sleepiness and/or daytime fatigue  Morning Disabled      Social History Main Topics    Smoking status: Never Smoker    Smokeless tobacco: Never Used    Alcohol use No    Drug use: No    Sexual activity: Not Currently     Other Topics Concern    Not on file     Social History Narrative    No narrative on file     Family History      Family History   Problem Relation Age of Onset    Heart Disease Mother     Diabetes Mother     High Blood Pressure Mother     Heart Disease Father     Diabetes Maternal Aunt     Kidney Disease Maternal Aunt     Diabetes Sister     High Blood Pressure Maternal Grandmother      Sleep History    Negative PSG twice, positive MSLT last office appointment 10/21/16  Meds    Current Medications    sirolimus  4 mg Oral Daily    phenylephrine  1 spray Each Nare Once    aspirin  81 mg Oral Daily    calcitRIOL  0.5 mcg Oral Once per day on Mon Wed Fri    calcium acetate  1,334 mg Oral TID WC    carvedilol  50 mg Oral BID    cloNIDine  0.2 mg Oral Q8H    clopidogrel  75 mg Oral Daily    gabapentin  100 mg Oral BID    isosorbide mononitrate  60 mg Oral Daily    latanoprost  1 drop Both Eyes Nightly    methylphenidate  10 mg Oral BID    therapeutic multivitamin-minerals  1 tablet Oral Daily    mycophenolate  360 mg Oral BID    simvastatin  20 mg Oral QPM    vitamin D  50,000 Units Oral Weekly    sodium chloride flush  10 mL Intravenous 2 times per day    ipratropium-albuterol  1 ampule Inhalation 4x daily    heparin (porcine)  5,000 Units Subcutaneous 3 times per day    azithromycin  250 mg Intravenous Q24H    insulin lispro  0-6 Units Subcutaneous TID WC    insulin lispro  0-3 Units Subcutaneous Nightly     cetirizine, sodium chloride flush, magnesium hydroxide, glucose, dextrose, glucagon (rDNA), dextrose, ondansetron  IV Drips/Infusions   dextrose       Vitals    Vitals    height is 5' 9\" (1.753 m) and weight is 179 lb 14.3 oz (81.6 kg). His temperature is 97.6 °F (36.4 °C).  His blood pressure is 165/107 (abnormal) and his pulse is 84. His respiration is 20 and oxygen saturation is 100%. O2 Flow Rate (L/min): 2 L/min  I/O    Intake/Output Summary (Last 24 hours) at 08/27/18 1451  Last data filed at 08/27/18 1412   Gross per 24 hour   Intake          2540.05 ml   Output             3400 ml   Net          -859.95 ml     Patient Vitals for the past 96 hrs (Last 3 readings):   Weight   08/27/18 1210 179 lb 14.3 oz (81.6 kg)   08/27/18 0816 186 lb 15.2 oz (84.8 kg)   08/27/18 0415 186 lb 11.2 oz (84.7 kg)     Exam   Physical Exam   Constitutional: No distress on RA. Patient appears moderately built and moderately nourished. Head: Normocephalic and atraumatic. Mouth/Throat: Oropharynx is clear and moist.  No oral thrush. Eyes: Conjunctivae are normal. Pupils are equal, round. No scleral icterus. Neck: Neck supple. No tracheal deviation present. Cardiovascular: S1 and S2 with no murmur. No peripheral edema  Pulmonary/Chest: Normal effort with bilateral air entry, clear breath sounds. No stridor. No respiratory distress. Patient exhibits no tenderness. Dialysis catheter to Right anterior chest dressing in place CDI  Abdominal: Soft. Bowel sounds audible. No distension or tenderness to palp. Musculoskeletal: Moves all extremities  Neurological: Patient is alert and oriented to person, place, and time. Skin: Warm and dry.      Labs   ABG  Lab Results   Component Value Date    PH 7.33 12/04/2017    PH 7.0 04/27/2012    PO2 133 12/04/2017    PCO2 15 12/04/2017    HCO3 8 12/04/2017    O2SAT 99 12/04/2017     Lab Results   Component Value Date    IFIO2 2 12/04/2017    MODE CPAP 08/17/2016    SETPEEP 12 08/17/2016     CBC  Recent Labs      08/26/18   0658  08/27/18   0442   WBC  9.3  7.7   RBC  3.68*  3.52*   HGB  9.3*  8.6*   HCT  30.2*  28.5*   MCV  82.1  81.0   MCH  25.3*  24.4*   MCHC  30.8*  30.2*   PLT  269  263   MPV  9.9  9.9      BMP  Recent Labs      08/26/18   0657  08/27/18   0442   NA  136  130*   K 5. 5*  6.2*   CL  95*  91*   CO2  22*  20*   BUN  70*  87*   CREATININE  10.1*  11.3*   GLUCOSE  56*  121*   PHOS   --   8.1*   CALCIUM  9.4  9.2     LFT  No results for input(s): AST, ALT, ALB, BILITOT, ALKPHOS, LIPASE in the last 72 hours. Invalid input(s): AMYLASE  TROP  Lab Results   Component Value Date    TROPONINT 0.160 2018    TROPONINT 0.176 2018    TROPONINT 0.197 2018     BNP  Lab Results   Component Value Date    PROBNP 69727.0 2018    PROBNP > 05241.0 2018    PROBNP 3779.0 2017     D-Dimer  No results found for: DDIMER  Lactic Acid  Recent Labs      18   0658   LACTA  0.6     INR  No results for input(s): INR, PROTIME in the last 72 hours. PTT  No results for input(s): APTT in the last 72 hours. Glucose  Recent Labs      18   0116  18   0641  18   1330   POCGLU  119*  135*  99     UA   Recent Labs      18   0745   SPECGRAV  1.015   PHUR  8.0   COLORU  YELLOW   PROTEINU  >= 300*   BLOODU  MODERATE*   RBCUA  5-10   WBCUA  10-15   BACTERIA  FEW   NITRU  NEGATIVE   BILIRUBINUR  NEGATIVE   UROBILINOGEN  0.2   KETUA  NEGATIVE   LABCAST  0-4 FINE GRAN  NONE SEEN   . PSG   PATIENT NAME: Karen Nobles            :  1975  MEDICAL RECORD NO. [id number]               ROOM:   ACCOUNT NO: [de-identified]                        DATE: 2014  PHYSICIAN: Lilly Jacobsen M.D. Multiple sleep latency test report. The study was performed on May 20, 2014. This is a self-referral.     IMPRESSION:  The multiple sleep latency test findings are consistent with  narcolepsy. Adrianna Palomares RECOMMENDATION/PLAN:  The patient to follow with my clinic as soon as  possible to discuss about sleep study and multiple sleep latency test report. Follow urine toxicology screen before clinic visit. Darrel Jacobsen M.D.    PSG:      IMPRESSION:  1. Mild snoring with no clinically significant obstructive sleep apnea.   2.  Patient's left  anterior descending artery has mild irregularities. The mid anterior  descending artery has an 80% stenosis, non-flow limiting stenosis. 2.  The circumflex vessel shows mild irregularities and is dominant. 3.  The right coronary artery is of very small caliber and is nondominant. 4.  Right femoral arteriography suitable for Angio-Seal device. 5.  LVEDP estimated at 38 mmHg. There was no gradient across the aortic  valve on pullback.     EQUIPMENT USED:  5-Cambodian and 6-Cambodian 10-cm femoral sheath, 5-Cambodian  Multipack diagnostic catheters, 6-Cambodian JL-4 guide, 0.014 BMW wire, 2.5 x  28 Mini-Vision BMS. Angio-Seal.     COMPLICATIONS:  None.     PROCEDURE  DETAILS:  Informed consent was obtained, and the patient was  brought to the cardiac catheterization laboratory where right femoral  arterial access was readily obtained using a micropuncture kit.     The diagnostic study was performed via 5-Cambodian femoral sheath and a  5-Cambodian Multipack catheters.     The findings are outlined above.     Following review of the diagnostic study, decision was made to  revascularize the mid LAD with a bare-mental stent.     The 5-Cambodian sheath in the femoral artery was exchanged over a wire for a  6-Cambodian 10-cm sheath, and a 6-Cambodian JL-4 guide used to intubate the left  coronary ostium.   A 0.014 BMW wire was advanced into the LAD distal to the  stenosis and a 2.5 X 28 mm Mini-Vision BMS deployed in the LAD.     PREPROCEDURE DIAGNOSIS:  80% mid LAD stenosis.     POSTPROCEDURE DIAGNOSIS:  0% residual with QUE grade 3 flow distally.     The patient tolerated the procedure well.     Following the right femoral arterial study, an Angio-Seal device was deemed  suitable and deployed.     The findings were explained to the patient and his family.     He was transferred back to the floor for further monitoring and management.           Krystyna Corley MD       Cultures    Procalcitonin  Lab Results   Component Value Date managing dialysis  Sleep Questionnaire completed 18- History of Narcolepsy lost to follow-up since 2016    Case discussed with nurse and patient/family. Questions and concerns addressed. Meds and Orders reviewed. Electronically signed by     ADRIAN Raymond CNP on 2018 at 2:51 PM     Addendum by Dr. Ailyn Cornejo MD:  I have seen and examined the patient independently. Face to face evaluation and examination was performed. The above evaluation and note has been reviewed. Labs and radiographs were reviewed. I Have discussed with . Kenji Mina CNP about this patient in detail. The above assessment and plan has been reviewed. Please see my modifications mentioned below. My modifications:  MSLT test:  2014  PATIENT NAME: Sergio Koch            :  1975  MEDICAL RECORD NO. [id number]               ROOM:   ACCOUNT NO: [de-identified]                        DATE: 2014  PHYSICIAN: Les Jacobsen M.D. Multiple sleep latency test report. IMPRESSION:  The multiple sleep latency test findings are consistent with  narcolepsy. Jose Napier He lost 13lbs of weight from his MSLT test.  He is not taking Ritalin for last 2years. He is currently denies any hypersomnia. S/p Cardiac cath on 18 with LVEDP estimated at 38 mmHg. There was no gradient across the aortic  valve on pullback. Follow [facility].     Mary Trinidad MD 2018 5:29 PM

## 2018-08-27 NOTE — PROGRESS NOTES
Renal Progress Note    Patient Nayeli Gardner   MRN -  [id number]   Acct # - [de-identified]      - 1975    41 y.o. Admit Date: 2018  Hospital Day: 1  Location: [address] [place] [address]  Date of evaluation -  2018    Subjective:   CC: shortness of breath   Denies sob or cough during Hemodialysis   Pt seen during hemodialysis, Hemodynamically stable, Goal Ultrafiltration 3000 ml, 2 K  /s  Afebrile    Objective:   VITALS:  BP (!) 143/102   Pulse 83   Temp 97.9 °F (36.6 °C)   Resp 20   Ht 5' 9\" (1.753 m)   Wt 186 lb 15.2 oz (84.8 kg)   SpO2 100%   BMI 27.61 kg/m²    Patient Vitals for the past 24 hrs:   BP Temp Temp src Pulse Resp SpO2 Height Weight   18 0816 (!) 143/102 97.9 °F (36.6 °C) - 83 - - - 186 lb 15.2 oz (84.8 kg)   18 0755 (!) 132/98 - - 86 - - - -   18 0415 (!) 141/103 97.5 °F (36.4 °C) Oral 85 20 100 % 5' 9\" (1.753 m) 186 lb 11.2 oz (84.7 kg)   18 2030 (!) 133/96 96.2 °F (35.7 °C) Axillary 94 16 100 % - -   18 - - Oral - - - - -   18 1630 (!) 153/98 97.7 °F (36.5 °C) Oral 92 18 99 % - -   18 1208 (!) 145/98 97.5 °F (36.4 °C) Oral 88 18 98 % - -   18 0943 (!) 144/89 97.7 °F (36.5 °C) Oral 87 18 99 % 5' 9\" (1.753 m) 184 lb 3.2 oz (83.6 kg)     2 L/min  Patient Vitals for the past 96 hrs (Last 3 readings):   Weight   18 0816 186 lb 15.2 oz (84.8 kg)   18 0415 186 lb 11.2 oz (84.7 kg)   18 0943 184 lb 3.2 oz (83.6 kg)       Intake/Output Summary (Last 24 hours) at 18 0915  Last data filed at 18 2234   Gross per 24 hour   Intake           400.25 ml   Output                0 ml   Net           400.25 ml       EXAM:  CONSTITUTIONAL:  No acute distress. Lying comfortable in bed. Pleasant  HEENT:  Head is normocephalic, Extraocular movement intact. Neck is supple. Voice is clear. CARDIOVASCULAR:  S1, S2  regular rate and rhythm. RESPIRATORY: Clear to ausculation bilaterally. Equal breath sounds. No wheezes.  No INR 1.11 08/22/2018    INR 1.07 08/20/2018    INR 0.89 08/17/2016     Lab Results   Component Value Date    PH 7.33 (L) 12/04/2017    PO2 133 (H) 12/04/2017    PCO2 15 (LL) 12/04/2017    HCO3 8 (L) 12/04/2017    O2SAT 99 12/04/2017 8/21/18  Conclusions      Summary   Ejection fraction is visually estimated at 35-40%.   There was moderate global hypokinesis of the left ventricle.   The right ventricular size was normal with normal systolic function and   wall thickness.   Right ventricular systolic pressure of 60 mm Hg consistent with severe   pulmonary hypertension.   Myxomatotic degeneration of mitral valve.   Severe mitral regurgitation is present.   Moderate tricuspid regurgitation. ASSESSMENT:  1. End Stage Renal Disease 2nd to diabetic and hypertensive nephrosclerosis on Hemodialysis, Hemodialysis catheter, Volume status ok. 2. Hyperkalemia: 2 K bath  3. Diabetes Mellitus Type I with nephrosclerosis with long term use of insulin   4. Hx kidney and pancreas transplant, on immunosuppressive therapy. 5. Pulm artery HTN  6. Anemia of chronic disease, Start Aranesp 60 mcg weekly  7. Chronic systolic heart failure with EF 35-40%    Principal Problem:    Shortness of breath at rest  Active Problems:    Systolic congestive heart failure (HCC)    Heart disease    GERD (gastroesophageal reflux disease)    Anemia in CKD (chronic kidney disease)    Type 1 diabetes mellitus with hyperglycemia (HCC)    Secondary hyperparathyroidism of renal origin ([address]    Vitamin D deficiency    Essential hypertension    ESRD on hemodialysis ([address]    Atypical chest pain    Pulmonary hypertension (HCC)    Iron deficiency    Acute nasopharyngitis    Pulmonary hypertension due to left ventricular systolic dysfunction ([address]  Resolved Problems:    * No resolved hospital problems.  ADRIAN Mcconnell CNP 9:15 AM 8/27/2018

## 2018-08-27 NOTE — PROGRESS NOTES
History & Physical        Patient:  Bennett Mobley  YOB: 1975    MRN: [id number]     Acct: [de-identified]    PCP: No primary care provider on file. Date of Admission: 8/26/2018    Date of Service: Pt seen/examined on 8/27/2018   and Admitted to Inpatient with expected LOS greater than two midnights due to medical therapy. Chief Complaint:   Chief Complaint   Patient presents with    Shortness of Breath         History Of Present Illness:   41 y.o. male who presented to [address] [place] [address] with complaints of shortness of breath x 1 month. Patient states that he has ongoing shortness of breath. He states he has shortness of breath that occurs at rest. He notes that he has some chest pain with it once or twice a day. He states that he will lay down and sometimes his shortness of breath will resolve, but rest or laying down does not help with his atypical chest pain. He states that he had a cardiac cath with stent placement 4 days ago in efforts to help with his symptoms but he admits that he remains unchanged. He states that he wants to know \"why am I always short of breath? \". Patient denies fevers or chills. Admits to upper respiratory congestion. Denies problems with dysuria. PMH ESRD on HD Mon/Wed/Fri, type 1 diabetes, systolic congestive heart failure, pulmonary hypertension, hypersomnolence, CVA. While in ED patient did not appear acutely in distress. Patient was noted to have elevated troponin 0.176. PROBNP [id number], elevated from baseline. Procalcitonin elevated at 1.77.      Subjective-  In dialysis  Shortness of breath better  Scheduled Meds:   sirolimus  4 mg Oral Daily    phenylephrine  1 spray Each Nare Once    aspirin  81 mg Oral Daily    calcitRIOL  0.5 mcg Oral Once per day on Mon Wed Fri    calcium acetate  1,334 mg Oral TID     carvedilol  50 mg Oral BID    cloNIDine  0.2 mg Oral Q8H    clopidogrel  75 mg Oral Daily    gabapentin  100 mg Oral BID    isosorbide infiltrate. Overall stable appearance of the chest when compared to previous study 8/17/2018. **This report has been created using voice recognition software. It may contain minor errors which are inherent in voice recognition technology. **      Final report electronically signed by Dr Devi James on 8/26/2018 7:19 AM               DVT prophylaxis: [] Lovenox                                 [] SCDs                                 [x] SQ Heparin                                 [] Encourage ambulation           [] Already on Anticoagulation    Code Status: Full Code      PT/OT Eval Status: Cardiac rehab consulted    Disposition:    [x] Home       [] TCU       [] Rehab       [] Psych       [] SNF       [] [facility]       [] Other-    ASSESSMENT:    Active Hospital Problems    Diagnosis Date Noted    Essential hypertension [I10]      Priority: High    Heart disease [I51.9] 01/23/2013     Priority: Medium    GERD (gastroesophageal reflux disease) [K21.9] 01/23/2013     Priority: Low    Shortness of breath at rest [R06.02] 08/26/2018    Atypical chest pain [R07.89] 08/26/2018    Pulmonary hypertension ([address] [I27.20] 08/26/2018    Iron deficiency [E61.1] 08/26/2018    Acute nasopharyngitis [J00] 08/26/2018    Pulmonary hypertension due to left ventricular systolic dysfunction ([address] [I27.22]     ESRD on hemodialysis ([address] [N18.6, Z99.2]     Vitamin D deficiency [E55.9] 08/17/2018    Secondary hyperparathyroidism of renal origin ([address] [N25.81] 08/17/2018    Type 1 diabetes mellitus with hyperglycemia ([address] [E10.65]     Anemia in CKD (chronic kidney disease) [N18.9, C75.9]     Systolic congestive heart failure ([address] [I50.20] 01/23/2013       PLAN:    1.  Shortness of breath, Secondary to acute congestive heart failure systolic with history of pulmonary hypertension and recent cardiac cath with stent placement on 8/22/2018 -patient is on dialysis      CTA negative for PE and infiltrates;

## 2018-08-28 NOTE — PROGRESS NOTES
(81.4 kg)   SpO2 99%   BMI 26.49 kg/m²        TELEMETRY: SR      Physical Exam:  General Appearance: alert and oriented to person, place and time, in no acute distress  Cardiovascular: normal rate, regular rhythm, normal S1 and S2, no murmurs, rubs, clicks, or gallops, distal pulses intact, no carotid bruits, no JVD  Pulmonary/Chest: clear to auscultation bilaterally- no wheezes, rales or rhonchi, normal air movement, no respiratory distress  Abdomen: soft, non-tender, non-distended, normal bowel sounds, no masses Extremities: no cyanosis, clubbing or edema, pulses present   Skin: warm and dry, no rash or erythema  Head: normocephalic and atraumatic  Musculoskeletal: normal range of motion, no joint swelling, deformity or tenderness  Neurological: alert, oriented, normal speech, no focal findings or movement disorder noted    Medications:    sirolimus  4 mg Oral Daily    phenylephrine  1 spray Each Nare Once    calcitRIOL  0.5 mcg Oral Once per day on Mon Wed Fri    calcium acetate  1,334 mg Oral TID     carvedilol  50 mg Oral BID    cloNIDine  0.2 mg Oral Q8H    gabapentin  100 mg Oral BID    isosorbide mononitrate  60 mg Oral Daily    latanoprost  1 drop Both Eyes Nightly    methylphenidate  10 mg Oral BID    therapeutic multivitamin-minerals  1 tablet Oral Daily    mycophenolate  360 mg Oral BID    simvastatin  20 mg Oral QPM    vitamin D  50,000 Units Oral Weekly    sodium chloride flush  10 mL Intravenous 2 times per day    ipratropium-albuterol  1 ampule Inhalation 4x daily    azithromycin  250 mg Intravenous Q24H    insulin lispro  0-6 Units Subcutaneous TID     insulin lispro  0-3 Units Subcutaneous Nightly      heparin (porcine) 12 Units/kg/hr (08/28/18 0800)    tirofiban 0.075 mcg/kg/min (08/28/18 0801)    dextrose         fentaNYL  PRN   midazolam  PRN   cetirizine 10 mg Daily PRN   sodium chloride flush 10 mL PRN   magnesium hydroxide 30 mL Daily PRN   glucose 15 g PRN

## 2018-08-28 NOTE — POST SEDATION
Sedation Post Procedure Note    Patient Name: Aurelia Aggarwal   YOB: 1975  Room/Bed: [address] [place] [address]  Medical Record Number: [id number]  Date: 8/28/2018   Time: 10:44 AM         Physicians/Assistants: Jose Hale MD    Procedure Performed: AMARIS  Post-Sedation Vital Signs:  Vitals:    08/28/18 1042   BP:    Pulse: 82   Resp: 13   Temp:    SpO2: 100%      Vital signs were reviewed and were stable after the procedure (see flow sheet for vitals)            Post-Sedation Exam: Cardiovascular: normal           Complications: none    Findings : Mod./Sev.  MR                   Mod. TR    Electronically signed by Jose Hale MD on 8/28/2018 at 10:44 AM

## 2018-08-28 NOTE — PROGRESS NOTES
Excision of 5cm abdominal wall mass with removal of PD catheter    PANCREAS SURGERY  2007    transplant    NV REPAIR INCISIONAL HERNIA,REDUCIBLE N/A 6/29/2018    HERNIA VENTRAL REPAIR,with mesh performed by aYz Coronado MD at [address] [place] [address] Trafficway; Low Sodium (2 GM); Daily Fluid Restriction: 1800 ml; Low Potassium  Allergies    Ace inhibitors; Bactrim [sulfamethoxazole-trimethoprim]; Hydralazine; Lisinopril;  Losartan; Amlodipine; Morphine; and Lipitor [atorvastatin]  Social History     Social History     Social History    Marital status: Single     Spouse name: N/A    Number of children: 3    Years of education: 15     Occupational History    Disabled      Social History Main Topics    Smoking status: Never Smoker    Smokeless tobacco: Never Used    Alcohol use No    Drug use: No    Sexual activity: Not Currently     Other Topics Concern    Not on file     Social History Narrative    No narrative on file     Family History      Family History   Problem Relation Age of Onset    Heart Disease Mother     Diabetes Mother     High Blood Pressure Mother     Heart Disease Father     Diabetes Maternal Aunt     Kidney Disease Maternal Aunt     Diabetes Sister     High Blood Pressure Maternal Grandmother      Sleep History    Negative PSG twice, positive MSLT last office appointment 10/21/16  Meds    Current Medications    sirolimus  4 mg Oral Daily    phenylephrine  1 spray Each Nare Once    calcitRIOL  0.5 mcg Oral Once per day on Mon Wed Fri    calcium acetate  1,334 mg Oral TID WC    carvedilol  50 mg Oral BID    cloNIDine  0.2 mg Oral Q8H    gabapentin  100 mg Oral BID    isosorbide mononitrate  60 mg Oral Daily    latanoprost  1 drop Both Eyes Nightly    methylphenidate  10 mg Oral BID    therapeutic multivitamin-minerals  1 tablet Oral Daily    mycophenolate  360 mg Oral BID    simvastatin  20 mg Oral QPM    vitamin D  50,000 Units Oral Weekly    sodium chloride flush  10 mL Intravenous 2 times per day    ipratropium-albuterol  1 ampule Inhalation 4x daily    azithromycin  250 mg Intravenous Q24H    insulin lispro  0-6 Units Subcutaneous TID WC    insulin lispro  0-3 Units Subcutaneous Nightly     cetirizine, sodium chloride flush, magnesium hydroxide, glucose, dextrose, glucagon (rDNA), dextrose, ondansetron  IV Drips/Infusions   heparin (porcine) 12 Units/kg/hr (08/28/18 0800)    tirofiban 0.075 mcg/kg/min (08/28/18 0801)    dextrose       Vitals    Vitals    height is 5' 9\" (1.753 m) and weight is 179 lb 6.4 oz (81.4 kg). His axillary temperature is 97 °F (36.1 °C). His blood pressure is 119/82 and his pulse is 81. His respiration is 18 and oxygen saturation is 100%. O2 Flow Rate (L/min): 2 L/min  I/O    Intake/Output Summary (Last 24 hours) at 08/28/18 1347  Last data filed at 08/28/18 0421   Gross per 24 hour   Intake          1590.05 ml   Output                0 ml   Net          1590.05 ml     Patient Vitals for the past 96 hrs (Last 3 readings):   Weight   08/28/18 0421 179 lb 6.4 oz (81.4 kg)   08/27/18 1210 179 lb 14.3 oz (81.6 kg)   08/27/18 0816 186 lb 15.2 oz (84.8 kg)     Exam   Physical Exam   Constitutional: No distress on O2 per NC 2 LPM. Patient appears moderately built and  moderately nourished. Head: Normocephalic and atraumatic. Eyes: Conjunctivae are normal. Pupils are equal, round. No scleral icterus. Neck: Neck supple. No tracheal deviation present. Cardiovascular: S1 and S2 with no murmur. No peripheral edema  Pulmonary/Chest: Normal effort with bilateral air entry, clear breath sounds. No stridor. No respiratory distress. Patient exhibits no tenderness. Abdominal: Soft. Bowel sounds audible. No distension or tenderness to palp. Musculoskeletal: Moves all extremities  Neurological: Patient is alert and oriented to person, place, and time. Skin: Warm and dry.    Labs   ABG  Lab Results   Component Value Date    PH 7.33 [id number]               ROOM:   ACCOUNT NO: [de-identified]                        DATE: 05/20/2014  PHYSICIAN: Jasmeet Jacobsen M.D. Multiple sleep latency test report. The study was performed on May 20, 2014. This is a self-referral.     IMPRESSION:  The multiple sleep latency test findings are consistent with  narcolepsy. Dhruv Ibrahim RECOMMENDATION/PLAN:  The patient to follow with my clinic as soon as  possible to discuss about sleep study and multiple sleep latency test report. Follow urine toxicology screen before clinic visit. Darrel Jacobsen M.D.    PSG:      IMPRESSION:  1. Mild snoring with no clinically significant obstructive sleep apnea. 2.  Patient's total sleep time was 449.5 minutes which is more than 6 hours       with a sleep efficiency of 94.9 percent. 3.  Periodic limb movements with no significant arousals. 4.  Hypersomnia of uncertain etiology. RECOMMENDATIONS:  1. Please see multiple sleep latency test report for further details. 2.  The patient should be scheduled for follow up with my clinic as soon as       possible to discuss about the sleep study findings for further       management. I have reviewed the raw data of the above sleep study epoch by epoch and  interpreted. Darrel Jacobsen M.D.  D: 05/21/2014     Echo      8/28/18 AMARIS completed pending final read    8/21/18  Conclusions      Summary   Ejection fraction is visually estimated at 35-40%.   There was moderate global hypokinesis of the left ventricle.   The right ventricular size was normal with normal systolic function and   wall thickness.   Right ventricular systolic pressure of 60 mm Hg consistent with severe   pulmonary hypertension.   Myxomatotic degeneration of mitral valve.   Severe mitral regurgitation is present.   Moderate tricuspid regurgitation.      Signature      ----------------------------------------------------------------   Electronically signed by Valentine Vasquez MD (Interpreting   [email or web address]) on 2018 at 03:52 PM   ----------------------------------------------------------------        CARDIAC CATHETERIZATION     PATIENT NAME: Aakash Marroquin                    :        1975  MED REC NO:   [id number]                           ROOM:       0021  ACCOUNT NO:   [de-identified]                           ADMIT DATE: 2018  PROVIDER:     Catalina Christianson MD     DATE OF PROCEDURE:  2018     PROCEDURE PERFORMED:  1. Selective coronary angiography. 2.  Measurement of LVEDP and transaortic valve gradient. 3.  BM metal stent to mid LAD. 4.  Selective right femoral arteriography.     FINDINGS:  1. Left main vessel shows no focal luminal narrowing. The proximal left  anterior descending artery has mild irregularities. The mid anterior  descending artery has an 80% stenosis, non-flow limiting stenosis. 2.  The circumflex vessel shows mild irregularities and is dominant. 3.  The right coronary artery is of very small caliber and is nondominant. 4.  Right femoral arteriography suitable for Angio-Seal device. 5.  LVEDP estimated at 38 mmHg. There was no gradient across the aortic  valve on pullback.     EQUIPMENT USED:  5-Turkmen and 6-Turkmen 10-cm femoral sheath, 5-Turkmen  Multipack diagnostic catheters, 6-Turkmen JL-4 guide, 0.014 BMW wire, 2.5 x  28 Mini-Vision BMS.   Angio-Seal.     COMPLICATIONS:  None.     PROCEDURE  DETAILS:  Informed consent was obtained, and the patient was  brought to the cardiac catheterization laboratory where right femoral  arterial access was readily obtained using a micropuncture kit.     The diagnostic study was performed via 5-Turkmen femoral sheath and a  5-Turkmen Multipack catheters.     The findings are outlined above.     Following review of the diagnostic study, decision was made to  revascularize the mid LAD with a bare-mental stent.     The 5-Turkmen sheath in the femoral artery was exchanged over a wire for

## 2018-08-28 NOTE — PROGRESS NOTES
surfaces  MUSCULOSKELETAL: Movement is coordinated. Moves all extremities   EXTREMITIES: Distal lower extremity temp is warm, No lower extremity edema. PSYCHIATRIC: mood and affect appropriate.     Medications:   Med reviewed  Scheduled Meds:   sirolimus  4 mg Oral Daily    phenylephrine  1 spray Each Nare Once    calcitRIOL  0.5 mcg Oral Once per day on Mon Wed Fri    calcium acetate  1,334 mg Oral TID     carvedilol  50 mg Oral BID    cloNIDine  0.2 mg Oral Q8H    gabapentin  100 mg Oral BID    isosorbide mononitrate  60 mg Oral Daily    latanoprost  1 drop Both Eyes Nightly    methylphenidate  10 mg Oral BID    therapeutic multivitamin-minerals  1 tablet Oral Daily    mycophenolate  360 mg Oral BID    simvastatin  20 mg Oral QPM    vitamin D  50,000 Units Oral Weekly    sodium chloride flush  10 mL Intravenous 2 times per day    ipratropium-albuterol  1 ampule Inhalation 4x daily    azithromycin  250 mg Intravenous Q24H    insulin lispro  0-6 Units Subcutaneous TID     insulin lispro  0-3 Units Subcutaneous Nightly     Continuous Infusions:   heparin (porcine) 12 Units/kg/hr (08/28/18 0800)    tirofiban 0.075 mcg/kg/min (08/28/18 0801)    dextrose       Labs:   Labs reviewed  Recent Labs      08/26/18   0657  08/27/18   0442  08/27/18 2012   NA  136  130*   --    K  5.5*  6.2*  4.9   CL  95*  91*   --    CO2  22*  20*   --    BUN  70*  87*   --    CREATININE  10.1*  11.3*   --    LABGLOM  7*  6*   --    GLUCOSE  56*  121*   --    CALCIUM  9.4  9.2   --      Recent Labs      08/26/18   0658  08/27/18   0442  08/27/18 2012   WBC  9.3  7.7  7.5   RBC  3.68*  3.52*  3.54*   HGB  9.3*  8.6*  8.7*   HCT  30.2*  28.5*  28.3*   PLT  269  263  268      8/21/18  Conclusions   Summary   Ejection fraction is visually estimated at 35-40%.   There was moderate global hypokinesis of the left ventricle.   The right ventricular size was normal with normal systolic function and   wall

## 2018-08-29 NOTE — PROGRESS NOTES
2000: Patient requested something to help him sleep, secure messaged Dr. Maggie Walker asking for a one time dose of tylenol with benadryl. Still waiting for a response/orders at 2124    2300: Patient complains of chest pain and appears very anxious. EKG was unremarkable. Andrew Walker for SL nitro, and something for anxiety. Still waiting for a response/orders at 2343.    0015: Patient resting with eyes closed in the chair. No other needs expressed at this time. 0500: Patient showed me some brown tinged sputum; got CBC ordered.

## 2018-08-29 NOTE — PROGRESS NOTES
intermediate closure, Excision of 5cm abdominal wall mass with removal of PD catheter    PANCREAS SURGERY  2007    transplant    RI OFFICE/OUTPT VISIT,PROCEDURE ONLY N/A 8/28/2018    TRANSESOPHAGEAL ECHOCARDIOGRAM performed by Xavier Puckett MD at [address] [place] [address] N/A 6/29/2018    HERNIA VENTRAL REPAIR,with mesh performed by Imer Schneider MD at [facility]       Medications Prior to Admission:      Prior to Admission medications    Medication Sig Start Date End Date Taking? Authorizing Provider   cloNIDine (CATAPRES) 0.2 MG tablet Take 1 tablet by mouth every 8 hours 8/24/18  Yes Nancy Bhardwaj MD   clopidogrel (PLAVIX) 75 MG tablet Take 1 tablet by mouth daily 8/24/18  Yes Nancy Bhardwaj MD   aspirin 81 MG chewable tablet Take 1 tablet by mouth daily 8/24/18  Yes Nancy Bhardwaj MD   insulin glargine (BASAGLAR KWIKPEN) 100 UNIT/ML injection pen Inject 18 units in the morning and 18 units in the evening 8/24/18  Yes Nancy Bhardwaj MD   isosorbide mononitrate (IMDUR) 60 MG extended release tablet Take 1 tablet by mouth daily 8/24/18  Yes Nancy Bhardwaj MD   nitroGLYCERIN (NITROSTAT) 0.4 MG SL tablet Place 1 tablet under the tongue every 5 minutes as needed for Chest pain up to max of 3 total doses. If no relief after 1 dose, call 911. 8/24/18  Yes Nancy Bhardwaj MD   insulin lispro (HUMALOG KWIKPEN) 100 UNIT/ML pen Inject 3 Units into the skin 3 times daily (before meals) Sliding scale:  -199 = 2 units  -249 = 4 units  -299 = 6 units  -400= 10 units  BS >400 = 12 units 8/24/18  Yes Nancy Bhardwaj MD   gabapentin (NEURONTIN) 100 MG capsule Take 100 mg by mouth 2 times daily. .   Yes Historical Provider, MD   calcitRIOL (ROCALTROL) 0.5 MCG capsule Take 0.5 mcg by mouth three times a week    Yes Historical Provider, MD   calcium acetate (ELIPHOS) 667 MG TABS Take 2 tablets by mouth 3 times daily (with meals)    Yes Historical Provider, MD   vitamin D

## 2018-08-29 NOTE — PROGRESS NOTES
[id number] Secure message sent to [facility] due to increase in CP, EKG unremarkable

## 2018-08-29 NOTE — PLAN OF CARE
Problem: Impaired respiratory status  Goal: Clear lung sounds  Outcome: Ongoing  Duoneb txs ongoing to improve aeration to lungs

## 2018-08-29 NOTE — PROGRESS NOTES
DIET CARDIAC; Low Sodium (2 GM);  Daily Fluid Restriction: 1800 ml; Low Potassium    DVT prophylaxis: [] Lovenox                                 [] SCDs                                 [] SQ Heparin                                 [] Encourage ambulation           [x] Already on Anticoagulation     Disposition:    [x] Home       [] TCU       [] Rehab       [] Psych       [x] SNF       [] [facility]       [] Other-    Code Status: Full Code    PT/OT Eval Status: no    Assessment/Plan:    Anticipated Discharge in : > 1 wk, here for plavix washout on aggrastat and heparin    Active Hospital Problems    Diagnosis Date Noted    Severe mitral regurgitation by prior echocardiogram [I34.0]      Priority: High    Essential hypertension [I10]      Priority: High    Heart disease [I51.9] 01/23/2013     Priority: Medium    GERD (gastroesophageal reflux disease) [K21.9] 01/23/2013     Priority: Low    Non-rheumatic tricuspid valve insufficiency [I36.1]     Coronary artery disease involving native coronary artery of native heart without angina pectoris [I25.10]     Shortness of breath at rest [R06.02] 08/26/2018    Atypical chest pain [R07.89] 08/26/2018    Pulmonary hypertension ([address] [I27.20] 08/26/2018    Iron deficiency [E61.1] 08/26/2018    Acute nasopharyngitis [J00] 08/26/2018    Pulmonary hypertension due to left ventricular systolic dysfunction ([address] [I27.22]     ESRD on hemodialysis ([address] [N18.6, Z99.2]     Vitamin D deficiency [E55.9] 08/17/2018    Secondary hyperparathyroidism of renal origin ([address] [N25.81] 08/17/2018    Type 1 diabetes mellitus with hyperglycemia ([address] [E10.65]     Anemia in CKD (chronic kidney disease) [N18.9, Z64.7]     Systolic congestive heart failure ([address] [I50.20] 01/23/2013       Severe MR, Mod TR - plan open heart for MVV/TVV/LIMA-LAD on 9/4/18, currently on heparin and aggrastat, await plavix washout  ESRD on HD - continue with MWF HD  Hyperkalemia - managed

## 2018-08-29 NOTE — FLOWSHEET NOTE
08/29/18 0710 08/29/18 1104   Vital Signs   BP (!) 141/92 (!) 157/102   Temp 97.6 °F (36.4 °C) 98 °F (36.7 °C)   Pulse 85 87   Weight 183 lb 13.8 oz (83.4 kg) 173 lb 15.1 oz (78.9 kg)   Weight Method Standing scale Bed scale   Percent Weight Change 2.49 -5.4   Post-Hemodialysis Assessment   Post-Treatment Procedures --  Blood returned;Catheter Capped, clamped with Saline x2 ports   Machine Disinfection Process --  Acid/Vinegar Clean;Heat Disinfect; Exterior Machine Disinfection   Total Liters Processed (l/min) --  80.4 l/min   Dialyzer Clearance --  Lightly streaked   Duration of Treatment (minutes) --  210 minutes   Hemodialysis Intake (ml) --  400 ml   Hemodialysis Output (ml) --  4600 ml   NET Removed (ml) --  4200 ml   Tolerated Treatment --  Good   3.5 hour treatment completed without complications. lines flushed with normal saline. cath dressing clean dry and intact upon leaving the unit. report called to primary nurse. treatment to be scanned into EMR.

## 2018-08-29 NOTE — PROGRESS NOTES
[address] [facility] RN entered room. Pt complaints of increase in CP. Found this RN requesting STAT EKG. This RN confirmed.    STAT EKG order placed  EKG looks unremarkable

## 2018-08-29 NOTE — PROGRESS NOTES
Hospitalist Progress Note    Patient:  Roseanna Biswas      Unit/Bed:3B-35/035-A    YOB: 1975    MRN: [id number]       Acct: [de-identified]     PCP: No primary care provider on file. Date of Admission: 8/26/2018    Chief Complaint: SOB    Hospital Course: Patient admitted with continued SOB - he has had this complaint for > 1 month, recently admitted, worked up, felt to be fluid overload situation due to decreased dry weight, patient improved and discharged but then quickly returned with the same complaint. Now felt to be due to valvular issues.   Plan for MVV on 9/4/18     Subjective: 8/28/18 - Feels fine, no complaints other than mild continued SOB      Medications:  Reviewed    Infusion Medications    heparin (porcine) 18 Units/kg/hr (08/29/18 1346)    tirofiban 0.075 mcg/kg/min (08/29/18 1420)    dextrose       Scheduled Medications    insulin lispro  0-18 Units Subcutaneous TID WC    insulin lispro  0-9 Units Subcutaneous Nightly    insulin glargine  15 Units Subcutaneous BID    sirolimus  4 mg Oral Daily    phenylephrine  1 spray Each Nare Once    calcitRIOL  0.5 mcg Oral Once per day on Mon Wed Fri    calcium acetate  1,334 mg Oral TID WC    carvedilol  50 mg Oral BID    cloNIDine  0.2 mg Oral Q8H    gabapentin  100 mg Oral BID    isosorbide mononitrate  60 mg Oral Daily    latanoprost  1 drop Both Eyes Nightly    methylphenidate  10 mg Oral BID    therapeutic multivitamin-minerals  1 tablet Oral Daily    mycophenolate  360 mg Oral BID    simvastatin  20 mg Oral QPM    vitamin D  50,000 Units Oral Weekly    sodium chloride flush  10 mL Intravenous 2 times per day    ipratropium-albuterol  1 ampule Inhalation 4x daily     PRN Meds: nitroGLYCERIN, cetirizine, sodium chloride flush, magnesium hydroxide, glucose, dextrose, glucagon (rDNA), dextrose, ondansetron      Intake/Output Summary (Last 24 hours) at 08/29/18 1649  Last data filed at 08/29/18 1600   Gross per 24 hour Intake          2187.57 ml   Output             4600 ml   Net         -2412.43 ml       Diet:  DIET CARDIAC; Low Sodium (2 GM); Daily Fluid Restriction: 1800 ml; Low Potassium    Exam:  BP (!) 147/91   Pulse 91   Temp 98.1 °F (36.7 °C) (Oral)   Resp 21   Ht 5' 9\" (1.753 m)   Wt 173 lb 15.1 oz (78.9 kg)   SpO2 98%   BMI 25.69 kg/m²     General appearance: No apparent distress, appears stated age and cooperative. HEENT: Pupils equal, round, and reactive to light. Conjunctivae/corneas clear. Neck: Supple, with full range of motion. No jugular venous distention. Trachea midline. Respiratory:  Normal respiratory effort. Clear to auscultation, bilaterally without Rales/Wheezes/Rhonchi. Cardiovascular: Regular rate and rhythm with normal S1/S2 without rubs or gallops. Loud murmur  Abdomen: Soft, non-tender, non-distended with normal bowel sounds. Musculoskeletal: passive and active ROM x 4 extremities. Skin: Skin color, texture, turgor normal.  No rashes or lesions. Neurologic:  Neurovascularly intact without any focal sensory/motor deficits. Cranial nerves: II-XII intact, grossly non-focal.  Psychiatric: Alert and oriented, thought content appropriate, normal insight  Capillary Refill: Brisk,< 3 seconds   Peripheral Pulses: +2 palpable, equal bilaterally       Labs:   Recent Labs      08/27/18   0442  08/27/18 2012 08/29/18   0541  08/29/18   0542   WBC  7.7  7.5  8.1   --    HGB  8.6*  8.7*  8.9*   --    HCT  28.5*  28.3*  29.2*   --    PLT  263  268  290  283     Recent Labs      08/27/18 0442  08/27/18 2012   NA  130*   --    K  6.2*  4.9   CL  91*   --    CO2  20*   --    BUN  87*   --    CREATININE  11.3*   --    CALCIUM  9.2   --    PHOS  8.1*   --      No results for input(s): AST, ALT, BILIDIR, BILITOT, ALKPHOS in the last 72 hours. No results for input(s): INR in the last 72 hours. No results for input(s): Cyrena Baldy in the last 72 hours.     Urinalysis:    Lab Results   Component Value Date    NITRU NEGATIVE 08/27/2018    WBCUA 10-15 08/27/2018    WBCUA 15-25 03/13/2012    BACTERIA FEW 08/27/2018    RBCUA 5-10 08/27/2018    BLOODU MODERATE 08/27/2018    SPECGRAV 1.015 08/27/2018    GLUCOSEU 500 05/27/2018       Radiology:  IR FLUORO GUIDED CVA DEVICE PLACEMENT   Final Result   1. Status post successful PICC line insertion. 2. At the end of this procedure, it was noted that the patient has a small kink in the inferior lumen of the indwelling tunneled dialysis catheter. Attempts were made to relieve the kinking by insertion of 2 Glidewire but these attempts were    unsuccessful. Despite this, both lumens of the dialysis catheter appear to aspirate well. **This report has been created using voice recognition software. It may contain minor errors which are inherent in voice recognition technology. **      Final report electronically signed by Dr. Lior Smith on 8/29/2018 3:39 PM      CTA CHEST W [address]   Final Result   1. No evidence of pulmonary embolism. 2. Diffuse groundglass opacities throughout both lungs which may be related to pulmonary edema. 3. Prominence of the main pulmonary artery which is often seen with pulmonary arterial hypertension. 3. Cardiomegaly. **This report has been created using voice recognition software. It may contain minor errors which are inherent in voice recognition technology. **      Final report electronically signed by Dr Carmen Mcmahon on 8/26/2018 9:41 AM      XR CHEST STANDARD (2 VW)   Final Result   Left basilar opacities which could be on the basis of atelectasis or infiltrate. Overall stable appearance of the chest when compared to previous study 8/17/2018. **This report has been created using voice recognition software. It may contain minor errors which are inherent in voice recognition technology. **      Final report electronically signed by Dr Carmen Mcmahon on 8/26/2018 7:19 AM          Diet: DIET HD  DM2 - adjusting insulin dosing based on patient PO intake  Chronic systolic CHF, EF 02%        Electronically signed by Renata Irvin DO on 8/29/2018 at 4:49 PM

## 2018-08-30 NOTE — PROGRESS NOTES
ondansetron      Intake/Output Summary (Last 24 hours) at 08/30/18 1652  Last data filed at 08/30/18 0910   Gross per 24 hour   Intake             1279 ml   Output                0 ml   Net             1279 ml       Diet:  DIET CARDIAC; Low Sodium (2 GM); Daily Fluid Restriction: 1800 ml; Low Potassium    Exam:  BP (!) 136/91   Pulse 82   Temp 98.1 °F (36.7 °C) (Oral)   Resp 18   Ht 5' 9\" (1.753 m)   Wt 173 lb 15.1 oz (78.9 kg)   SpO2 95%   BMI 25.69 kg/m²     General appearance: No apparent distress, appears stated age and cooperative. HEENT: Pupils equal, round, and reactive to light. Conjunctivae/corneas clear. Neck: Supple, with full range of motion. No jugular venous distention. Trachea midline. Respiratory:  Normal respiratory effort. Clear to auscultation, bilaterally without Rales/Wheezes/Rhonchi. Cardiovascular: Regular rate and rhythm with normal S1/S2 without rubs or gallops. Loud murmur  Abdomen: Soft, non-tender, non-distended with normal bowel sounds. Musculoskeletal: passive and active ROM x 4 extremities. Skin: Skin color, texture, turgor normal.  No rashes or lesions. Neurologic:  Neurovascularly intact without any focal sensory/motor deficits. Cranial nerves: II-XII intact, grossly non-focal.  Psychiatric: Alert and oriented, thought content appropriate, normal insight  Capillary Refill: Brisk,< 3 seconds   Peripheral Pulses: +2 palpable, equal bilaterally       Labs:   Recent Labs      08/27/18 2012 08/29/18   0541  08/29/18   0542  08/29/18 2002 08/30/18   0610   WBC  7.5  8.1   --    --   7.4   HGB  8.7*  8.9*   --   9.0*  8.7*   HCT  28.3*  29.2*   --   28.8*  28.2*   PLT  268  290  283   --   288     Recent Labs      08/27/18 2012   K  4.9     No results for input(s): AST, ALT, BILIDIR, BILITOT, ALKPHOS in the last 72 hours. No results for input(s): INR in the last 72 hours. No results for input(s): Ladena Quale in the last 72 hours.     Urinalysis:      Lab

## 2018-08-30 NOTE — PLAN OF CARE
Problem: Daily Care:  Goal: Daily care needs are met  Daily care needs are met   Outcome: Ongoing  Patient's daily care needs have been met this shift. Problem: Pain:  Goal: Patient's pain/discomfort is manageable  Patient's pain/discomfort is manageable   Outcome: Ongoing  Patient denies any pain or discomfort. Problem: Skin Integrity:  Goal: Skin integrity will stabilize  Skin integrity will stabilize   Outcome: Ongoing  Patient shows no signs of skin breakdown at this time. Will continue to encourage to turn and move. Problem: Discharge Planning:  Goal: Patients continuum of care needs are met  Patients continuum of care needs are met   Outcome: Ongoing  Continue to work on discharge plans after mitral valve replacement. Problem: Impaired respiratory status  Goal: Clear lung sounds  Outcome: Ongoing  Patient's lung sounds remain clear throughout. Will continue to assess. Problem: Serum Glucose Level - Abnormal:  Goal: Ability to maintain appropriate glucose levels will improve to within specified parameters  Ability to maintain appropriate glucose levels will improve to within specified parameters   Outcome: Ongoing  Patient's glucose was 227 at the beginning of the shift. He was given 3 units of humalog and 15 units of lantus. Will continue to monitor for any signs of hypoglycemia. Comments: Care plan reviewed with patient. Patient verbalizes understanding of the care plan and contributed to goal setting.

## 2018-08-30 NOTE — PROGRESS NOTES
Updated Night shift hosip on pt bleeding from newly placed IJ. Day shift reported significant amount of EBL. Order for H&H and aptt received.   Dressing reinforced

## 2018-08-30 NOTE — CONSULTS
[facility]    [facility]  [address]  [place], [address]  [address] [facility]      8/30/2018 8:29 AM         Pt Name:    Halley Hermosillo  MRN:     [id number]   [id number]  YOB: 1975  Admit Date:    8/26/2018  6:29 AM  Primary Care Physician:  No primary care provider on file. Room Number:  3B-35/035-A   Reason for Consult:  Management of hemodialysis  Requesting Providor: Dr. Montez Kc  Primary Nephrologist: Dr. Kendra Holliday    ### ISOLATION ###:   none     Admit Chief Complaint: shortness of breath     History of Chief Complaint:   The patient is a 41y.o. year old black male who recieves hemodialysis under the care of Dr. Kendra Holliday to treat ESRD from DM and failed kidney transplant. He was just discharge 08/24/18 after being evaluated for shortness of breath. He has been short of breath for many weeks. His has not skipped dialysis but on occasion will shorten his treatments which makes it hard to remove enough fluid to prevent fluid overload. Last echocardiogram showed severe mitral and moderate tricuspid regurgitation. Nephrology is following the patient for: ESRD requiring hemodialysis     24 hour summary:   The patient was relaxing in bed. He is less short of breath. He has no complaints and is waiting on open heart surgery. Baseline eGFR Hemodialysis patient   Admit eGFR eGFR < 15 ml/min   Current eGFR        eGFR trend    Lab Results   Component Value Date    LABGLOM 6 08/27/2018    LABGLOM 7 08/26/2018    LABGLOM 7 08/24/2018    LABGLOM 10 08/23/2018    LABGLOM 7 08/22/2018    LABGLOM 9 08/21/2018    LABGLOM 10 08/20/2018    LABGLOM 7 08/19/2018    LABGLOM 9 08/18/2018    LABGLOM 6 08/17/2018    LABGLOM 6 05/27/2018    LABGLOM 20 12/07/2017          Compliance with treatment plan: 50%     Allergies and Intolerances: ALLERGIES: Ace inhibitors; Bactrim [sulfamethoxazole-trimethoprim]; Hydralazine; Lisinopril;  Losartan; Amlodipine; Morphine; relief after 1 dose, call 911. 8/24/18  Yes Janneth Victoria MD   insulin lispro (HUMALOG KWIKPEN) 100 UNIT/ML pen Inject 3 Units into the skin 3 times daily (before meals) Sliding scale:  -199 = 2 units  -249 = 4 units  -299 = 6 units  -400= 10 units  BS >400 = 12 units 8/24/18  Yes Janneth Victoria MD   gabapentin (NEURONTIN) 100 MG capsule Take 100 mg by mouth 2 times daily. .   Yes Historical Provider, MD   calcitRIOL (ROCALTROL) 0.5 MCG capsule Take 0.5 mcg by mouth three times a week    Yes Historical Provider, MD   calcium acetate (ELIPHOS) 667 MG TABS Take 2 tablets by mouth 3 times daily (with meals)    Yes Historical Provider, MD   vitamin D (ERGOCALCIFEROL) 47155 units CAPS capsule Take 50,000 Units by mouth once a week   Yes Historical Provider, MD   cetirizine (ZYRTEC) 10 MG tablet Take 10 mg by mouth daily as needed for Allergies   Yes Historical Provider, MD   darbepoetin mirna-polysorbate (ARANESP) 60 MCG/0.3ML SOSY injection Inject 60 mcg into the skin every 14 days Per clinic 9/28/17  Yes Historical Provider, MD   carvedilol (COREG) 25 MG tablet Take 2 tablets by mouth 2 times daily 6/14/17  Yes Kristy Davison MD   sirolimus (RAPAMUNE) 1 MG tablet Take 4 mg by mouth daily    Yes Historical Provider, MD   simvastatin (ZOCOR) 20 MG tablet Take 1 tablet by mouth every evening 2/9/17  Yes Keysha Pagan, DO   Mycophenolate Sodium 360 MG TBEC Take 1 tablet by mouth 2 times daily 1/14/17  Yes Keysha Pagan,    methylphenidate (RITALIN) 10 MG tablet Take 1 tablet by mouth 2 times daily 10/21/16  Yes Fay Lion MD   Multiple Vitamins-Minerals (MENS MULTI VITAMIN & MINERAL PO) Take 1 tablet by mouth daily   Yes Historical Provider, MD   glucose blood VI test strips (ONETOUCH VERIO) strip 1 each by In Vitro route 4 times daily As needed.  12/30/15  Yes Rere Chavez,    latanoprost (XALATAN) 0.005 % ophthalmic solution Place 1 drop into both eyes nightly 11/13/15  Yes Constipation: none, Blood in the stool: none, Melena: none, Rebound: none, Rovsing's: none. GENITOURINARY  Dysuria: none, Pyuria: none, Gross hematuria: none, Urgency: none, Flank pain: none, STD: none. MUSCULOSKELETAL  Myalgia: none, Neck pain: none, Thoracic pain: none, Lumbar pain: none, Joint pain: none, Falls: none    ENDOCRINE/HEMATOLOGY/ALLERGIC  Easy bruising: none, Easy bleeding: none, Environmental allergies: none, Polydipsia: none. NEUROLOGICAL  Dizziness: none, Tingling: none, Numbness: none, Tremor: none, Sensory change: none, Speech change: none, Focal weakness: none, Seizures: none, Loss of consciousness: none,    PSYCHIATRIC  Depression: none, Suicidal ideation: none, Heroin abuse: none, Cocaine abuse: none, Marijuana abuse: none, Hallucinations: none, Anxiety: none, Memory loss: none       Physical Examination:  BP (!) 143/105   Pulse 90   Temp 99 °F (37.2 °C) (Oral)   Resp 16   Ht 5' 9\" (1.753 m)   Wt 173 lb 15.1 oz (78.9 kg)   SpO2 98%   BMI 25.69 kg/m²       General appearance: alert and cooperative with exam  HEENT: Head: Normocephalic, no lesions, without obvious abnormality. Neck: no adenopathy, no carotid bruit and no JVD  Lungs: clear to auscultation bilaterally  Heart: regular rate and rhythm, S1, S2 normal, no murmur, click, rub or gallop  Abdomen: soft, non-tender; bowel sounds normal; no masses,  no organomegaly  Extremities: extremities normal, atraumatic, no cyanosis or edema  Neurologic: Mental status: Alert, oriented, thought content appropriate  PSY: No evidence of depression. Mood is normal for the patient. Skin: Warm and dry. No unusual lesions or rashes noted. Muscles: Hand  is equal and strong bilaterally. Leg strength is equal and strong. Skeletal: No bony or skeletal abnormalities noted.     Admission weight: 173 lb (78.5 kg)  Wt Readings from Last 3 Encounters:   08/29/18 173 lb 15.1 oz (78.9 kg)   08/24/18 173 lb 4.5 oz (78.6 kg)   08/07/18 176 lb 1.6

## 2018-08-31 NOTE — PROGRESS NOTES
Hospitalist Progress Note    Patient:  Charolette Homans      Unit/Bed:3B-35/035-A    YOB: 1975    MRN: [id number]       Acct: [de-identified]     PCP: No primary care provider on file. Date of Admission: 8/26/2018    Chief Complaint: SOB    Hospital Course: Patient admitted with continued SOB - he has had this complaint for > 1 month, recently admitted, worked up, felt to be fluid overload situation due to decreased dry weight, patient improved and discharged but then quickly returned with the same complaint. Now felt to be due to valvular issues.   Plan for MVV on 9/4/18 8/28/18 - 8/29/18 - Feels fine, no complaints other than mild continued SOB  8/30/18 - Having difficulty sleeping, wants to try something while here in the hospital    Subjective: 8/31/18 - slept well last night, no complaints today      Medications:  Reviewed    Infusion Medications    heparin (porcine) 16.015 Units/kg/hr (08/31/18 0659)    tirofiban 0.075 mcg/kg/min (08/31/18 0301)    dextrose       Scheduled Medications    insulin glargine  8 Units Subcutaneous Daily    insulin lispro  0-18 Units Subcutaneous TID WC    sirolimus  4 mg Oral Daily    phenylephrine  1 spray Each Nare Once    calcitRIOL  0.5 mcg Oral Once per day on Mon Wed Fri    calcium acetate  1,334 mg Oral TID WC    carvedilol  50 mg Oral BID    cloNIDine  0.2 mg Oral Q8H    gabapentin  100 mg Oral BID    isosorbide mononitrate  60 mg Oral Daily    latanoprost  1 drop Both Eyes Nightly    methylphenidate  10 mg Oral BID    therapeutic multivitamin-minerals  1 tablet Oral Daily    mycophenolate  360 mg Oral BID    simvastatin  20 mg Oral QPM    vitamin D  50,000 Units Oral Weekly    sodium chloride flush  10 mL Intravenous 2 times per day    ipratropium-albuterol  1 ampule Inhalation 4x daily     PRN Meds: melatonin, nitroGLYCERIN, cetirizine, sodium chloride flush, magnesium hydroxide, glucose, dextrose, glucagon (rDNA), dextrose, results for input(s): INR in the last 72 hours. No results for input(s): Jone Meager in the last 72 hours. Urinalysis:      Lab Results   Component Value Date    NITRU NEGATIVE 08/27/2018    WBCUA 10-15 08/27/2018    WBCUA 15-25 03/13/2012    BACTERIA FEW 08/27/2018    RBCUA 5-10 08/27/2018    BLOODU MODERATE 08/27/2018    SPECGRAV 1.015 08/27/2018    GLUCOSEU 500 05/27/2018       Radiology:  IR FLUORO GUIDED CVA DEVICE PLACEMENT   Final Result   1. Status post successful PICC line insertion. 2. At the end of this procedure, it was noted that the patient has a small kink in the inferior lumen of the indwelling tunneled dialysis catheter. Attempts were made to relieve the kinking by insertion of 2 Glidewire but these attempts were    unsuccessful. Despite this, both lumens of the dialysis catheter appear to aspirate well. **This report has been created using voice recognition software. It may contain minor errors which are inherent in voice recognition technology. **      Final report electronically signed by Dr. Boris Garcia on 8/29/2018 3:39 PM      CTA CHEST [address]   Final Result   1. No evidence of pulmonary embolism. 2. Diffuse groundglass opacities throughout both lungs which may be related to pulmonary edema. 3. Prominence of the main pulmonary artery which is often seen with pulmonary arterial hypertension. 3. Cardiomegaly. **This report has been created using voice recognition software. It may contain minor errors which are inherent in voice recognition technology. **      Final report electronically signed by Dr Brian Suarez on 8/26/2018 9:41 AM      XR CHEST STANDARD (2 VW)   Final Result   Left basilar opacities which could be on the basis of atelectasis or infiltrate. Overall stable appearance of the chest when compared to previous study 8/17/2018. **This report has been created using voice recognition software.  It may contain minor errors which ([facility] [address] [I50.20] 01/23/2013       Severe MR, Mod TR - plan open heart for MVV/TVV/LIMA-LAD on 9/4/18, currently on heparin and aggrastat, await plavix washout  ESRD on HD - continue with MWF HD  Hyperkalemia - managed with HD  DM2 - adjusting insulin dosing based on patient PO intake  Chronic systolic CHF, EF 17%    2/30/34 - No changes  8/30/18 - Add melatonin 3mg nightly prn insomnia  8/31/18 - Decreasing insulin today due to hypoglycemia    Electronically signed by Elsie Covarrubias DO on 8/31/2018 at 5:16 PM

## 2018-08-31 NOTE — PLAN OF CARE
Problem: Daily Care:  Goal: Daily care needs are met  Daily care needs are met   Outcome: Ongoing  Patient's daily care needs are met this shift. Will continue to assess for other needs. Problem: Pain:  Goal: Patient's pain/discomfort is manageable  Patient's pain/discomfort is manageable   Outcome: Ongoing  Patient denies any pain this shift. Will continue to assess and monitor. Problem: Skin Integrity:  Goal: Skin integrity will stabilize  Skin integrity will stabilize   Outcome: Ongoing  Patient has no signs of skin breakdown. Will continue to assess skin and encourage patient to turn often and eat well. Problem: Discharge Planning:  Goal: Patients continuum of care needs are met  Patients continuum of care needs are met   Outcome: Ongoing  Continue with discharge planning after open heart surgery. Problem: Impaired respiratory status  Goal: Clear lung sounds  Outcome: Ongoing  Patient lung sounds are clear this shift. Will continue to assess. Problem: Serum Glucose Level - Abnormal:  Goal: Ability to maintain appropriate glucose levels will improve to within specified parameters  Ability to maintain appropriate glucose levels will improve to within specified parameters   Outcome: Ongoing  Patient's glucose levels have stabilized this shift at 112. Will continue to assess and monitor. Problem: Bleeding:  Goal: Will show no signs and symptoms of excessive bleeding  Will show no signs and symptoms of excessive bleeding   Outcome: Ongoing  Patient shows no signs of symptoms of bleeding. Comments: Care plan reviewed with patient. Patient verbalizes understanding of the care plan and contributed to goal setting.

## 2018-08-31 NOTE — PLAN OF CARE
Problem: Nutrition  Goal: Optimal nutrition therapy  Outcome: Ongoing  Nutrition Problem: Altered nutrition-related lab values  Intervention: Food and/or Nutrient Delivery: Continue current diet, ONS not indicated  Nutritional Goals: Pt.'s BUN & Creatinine will improve during LOS. (8/31) BUN 49, Creatinine 7.1.

## 2018-08-31 NOTE — PROGRESS NOTES
Nutrition Assessment    Type and Reason for Visit: Initial, Consult ([facility] protocol for [facility])    Nutrition Recommendations: Will send decaf warm beverage TID ( count as part of fluid restriction). Revisit pt. As able. Monitor intake, labs, & wt. Malnutrition Assessment:  · Malnutrition Status: At risk for malnutrition    Nutrition Diagnosis:   · Problem: Altered nutrition-related lab values  · Etiology: related to Cardiac dysfunction, Renal dysfunction     Signs and symptoms:  as evidenced by Lab values    Nutrition Assessment:  · Subjective Assessment: Pt. admitted with Severe Mitral Regurgitation, Essential HTN, CAD, Pulonary HTN, Iron Defiency. Pt. off unit. Goes to HD Monday, Wednesday, Friday. Labs include: (8/31) BUN 49, Creatinine 7.1, Glucose 142, (8/27) Phosphorus 2.1. Pt. just here & received renal diet education by our staff (8/23/18). Meds include: Lantus, Phoslo, Humalog, Zofran, milk of Magnesia, MVI, Vitamin D.   · Wound Type: Not charted ( Pt. Had Hernia Ventral Repair with Mesh 6/29/18)  · Current Nutrition Therapies:  · Oral Diet Orders: Cardiac, 2gm Sodium, Fluid Restriction, Low Potassium (1800 ml)   · Oral Diet intake: %  · Oral Nutrition Supplement (ONS) Orders: None  · Anthropometric Measures:  · Ht: 5' 9\" (175.3 cm)   · Current Body Wt: 179 lb 14.3 oz (81.6 kg) (edema not assessed)  · Admission Body Wt: 184 lb 4.9 oz (83.6 kg) ((8/26))  · Usual Body Wt:  ((8/17/18):173#4. 5oz, (6/29/18) 176# 2.4oz, (6/12/18) 181#)  · % Weight Change: 3% wt. gain,  14 days (suspect fluid shifts/  HD pt. )  · Ideal Body Wt: 160 lb (72.6 kg), % Ideal Body 119%  · BMI Classification: BMI 25.0 - 29.9 Overweight  · Comparative Standards (Estimated Nutrition Needs):  · Estimated Daily Total Kcal: ~2448  · Estimated Daily Protein (g):  grams    Estimated Intake vs Estimated Needs: Intake Improving    Nutrition Risk Level:  Moderate    Nutrition Interventions:   Continue current diet, ONS not indicated  Continued Inpatient Monitoring, Education not appropriate at this time, Coordination of Care    Nutrition Evaluation:   · Evaluation: Goals set   · Goals: Pt.'s BUN & Creatinine will improve during LOS. (8/31) BUN 49, Creatinine 7.1.    · Monitoring: Meal Intake, Supplement Intake, Skin Integrity, Weight, Pertinent Labs, Chewing/Swallowing, Nausea or Vomiting    See Adult Nutrition Doc Flowsheet for more detail.      Electronically signed by Dominique Reddy RD, LD on 8/31/18 at 11:10 AM    Contact Number: [phone]

## 2018-08-31 NOTE — PLAN OF CARE
Problem: Daily Care:  Goal: Daily care needs are met  Daily care needs are met   Outcome: Met This Shift  Patient in dialysis most of shift. Patient plans to go home after surgery on 9/4. Problem: Pain:  Goal: Patient's pain/discomfort is manageable  Patient's pain/discomfort is manageable   Outcome: Met This Shift  Patient in dialysis most of shift. Will continue to monitor. Problem: Discharge Planning:  Goal: Patients continuum of care needs are met  Patients continuum of care needs are met   Outcome: Ongoing  Plan is to return home after patient's surgery on 9/4. Problem: Serum Glucose Level - Abnormal:  Goal: Ability to maintain appropriate glucose levels will improve to within specified parameters  Ability to maintain appropriate glucose levels will improve to within specified parameters   Outcome: Ongoing  Patient's glucose level low over night. Blood Sugar 104 this morning. Dr. Niranjan Go to adjust insulin. Problem: Bleeding:  Goal: Will show no signs and symptoms of excessive bleeding  Will show no signs and symptoms of excessive bleeding   Outcome: Ongoing  Patient in dialysis most of shift. No calls about excessive bleeding this shift. Comments: Care plan reviewed with patient. Patient verbalizes understanding of the plan of care and contribute to goal setting.

## 2018-09-01 NOTE — PROGRESS NOTES
25.89 kg/m²     General appearance: No apparent distress, appears stated age and cooperative. HEENT: Pupils equal, round, and reactive to light. Conjunctivae/corneas clear. Neck: Supple, with full range of motion. No jugular venous distention. Trachea midline. Respiratory:  Normal respiratory effort. Clear to auscultation, bilaterally without Rales/Wheezes/Rhonchi. Cardiovascular: Regular rate and rhythm with normal S1/S2 without murmurs, rubs or gallops. Abdomen: Soft, non-tender, non-distended with normal bowel sounds. Musculoskeletal: passive and active ROM x 4 extremities. Skin: Skin color, texture, turgor normal.  No rashes or lesions. Neurologic:  Neurovascularly intact without any focal sensory/motor deficits. Cranial nerves: II-XII intact, grossly non-focal.  Psychiatric: Alert and oriented, thought content appropriate, normal insight  Capillary Refill: Brisk,< 3 seconds   Peripheral Pulses: +2 palpable, equal bilaterally       Labs:   Recent Labs      08/29/18 2002 08/30/18   0610  08/31/18   0610   WBC   --   7.4   --    HGB  9.0*  8.7*   --    HCT  28.8*  28.2*   --    PLT   --   288  277     Recent Labs      08/31/18   0808  09/01/18   0441   NA  138  134*   K  4.2  5.0   CL  92*  91*   CO2  29  29   BUN  49*  50*   CREATININE  7.1*  7.3*   CALCIUM  9.0  8.9     No results for input(s): AST, ALT, BILIDIR, BILITOT, ALKPHOS in the last 72 hours. No results for input(s): INR in the last 72 hours. No results for input(s): Bonnielee Churn in the last 72 hours. Urinalysis:    Lab Results   Component Value Date    NITRU NEGATIVE 08/27/2018    WBCUA 10-15 08/27/2018    WBCUA 15-25 03/13/2012    BACTERIA FEW 08/27/2018    RBCUA 5-10 08/27/2018    BLOODU MODERATE 08/27/2018    SPECGRAV 1.015 08/27/2018    GLUCOSEU 500 05/27/2018       Radiology:  IR FLUORO GUIDED CVA DEVICE PLACEMENT   Final Result   1. Status post successful PICC line insertion.    2. At the end of this procedure, it was noted

## 2018-09-02 NOTE — PLAN OF CARE
Problem: Impaired respiratory status  Goal: Clear lung sounds  Outcome: Met This Shift  Duoneb aerosols ordered 4 times daily to keep lungs clear. Breath sounds are clear prior to tx at this time.

## 2018-09-02 NOTE — PROGRESS NOTES
[facility]   Nephrology progress note  9/2/2018, 9:40 AM      Pt Name:    Bisi Paez  MRN:     [id number]     YOB: 1975  Admit Date:    8/26/2018  6:29 AM  Primary Care Physician:  No primary care provider on file. Room number  3B-35/035-A    Chief Complaint: Nephrology following for ESRD/HD    Subjective:  Patient seen and examined  No chest pain or shortness of breath  Feels okay    Objective:  24HR INTAKE/OUTPUT:    Intake/Output Summary (Last 24 hours) at 09/02/18 0940  Last data filed at 09/01/18 2146   Gross per 24 hour   Intake           503.71 ml   Output                0 ml   Net           503.71 ml     I/O last 3 completed shifts: In: 503.7 [P.O.:200; I.V.:303.7]  Out: -   No intake/output data recorded. Admission weight: 173 lb (78.5 kg)  Wt Readings from Last 3 Encounters:   09/02/18 171 lb 15.3 oz (78 kg)   08/24/18 173 lb 4.5 oz (78.6 kg)   08/07/18 176 lb 1.6 oz (79.9 kg)     Body mass index is 25.39 kg/m². Physical examination  VITALS:     Vitals:    09/02/18 0700   BP: (!) 148/103   Pulse: 88   Resp: 18   Temp: 98.3 °F (36.8 °C)   SpO2: 98%     General Appearance: alert and cooperative with exam, appears comfortable, no distress  Mouth/Throat: Oral mucosa moist  Neck: No JVD  Lungs: Air entry B/L, no rales, no use of accessory muscles  Heart:  S1, S2 heard  GI: soft, non-tender, no guarding  Extremities: no LE edema      Lab Data  CBC:   Recent Labs      08/31/18   0610  09/02/18   0305   PLT  277  281     BMP:  Recent Labs      08/31/18   0808  09/01/18   0441  09/02/18   0305   NA  138  134*  137   K  4.2  5.0  5.3*   CL  92*  91*  93*   CO2  29  29  28   BUN  49*  50*  68*   CREATININE  7.1*  7.3*  9.8*   GLUCOSE  142*  307*  81   CALCIUM  9.0  8.9  9.6     Hepatic: No results for input(s): LABALBU, AST, ALT, ALB, BILITOT, ALKPHOS in the last 72 hours.       Meds:  Infusion:    heparin (porcine) 14 Units/kg/hr (09/02/18 0252)    tirofiban 0.075

## 2018-09-02 NOTE — PROGRESS NOTES
Hospitalist Progress Note    Patient:  Marky Pham      Unit/Bed:3B-35/035-A    YOB: 1975    MRN: [id number]       Acct: [de-identified]     PCP: No primary care provider on file. Date of Admission: 8/26/2018        Subjective:     Doing well this morning with no complaints. No acute events reported    Medications:  Reviewed    Infusion Medications    heparin (porcine) 14 Units/kg/hr (09/02/18 1251)    tirofiban 0.075 mcg/kg/min (09/01/18 1239)    dextrose       Scheduled Medications    [START ON 9/3/2018] insulin glargine  18 Units Subcutaneous Nightly    ceFAZolin (ANCEF) IVPB  2 g Intravenous On Call to OR    chlorhexidine  15 mL Mouth/Throat Once    chlorhexidine   Topical See Admin Instructions    magnesium sulfate  1.5 g Intravenous Once    vancomycin (VANCOCIN) IV  1,000 mg Intravenous On Call to OR    insulin lispro  0-18 Units Subcutaneous TID WC    phenylephrine  1 spray Each Nare Once    calcitRIOL  0.5 mcg Oral Once per day on Mon Wed Fri    calcium acetate  1,334 mg Oral TID WC    carvedilol  50 mg Oral BID    cloNIDine  0.2 mg Oral Q8H    gabapentin  100 mg Oral BID    isosorbide mononitrate  60 mg Oral Daily    latanoprost  1 drop Both Eyes Nightly    methylphenidate  10 mg Oral BID    therapeutic multivitamin-minerals  1 tablet Oral Daily    simvastatin  20 mg Oral QPM    vitamin D  50,000 Units Oral Weekly    sodium chloride flush  10 mL Intravenous 2 times per day    ipratropium-albuterol  1 ampule Inhalation 4x daily     PRN Meds: melatonin, nitroGLYCERIN, cetirizine, sodium chloride flush, magnesium hydroxide, glucose, dextrose, glucagon (rDNA), dextrose, ondansetron      Intake/Output Summary (Last 24 hours) at 09/02/18 1332  Last data filed at 09/01/18 2146   Gross per 24 hour   Intake           503.71 ml   Output                0 ml   Net           503.71 ml       Diet:  DIET CARDIAC; Low Sodium (2 GM);  Daily Fluid Restriction: 1800 ml; Low comment)  Diet NPO Time Specified Exceptions are: Ice Chips    DVT prophylaxis: [] Lovenox                                 [] SCDs                                 [] SQ Heparin                                 [] Encourage ambulation           [] Already on Anticoagulation     Disposition:    [] Home       [] TCU       [] Rehab       [] Psych       [] SNF       [] [place]       [] Other-    Code Status: Full Code    PT/OT Eval Status:     Assessment/Plan:      -Severe MR, Mod TR - plan open heart for MVV/TVV/LIMA-LAD on 9/4/18, currently on heparin and aggrastat, awaiting plavix washout  -CAD-managed as above-s/p recent cardiac cath with stent placement on 8/22/2018   -acute on chronic systolic heart failure,poa,EF 35-40%- now compensated-on coreg,imdur, fluid removal by dialysis  - severe pulmonary hypertension associated left heart disease  -ESRD on HD - continue with MWF HD-s/p failed kidney and pancreatic transplants-on sirolimus, CellCept  -Hyperkalemia - resolved  anemia of chronic kidney disease/ Iron deficiency- stable- on supplementation  -DM2 type 1, with uncontrolled hyperglycemia-- resumed home insulin glargine dose,c/w SSI  -essential hypertension- controlled on current regimen  -narcolepsy-on Ritalin      Electronically signed by Daniela Stout MD on 9/2/2018 at 1:32 PM

## 2018-09-02 NOTE — PLAN OF CARE
Problem: Daily Care:  Goal: Daily care needs are met  Daily care needs are met   Outcome: Ongoing  Patient is alert and oriented and is able to help himself and ask for help when he needs it. Daily care needs are met    Problem: Pain:  Goal: Patient's pain/discomfort is manageable  Patient's pain/discomfort is manageable   Outcome: Ongoing  Patient has had no pain this shift. Patient is comfortable and waiting for procedure Tuesday     Problem: Skin Integrity:  Goal: Skin integrity will stabilize  Skin integrity will stabilize   Outcome: Ongoing  Patient has intact skin, scars, RIJ and right chest dialysis line    Problem: Discharge Planning:  Goal: Patients continuum of care needs are met  Patients continuum of care needs are met   Outcome: Ongoing      Problem: Serum Glucose Level - Abnormal:  Goal: Ability to maintain appropriate glucose levels will improve to within specified parameters  Ability to maintain appropriate glucose levels will improve to within specified parameters   Outcome: Ongoing  Patient has been having trouble with blood sugars at times. Doctors have been increasing and decreasing insulin lantus    Problem: Bleeding:  Goal: Will show no signs and symptoms of excessive bleeding  Will show no signs and symptoms of excessive bleeding   Outcome: Ongoing  Patient has no signs of bleeding this shift. Patient on heparin and aggrastat drip for cardiac stent    Comments: Care plan reviewed with patient. Patient verbalize understanding of the plan of care and contribute to goal setting.

## 2018-09-03 NOTE — CONSULTS
Consult Note    Patient: Archana Rogers : 1975  [facility] Rec#: [id number] Acc#: [id number]   Provider Performing Procedure: Yunior Ford  Primary Care Physician: No primary care provider on file. Chief Complaint   Patient presents with    Shortness of Breath     Consulted by the hospitalist for hemoptysis , patient had no prior GI evaluation , has hemoptysis after starting Agristat , and resolved after holding it , no hematemesis or coffee ground emesis , no other GI complains . ROS  General Denies any fever or chills  HEENT Denies any diplopia, tinnitus or vertigo  Resp Denies SOB, chest pain, hemoptysis  Cardiac Denies any chest pain, palpitations, claudication or edema  GI Denies any melena, hematochezia, hematemesis or pyrosis   Denies any frequency, urgency, hesitancy or incontinence  Heme Denies bruising or bleeding easily  Endocrine Denies any history of diabetes or thyroid disease  Neuro Denies any focal motor or sensory deficits  Psychiatric Denies anxiety, depression, suicidal ideation  Skin Denies rashes, itching, open sores    MEDICAL HISTORY   has a past medical history of Anemia; Angiotensin converting enzyme inhibitor-aggravated angioedema; Cerebral artery occlusion with cerebral infarction [facility]); CHF (congestive heart failure) ([facility] Disease of blood and blood forming organ; DM type 1 (diabetes mellitus, type 1) ([facility] [facility] ESRD (end stage renal disease) ([facility] [address] GERD (gastroesophageal reflux disease); H/O pancreas transplant ([facility] Hemodialysis patient [facility]); History of blood transfusion; HTN (hypertension); Hyperlipidemia; Hyperphosphatemia; Hypersomnolence disorder; Kidney transplant status, living unrelated donor; Leaky heart valve; Metabolic acidosis; Narcolepsy; Nephropathy, diabetic ([facility] [facility] Secondary hyperparathyroidism of renal origin ([facility] [facility] Seizures ([facility] [address] and Vitamin D deficiency.     SURGICAL HISTORY   has a past surgical history that includes Pancreas surgery (); eye surgery; Kidney transplant (2006); other surgical history (04/15/2005); other surgical history (06/07/2005); other surgical history (06/23/2005); other surgical history (10/03/2005); pr repair incisional hernia,reducible (N/A, 6/29/2018); and pr office/outpt visit,procedure only (N/A, 8/28/2018).   Additional information:       ALLERGIES   Allergies as of 08/26/2018 - Review Complete 08/26/2018   Allergen Reaction Noted    Ace inhibitors Swelling and Anaphylaxis 02/06/2007    Bactrim [sulfamethoxazole-trimethoprim] Swelling 02/26/2015    Hydralazine Swelling 09/03/2015    Lisinopril Anaphylaxis 03/31/2015    Losartan Swelling 04/01/2015    Amlodipine Swelling 08/31/2015    Morphine Itching 02/06/2007    Lipitor [atorvastatin] Swelling 03/16/2015     Additional information:       MEDICATIONS   Coumadin Use Last 7 Days [x]No []Yes  Antiplatelet drug therapy use last 7 days  [x]No []Yes  Other anticoagulant use last 7 days  [x]No []Yes  Current Facility-Administered Medications   Medication Dose Route Frequency Provider Last Rate Last Dose    0.9 % sodium chloride bolus  250 mL Intravenous Once Gigi Montano MD        cloNIDine (CATAPRES) tablet 0.3 mg  0.3 mg Oral Q8H Nancy Briggs MD   0.3 mg at 09/03/18 1556    tirofiban (AGGRASTAT) 50 mcg/mL infusion  0.075 mcg/kg/min Intravenous Continuous Julio C Carrasco MD 7.3 mL/hr at 09/03/18 1209 0.075 mcg/kg/min at 09/03/18 1209    insulin glargine (LANTUS) injection vial 18 Units  18 Units Subcutaneous Nightly Jasbir Camargo MD        chlorhexidine (PERIDEX) 0.12 % solution 15 mL  15 mL Mouth/Throat Once Erica Morton MD        chlorhexidine (HIBICLENS) 4 % liquid   Topical See Admin Instructions Erica Morton MD        magnesium sulfate 1.5 g in dextrose 5 % 100 mL IVPB  1.5 g Intravenous Once Erica Morton MD        pantoprazole (PROTONIX) 80 mg in sodium chloride 0.9 % 100 mL infusion  8 mg/hr Intravenous Continuous Ellen Arriaza MD 10 mL/hr at [facility] but these attempts were unsuccessful. Despite this, both lumens of the dialysis catheter appear to aspirate well. **This report has been created using voice recognition software. It may contain minor errors which are inherent in voice recognition technology. ** Final report electronically signed by Dr. Virgil Sharma on 8/29/2018 3:39 PM    Xr Chest Portable    Result Date: 9/2/2018  PROCEDURE: XR CHEST PORTABLE CLINICAL INFORMATION: spitting up blood, . COMPARISON: August 26, 2018 TECHNIQUE: AP upright view of the chest. FINDINGS: The heart remains enlarged. Mild venous congestion persists. Streaky opacities at the left base again are noted. There is no visualized effusion or pneumothorax. The skeleton is unchanged. Central dialysis catheter position remains within the superior vena cava above the right atrium. Additionally there has been a second central line placed into the SVC. 1. Stable coarse interstitial markings with residual infrahilar atelectasis/infiltrates. 2. Persistent mild venous congestion. 3. Interval placement of a right IJ central line within the SVC adjacent to a pre-existing central venous catheter. **This report has been created using voice recognition software. It may contain minor errors which are inherent in voice recognition technology. ** Final report electronically signed by Dr. Kavon Little on 9/2/2018 11:22 PM      Impression and plan : Hemoptysis , resolved no obvious GI bleeding , will do stools occult blood screening , follow H&H , continue IV Protonix till after surgery .      Patient Active Problem List   Diagnosis    Systolic congestive heart failure ([address]    Heart disease    Heart valve disease    Controlled type 1 diabetes mellitus with stage 5 chronic kidney disease not on chronic dialysis (HCC)    HIGH CHOLESTEROL    GERD (gastroesophageal reflux disease)    Anemia    Acute kidney injury ([address]    Metabolic acidosis    Diabetic nephropathy associated of recurrent ventral hernia    Fluid overload    Secondary hyperparathyroidism of renal origin ([address]    Hyperphosphatemia    Vitamin D deficiency    Hyperkalemia    Systolic CHF, acute on chronic (HCC)    Cardiomyopathy (HCC)    Opacity of lung on imaging study    Pulmonary hypertension ([address]    Systolic dysfunction    Atypical chest pain    History of CVA (cerebrovascular accident)    History of angioedema    Essential hypertension    NSTEMI (non-ST elevated myocardial infarction) ([address]    ESRD on hemodialysis (HCC)    Shortness of breath at rest    Atypical chest pain    Pulmonary hypertension (HCC)    Iron deficiency    Acute nasopharyngitis    Pulmonary hypertension due to left ventricular systolic dysfunction (HCC)    Severe mitral regurgitation by prior echocardiogram    Non-rheumatic tricuspid valve insufficiency    Coronary artery disease involving native coronary artery of native heart without angina pectoris           Thanks for consultation .     Darlene Haq MD, MS  Electronically signed 9/3/2018 at 5:19 PM

## 2018-09-03 NOTE — FLOWSHEET NOTE
Perfect serve message sent to [facility] Jauregui. Pt already established with cardio group. Dr. Gallito Cutler requesting phone call at [phone].  Thank you

## 2018-09-03 NOTE — FLOWSHEET NOTE
Perfect serve message sent to [facility] Chamberlain.      Pt currently on Aggrostat and Heparin and still continues to cough up blood      Waiting for response

## 2018-09-03 NOTE — PROGRESS NOTES
1205 - Returns from dialysis. Assessment done. Patient denies pain or discomfort at present time. Waiting on lunch. Informed that Dr Talya Mcgarry wants him placed back on Aggrastat and the IV restarted. Knows that he will be getting at least 1 more unit of blood if not 2 prior to OR in am.  Patient asking what time surgery will be and I told him that I will call and let him know when I find out. 1245 - Meds given to patient along with his food. He asks where his anti rejection meds are and they have been discontinued. Several phone calls are then made back and forth between Dr Solomon Fischer, Dr Jacob Malin and he calls Dr Kendra Carroll. 1632 - 1 unit pRBCs started via white port in PICC and patient attended for 15 minutes without any signs or symptoms of transfusion reaction. 1825 - Transfusion complete and 0.9 NS on to flush . Vitals in doc flow.   No signs or symptoms of transfusion reaction

## 2018-09-03 NOTE — PROGRESS NOTES
Hospitalist Progress Note    Patient:  Lee Arroyo      Unit/Bed:3B-35/035-A    YOB: 1975    MRN: [id number]       Acct: [de-identified]     PCP: No primary care provider on file. Date of Admission: 8/26/2018    Chief Complaint: [place] [facility]:  Pt with esrd, remote kidney transplant, dm, presents with sob; to have mitral valve surgery 9.4. Has had some blood in mouth.  aggrastat stopped    Subjective:  Complains of blood in mouth      Medications:  Reviewed    Infusion Medications    pantoprozole (PROTONIX) infusion 8 mg/hr (09/03/18 0603)    heparin (porcine) 14 Units/kg/hr (09/02/18 2154)    dextrose       Scheduled Medications    insulin glargine  18 Units Subcutaneous Nightly    chlorhexidine  15 mL Mouth/Throat Once    chlorhexidine   Topical See Admin Instructions    magnesium sulfate  1.5 g Intravenous Once    insulin lispro  0-18 Units Subcutaneous TID WC    phenylephrine  1 spray Each Nare Once    calcitRIOL  0.5 mcg Oral Once per day on Mon Wed Fri    calcium acetate  1,334 mg Oral TID WC    carvedilol  50 mg Oral BID    cloNIDine  0.2 mg Oral Q8H    gabapentin  100 mg Oral BID    isosorbide mononitrate  60 mg Oral Daily    latanoprost  1 drop Both Eyes Nightly    methylphenidate  10 mg Oral BID    therapeutic multivitamin-minerals  1 tablet Oral Daily    simvastatin  20 mg Oral QPM    vitamin D  50,000 Units Oral Weekly    sodium chloride flush  10 mL Intravenous 2 times per day    ipratropium-albuterol  1 ampule Inhalation 4x daily     PRN Meds: melatonin, nitroGLYCERIN, cetirizine, sodium chloride flush, magnesium hydroxide, glucose, dextrose, glucagon (rDNA), dextrose, ondansetron      Intake/Output Summary (Last 24 hours) at 09/03/18 0706  Last data filed at 09/02/18 1357   Gross per 24 hour   Intake           778.78 ml   Output                0 ml   Net           778.78 ml       Diet:  DIET CARDIAC; Low Sodium (2 GM);  Daily Fluid Restriction: 08/27/2018    RBCUA 5-10 08/27/2018    BLOODU MODERATE 08/27/2018    SPECGRAV 1.015 08/27/2018    GLUCOSEU 500 05/27/2018       Radiology:  XR CHEST PORTABLE   Final Result   1. Stable coarse interstitial markings with residual infrahilar atelectasis/infiltrates. 2. Persistent mild venous congestion. 3. Interval placement of a right IJ central line within the SVC adjacent to a pre-existing central venous catheter. **This report has been created using voice recognition software. It may contain minor errors which are inherent in voice recognition technology. **      Final report electronically signed by Dr. Jannette Reid on 9/2/2018 11:22 PM      IR FLUORO GUIDED CVA DEVICE PLACEMENT   Final Result   1. Status post successful PICC line insertion. 2. At the end of this procedure, it was noted that the patient has a small kink in the inferior lumen of the indwelling tunneled dialysis catheter. Attempts were made to relieve the kinking by insertion of 2 Glidewire but these attempts were    unsuccessful. Despite this, both lumens of the dialysis catheter appear to aspirate well. **This report has been created using voice recognition software. It may contain minor errors which are inherent in voice recognition technology. **      Final report electronically signed by Dr. Carlota Su on 8/29/2018 3:39 PM      CTA CHEST [address]   Final Result   1. No evidence of pulmonary embolism. 2. Diffuse groundglass opacities throughout both lungs which may be related to pulmonary edema. 3. Prominence of the main pulmonary artery which is often seen with pulmonary arterial hypertension. 3. Cardiomegaly. **This report has been created using voice recognition software. It may contain minor errors which are inherent in voice recognition technology. **      Final report electronically signed by Dr Jessica Fleischer on 8/26/2018 9:41 AM      XR CHEST STANDARD (2 VW)   Final Result   Left basilar [E55.9] 08/17/2018    Secondary hyperparathyroidism of renal origin [facility]) [N25.81] 08/17/2018    Type 1 diabetes mellitus with hyperglycemia (HCC) [E10.65]     Anemia in CKD (chronic kidney disease) [N18.9, U11.5]     Systolic congestive heart failure ([facility] [address] [I50.20] 01/23/2013       1. Severe mitral regurg- to have surgery 9.4  2. Dm- monitor  3. Hyperkalemia- to be addressed with dailysis  4.  Anemia- will have Dr Bi Biggs advised        Electronically signed by Artur Herrera MD on 9/3/2018 at 7:06 AM

## 2018-09-03 NOTE — PROGRESS NOTES
[facility]   Nephrology progress note  9/3/2018, 9:42 AM      Pt Name:    Bhavani Post  MRN:     [id number]     YOB: 1975  Admit Date:    8/26/2018  6:29 AM  Primary Care Physician:  No primary care provider on file. Room number  3B-35/035-A    Chief Complaint: Nephrology following for ESRD/HD    Subjective:  Patient seen and examined  No chest pain or shortness of breath  Feels okay  Seen on HD during HD treatment    Objective:  24HR INTAKE/OUTPUT:      Intake/Output Summary (Last 24 hours) at 09/03/18 0942  Last data filed at 09/03/18 0930   Gross per 24 hour   Intake          1158.78 ml   Output                0 ml   Net          1158.78 ml     I/O last 3 completed shifts: In: 778.8 [P.O.:480; I.V.:298.8]  Out: -   I/O this shift:  In: 620 [Blood:620]  Out: -   Admission weight: 173 lb (78.5 kg)  Wt Readings from Last 3 Encounters:   09/03/18 177 lb 14.6 oz (80.7 kg)   08/24/18 173 lb 4.5 oz (78.6 kg)   08/07/18 176 lb 1.6 oz (79.9 kg)     Body mass index is 26.27 kg/m².     Physical examination  VITALS:     Vitals:    09/03/18 0900   BP: (!) 156/108   Pulse: 82   Resp: 19   Temp: 97.8 °F (36.6 °C)   SpO2:      General Appearance: alert and cooperative with exam, appears comfortable, no distress  Mouth/Throat: Oral mucosa moist  Neck: No JVD  Lungs: Air entry B/L, no rales, no use of accessory muscles  Heart:  S1, S2 heard  GI: soft, non-tender, no guarding  Extremities: no LE edema      Lab Data  CBC:   Recent Labs      09/02/18 0305 09/02/18 2129 09/03/18 0341   WBC   --   7.9   --    HGB   --   7.8*  7.8*   HCT   --   25.2*  25.6*   PLT  281  265   --      BMP:  Recent Labs      09/01/18   0441  09/02/18 0305 09/03/18   0341   NA  134*  137  134*   K  5.0  5.3*  6.2*   CL  91*  93*  90*   CO2  29  28  24   BUN  50*  68*  89*   CREATININE  7.3*  9.8*  11.3*   GLUCOSE  307*  81  159*   CALCIUM  8.9  9.6  9.2     Hepatic:   Recent Labs      09/02/18   1200 LABALBU  3.2*   AST  77*   ALT  124*   BILITOT  0.2*   ALKPHOS  168*         Meds:  Infusion:    pantoprozole (PROTONIX) infusion 8 mg/hr (09/03/18 0603)    heparin (porcine) 14 Units/kg/hr (09/02/18 2154)    dextrose       Meds:    sodium chloride  250 mL Intravenous Once    insulin glargine  18 Units Subcutaneous Nightly    chlorhexidine  15 mL Mouth/Throat Once    chlorhexidine   Topical See Admin Instructions    magnesium sulfate  1.5 g Intravenous Once    insulin lispro  0-18 Units Subcutaneous TID     phenylephrine  1 spray Each Nare Once    calcitRIOL  0.5 mcg Oral Once per day on Mon Wed Fri    calcium acetate  1,334 mg Oral TID WC    carvedilol  50 mg Oral BID    cloNIDine  0.2 mg Oral Q8H    gabapentin  100 mg Oral BID    isosorbide mononitrate  60 mg Oral Daily    latanoprost  1 drop Both Eyes Nightly    methylphenidate  10 mg Oral BID    therapeutic multivitamin-minerals  1 tablet Oral Daily    simvastatin  20 mg Oral QPM    vitamin D  50,000 Units Oral Weekly    sodium chloride flush  10 mL Intravenous 2 times per day    ipratropium-albuterol  1 ampule Inhalation 4x daily     Meds prn: melatonin, nitroGLYCERIN, cetirizine, sodium chloride flush, magnesium hydroxide, glucose, dextrose, glucagon (rDNA), dextrose, ondansetron       Impression and Plan:  1. ESRD on HD: tolerating HD well  - increase , BFR is already at 400  - on 2K bath for hyperkalemia  - tolerating HD well  - /100  - receiving PRBC on dialysis, tolerating it well    2. Mild hyperkalemia: on 2K bath, increase   3. HTN: mildly elevated, increase clonidine 0.3 mg po TID, Fluid removal should also help  4. Anemia in ESRD  5.  Valvular heart disease: for valve surgery tomorrow: CTS managing blood transfusion    D/W patient and RN    Liyah Colindres MD  [facility]

## 2018-09-03 NOTE — FLOWSHEET NOTE
09/03/18 0718 09/03/18 1104   Vital Signs   BP (!) 133/97 (!) 163/11   Temp 98.3 °F (36.8 °C) 98.4 °F (36.9 °C)   Pulse 87 85   Weight 177 lb 14.6 oz (80.7 kg) 169 lb 1.5 oz (76.7 kg)   Weight Method --  Bed scale   Percent Weight Change 3.46 -4.96   Post-Hemodialysis Assessment   Post-Treatment Procedures --  Blood returned;Catheter Capped, clamped with Saline x2 ports   Machine Disinfection Process --  Acid/Vinegar Clean;Heat Disinfect; Exterior Machine Disinfection   Total Liters Processed (l/min) --  80 l/min   Dialyzer Clearance --  Lightly streaked   Heparin amount administered during treatment (units) --  0 units   Hemodialysis Intake (ml) --  600 ml   Hemodialysis Output (ml) --  4600 ml   NET Removed (ml) --  4000 ml   Tolerated Treatment --  Good   3.5 hour treatment completed without complications. 2 units of PRBC given during dialysis tx. cath flushed with NS. cath dressing clean dry and intact upon leaving the unit. report called to primary nurse. treatment to be scanned into EMR.

## 2018-09-03 NOTE — FLOWSHEET NOTE
Yin ALMONTE personal phone number called [phone] at [phone]. Voicemail left.      Waiting for response

## 2018-09-03 NOTE — FLOWSHEET NOTE
Called and perfect serve messaged Hospitalist.     Anna See 3B-35/Pt currently on heparin gtt at 14units/kg/hr. Pt woke up, coughed up phlegm in trash can, and has blood in mouth. Pt denies biting lip, tongue, or mouth.       Awaiting response

## 2018-09-03 NOTE — FLOWSHEET NOTE
Perfect serve message sent to GAGE Dowd. Marlowe Sandifer 3B-35/Pt spitting up blood and some clots noted. Hospitalist notified. pt received stent to LAD 8/23. Plavix washout.  Currently on Heparin and agrostat drip  Read 9:06 pm

## 2018-09-04 NOTE — ANESTHESIA PROCEDURE NOTES
Central Venous Line:    A central venous line was placed using ultrasound guidance, in the OR for the following indication(s): central venous access and CVP monitoring. 9/4/2018 8:25 AM9/4/2018 8:45 AM    Sterility preparation included the following: hand hygiene performed prior to procedure, maximum sterile barriers used and sterile technique used to drape from head to toe. The patient was placed in Trendelenburg position. The left subclavian vein was prepped. The site was prepped with Chloraprep. A 9 Fr (size), 10 (length), introducer single lumen was placed. During the procedure, the following specific steps were taken: target vein identified, needle advanced into vein and blood aspirated and guidewire advanced into vein. Intravenous verification was obtained by venous blood return. Post insertion care included: all ports aspirated, all ports flushed easily, guidewire removed intact, Biopatch applied and line sutured in place. During the procedure the patient experienced: patient tolerated procedure well with no complications. Insertion site scrubbed per usage guidelines?: Yes  Skin prep agent dried for 3 minutes prior to procedure?:yes  Anesthesia type: generalA(n) non-oximetric, 7.5 (size) Pulmonary Artery Catheter (PAC) was placed through the Introducer CVL in the left subclavian vein. The PAC placement was confirmed by pressure tracing changes and secured at 53 cm (depth). The patient experienced the following events during the procedure: patient tolerated procedure well with no complications. PA Cath placed?: Yes    Additional notes:  Left IJ THROMBOSED  I WAS HELPED BY DR Alvin So FOR THE PLACEMENT OF LEFT SUBCLAVIAN INTORDUCER  PLACEMENT  Staffing  Anesthesiologist: Chance Dutton  Performed: Anesthesiologist   Preanesthetic Checklist  Completed: patient identified, site marked, surgical consent, pre-op evaluation, timeout performed, IV checked, risks and benefits discussed, monitors and equipment checked, anesthesia consent given, oxygen available and patient being monitored

## 2018-09-04 NOTE — PROGRESS NOTES
1405  Patient into ICU room 3 from Surgery. Connected to ventilator. Connected to continuous ICU monitoring. Patient sedated. Does not open eyes. Temporary Pacemaker on DDD rate 80. Dr. Lianne Spangler at bedside. 1420  Vasopressin IV started per order of Dr. Lianne Spangler. 1520  Patient remains sedated. Family updated at bedside. [id number] started via ventilator. Patient remains sedated. 1730  Patient opens eyes. Follows simple commands. 1905  Dr. Lianne Spangler updated per phone. Given hemodynamic results. Orders received.

## 2018-09-04 NOTE — OP NOTE
oblique right atriotomy was made parallel to the atrioventricular groove. The tricuspid valve was noted to be morphologically normal, with a dilated annulus. The annulus was sized to a 32 mm Rangel MC3 annuloplasty band. The annuloplasty sutures of horizontally mattressed 2-0 Ticron were placed circumferentially around the annulus, except for the usual sparing of the apex of the triangle of Feng, and then passed through the sewing ring of the annuloplasty band. The band was seated, and the sutures were tied. The valve was verified to be competent. The patient was then placed in steep Trendelenburg position with the aortic root vent turned on full and retrograde cardioplegia infusing, so as to de-air the left heart. The crossclamp was removed. The right atriotomy was closed with 4-0 Prolene. Atrial and ventricular pacing wires were placed. The patient weaned readily from cardiopulmonary bypass with baseline contractility. Protamine was administered. The patient was de-cannulated. All sites were verified as hemostatic. Chest tubes were placed in the mediastinum and the pleural cavities. The chest was closed using #7 wire for the sternum, followed by the usual 3-layer soft tissue closure. The patient transferred to the ICU in stable condition.

## 2018-09-04 NOTE — ANESTHESIA PRE PROCEDURE
Department of Anesthesiology  Preprocedure Note       Name:  Archana Rogers   Age:  41 y.o.  :  1975                                          MRN:  [id number]         Date:  2018      Surgeon: Anna Caldwell):  Cheryle Rhein, MD    Procedure: Procedure(s):  MVR (REPAIR VS REPLACEMENT MECHANICAL VALVE), TVR, CABG X1, AMARIS    Medications prior to admission:   Prior to Admission medications    Medication Sig Start Date End Date Taking? Authorizing Provider   cloNIDine (CATAPRES) 0.2 MG tablet Take 1 tablet by mouth every 8 hours 18  Yes Symone Duffy MD   clopidogrel (PLAVIX) 75 MG tablet Take 1 tablet by mouth daily 18  Yes Symone Duffy MD   aspirin 81 MG chewable tablet Take 1 tablet by mouth daily 18  Yes Symone Duffy MD   insulin glargine (BASAGLAR KWIKPEN) 100 UNIT/ML injection pen Inject 18 units in the morning and 18 units in the evening 18  Yes Symone Duffy MD   isosorbide mononitrate (IMDUR) 60 MG extended release tablet Take 1 tablet by mouth daily 18  Yes Symone Duffy MD   nitroGLYCERIN (NITROSTAT) 0.4 MG SL tablet Place 1 tablet under the tongue every 5 minutes as needed for Chest pain up to max of 3 total doses. If no relief after 1 dose, call 911. 18  Yes Symone Duffy MD   insulin lispro (HUMALOG KWIKPEN) 100 UNIT/ML pen Inject 3 Units into the skin 3 times daily (before meals) Sliding scale:  -199 = 2 units  -249 = 4 units  -299 = 6 units  -400= 10 units  BS >400 = 12 units 18  Yes Symone Duffy MD   gabapentin (NEURONTIN) 100 MG capsule Take 100 mg by mouth 2 times daily. .   Yes Historical Provider, MD   calcitRIOL (ROCALTROL) 0.5 MCG capsule Take 0.5 mcg by mouth three times a week    Yes Historical Provider, MD   calcium acetate (ELIPHOS) 667 MG TABS Take 2 tablets by mouth 3 times daily (with meals)    Yes Historical Provider, MD   vitamin D (ERGOCALCIFEROL) 64937 units CAPS capsule Take 50,000 Units by mouth once a week   Yes Historical Provider, MD   cetirizine (ZYRTEC) 10 MG tablet Take 10 mg by mouth daily as needed for Allergies   Yes Historical Provider, MD   darbepoetin mirna-polysorbate (ARANESP) 60 MCG/0.3ML SOSY injection Inject 60 mcg into the skin every 14 days Per clinic 9/28/17  Yes Historical Provider, MD   carvedilol (COREG) 25 MG tablet Take 2 tablets by mouth 2 times daily 6/14/17  Yes Maritza Becker MD   sirolimus (RAPAMUNE) 1 MG tablet Take 4 mg by mouth daily    Yes Historical Provider, MD   simvastatin (ZOCOR) 20 MG tablet Take 1 tablet by mouth every evening 2/9/17  Yes Bella Barrios, DO   Mycophenolate Sodium 360 MG TBEC Take 1 tablet by mouth 2 times daily 1/14/17  Yes Bella Barrios, DO   methylphenidate (RITALIN) 10 MG tablet Take 1 tablet by mouth 2 times daily 10/21/16  Yes Trish Sacks, MD   Multiple Vitamins-Minerals (MENS MULTI VITAMIN & MINERAL PO) Take 1 tablet by mouth daily   Yes Historical Provider, MD   glucose blood VI test strips (ONETOUCH VERIO) strip 1 each by In Vitro route 4 times daily As needed.  12/30/15  Yes Adam Jones DO   latanoprost (XALATAN) 0.005 % ophthalmic solution Place 1 drop into both eyes nightly 11/13/15  Yes MD LOUISE Concepcion Complex-C-Folic Acid (VOL-CARE RX) 1 MG TABS Take by mouth 3 times daily    Historical Provider, MD   B-D ULTRAFINE III SHORT PEN 31G X 8 MM MISC Seven or eight 7/7/16   Historical Provider, MD   Insulin Pen Needle 33G X 5 MM MISC Indications: E10.21 Use with Insulin pens 7/5/16   Gavino Mac MD   ONE TOUCH LANCETS MISC 1 each by Does not apply route 4 times daily 12/30/15   Adam Jones DO       Current medications:    Current Facility-Administered Medications   Medication Dose Route Frequency Provider Last Rate Last Dose    0.9 % sodium chloride bolus  250 mL Intravenous Once Aditi Will MD 20 mL/hr at 09/04/18 0149 250 mL at 09/04/18 0149    insulin regular (HUMULIN R;NOVOLIN R) injection 0-10 Units  0-10 Units Intravenous PRN Wolfgang Dalal MD        dextrose 50 % solution 12.5 g  12.5 g Intravenous PRN Wolfgang Dalal MD        insulin regular (HUMULIN R;NOVOLIN R) 100 Units in sodium chloride 0.9 % 100 mL infusion  0.5 Units/hr Intravenous Continuous Wolfgang Dalal MD 0.5 mL/hr at 09/04/18 0659 0.5 Units/hr at 09/04/18 0659    0.9 % sodium chloride bolus  250 mL Intravenous Once Lolis Wang MD        cloNIDine (CATAPRES) tablet 0.3 mg  0.3 mg Oral Q8H Shawna Glaser MD   0.3 mg at 09/03/18 2222    tirofiban (AGGRASTAT) 50 mcg/mL infusion  0.075 mcg/kg/min Intravenous Continuous Bella Valencia MD   Stopped at 09/04/18 0057    vancomycin 1000 mg IVPB in 250 mL D5W addavial  1,000 mg Intravenous On Call to [address] [place] [address] [place], MD        ceFAZolin (ANCEF) 2 g in dextrose 5 % 50 mL IVPB  2 g Intravenous On Call to [address] [place] [address] [place], MD        insulin glargine (LANTUS) injection vial 18 Units  18 Units Subcutaneous Nightly Carmen MD Genaro   18 Units at 09/03/18 2056    chlorhexidine (HIBICLENS) 4 % liquid   Topical See Admin Instructions Romelia Mcghee MD        pantoprazole (PROTONIX) 80 mg in sodium chloride 0.9 % 100 mL infusion  8 mg/hr Intravenous Continuous Sue Acosta MD 10 mL/hr at 09/04/18 0556 8 mg/hr at 09/04/18 0556    melatonin tablet 3 mg  3 mg Oral Nightly PRN Dillon Sanchez DO   3 mg at 09/03/18 2100    insulin lispro (HUMALOG) injection vial 0-18 Units  0-18 Units Subcutaneous TID  Dillon Sanchez DO   2 Units at 09/03/18 1643    nitroGLYCERIN (NITROSTAT) SL tablet 0.4 mg  0.4 mg Sublingual Q5 Min PRN Sue Acosta MD        heparin 25,000 units in dextrose 5% 250 mL infusion  12 Units/kg/hr Intravenous Continuous Romelia Mcghee MD   Stopped at 09/04/18 0057    phenylephrine (REJI-SYNEPHRINE) 1 % nasal spray 1 spray  1 spray Each Nare Once ADRIAN Rey - POPPY        calcitRIOL (ROCALTROL) capsule 0.5 mcg  0.5 mcg Oral Once per day on Mon Wed Fri Dawhernando Allen Damion, DO   0.5 mcg at 09/03/18 1244    calcium acetate (PHOSLO) capsule 1,334 mg  1,334 mg Oral TID WC Frederick Taylor DO   1,334 mg at 09/03/18 1644    carvedilol (COREG) tablet 50 mg  50 mg Oral BID Frederick Taylor, DO   50 mg at 09/03/18 2055    cetirizine (ZYRTEC) tablet 10 mg  10 mg Oral Daily PRN Frederick Taylor, DO   10 mg at 08/31/18 0308    gabapentin (NEURONTIN) capsule 100 mg  100 mg Oral BID Frederick Taylor, DO   100 mg at 09/03/18 2055    isosorbide mononitrate (IMDUR) extended release tablet 60 mg  60 mg Oral Daily Frederick Taylor, DO   60 mg at 09/03/18 1243    latanoprost (XALATAN) 0.005 % ophthalmic solution 1 drop  1 drop Both Eyes Nightly Frederick Taylor DO   1 drop at 09/03/18 2055    methylphenidate (RITALIN) tablet 10 mg  10 mg Oral BID Frederick Taylor, DO   10 mg at 09/03/18 1243    therapeutic multivitamin-minerals 1 tablet  1 tablet Oral Daily Frederick Taylor, DO   1 tablet at 09/03/18 1243    simvastatin (ZOCOR) tablet 20 mg  20 mg Oral QPM Frederick Taylor, DO   20 mg at 09/03/18 2055    vitamin D (ERGOCALCIFEROL) capsule 50,000 Units  50,000 Units Oral Weekly Frederick Taylor, DO   50,000 Units at 09/03/18 1243    sodium chloride flush 0.9 % injection 10 mL  10 mL Intravenous 2 times per day Frederick Taylor DO   10 mL at 09/03/18 2215    sodium chloride flush 0.9 % injection 10 mL  10 mL Intravenous PRN Frederick Taylor DO        magnesium hydroxide (MILK OF MAGNESIA) 400 MG/5ML suspension 30 mL  30 mL Oral Daily PRN Frederick Taylor DO        ipratropium-albuterol (DUONEB) nebulizer solution 1 ampule  1 ampule Inhalation 4x daily Frederick Taylor DO   1 ampule at 09/03/18 1603    glucose (GLUTOSE) 40 % oral gel 15 g  15 g Oral PRN Frederick Taylor DO        dextrose 50 % solution 12.5 g  12.5 g Intravenous PRN Frederick Taylor DO        glucagon (rDNA) injection 1 mg  1 mg Intramuscular PRN Frederick Taylor DO        dextrose 5 % solution  100 mL/hr Intravenous PRN Brennon Hoffman DO        ondansetron [facility] tablet 4 mg  4 mg Oral Q6H PRN Brennon Hoffman DO           Allergies: Allergies   Allergen Reactions    Ace Inhibitors Swelling and Anaphylaxis     Facial and tongue swelling  Facial and tongue swelling  Causes acute angioedema of the neck and throat.     Bactrim [Sulfamethoxazole-Trimethoprim] Swelling     Acute angioedema with throat swelling  Gregorio    Hydralazine Swelling     Facial and tongue swelling  Facial and tongue swelling    Lisinopril Anaphylaxis     Life-Threatening   Life-Threatening     Losartan Swelling     angioedema    Amlodipine Swelling     Facial swelling (possibly unrelated to rx)  Facial swelling (possibly unrelated to rx)    Morphine Itching     TransChart documentation: unspecified -reaction type/severity  TransChart documentation: unspecified -reaction type/severity    Lipitor [Atorvastatin] Swelling     Reports swelling; taking ACE at the time  Reports swelling; taking ACE at the time  Face, lip and tongue swelling       Problem List:    Patient Active Problem List   Diagnosis Code    Systolic congestive heart failure (HCC) I50.20    Heart disease I51.9    Heart valve disease I38    Controlled type 1 diabetes mellitus with stage 5 chronic kidney disease not on chronic dialysis ([facility]) E10.22, N18.5    HIGH CHOLESTEROL     GERD (gastroesophageal reflux disease) K21.9    Anemia D64.9    Acute kidney injury ([facility] [address] Y51.6    Metabolic acidosis U87.2    Diabetic nephropathy associated with type 1 diabetes mellitus ([facility] [address] E10.21    ESRD (end stage renal disease) ([facility] [address] N18.6    Hypersomnolence disorder G47.10    History of kidney transplant Z94.0    Uncontrolled hypertension I10    Hyponatremia E87.1    Narcolepsy G47.419    Hyperglycemia R73.9    Diabetic ketoacidosis without coma associated with type 1 diabetes mellitus (HCC) E10.10    Gastroesophageal reflux disease without esophagitis K21.9    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 3XXA, T46.4X5A    Elevated brain natriuretic peptide (BNP) level R79.89    Dyspnea R06.00    Hypoxemia R09.02    Acute respiratory failure with hypoxia ([facility]) J96.01    Chronic kidney disease, stage 4 (severe) ([facility]) N18.4    Anemia in CKD (chronic kidney disease) N18.9, D63.1    Mitral valve insufficiency I34.0    History of pancreas transplant ([facility] [address] Z94.83    H/O kidney transplant Z94.0    Diabetic ketoacidosis associated with type 1 diabetes mellitus ([facility]) E10.10    Elevated troponin R74.8    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    ESRD on peritoneal dialysis ([facility]) N18.6, Z99.2    Failed kidney transplant T86.12    Type 1 diabetes mellitus with hyperglycemia ([facility]) E10.65    Dehydration E86.0    Diabetic ketoacidosis without coma associated with diabetes mellitus due to underlying condition ([facility]) I67.43    Systolic CHF, acute on chronic ([facility]) I50.23    CKD (chronic kidney disease) stage 5, GFR less than 15 ml/min ([facility]) N18.5    History of renal transplant Z94.0    Cellulitis of face L03. 80    Diabetes mellitus due to underlying condition with hyperosmolarity without coma ([address] E08.00    Immunosuppressed status ([address] D89.9    Type 2 diabetes mellitus with complication, with long-term current use of insulin ([address] E11.8, Z79.4    Encounter for peritoneal dialysis for ESRD ([address] N18.6, Z99.2    Left ventricular systolic dysfunction O27.5    History of ventral hernia Z87.19    S/P repair of recurrent ventral hernia Z98.890, Z87.19    Fluid overload E87.70    Secondary hyperparathyroidism of renal origin ([facility] [address] N25.81    Hyperphosphatemia E83.39    Vitamin D deficiency E55.9    Hyperkalemia E68.2    Systolic CHF, acute on chronic ([facility]) I50.23    Cardiomyopathy ([facility]) I42.9    Opacity of lung on imaging study R91.8    Pulmonary hypertension ([facility]) S54.36    Systolic dysfunction Y78.8    Atypical chest pain R07.89    History of CVA (cerebrovascular accident) Z80.78    History of angioedema Z87.898    Essential hypertension I10    NSTEMI (non-ST elevated myocardial infarction) ([facility]) I21.4    ESRD on hemodialysis ([facility]) N18.6, Z99.2    Shortness of breath at rest R06.02    Atypical chest pain R07.89    Pulmonary hypertension ([facility]) I27.20    Iron deficiency E61.1    Acute nasopharyngitis J00    Pulmonary hypertension due to left ventricular systolic dysfunction ([facility]) I27.22    Severe mitral regurgitation by prior echocardiogram I34.0    Non-rheumatic tricuspid valve insufficiency I36.1    Coronary artery disease involving native coronary artery of native heart without angina pectoris I25.10       Past Medical History:        Diagnosis Date    Anemia     Angiotensin converting enzyme inhibitor-aggravated angioedema 8/12/2016    Cerebral artery occlusion with cerebral infarction ([facility] [address]     CHF (congestive heart failure) ([facility] [address]     Disease of blood and blood forming organ     DM type 1 (diabetes mellitus, type 1) ([facility] [address]     previous diabetes, diag at age of 17,  no tx after transplant in 2008, started on insulins again in august 2015 as pancreas is not functioning well    ESRD (end stage renal disease) ([facility] [address] 12/10/2013    was on dialysis in [id number], before transplant    GERD (gastroesophageal reflux disease)     H/O pancreas transplant ([facility] [address] 5/15/2007    Hemodialysis patient ([facility] [address]     History of blood transfusion     FFP for angioedema    HTN (hypertension)     Hyperlipidemia     Hyperphosphatemia 8/17/2018    Hypersomnolence disorder     Kidney

## 2018-09-04 NOTE — PROGRESS NOTES
mass with removal of PD catheter    PANCREAS SURGERY  2007    transplant    [place] OFFICE/OUTPT VISIT,PROCEDURE ONLY N/A 8/28/2018    TRANSESOPHAGEAL ECHOCARDIOGRAM [facility] [address] N/A 6/29/2018    HERNIA VENTRAL REPAIR,with mesh performed by Yaz Coronado MD at [facility] History:  Family History   Problem Relation Age of Onset    Heart Disease Mother     Diabetes Mother     High Blood Pressure Mother     Heart Disease Father     Diabetes Maternal Aunt     Kidney Disease Maternal Aunt     Diabetes Sister     High Blood Pressure Maternal Grandmother         Social History:  Social History     Social History    Marital status: Single     Spouse name: N/A    Number of children: 3    Years of education: 15     Occupational History    Disabled      Social History Main Topics    Smoking status: Never Smoker    Smokeless tobacco: Never Used    Alcohol use No    Drug use: No    Sexual activity: Not Currently     Other Topics Concern    Not on file     Social History Narrative    No narrative on file        Home Medications:  Prior to Admission medications    Medication Sig Start Date End Date Taking?  Authorizing Provider   cloNIDine (CATAPRES) 0.2 MG tablet Take 1 tablet by mouth every 8 hours 8/24/18  Yes Alyssa Solomon MD   clopidogrel (PLAVIX) 75 MG tablet Take 1 tablet by mouth daily 8/24/18  Yes Alyssa Solomon MD   aspirin 81 MG chewable tablet Take 1 tablet by mouth daily 8/24/18  Yes Alyssa Solomon MD   insulin glargine (BASAGLAR KWIKPEN) 100 UNIT/ML injection pen Inject 18 units in the morning and 18 units in the evening 8/24/18  Yes Alyssa Solomon MD   isosorbide mononitrate (IMDUR) 60 MG extended release tablet Take 1 tablet by mouth daily 8/24/18  Yes Alyssa Soolmon MD   nitroGLYCERIN (NITROSTAT) 0.4 MG SL tablet Place 1 tablet under the tongue every 5 minutes as needed for Chest pain up to max of 3 total doses. If no relief after 1 dose, call 911. 8/24/18  Yes Henry Fontaine MD   insulin lispro (HUMALOG KWIKPEN) 100 UNIT/ML pen Inject 3 Units into the skin 3 times daily (before meals) Sliding scale:  -199 = 2 units  -249 = 4 units  -299 = 6 units  -400= 10 units  BS >400 = 12 units 8/24/18  Yes Henry Fontaine MD   gabapentin (NEURONTIN) 100 MG capsule Take 100 mg by mouth 2 times daily. .   Yes Historical Provider, MD   calcitRIOL (ROCALTROL) 0.5 MCG capsule Take 0.5 mcg by mouth three times a week    Yes Historical Provider, MD   calcium acetate (ELIPHOS) 667 MG TABS Take 2 tablets by mouth 3 times daily (with meals)    Yes Historical Provider, MD   vitamin D (ERGOCALCIFEROL) 81593 units CAPS capsule Take 50,000 Units by mouth once a week   Yes Historical Provider, MD   cetirizine (ZYRTEC) 10 MG tablet Take 10 mg by mouth daily as needed for Allergies   Yes Historical Provider, MD   darbepoetin mirna-polysorbate (ARANESP) 60 MCG/0.3ML SOSY injection Inject 60 mcg into the skin every 14 days Per clinic 9/28/17  Yes Historical Provider, MD   carvedilol (COREG) 25 MG tablet Take 2 tablets by mouth 2 times daily 6/14/17  Yes Sally Aguiar MD   sirolimus (RAPAMUNE) 1 MG tablet Take 4 mg by mouth daily    Yes Historical Provider, MD   simvastatin (ZOCOR) 20 MG tablet Take 1 tablet by mouth every evening 2/9/17  Yes Cleveland Price, DO   Mycophenolate Sodium 360 MG TBEC Take 1 tablet by mouth 2 times daily 1/14/17  Yes Cleveland Price, DO   methylphenidate (RITALIN) 10 MG tablet Take 1 tablet by mouth 2 times daily 10/21/16  Yes Yulisa Melendez MD   Multiple Vitamins-Minerals (MENS MULTI VITAMIN & MINERAL PO) Take 1 tablet by mouth daily   Yes Historical Provider, MD   glucose blood VI test strips (ONETOUCH VERIO) strip 1 each by In Vitro route 4 times daily As needed.  12/30/15  Yes Haven Perkins DO   latanoprost (XALATAN) 0.005 % ophthalmic solution Place 1 drop into both eyes nightly 11/13/15  Yes MD LOUISE Concepcion-C-Folic Acid (VOL-CARE RX) 1 MG TABS Take by mouth 3 times daily    Historical Provider, MD EDOUARD ULTRAFINE III SHORT PEN 31G X 8 MM MISC Seven or eight 7/7/16   Historical Provider, MD   Insulin Pen Needle 33G X 5 MM MISC Indications: E10.21 Use with Insulin pens 7/5/16   Gavino Mac MD   ONE TOUCH LANCETS MISC 1 each by Does not apply route 4 times daily 12/30/15   Adam Jones DO        Lab data:  CBC:    Recent Labs      09/02/18   2129   09/04/18   0600  09/04/18   0920   09/04/18   1146  09/04/18   1207  09/04/18   1251   WBC  7.9   --    --    --    --    --    --    --    HGB  7.8*   < >  10.4*  9.6*   < >  7.4*  6.9*  7.3*   PLT  265   --   243  220   --    --   163   --     < > = values in this interval not displayed. CMP:    Recent Labs      09/02/18   0305  09/03/18   0341   09/04/18   1150  09/04/18   1214  09/04/18   1254   NA  137  134*   < >  135*  134*  133*   K  5.3*  6.2*   < >  5.1*  6.0*  4.4   CL  93*  90*   --    --    --    --    CO2  28  24   --    --    --    --    BUN  68*  89*   --    --    --    --    CREATININE  9.8*  11.3*   --    --    --    --    GLUCOSE  81  159*   --    --    --    --    CALCIUM  9.6  9.2   --    --    --    --     < > = values in this interval not displayed. Urine:No results for input(s): COLORU, PHUR, LABCAST, WBCUA, RBCUA, MUCUS, TRICHOMONAS, YEAST, BACTERIA, CLARITYU, SPECGRAV, LEUKOCYTESUR, UROBILINOGEN, BILIRUBINUR, BLOODU in the last 72 hours. Invalid input(s): NITRATE, GLUCOSEUKETONESUAMORPHOUS   Sed Rate: No results for input(s): SEDRATE in the last 72 hours.          Radiology       Review of Systems:  Source of data: patient    CONSTITUTIONAL  Fever: none, Chills: none, Weight loss: none, Malaise: yes, Fatigue: yes, Diaphoresis: none, Weakness: none    SKIN  Rash: none, Itch: none, Open sores: none    HENT:  Headache: none, Hearing loss: none, Tinnitus: none, Ear pain: none, Ear discharge: none,   Nasal bleeding: none, Congestion: none, Stridor: none, Sore throat: none. EYES  Blurred vision: none, Double vision: none, Photophobia: none, Eye pain: none, Eye discharge: none, Eye redness: none. CARDIOVASCULAR  Chest pain: none, Arm pain: none, Palpitations: none, Orthopnea: none, Claudication: none,  Leg swelling: none, PND: none. RESPIRATORY  Cough: none, Hemoptysis: none, Sputum production: none, Shortness of breath: none, Wheezing: none    GASTROINTESTINAL  Heartburn: none, Nausea: none, Vomiting: none, Abdominal pain: none, Diarrhea: none,  Constipation: none, Blood in the stool: none, Melena: none, Rebound: none, Rovsing's: none. GENITOURINARY  Dysuria: none, Pyuria: none, Gross hematuria: none, Urgency: none, Flank pain: none, STD: none. MUSCULOSKELETAL  Myalgia: none, Neck pain: none, Thoracic pain: none, Lumbar pain: none, Joint pain: none, Falls: none    ENDOCRINE/HEMATOLOGY/ALLERGIC  Easy bruising: none, Easy bleeding: none, Environmental allergies: none, Polydipsia: none. NEUROLOGICAL  Dizziness: none, Tingling: none, Numbness: none, Tremor: none, Sensory change: none, Speech change: none, Focal weakness: none, Seizures: none, Loss of consciousness: none,    PSYCHIATRIC  Depression: none, Suicidal ideation: none, Heroin abuse: none, Cocaine abuse: none, Marijuana abuse: none, Hallucinations: none, Anxiety: none, Memory loss: none       Physical Examination:  BP (!) 149/96   Pulse 80   Temp 98.6 °F (37 °C) (Oral)   Resp 18   Ht 5' 9\" (1.753 m)   Wt 177 lb 1.6 oz (80.3 kg)   SpO2 100%   BMI 26.15 kg/m²       General appearance: intubated and sedated. HEENT: Head: Normocephalic, no lesions, without obvious abnormality. Neck: no adenopathy, no carotid bruit and no JVD  Lungs: clear to auscultation bilaterally  Heart: regular rate and rhythm, S1, S2 normal, no murmur, click, rub or gallop  Abdomen: quiet.    Extremities: extremities normal, atraumatic, no

## 2018-09-05 NOTE — PLAN OF CARE
Problem: Nutrition  Goal: Optimal nutrition therapy  Outcome: Ongoing  Nutrition Problem: Inadequate oral intake  Intervention: Food and/or Nutrient Delivery: Continue NPO (if unable to extubate in 24h recommend Nepro goal of 50ml/hour)  Nutritional Goals: adequate nutrient intake in 1-4 days

## 2018-09-05 NOTE — PLAN OF CARE
Problem: Impaired respiratory status  Goal: Normal spontaneous ventilation  Outcome: Ongoing  Pt remains intubated and on ventilator. Current settings are SIMV Vt 430, RR 16, PEEP 5, PS 10. Vt was decreased after ABG showed alkalosis. Pt is not ready for weaning at this time- on balloon pump and CVVH. Tolerating vent settings well.

## 2018-09-05 NOTE — PROGRESS NOTES
Epoprostenol Subsequent Assessment    Epoprostenol  continues to be aerosolized via the Alaris syringe pump to the aerogen nebulizer. Medication syringe did not need changed. Aerogen nebulizer did not need changed. Syringe tubing did not need changed. Medication syringe and tubing are protected from light. Aerogen nebulizer was functioning properly. Filters were changed at this check. No problems seen with expiratory resistance. Extra Solo nebulizer remains at bedside for back-up. Assessment was completed. Every 4 hour Assessment Hemodynamic Measurement if available   Heart Rate 80 beats per minute Mean Pulmonary Artery Pressure  64  mm Hg   Blood Pressure 116/41 mm Hg Cardiac Output  3.71  l/min   PaO2/ FiO2 ratio 353 ratio Cardiac Index 1.89  l/min/m2    AutoPeep 0 cmH2O Central Venous Pressure (CVP) 19 mm Hg     Notify pharmacy for new syringe on Date: 9/5 Time: 0800, 2 hour prior to medication running out or room air expiration. Syringe will need changed on Date: 9/5/18 Time: [place] will need changed on Date: 9/11/18 Time: 1720 , 7 days after initiation or last change. Aerogen tubing will need changed on  Date: 9/8/2018 Time: 1720 , 96 hours after initiation or last change. Epoprostenol is notbeing weaned. Rate of administration is 7.07 ml/hour.

## 2018-09-05 NOTE — PROGRESS NOTES
1600  Patient does not open eyes. Not moving extremities spontaneously. Weak cough when suction via ETT. Respirations easy, unlabored with vent assist.  Mary Carmen hugger warming blanket remains on. CVVH continues. Pacer on DDD rate 80.      1655  Dr. Michela Medina updated via phone. Order received. Dr. Breann Schneider updated. Reviewed labs.

## 2018-09-05 NOTE — FLOWSHEET NOTE
09/05/18 0940   Encounter Summary   Services provided to: Patient and family together   Referral/Consult From: [facility]   Continue Visiting Yes  (9/5/18)   Complexity of Encounter Moderate   Length of Encounter 15 minutes   Spiritual/Bahai   Type Spiritual support   Assessment Tearful; Anxious   Intervention Nurtured hope;Prayer   Outcome Comfort   S- During my contact with the patient and the family, I wanted to assess what their       spiritual and emotional needs were. O- The pt was unresponsive in bed. The pt's children were tearfully present at the bedside. I offered a prayer        of comfort, healing and peace to the pt's children. A-  I gave the pt's son my contact information per request and he shared that the prayer made him feel much         better. P-  Continued support would be helpful in order to meet the future Spiritual needs of the         patient.

## 2018-09-05 NOTE — FLOWSHEET NOTE
[facility] THERAPY MISSED TREATMENT NOTE  [facility] ICU 4D  4D-03/003-A      Date: 2018  Patient Name: Dania Nascimento        CSN: [id number]   : 1975  (41 y.o.)  Gender: male                REASON FOR MISSED TREATMENT:  Missed Treat. Pt is pod 1 CABG still on vent. Will defer & await new orders when medically stable.

## 2018-09-05 NOTE — PROGRESS NOTES
[address]  PHYSICAL THERAPY MISSED TREATMENT NOTE  ACUTE CARE    Date: 2018  Patient Name: Tevin Paula        MRN: [id number]   : 1975  (41 y.o.)  Gender: male                REASON FOR MISSED TREATMENT:  Missed Treat. Pt is pod 1 CABG still on vent. Will defer & await new orders when medically stable.

## 2018-09-05 NOTE — PROGRESS NOTES
Nutrition Assessment    Type and Reason for Visit: Reassess, Consult (diet education per OHS protocol)    Nutrition Recommendations: If unable to extubate in 24h recommend Nepro goal of 50ml/hour. If extubated will educate as appropriate. Malnutrition Assessment:  · Malnutrition Status: At risk for malnutrition    Nutrition Diagnosis:   · Problem: Inadequate oral intake  · Etiology: related to Impaired respiratory function-inability to consume food     Signs and symptoms:  as evidenced by NPO status due to medical condition, Intubation    Nutrition Assessment:  · Subjective Assessment: Pt. s/p OHS; intubated and on balloon pump yet with CVVH; BM x1 9/1; meds include ATB,folbee plus; glucose 117, BUN 60, creatinine 7.7, K+ 6.3; discussed on rounds this am - no plan for TF today - recs in for MD once more stable  · Wound Type: Surgical Wound (MVR and CABG x1 9/4)  · Current Nutrition Therapies:  · Oral Diet Orders: NPO   · Anthropometric Measures:  · Ht: 5' 9\" (175.3 cm)   · Current Body Wt: 184 lb (83.5 kg) (9/5 with trace edema)  · Admission Body Wt: 184 lb 4.9 oz (83.6 kg) ((8/26))  · Usual Body Wt:  ((8/17/18):173#4. 5oz, (6/29/18) 176# 2.4oz, (6/12/18) 181#)  · % Weight Change: 3% wt. gain,  14 days (suspect fluid shifts/  HD pt. )  · Ideal Body Wt: 160 lb (72.6 kg),   · BMI Classification: BMI 25.0 - 29.9 Overweight (27.3)  · Comparative Standards (Estimated Nutrition Needs):  · Estimated Daily Total Kcal: 2100 (25/kgm actual wt. of 84kgm)  · Estimated Daily Protein (g): 101-118 grams (1.2-1.4/kgm actual wt. of 84kgm)    Estimated Intake vs Estimated Needs: Intake Less Than Needs    Nutrition Risk Level: High    Nutrition Interventions:   Continue NPO (if unable to extubate in 24h recommend Nepro goal of 50ml/hour)  Continued Inpatient Monitoring, Education not appropriate at this time, Coordination of Care    Nutrition Evaluation:   · Evaluation: No progress toward goals   · Goals: adequate nutrient

## 2018-09-05 NOTE — PROGRESS NOTES
0 Updated Dr. Nickolas Webb on patients hemodynamics. CO 3.27, CI 1.67, CVP 16, arterial BP 88/49 (61). Updated on temp of 38.5. New orders received to stop milrinone, no further orders.      2311: perfect served sent to dr. Nickolas Webb: \"In to assess patient and pt. was unresponsive to painful stimuli, was following simple commands before. Check blood sugar and ABG, blood sugar was 71. His ABG was 7.51, 19, 138, 16, -6.1, and 99%. Respiratory asked if they could decrease TV to 425. Ran a set of numbers and CI is now 1.39, Co 2.73, BP is 85/52 (60) with EPI up to 17 mcg/min. Patient moves all extremities with cough, but remains unresponsive to pain. \"  Cleveland Holstein gladis rn at bedside to assist.      2334: Nickolas Webb called back, new orders for stat echo and a mixed venous blood gas from the distal port of the swan. States he will be in shortly. 2340: dr. Miguel Tinoco called to get supplies ready for balloon pump. Radha haines rn, conor rn, Alden Mosqueda educator rn and riley ccu rn at bedside to assist.      2354: dr. Miguel Tinoco on floor. Dr. Miguel Tinoco wants stat echo. Dr. Miguel Tinoco shown swan venous gas. 2357: 12.5 g albumin given 999ml/hr per dr. Pilar Bosch orders    0003: 12.5 g albumin given 999mls/hr per dr. Miguel Tinoco orders. 0009: dr. Miguel Tinoco preparing to insert balloon cath. 0011: riley rn left message for stat echo, multiple staff members attempted to contact echo unsuccessfully. 0013: riley ccu rn set up balloon pump machine, helium tank full. 0014: echo tech returned page and ETA 15-20minutes. 0015: first bottle of albumin completed. 0019: second bottle of albumin completed. 0021: stat cxr for placement called. Dr. Miguel Tinoco complete on inserting balloon pump cath. 0025: radiology at bedside for cxr.     0039: dr. Miguel Tinoco sterile suctioning all chest tubes. Echo tech at bedside. Dr. Miguel Tinoco wants to look for tamponade. 56: dr. Miguel Tinoco stated pt is not a LVAD condidate at this time.   Possibly wants to start

## 2018-09-05 NOTE — FLOWSHEET NOTE
Arterial blood pressure dropping. IABP alarming low augmentation. Epi drip increased. AV milliamps increased to 20. Dr. Ugalde Cons notified.

## 2018-09-05 NOTE — PROGRESS NOTES
1923---Aerogen nebulizer function checked. Nebulizer is in upright position and aerosolization present. Rate of administration of Epoprostenol is 7.07 ml/hour. No issues seen with administration. Back-up Aerogen Solo nebulizer remains at bedside. 2120---Epoprostenol Subsequent Assessment    Aerogen nebulizer function checked. Nebulizer is in upright position and aerosolization present. Rate of administration of Epoprostenol is 7.07 ml/hour. No issues seen with administration. Back-up Aerogen Solo nebulizer remains at bedside. Epoprostenol  continues to be aerosolized via the Alaris syringe pump to the aerogen nebulizer. Medication syringe did not need changed. Aerogen nebulizer did not need changed. Syringe tubing did not need changed. Medication syringe and tubing are protected from light. Aerogen nebulizer was functioning properly. Filters were changed at this check. No problems seen with expiratory resistance. Extra Solo nebulizer remains at bedside for back-up. Assessment was completed. Every 4 hour Assessment Hemodynamic Measurement if available   Heart Rate 91 beats per minute Mean Pulmonary Artery Pressure  31  mm Hg   Blood Pressure 91/50 mm Hg Cardiac Output  3.4  l/min   PaO2/ FiO2 ratio NA ratio Cardiac Index 1.74  l/min/m2    AutoPeep 0.3 cmH2O Central Venous Pressure (CVP) 17 mm Hg     Notify pharmacy for new syringe on Date: 9/4 Time: 9946, 2 hour prior to medication running out or room air expiration. Syringe will need changed on Date: 9/5 Time: [address] will need changed on Date: 9/11 Time: 1720 , 7 days after initiation or last change.     Aerogen tubing will be changed on Date: 9/8 Time: 1720 , 96 hours after initiation or last change. 2349---Epoprostenol Subsequent Assessment    Aerogen nebulizer function checked. Nebulizer is in upright position and aerosolization present. Rate of administration of Epoprostenol is 7.07 ml/hour.  No issues seen with

## 2018-09-05 NOTE — PROGRESS NOTES
0 Updated Dr. Yoandy Horn on patients hemodynamics. CO 3.27, CI 1.67, CVP 16, arterial BP 88/49 (61). Updated on temp of 38.5. New orders received to stop milrinone, no further orders.

## 2018-09-05 NOTE — PROGRESS NOTES
[facility]    [facility]  [address]  [place], [address]  [address] [place] [facility] Progress Note      9/5/2018 10:03 AM         Pt Name:    Charolette Homans  MRN:     [id number]   [id number]  YOB: 1975  Admit Date:    8/26/2018  6:29 AM  Primary Care Physician:  No primary care provider on file. Room Number:  4D-03/003-A   Reason for Consult:  Management of hemodialysis  Requesting Providor: Dr. Lex Lindsay  Primary Nephrologist: Dr. Maria Ines Schumacher    ### ISOLATION ###:   none     Admit Chief Complaint: shortness of breath     History of Chief Complaint:   The patient is a 41y.o. year old black male who recieves hemodialysis under the care of Dr. Maria Ines Schumacher to treat ESRD from DM and failed kidney transplant. He was just discharge 08/24/18 after being evaluated for shortness of breath. He has been short of breath for many weeks. His has not skipped dialysis but on occasion will shorten his treatments which makes it hard to remove enough fluid to prevent fluid overload. Last echocardiogram showed severe mitral and moderate tricuspid regurgitation. Nephrology is following the patient for: ESRD requiring hemodialysis     24 hour summary:   The patient remains in ICU intubated. He does not respond. He remains on epinephrine at 14 mcg/Kg/min and vasopressin 0.4 mcg/min. His cardiac output dropped last night requiring insertion of balloon pump. He was started on CVVH to provide better management of electrolytes. His WBC count improved nicely.       Baseline eGFR Hemodialysis patient   Admit eGFR eGFR < 15 ml/min   Current eGFR        eGFR trend    Lab Results   Component Value Date    LABGLOM 11 09/05/2018    LABGLOM 9 09/05/2018    LABGLOM 9 09/04/2018    LABGLOM 6 09/03/2018    LABGLOM 7 09/02/2018    LABGLOM 10 09/01/2018    LABGLOM 10 08/31/2018    LABGLOM 6 08/27/2018    LABGLOM 7 08/26/2018    LABGLOM 7 08/24/2018    LABGLOM 10 08/23/2018    LABGLOM 7 08/22/2018 Compliance with treatment plan: 50%     Allergies and Intolerances: ALLERGIES: Ace inhibitors; Bactrim [sulfamethoxazole-trimethoprim]; Hydralazine; Lisinopril;  Losartan; Amlodipine; Morphine; and Lipitor [atorvastatin]  MEDICATION INTOLERANCES: none  FOOD ALLERGIES: none  INSECT ALLERGIES: none  PLANT ALLERGIES: none     Past Medical History:  Past Medical History:   Diagnosis Date    Anemia     Angiotensin converting enzyme inhibitor-aggravated angioedema 8/12/2016    [facility] with [facility]     CHF (congestive heart failure) ([facility]     Disease of blood and blood forming organ     DM type 1 (diabetes mellitus, type 1) ([facility]     previous diabetes, diag at age of 17,  no tx after transplant in 2008, started on insulins again in august 2015 as pancreas is not functioning well    ESRD (end stage renal disease) ([facility] 12/10/2013    was on dialysis in [id number], before transplant    GERD (gastroesophageal reflux disease)     H/O pancreas transplant ([facility] 5/15/2007    Hemodialysis patient ([facility]     History of blood transfusion     FFP for angioedema    HTN (hypertension)     Hyperlipidemia     Hyperphosphatemia 8/17/2018    Hypersomnolence disorder     Kidney transplant status, living unrelated donor 5/7/2006    Leaky heart valve     Metabolic acidosis     Narcolepsy     Nephropathy, diabetic ([facility]     Secondary hyperparathyroidism of renal origin ([facility] 8/17/2018    Seizures ([facility]     Vitamin D deficiency 8/17/2018        Past Surgical History:  Past Surgical History:   Procedure Laterality Date    EYE SURGERY      vitrectomy, in both eyes, bleeding from heparin while on dialysis    KIDNEY TRANSPLANT  2006    OTHER SURGICAL HISTORY  04/15/2005    Dr Pimentel Lose CAPD cath    OTHER SURGICAL HISTORY  06/07/2005    Dr Muir Peeling with lysis of adhesion and manipulatin of CAPD cath    OTHER SURGICAL HISTORY  06/23/2005    Dr Ksenia Araya left CAPD cath, placement of right CAPD cath    OTHER SURGICAL HISTORY  10/03/2005    Dr Stephen-Excision of 5cm chest wall mass with intermediate closure, Excision of 5cm abdominal wall mass with removal of PD catheter    PANCREAS SURGERY  2007    transplant    CT OFFICE/OUTPT VISIT,PROCEDURE ONLY N/A 8/28/2018    TRANSESOPHAGEAL ECHOCARDIOGRAM performed by Micheal Parsons MD at [facility] OFFICE/OUTPT [facility] N/A 9/4/2018    MVR (REPAIR VS REPLACEMENT MECHANICAL VALVE), TVR, CABG X1, AMARIS performed by John Odonnell MD at [address] N/A 6/29/2018    HERNIA VENTRAL REPAIR,with mesh performed by Javier Oquendo MD at [facility] History:  Family History   Problem Relation Age of Onset    Heart Disease Mother     Diabetes Mother     High Blood Pressure Mother     Heart Disease Father     Diabetes Maternal Aunt     Kidney Disease Maternal Aunt     Diabetes Sister     High Blood Pressure Maternal Grandmother         Social History:  Social History     Social History    Marital status: Single     Spouse name: N/A    Number of children: 3    Years of education: 15     Occupational History    Disabled      Social History Main Topics    Smoking status: Never Smoker    Smokeless tobacco: Never Used    Alcohol use No    Drug use: No    Sexual activity: Not Currently     Other Topics Concern    Not on file     Social History Narrative    No narrative on file        Home Medications:  Prior to Admission medications    Medication Sig Start Date End Date Taking?  Authorizing Provider   cloNIDine (CATAPRES) 0.2 MG tablet Take 1 tablet by mouth every 8 hours 8/24/18  Yes Edison Veloz MD   clopidogrel (PLAVIX) 75 MG tablet Take 1 tablet by mouth daily 8/24/18  Yes Edison Veloz MD   aspirin 81 MG chewable tablet Take 1 tablet by mouth daily 8/24/18  Yes Edison Veloz MD   insulin glargine (BASAGLAR KWIKPEN) 100 UNIT/ML injection pen Inject 18 units in the morning and 18 units in the evening 8/24/18  Yes Eun Leblanc MD   isosorbide mononitrate (IMDUR) 60 MG extended release tablet Take 1 tablet by mouth daily 8/24/18  Yes Eun Leblanc MD   nitroGLYCERIN (NITROSTAT) 0.4 MG SL tablet Place 1 tablet under the tongue every 5 minutes as needed for Chest pain up to max of 3 total doses. If no relief after 1 dose, call 911. 8/24/18  Yes Eun Leblanc MD   insulin lispro (HUMALOG KWIKPEN) 100 UNIT/ML pen Inject 3 Units into the skin 3 times daily (before meals) Sliding scale:  -199 = 2 units  -249 = 4 units  -299 = 6 units  -400= 10 units  BS >400 = 12 units 8/24/18  Yes Eun Leblanc MD   gabapentin (NEURONTIN) 100 MG capsule Take 100 mg by mouth 2 times daily. .   Yes Historical Provider, MD   calcitRIOL (ROCALTROL) 0.5 MCG capsule Take 0.5 mcg by mouth three times a week    Yes Historical Provider, MD   calcium acetate (ELIPHOS) 667 MG TABS Take 2 tablets by mouth 3 times daily (with meals)    Yes Historical Provider, MD   vitamin D (ERGOCALCIFEROL) 65254 units CAPS capsule Take 50,000 Units by mouth once a week   Yes Historical Provider, MD   cetirizine (ZYRTEC) 10 MG tablet Take 10 mg by mouth daily as needed for Allergies   Yes Historical Provider, MD   darbepoetin mirna-polysorbate (ARANESP) 60 MCG/0.3ML SOSY injection Inject 60 mcg into the skin every 14 days Per clinic 9/28/17  Yes Historical Provider, MD   carvedilol (COREG) 25 MG tablet Take 2 tablets by mouth 2 times daily 6/14/17  Yes Kassidy Maldonado MD   sirolimus (RAPAMUNE) 1 MG tablet Take 4 mg by mouth daily    Yes Historical Provider, MD   simvastatin (ZOCOR) 20 MG tablet Take 1 tablet by mouth every evening 2/9/17  Yes Myla Breeding, DO   Mycophenolate Sodium 360 MG TBEC Take 1 tablet by mouth 2 times daily 1/14/17  Yes Myla Breeding, DO   methylphenidate (RITALIN) 10 MG tablet Take 1 tablet by mouth 2 times daily 10/21/16  Yes Nolberto Caro MD Multiple Vitamins-Minerals (MENS MULTI VITAMIN & MINERAL PO) Take 1 tablet by mouth daily   Yes Historical Provider, MD   glucose blood VI test strips (ONETOUCH VERIO) strip 1 each by In Vitro route 4 times daily As needed. 12/30/15  Yes Rere Chavez DO   latanoprost (XALATAN) 0.005 % ophthalmic solution Place 1 drop into both eyes nightly 11/13/15  Yes MD LOUISE Castañeda-C-Folic Acid (VOL-CARE RX) 1 MG TABS Take by mouth 3 times daily    Historical Provider, MD GIL-NATO ULTRAFINE III SHORT PEN 31G X 8 MM MISC Seven or eight 7/7/16   Historical Provider, MD   Insulin Pen Needle 33G X 5 MM MISC Indications: E10.21 Use with Insulin pens 7/5/16   Azeb June MD   ONE TOUCH LANCETS MISC 1 each by Does not apply route 4 times daily 12/30/15   Rere Chavez DO        Lab data:  CBC:    Recent Labs      09/04/18 2000 09/05/18   0400  09/05/18   0820   WBC  2.1*  8.8  14.2*   HGB  8.9*  8.9*  9.0*   PLT  124*  83*  72*     CMP:    Recent Labs      09/04/18   1425  09/04/18 2000 09/05/18   0400  09/05/18   0820   NA  137   --   134*  136   K  4.4  5.3*  6.3*  5.5*   CL  96*   --   95*  97*   CO2  21*   --   16*  15*   BUN  57*   --   60*  50*   CREATININE  8.4*   --   7.7*  6.8*   GLUCOSE  116*   --   117*  112*   CALCIUM  8.4*   --   8.4*  8.5     Urine:No results for input(s): COLORU, PHUR, LABCAST, WBCUA, RBCUA, MUCUS, TRICHOMONAS, YEAST, BACTERIA, CLARITYU, SPECGRAV, LEUKOCYTESUR, UROBILINOGEN, BILIRUBINUR, BLOODU in the last 72 hours. Invalid input(s): NITRATE, GLUCOSEUKETONESUAMORPHOUS   Sed Rate: No results for input(s): SEDRATE in the last 72 hours.          Radiology       Review of Systems:  Source of data: patient    CONSTITUTIONAL  Fever: none, Chills: none, Weight loss: none, Malaise: yes, Fatigue: yes, Diaphoresis: none, Weakness: none    SKIN  Rash: none, Itch: none, Open sores: none    HENT:  Headache: none, Hearing loss: none, Tinnitus: none, Ear pain: none, Ear discharge:

## 2018-09-05 NOTE — PROGRESS NOTES
Epoprostenol Subsequent Assessment    Epoprostenol  continues to be aerosolized via the Alaris syringe pump to the aerogen nebulizer. Medication syringe did not need changed. Aerogen nebulizer did not need changed. Syringe tubing did not need changed. Medication syringe and tubing are protected from light. Aerogen nebulizer was functioning properly. Filters were not changed at this check. No problems seen with expiratory resistance. Extra Solo nebulizer remains at bedside for back-up. Assessment was completed. Every 4 hour Assessment Hemodynamic Measurement if available   Heart Rate 80 beats per minute Mean Pulmonary Artery Pressure  N/A  mm Hg   Blood Pressure 90/36 mm Hg Cardiac Output  N/A  l/min   PaO2/ FiO2 ratio N/A ratio Cardiac Index N/A  l/min/m2    AutoPeep N/A cmH2O Central Venous Pressure (CVP) N/A mm Hg     Notify pharmacy for new syringe on Date: 9/5/18 Time: 1830, 2 hour prior to medication running out or room air expiration. Syringe will need changed on Date: 9/5/18 Time: 2030    Aerogen Solo Nebulizer will need changed on Date: 9/11/18 Time: 1720 , 7 days after initiation or last change. Aerogen tubing will need changed on  Date: 9/8/18 Time: 1720 , 96 hours after initiation or last change. Epoprostenol is notbeing weaned. Rate of administration is 7.07 ml/hour.

## 2018-09-05 NOTE — PLAN OF CARE
Problem: Daily Care:  Goal: Daily care needs are met  Daily care needs are met   Outcome: Ongoing  Pt intubated. Turned Q2H and PRN. Oral care done. Back rub with lotion with turns. Passive ROM. Problem: Pain:  Goal: Patient's pain/discomfort is manageable  Patient's pain/discomfort is manageable   Outcome: Ongoing  Medicated with morphine PRN when restless and facial grimacing. Problem: Skin Integrity:  Goal: Skin integrity will stabilize  Skin integrity will stabilize   Outcome: Ongoing  No skin breakdown noted. Turned Q2H and PRN. Scaral pad to coccyx. Pillows under elbows and heels    Problem: Discharge Planning:  Goal: Patients continuum of care needs are met  Patients continuum of care needs are met   Outcome: Ongoing      Problem: Serum Glucose Level - Abnormal:  Goal: Ability to maintain appropriate glucose levels will improve to within specified parameters  Ability to maintain appropriate glucose levels will improve to within specified parameters   Outcome: Ongoing  Blood sugar checked 4 times a day and PRN. Insulin per sliding scale. Problem: Bleeding:  Goal: Will show no signs and symptoms of excessive bleeding  Will show no signs and symptoms of excessive bleeding   Outcome: Ongoing  Monitored closely. Minimal output from chest tubes. Problem: Nutrition  Goal: Optimal nutrition therapy  Outcome: Ongoing  NPO at this time. Problem: Falls - Risk of:  Goal: Will remain free from falls  Will remain free from falls   Outcome: Met This Shift  No falls this shift. Falling star posted and fall band on. Goal: Absence of physical injury  Absence of physical injury   Outcome: Met This Shift      Problem: Discharge Planning:  Goal: Discharged to appropriate level of care  Discharged to appropriate level of care   Outcome: Ongoing  Remains in ICU    Problem:  Activity Intolerance:  Goal: Able to perform prescribed physical activity  Able to perform prescribed physical activity   Outcome:

## 2018-09-06 NOTE — PROGRESS NOTES
Epoprostenol Subsequent Assessment    Epoprostenol  continues to be aerosolized via the Alaris syringe pump to the aerogen nebulizer. Medication syringe did need changed. Aerogen nebulizer did not need changed. Syringe tubing did not need changed. Medication syringe and tubing are protected from light. Aerogen nebulizer was functioning properly. Filters were changed at this check. No problems seen with expiratory resistance. Extra Solo nebulizer remains at bedside for back-up. Assessment was completed. Every 4 hour Assessment Hemodynamic Measurement if available   Heart Rate 80 beats per minute Mean Pulmonary Artery Pressure  31  mm Hg   Blood Pressure 75/64 mm Hg Cardiac Output  3.5  l/min   PaO2/ FiO2 ratio N/A ratio Cardiac Index 1.79  l/min/m2    AutoPeep 0 cmH2O Central Venous Pressure (CVP) 24 mm Hg     Notify pharmacy for new syringe on Date: 9/6/18 Time: 1430, 2 hour prior to medication running out or room air expiration. Syringe will need changed on Date: 9/6/18 Time: 1815    [address] will need changed on Date: 9/11/18 Time: 1720 , 7 days after initiation or last change. Aerogen tubing will need changed on  Date: 9/8/18 Time: 1720 , 96 hours after initiation or last change. Epoprostenol is notbeing weaned. Rate of administration is 7.07 ml/hour.

## 2018-09-06 NOTE — PROGRESS NOTES
Epoprostenol Subsequent Assessment    Epoprostenol  continues to be aerosolized via the Alaris syringe pump to the aerogen nebulizer. Medication syringe did need changed. Aerogen nebulizer did not need changed. Syringe tubing did not need changed. Medication syringe and tubing are protected from light. Aerogen nebulizer was functioning properly. Filters were not changed at this check. No problems seen with expiratory resistance. Extra Solo nebulizer remains at bedside for back-up. Assessment was completed. Every 4 hour Assessment Hemodynamic Measurement if available   Heart Rate 80 beats per minute Mean Pulmonary Artery Pressure  N/A  mm Hg   Blood Pressure 109/70 mm Hg Cardiac Output  N/A  l/min   PaO2/ FiO2 ratio N/A ratio Cardiac Index N/A  l/min/m2    AutoPeep N/A cmH2O Central Venous Pressure (CVP) N/A mm Hg     Notify pharmacy for new syringe on Date: 9/6/18 Time: 2311, 2 hour prior to medication running out or room air expiration. Syringe will need changed on Date: 9/7/18 Time: 0111    Aerogen Solo Nebulizer will need changed on Date: 9/11/18 Time: 1720 , 7 days after initiation or last change. Aerogen tubing will need changed on  Date: 9/8/18 Time: 1720 , 96 hours after initiation or last change. Epoprostenol is notbeing weaned. Rate of administration is 7.07 ml/hour.

## 2018-09-06 NOTE — PROGRESS NOTES
rate and rhythm, S1, S2 normal, no murmur, click, rub or gallop  Abdomen: quiet. Extremities: extremities normal, atraumatic, no cyanosis or edema  Neurologic: sedated  PSY: sedated  Skin: Warm and dry. No unusual lesions or rashes noted. Muscles: unable to assess. Skeletal: No bony or skeletal abnormalities noted. Admission weight: 173 lb (78.5 kg)  Wt Readings from Last 3 Encounters:   09/06/18 190 lb 0.6 oz (86.2 kg)   08/24/18 173 lb 4.5 oz (78.6 kg)   08/07/18 176 lb 1.6 oz (79.9 kg)     Body mass index is 28.06 kg/m². Clinical Impressions:    1. ESRD from DM and failed kidney transplant  2. DM  3. Pulmonary HTN  4. Severe mitral regurgitation  5. Tricuspid regurgitation  6. Anemia from kidney disease  7. Failed kidney transplant (he retains this organ). 8. Failed pancreas transplant (he retains this organ)(transplanted at [place] 2007 in the left iliac fossa)  9. History of seizures  10. History of narcolepsy. 11. History of diabetic retinopathy  12. S/P open heart surgery 09/04/18 with mitral valve repair with 28 mm band and tricuspid repair with 32 mm band and LIMA to LAD. 13. Acute liver injury  14. Leukocytosis. Plan of Care      1. Continue CVVH. 2. Will monitor closely for acute rejection of transplanted organs (check amylase, lipase and C-peptide)  3. Continue CVVH to control electrolytes and remove waste. 4. Continue epogen for anemia  5. Resume Myfortic and rapamune in liquid form, but will reduce the dose  6. Blood cultures  7. IV vancomycin. 8. Cardiac surgeon is inserting Impella. Fabián Perkins DO  [facility].

## 2018-09-06 NOTE — PLAN OF CARE
Problem: Daily Care:  Goal: Daily care needs are met  Daily care needs are met   Outcome: Ongoing  Per RN's     Problem: Pain:  Goal: Patient's pain/discomfort is manageable  Patient's pain/discomfort is manageable   Outcome: Met This Shift      Problem: Skin Integrity:  Goal: Skin integrity will stabilize  Skin integrity will stabilize   Outcome: Met This Shift      Problem: Discharge Planning:  Goal: Patients continuum of care needs are met  Patients continuum of care needs are met   Outcome: Met This Shift      Problem: Impaired respiratory status  Goal: Clear lung sounds  Outcome: Met This Shift      Problem: Serum Glucose Level - Abnormal:  Goal: Ability to maintain appropriate glucose levels will improve to within specified parameters  Ability to maintain appropriate glucose levels will improve to within specified parameters   Outcome: Ongoing  Tx per orders     Problem: Bleeding:  Goal: Will show no signs and symptoms of excessive bleeding  Will show no signs and symptoms of excessive bleeding   Outcome: Ongoing  No outward signs of bleeding HGB low - PRBC's ordered     Problem: Nutrition  Goal: Optimal nutrition therapy  Outcome: Not Met This Shift  No feeding at this time     Problem: Falls - Risk of:  Goal: Will remain free from falls  Will remain free from falls   Outcome: Met This Shift    Goal: Absence of physical injury  Absence of physical injury   Outcome: Met This Shift      Problem: Discharge Planning:  Goal: Discharged to appropriate level of care  Discharged to appropriate level of care   Outcome: Ongoing      Problem:  Activity Intolerance:  Goal: Able to perform prescribed physical activity  Able to perform prescribed physical activity   Outcome: Not Met This Shift  Unresponsive    Goal: Ability to tolerate increased activity will improve  Ability to tolerate increased activity will improve   Outcome: Not Met This Shift  Pt unable to tolerate any activity      Problem: Cardiac Output - Decreased:  Goal: Cardiac output within specified parameters  Cardiac output within specified parameters   Outcome: Not Met This Shift  Impella, pressor, Bicarb & CRRT support ongoing   Goal: Hemodynamic stability will improve  Hemodynamic stability will improve   Outcome: Not Met This Shift  As above      Problem: Fluid Volume - Imbalance:  Goal: Ability to achieve a balanced intake and output will improve  Ability to achieve a balanced intake and output will improve   Outcome: Ongoing  CRRT continues    Goal: Chest tube drainage is within specified parameters  Chest tube drainage is within specified parameters   Outcome: Met This Shift      Problem: Gas Exchange - Impaired:  Goal: Levels of oxygenation will improve  Levels of oxygenation will improve   Outcome: Ongoing  With supportive measures    Goal: Ability to maintain adequate ventilation will improve  Ability to maintain adequate ventilation will improve   Outcome: Ongoing  With supportive measures      Problem: Pain:  Goal: Pain level will decrease  Pain level will decrease   Outcome: Met This Shift    Goal: Control of acute pain  Control of acute pain   Outcome: Met This Shift    Goal: Control of chronic pain  Control of chronic pain   Outcome: Met This Shift      Problem: Risk for Impaired Skin Integrity  Goal: Tissue integrity - skin and mucous membranes  Structural intactness and normal physiological function of skin and  mucous membranes.    Outcome: Met This Shift      Problem: DISCHARGE BARRIERS  Goal: Patient's continuum of care needs are met  Outcome: Met This Shift

## 2018-09-06 NOTE — PROGRESS NOTES
Cardiovascular Surgery Progress Note      Despite modest improvement overnight, patient remains on high doses of chemical support, with progressive metabolic acidosis and elevated liver function tests, consistent with cardiogenic shock due to biventricular failure  Discussed case with Dr. Keegan Metcalf; we agree that the patient's remaining viable option is biventricular Impella support, as temporary (approximately 4-5 days) bridging to myocardial recovery  The plan for right and left Impellas was explained in detail to the patient's mother, Maranda Allison, by telephone, including the risks of hemolysis, infection, bleeding, and failure of myocardial recovery, which would result in non-survival  She expressed understanding of these issues, and consented to proceed

## 2018-09-06 NOTE — PROGRESS NOTES
Aerogen nebulizer function checked. Nebulizer is in upright position and aerosolization present. Rate of administration of Epoprostenol is 7.07 ml/hour. No issues seen with administration. Back-up Aerogen Solo nebulizer remains at bedside.

## 2018-09-06 NOTE — PROGRESS NOTES
Epoprostenol Subsequent Assessment    Epoprostenol  continues to be aerosolized via the Alaris syringe pump to the aerogen nebulizer. Medication syringe did need changed. Aerogen nebulizer did not need changed. Syringe tubing did not need changed. Medication syringe and tubing are protected from light. Aerogen nebulizer was functioning properly. Filters were changed at this check. No problems seen with expiratory resistance. Extra Solo nebulizer remains at bedside for back-up. Assessment was completed. Every 4 hour Assessment Hemodynamic Measurement if available   Heart Rate 80 beats per minute Mean Pulmonary Artery Pressure  N/A  mm Hg   Blood Pressure 90/37 mm Hg Cardiac Output  N/A  l/min   PaO2/ FiO2 ratio N/A ratio Cardiac Index N/A  l/min/m2    AutoPeep 0 cmH2O Central Venous Pressure (CVP) N/A mm Hg     Notify pharmacy for new syringe on Date: 9/6 Time: 0129, 2 hour prior to medication running out or room air expiration. Syringe will need changed on Date: 9/6 Time: [address] will need changed on Date: 9/11 Time: 1720 , 7 days after initiation or last change. Aerogen tubing will need changed on  Date: 9/8 Time: 1720 , 96 hours after initiation or last change. Epoprostenol is notbeing weaned. Rate of administration is 7.07 ml/hour.

## 2018-09-06 NOTE — PRE SEDATION
questions and wished to proceed; the consent form was signed. MEDICAL HISTORY  [x]ASHD/ANGINA/MI/CHF   [x]Hypertension  [x]Diabetes  [x]Hyperlipidemia  []Smoking  []Family Hx of ASHD  [x]Additional information:       has a past medical history of Anemia; Angiotensin converting enzyme inhibitor-aggravated angioedema; Cerebral artery occlusion with cerebral infarction [facility]); CHF (congestive heart failure) ([facility] Disease of blood and blood forming organ; DM type 1 (diabetes mellitus, type 1) ([facility] ESRD (end stage renal disease) ([facility] GERD (gastroesophageal reflux disease); H/O pancreas transplant ([facility] Hemodialysis patient [facility]); History of blood transfusion; HTN (hypertension); Hyperlipidemia; Hyperphosphatemia; Hypersomnolence disorder; Kidney transplant status, living unrelated donor; Leaky heart valve; Metabolic acidosis; Narcolepsy; Nephropathy, diabetic ([facility] Secondary hyperparathyroidism of renal origin ([facility] Seizures ([facility] and Vitamin D deficiency. SURGICAL HISTORY   has a past surgical history that includes Pancreas surgery (2007); eye surgery; Kidney transplant (2006); other surgical history (04/15/2005); other surgical history (06/07/2005); other surgical history (06/23/2005); other surgical history (10/03/2005); pr repair incisional hernia,reducible (N/A, 6/29/2018); pr office/outpt visit,procedure only (N/A, 8/28/2018); and pr office/outpt visit,procedure only (N/A, 9/4/2018).   Additional information:       ALLERGIES   Allergies as of 08/26/2018 - Review Complete 08/26/2018   Allergen Reaction Noted    Ace inhibitors Swelling and Anaphylaxis 02/06/2007    Bactrim [sulfamethoxazole-trimethoprim] Swelling 02/26/2015    Hydralazine Swelling 09/03/2015    Lisinopril Anaphylaxis 03/31/2015    Losartan Swelling 04/01/2015    Amlodipine Swelling 08/31/2015    Morphine Itching 02/06/2007    Lipitor [atorvastatin] Swelling 03/16/2015     Additional information:       MEDICATIONS   Aspirin suppository 650 mg, 650 mg, Rectal, Q4H PRN, Rebel Kelsey MD    oxyCODONE (ROXICODONE) immediate release tablet 5 mg, 5 mg, Oral, Q4H PRN **OR** oxyCODONE (ROXICODONE) immediate release tablet 10 mg, 10 mg, Oral, Q4H PRN, Rebel Kelsey MD    morphine injection 2 mg, 2 mg, Intravenous, Q15 Min PRN, Rebel Kelsey MD, 2 mg at 09/05/18 0795    bisacodyl (DULCOLAX) suppository 10 mg, 10 mg, Rectal, Daily PRN, Rebel Kelsey MD    senna (SENOKOT) 8.8 MG/5ML syrup 17.6 mg, 10 mL, Oral, Daily PRN, Rebel Kelsey MD    ondansetron [facility]) injection 4 mg, 4 mg, Intravenous, Q6H PRN, Rebel Kelsey MD    metoprolol (LOPRESSOR) injection 2.5 mg, 2.5 mg, Intravenous, Q10 Min PRN, Rebel Kelsey MD    aspirin EC tablet 325 mg, 325 mg, Oral, Daily, Rebel Kelsey MD    albuterol sulfate  (90 Base) MCG/ACT inhaler 2 puff, 2 puff, Inhalation, Q6H PRN, Rebel Kelsey MD    albumin human 5 % bottle 25 g, 25 g, Intravenous, PRN, Rebel Kelsey MD, 12.5 g at 09/04/18 2359    insulin regular (HUMULIN R;NOVOLIN R) 100 Units in sodium chloride 0.9 % 100 mL infusion, 1 Units/hr, Intravenous, Continuous, Rebel Kelsey MD, Stopped at 09/04/18 2000    glucose (GLUTOSE) 40 % oral gel 15 g, 15 g, Oral, PRN, Rebel Kelsey MD    dextrose 50 % solution 12.5 g, 12.5 g, Intravenous, PRN, Rebel Kelsey MD, 12.5 g at 09/05/18 2100    glucagon (rDNA) injection 1 mg, 1 mg, Intramuscular, PRN, Rebel Kelsey MD    dextrose 5 % solution, 100 mL/hr, Intravenous, PRN, Rebel Kelsey MD    epoetin mirna (EPOGEN;PROCRIT) injection 10,000 Units, 10,000 Units, Subcutaneous, Once per day on Mon Wed Fri, Daniel Perkins DO, 10,000 Units at 09/05/18 1240    vasopressin 20 Units in dextrose 5 % 100 mL infusion, 0.04 Units/min, Intravenous, Continuous, Rebel Kelsey MD, Last Rate: 12 mL/hr at 09/05/18 2230, 0.04 Units/min at 09/05/18 2230    heparin (porcine) injection 5,000 Units, 5,000 Units, Subcutaneous, 3 times per day, Rebel Kelsey MD, Stopped at 09/05/18 0600    EPINEPHrine (EPINEPHrine HCL) 5 mg in dextrose 5 % 250 mL infusion, 1 mcg/min, Intravenous, Continuous, Jose Claudio MD, Last Rate: 54 mL/hr at 09/06/18 0407, 18 mcg/min at 09/06/18 0407    epoprostenol 1,500 mcg in sodium chloride 0.9 % 50 mL nebulization solution, 50 ng/kg/min (Ideal), Nebulization, Continuous, Jose Claudio MD, Last Rate: 7.1 mL/hr at 09/06/18 0334, 50 ng/kg/min at 09/06/18 0334    dexmedetomidine (PRECEDEX) 400 mcg in sodium chloride 0.9 % 100 mL infusion, 0.2 mcg/kg/hr, Intravenous, Continuous, Jose Claudio MD    latanoprost (XALATAN) 0.005 % ophthalmic solution 1 drop, 1 drop, Both Eyes, Nightly, [employer], DO, 1 drop at 09/05/18 2225  Prior to Admission medications    Medication Sig Start Date End Date Taking? Authorizing Provider   cloNIDine (CATAPRES) 0.2 MG tablet Take 1 tablet by mouth every 8 hours 8/24/18  Yes Jeff Aguero MD   clopidogrel (PLAVIX) 75 MG tablet Take 1 tablet by mouth daily 8/24/18  Yes Jeff Aguero MD   aspirin 81 MG chewable tablet Take 1 tablet by mouth daily 8/24/18  Yes Jeff Aguero MD   insulin glargine (BASAGLAR KWIKPEN) 100 UNIT/ML injection pen Inject 18 units in the morning and 18 units in the evening 8/24/18  Yes Jeff Aguero MD   isosorbide mononitrate (IMDUR) 60 MG extended release tablet Take 1 tablet by mouth daily 8/24/18  Yes Jeff Aguero MD   nitroGLYCERIN (NITROSTAT) 0.4 MG SL tablet Place 1 tablet under the tongue every 5 minutes as needed for Chest pain up to max of 3 total doses. If no relief after 1 dose, call 911. 8/24/18  Yes Jeff Aguero MD   insulin lispro (HUMALOG KWIKPEN) 100 UNIT/ML pen Inject 3 Units into the skin 3 times daily (before meals) Sliding scale:  -199 = 2 units  -249 = 4 units  -299 = 6 units  -400= 10 units  BS >400 = 12 units 8/24/18  Yes Jeff Aguero MD   gabapentin (NEURONTIN) 100 MG capsule Take 100 mg by mouth 2 times daily. .   Yes Historical Provider, MD   calcitRIOL (ROCALTROL) 0.5 MCG capsule Take 0.5 mcg by mouth three times a week    Yes Historical Provider, MD   calcium acetate (ELIPHOS) 667 MG TABS Take 2 tablets by mouth 3 times daily (with meals)    Yes Historical Provider, MD   vitamin D (ERGOCALCIFEROL) 23472 units CAPS capsule Take 50,000 Units by mouth once a week   Yes Historical Provider, MD   cetirizine (ZYRTEC) 10 MG tablet Take 10 mg by mouth daily as needed for Allergies   Yes Historical Provider, MD   darbepoetin mirna-polysorbate (ARANESP) 60 MCG/0.3ML SOSY injection Inject 60 mcg into the skin every 14 days Per clinic 9/28/17  Yes Historical Provider, MD   carvedilol (COREG) 25 MG tablet Take 2 tablets by mouth 2 times daily 6/14/17  Yes Zhen Magdaleno MD   sirolimus (RAPAMUNE) 1 MG tablet Take 4 mg by mouth daily    Yes Historical Provider, MD   simvastatin (ZOCOR) 20 MG tablet Take 1 tablet by mouth every evening 2/9/17  Yes Chela Hackett DO   Mycophenolate Sodium 360 MG TBEC Take 1 tablet by mouth 2 times daily 1/14/17  Yes Chela Hackett DO   methylphenidate (RITALIN) 10 MG tablet Take 1 tablet by mouth 2 times daily 10/21/16  Yes Juhi Ellis MD   Multiple Vitamins-Minerals (MENS MULTI VITAMIN & MINERAL PO) Take 1 tablet by mouth daily   Yes Historical Provider, MD   glucose blood VI test strips (ONETOUCH VERIO) strip 1 each by In Vitro route 4 times daily As needed.  12/30/15  Yes Israel Perez DO   latanoprost (XALATAN) 0.005 % ophthalmic solution Place 1 drop into both eyes nightly 11/13/15  Yes Yara Regan MD   B Complex-C-Folic Acid (VOL-CARE RX) 1 MG TABS Take by mouth 3 times daily    Historical Provider, MD   B-D ULTRAFINE III SHORT PEN 31G X 8 MM MISC Seven or eight 7/7/16   Historical Provider, MD   Insulin Pen Needle 33G X 5 MM MISC Indications: E10.21 Use with Insulin pens 7/5/16   Emmer Goldmann, MD   ONE TOUCH LANCETS MISC 1 each by Does not apply route 4 times daily 12/30/15   Israel Perez DO     Additional information:       VITAL SIGNS   Vitals:    09/06/18 0703   BP: 88/66   Pulse: 80   Resp: 21   Temp:    SpO2: 100%       PHYSICAL:   General:Intubated/sedated  HEENT:  Unremarkable for age  Neck: without increased JVD, carotid pulses 2+ bilaterally without bruits  Heart: RRR, S1 & S2 WNL, S4 gallop, without murmurs or rubs    Lungs: Bilateral crackles   Abdomen: BS present, without HSM, masses, or tenderness    Extremities: No bilateral edema  Mental Status: Sedated        PLANNED PROCEDURE   []Cath  []PCI                []Pacemaker/AICD  []AMARIS             []Cardioversion []Peripheral angiography/PTA  [x]Other: RP Impella, CP Impella     SEDATION  Planned agent:[x]Midazolam []Meperidine [x]Sublimaze []Morphine  []Diazepam  []Other:     ASA Classification:  []1 []2 []3 []4 [x]5  Class 1: A normal healthy patient  Class 2: Pt with mild to moderate systemic disease  Class 3: Severe systemic disease or disturbance  Class 4: Severe systemic disorders that are already life threatening. Class 5: Moribund pt with little chances of survival, for more than 24 hours. Mallampati I Airway Classification:   []1 []2 []3 [x]4    [x]Pre-procedure diagnostic studies complete and results available. Comment:    [x]Previous sedation/anesthesia experiences assessed. Comment:    [x]The patient is an appropriate candidate to undergo the planned procedure sedation and anesthesia. (Refer to nursing sedation/analgesia documentation record)  [x]Formulation and discussion of sedation/procedure plan, risks, and expectations with patient and/or responsible adult completed. [x]Patient examined immediately prior to the procedure.  (Refer to nursing sedation/analgesia documentation record)    Petrona White MD   Electronically signed 9/6/2018 at 7:46 AM

## 2018-09-07 NOTE — PROGRESS NOTES
1045 dr. Pepper Box update on decreased sat 88% and decrease in B/p orders given for concha and to wean levo  1047 dr. Jing Aguero updated on potassium 2.9, orders given

## 2018-09-07 NOTE — PROGRESS NOTES
PHASE 1 CARDIAC REHABILITATION   CABG, AVR, MVR, TVR, AMI, PCI's  Exercise Physiologists      Hold treatment, per RN. Patient currently working with respiratory care.

## 2018-09-07 NOTE — PROGRESS NOTES
[facility] - Mother - Syed Osorio, sister & son in - questions asked concerning withdraw of care - answered and support given. Family has made the decision to withdraw care tomorrow around noon. Code status discussed and explained - family doesn't want any resucitative measures taken - no shock, no cpr, no re-intubation, no medications. 0 - Dr. Alvin Valenzuela to floor - updated, orders received (he updated Dr. Paulina Ackerman).

## 2018-09-07 NOTE — PROGRESS NOTES
Epoprostenol Subsequent Assessment    Epoprostenol  continues to be aerosolized via the Alaris syringe pump to the aerogen nebulizer. Medication syringe did need changed. Aerogen nebulizer did not need changed. Syringe tubing did not need changed. Medication syringe and tubing are protected from light. Aerogen nebulizer was functioning properly. Filters were changed at this check. No problems seen with expiratory resistance. Extra Solo nebulizer remains at bedside for back-up. Assessment was completed. Every 4 hour Assessment Hemodynamic Measurement if available   Heart Rate 80 beats per minute Mean Pulmonary Artery Pressure  59  mm Hg   Blood Pressure 83/51 mm Hg Cardiac Output  N/A  l/min   PaO2/ FiO2 ratio 57 ratio Cardiac Index N/A  l/min/m2    AutoPeep . 5 cmH2O Central Venous Pressure (CVP) 21 mm Hg     , 2 hour prior to medication running out or room air expiration. Syringe will need changed on Date: 9/7 Time: 825 am    [address] will need changed on Date: 9/11 Time: 1720 , 7 days after initiation or last change. Aerogen tubing will need changed on  Date: 9/8 Time: 1720 , 96 hours after initiation or last change. Epoprostenol is notbeing weaned. Rate of administration is 7.07 ml/hour. Rate changes was verified by Caitie James RN and Sonya Fleming RRT.

## 2018-09-07 NOTE — PROGRESS NOTES
Epoprostenol Subsequent Assessment    Epoprostenol  continues to be aerosolized via the Alaris syringe pump to the aerogen nebulizer. Medication syringe did need changed. Aerogen nebulizer did not need changed. Syringe tubing did not need changed. Medication syringe and tubing are protected from light. Aerogen nebulizer was functioning properly. Filters were changed at this check. No problems seen with expiratory resistance. Extra Solo nebulizer remains at bedside for back-up. Assessment was completed. Every 4 hour Assessment Hemodynamic Measurement if available   Heart Rate 80 beats per minute Mean Pulmonary Artery Pressure  31  mm Hg   Blood Pressure 78/51 mm Hg Cardiac Output  4.5  l/min   PaO2/ FiO2 ratio 224 ratio Cardiac Index 2.3  l/min/m2    AutoPeep 0 cmH2O Central Venous Pressure (CVP) 24 mm Hg     Notify pharmacy for new syringe on Date: 9/7/18 Time: 9532, 2 hour prior to medication running out or room air expiration. Syringe will need changed on Date: 9/7/18 Time: [address] will need changed on Date: 9/11/18 Time: 1720 , 7 days after initiation or last change. Aerogen tubing will need changed on  Date: 9/8/18 Time: 1720 , 96 hours after initiation or last change. Epoprostenol is notbeing weaned. Rate of administration is 7.07 ml/hour.  Rate changes was verified by  and .

## 2018-09-07 NOTE — PROGRESS NOTES
Meds:   albumin human  25 g Intravenous Q6H    sodium chloride  250 mL Intravenous Once    calcium gluconate IVPB  1.5 g Intravenous Once    potassium chloride  20 mEq Intravenous Q1H    sirolimus  2 mg Oral Daily    sodium bicarbonate  100 mEq Intravenous Once    docusate sodium  100 mg Oral BID    mycophenolate (CELLCEPT) IVPB  500 mg Intravenous Q12H    sodium chloride flush  10 mL Intravenous 2 times per day    calcium replacement protocol   Other RX Placeholder    aspirin  325 mg Oral Daily    epoetin  10,000 Units Subcutaneous Once per day on Mon Wed Fri    latanoprost  1 drop Both Eyes Nightly     Continuous Infusions:   prismaSATE BGK 4/2.5 4,600 mL/hr (09/07/18 1140)    0.9% NaCl with KCl 40 mEq 100 mL/hr at 09/07/18 1238    phenylephrine (REJI-SYNEPHRINE) 50mg/250mL infusion 200 mcg/min (09/07/18 1254)    sodium bicarbonate infusion 500 mL/hr at 09/07/18 1138    IV infusion builder      IV infusion builder      heparin (porcine) 5.6 Units/kg/hr (09/07/18 1218)    milrinone Stopped (09/06/18 1423)    vasopressin infusion 0.04 Units/min (09/07/18 0930)    epoprostenol (VELETRI) nebulization solution 50 ng/kg/min (09/07/18 0815)    norepinephrine (LEVOPHED) infusion (double concentration) 60 mcg/min (09/07/18 1022)    EPINEPHrine infusion (double concentration) 20 mcg/min (09/07/18 1000)    sodium chloride 200 mL/hr at 09/06/18 1715    sodium chloride 20 mL/hr at 09/04/18 1425    insulin (HUMAN R) non-weight based infusion 15.9 Units/hr (09/07/18 1225)    dextrose      dexmedetomidine       PRN Meds:.sodium bicarbonate, sodium chloride flush, potassium chloride, acetaminophen, acetaminophen, oxyCODONE **OR** oxyCODONE, morphine, bisacodyl, senna, ondansetron, metoprolol, albuterol sulfate HFA, albumin human, glucose, dextrose, glucagon (rDNA), dextrose    Technician: Minor Peraza 9/7/2018

## 2018-09-07 NOTE — PROCEDURES
artery and removed the  apex sheath while leaving the 0.018 guidewire in place. Once this was  complete, we then used the 6-Zimbabwean radial sheath and inserted this over  the 0.018 guidewire to maintain access to the initial arteriotomy. Once we  had confirmed that the sheath was in the initial arteriotomy, I took an  oblique angiogram of the left femoral artery which demonstrated that the  left femoral artery, external iliac, and the common iliac artery into the  aorta were relatively patent in terms of appropriate caliber to insert the  Impella CP device. We then took out the 0.018 guidewire from the 6-Zimbabwean sheath and inserted  a stiff 0.035 Impella sheath guidewire into the aorta. We serially dilated  the arteriotomy and then inserted the 14-Zimbabwean peel away sheath into the  left femoral artery. Once this was completed, I used an angled pigtail  over the 0.035 standard guidewire across the aortic valve without any  complications pushing this into the apex without involving the mitral valve  apparatus. I then inserted a 0.018 guidewire into the angled pigtail and  exchanged this out of the patient. The Impella CP was then prepped per   recommendations and loaded on the 0.018 guidewire and then  ultimately inserted into the patient. Under fluoroscopic guidance, we were  able to advance the Impella across the aortic valve and placed in  appropriate position of the LV apex. Guidewire was removed. The Impella  was turned on, and we had reduced flows of approximately 1 to 2 L per  minute. There were suction alarms noted. Given that the likely issue was that the right ventricle was also failing  given the fact that his GLENROY was less than 1 and the CVP/WP ratio was  approximately 1, we elected to prep for the Impella RP insertion.   I then  obtained venous access using micropuncture and modified Seldinger technique  under fluoroscopic guidance of the right femoral vein and inserted a  standard in place as were the peel-away sheaths  given the fact that the patient needed multiple transport, we did not want  to risk groin bleed on the left side or the right side. Final fluoroscopy revealed that the Impella catheters both in the  appropriate position were not moved had then moved after final suturing of  the sheath. MEDICATIONS:  See EMR. IMMEDIATE COMPLICATIONS:  None. SUMMARY:  Successful implantation of Impella CP catheter via left femoral  artery; successful implantation of Impella RP catheter via right femoral  vein; IABP explantation. PLAN:  1. Continue management of the patient's cardiogenic shock in the ICU. 2.  Serial hemodynamics. 3.  ABG. 4.  Nephrology to continue managing the patient's renal status with the  CVVH. 5.  Heparin IV was given during the procedure to ensure that ACT was  therapeutic for the bilateral Impella, and therefore, ACT monitoring is  necessary post insertion of both the hemodynamic supportive devices. 6.  TTE.  7.  Chest x-ray. 8.  Strict bedrest.  9.  Monitor LFTs and BMP serially. All the above was explained to the patient and the patient's family. They  were agreeable and amenable to the above plan.         Donell Escobedo MD    D: 09/07/2018 7:09:22       T: 09/07/2018 9:25:28     [email or web address]  Job#: [id number]     Doc#: [id number]    CC:

## 2018-09-07 NOTE — PROGRESS NOTES
Epoprostenol Subsequent Assessment    Epoprostenol  continues to be aerosolized via the Alaris syringe pump to the aerogen nebulizer. Medication syringe did not need changed. Aerogen nebulizer did not need changed. Syringe tubing did not need changed. Medication syringe and tubing are protected from light. Aerogen nebulizer was functioning properly. Filters were changed at this check. No problems seen with expiratory resistance. Extra Solo nebulizer remains at bedside for back-up. Assessment was completed. Every 4 hour Assessment Hemodynamic Measurement if available   Heart Rate 80 beats per minute Mean Pulmonary Artery Pressure  94  mm Hg   Blood Pressure 151/76 mm Hg Cardiac Output   NA   PaO2/ FiO2 ratio N/A ratio Cardiac Index N/A  l/min/m2    AutoPeep . [id number] Central Venous Pressure (CVP) 25 mm Hg     Notify pharmacy for new syringe on Date:9/6/2018 Time: 2133, 2 hour prior to medication running out or room air expiration. Syringe will need changed on Date: 9/7 [address] will need changed on Date: 09/11 Time: 1720 , 7 days after initiation or last change. Aerogen tubing will need changed on  Date: 09/8 Time: 1720 , 96 hours after initiation or last change. Epoprostenol is notbeing weaned. Rate of administration is 7.07 ml/hour.

## 2018-09-07 NOTE — PROGRESS NOTES
Epoprostenol Subsequent Assessment    Epoprostenol  continues to be aerosolized via the Alaris syringe pump to the aerogen nebulizer. Medication syringe did not need changed. Aerogen nebulizer did not need changed. Syringe tubing did not need changed. Medication syringe and tubing are protected from light. Aerogen nebulizer was functioning properly. Filters were changed at this check. No problems seen with expiratory resistance. Extra Solo nebulizer remains at bedside for back-up. Assessment was completed. Every 4 hour Assessment Hemodynamic Measurement if available   Heart Rate 110 beats per minute Mean Pulmonary Artery Pressure  67  mm Hg   Blood Pressure 110/52 mm Hg Cardiac Output  N/A  l/min   PaO2/ FiO2 ratio 68 ratio Cardiac Index N/A  l/min/m2    AutoPeep N/A cmH2O Central Venous Pressure (CVP) 24 mm Hg     Notify pharmacy for new syringe on Date: 9/7 Time: 442, 2 hour prior to medication running out or room air expiration. Syringe will need changed on Date: 9/7 Time: 820 am    Aerogen Solo Nebulizer will need changed on Date: 9/11Time: 1720 , 7 days after initiation or last change. Aerogen tubing will need changed on  Date: 9/8 Time: 1720 , 96 hours after initiation or last change. Epoprostenol is notbeing weaned. Rate of administration is 7.07 ml/hour.

## 2018-09-07 NOTE — PROGRESS NOTES
Cardiovascular Surgery Progress Note      Unresponsive to deep stimuli, having received no sedation  Adequate hemodynamics, L Impella flows 3.5, MAPs 55-60, but requiring epi 18, levo 50, vasopressin 0.04  Currently oxygenating adequately, on low-moderate settings  CXR no space issues  PLT down to 25  Blood cultures NTD  -Attempt to replace levo with neosynephrine  -Transfuse 1 u PLT  -Wean pressors as tolerated  -However, ongoing high pressor dependence indicates poor prognosis

## 2018-09-07 NOTE — PROGRESS NOTES
Hemodialysis patient   Admit eGFR eGFR < 15 ml/min   Current eGFR        eGFR trend    Lab Results   Component Value Date    LABGLOM 45 09/07/2018    LABGLOM 35 09/06/2018    LABGLOM 25 09/06/2018    LABGLOM 23 09/06/2018    LABGLOM 14 09/05/2018    LABGLOM 11 09/05/2018    LABGLOM 9 09/05/2018    LABGLOM 17 09/04/2018    LABGLOM 9 09/04/2018    LABGLOM 6 09/03/2018    LABGLOM 7 09/02/2018    LABGLOM 10 09/01/2018          Compliance with treatment plan: 50%     Allergies and Intolerances: ALLERGIES: Ace inhibitors; Bactrim [sulfamethoxazole-trimethoprim]; Hydralazine; Lisinopril;  Losartan; Amlodipine; Morphine; and Lipitor [atorvastatin]  MEDICATION INTOLERANCES: none  FOOD ALLERGIES: none  INSECT ALLERGIES: none  PLANT ALLERGIES: none     Past Medical History:  Past Medical History:   Diagnosis Date    Anemia     Angiotensin converting enzyme inhibitor-aggravated angioedema 8/12/2016    [facility] with [facility]     CHF (congestive heart failure) [facility]     Disease of blood and blood forming organ     DM type 1 (diabetes mellitus, type 1) ([facility]     previous diabetes, diag at age of 17,  no tx after transplant in 2008, started on insulins again in august 2015 as pancreas is not functioning well    ESRD (end stage renal disease) ([address] 12/10/2013    was on dialysis in 2425-3176, before transplant    GERD (gastroesophageal reflux disease)     H/O pancreas transplant ([facility] 5/15/2007    Hemodialysis patient ([facility]     History of blood transfusion     FFP for angioedema    HTN (hypertension)     Hyperlipidemia     Hyperphosphatemia 8/17/2018    Hypersomnolence disorder     Kidney transplant status, living unrelated donor 5/7/2006    Leaky heart valve     Metabolic acidosis     Narcolepsy     Nephropathy, diabetic ([address]     Secondary hyperparathyroidism of renal origin ([address] 8/17/2018    Seizures ([facility]     Vitamin D deficiency 8/17/2018        Past Surgical History:  Past Surgical History:   Procedure Laterality Date    EYE SURGERY      vitrectomy, in both eyes, bleeding from heparin while on dialysis    KIDNEY TRANSPLANT  2006    OTHER SURGICAL HISTORY  04/15/2005    Dr Montana Lasso CAPD cath    OTHER SURGICAL HISTORY  06/07/2005    Dr Manolo Garza with lysis of adhesion and manipulatin of CAPD cath    OTHER SURGICAL HISTORY  06/23/2005    Dr Ladi Covarrubiast left CAPD cath, placement of right CAPD cath    OTHER SURGICAL HISTORY  10/03/2005    Dr Ara Trujillo of 5cm chest wall mass with intermediate closure, Excision of 5cm abdominal wall mass with removal of PD catheter    PANCREAS SURGERY  2007    transplant    [facility] OFFICE/OUTPT VISIT,PROCEDURE ONLY N/A 8/28/2018    TRANSESOPHAGEAL ECHOCARDIOGRAM performed by Melinda Callaway MD at [facility] OFFICE/OUTPT [address] [place] [address] N/A 9/4/2018    MVR (REPAIR VS REPLACEMENT MECHANICAL VALVE), TVR, CABG X1, AMARIS performed by Clark Marin MD at [address] N/A 6/29/2018    HERNIA VENTRAL REPAIR,with mesh performed by Elijah Marrero MD at [address] History:  Family History   Problem Relation Age of Onset    Heart Disease Mother     Diabetes Mother     High Blood Pressure Mother     Heart Disease Father     Diabetes Maternal Aunt     Kidney Disease Maternal Aunt     Diabetes Sister     High Blood Pressure Maternal Grandmother         Social History:  Social History     Social History    Marital status: Single     Spouse name: N/A    Number of children: 3    Years of education: 15     Occupational History    Disabled      Social History Main Topics    Smoking status: Never Smoker    Smokeless tobacco: Never Used    Alcohol use No    Drug use: No    Sexual activity: Not Currently     Other Topics Concern    Not on file     Social History Narrative    No narrative on file        Home Medications:  Prior to Admission medications Medication Sig Start Date End Date Taking? Authorizing Provider   cloNIDine (CATAPRES) 0.2 MG tablet Take 1 tablet by mouth every 8 hours 8/24/18  Yes Symone Duffy MD   clopidogrel (PLAVIX) 75 MG tablet Take 1 tablet by mouth daily 8/24/18  Yes Symone Duffy MD   aspirin 81 MG chewable tablet Take 1 tablet by mouth daily 8/24/18  Yes Symone Duffy MD   insulin glargine (BASAGLAR KWIKPEN) 100 UNIT/ML injection pen Inject 18 units in the morning and 18 units in the evening 8/24/18  Yes Symone Duffy MD   isosorbide mononitrate (IMDUR) 60 MG extended release tablet Take 1 tablet by mouth daily 8/24/18  Yes Symone Duffy MD   nitroGLYCERIN (NITROSTAT) 0.4 MG SL tablet Place 1 tablet under the tongue every 5 minutes as needed for Chest pain up to max of 3 total doses. If no relief after 1 dose, call 911. 8/24/18  Yes Symone Duffy MD   insulin lispro (HUMALOG KWIKPEN) 100 UNIT/ML pen Inject 3 Units into the skin 3 times daily (before meals) Sliding scale:  -199 = 2 units  -249 = 4 units  -299 = 6 units  -400= 10 units  BS >400 = 12 units 8/24/18  Yes Symone Duffy MD   gabapentin (NEURONTIN) 100 MG capsule Take 100 mg by mouth 2 times daily. .   Yes Historical Provider, MD   calcitRIOL (ROCALTROL) 0.5 MCG capsule Take 0.5 mcg by mouth three times a week    Yes Historical Provider, MD   calcium acetate (ELIPHOS) 667 MG TABS Take 2 tablets by mouth 3 times daily (with meals)    Yes Historical Provider, MD   vitamin D (ERGOCALCIFEROL) 63298 units CAPS capsule Take 50,000 Units by mouth once a week   Yes Historical Provider, MD   cetirizine (ZYRTEC) 10 MG tablet Take 10 mg by mouth daily as needed for Allergies   Yes Historical Provider, MD   darbepoetin mirna-polysorbate (ARANESP) 60 MCG/0.3ML SOSY injection Inject 60 mcg into the skin every 14 days Per clinic 9/28/17  Yes Historical Provider, MD   carvedilol (COREG) 25 MG tablet Take 2 tablets by mouth 2 times daily 6/14/17  Yes appearance: intubated and sedated. HEENT: Head: Normocephalic, no lesions, without obvious abnormality. Neck: no adenopathy, no carotid bruit and no JVD  Lungs: no rales and no rhonchi. Dull bases. Heart: IRRR. II/VI murmur. No rubs. Abdomen: quiet. Extremities: 3+ pitting edema noted. Neurologic: sedated  PSY: sedated  Skin: Warm and dry. No unusual lesions or rashes noted. Muscles: unable to assess. Skeletal: No bony or skeletal abnormalities noted. Admission weight: 173 lb (78.5 kg)  Wt Readings from Last 3 Encounters:   09/06/18 190 lb 0.6 oz (86.2 kg)   08/24/18 173 lb 4.5 oz (78.6 kg)   08/07/18 176 lb 1.6 oz (79.9 kg)     Body mass index is 28.06 kg/m². Clinical Impressions:    1. ESRD from DM and failed kidney transplant  2. DM  3. Pulmonary HTN  4. Severe mitral regurgitation  5. Tricuspid regurgitation  6. Anemia from kidney disease  7. Failed kidney transplant (he retains this organ). 8. Failed pancreas transplant (he retains this organ)(transplanted at [facility] 2007 in the left iliac fossa)  9. History of seizures  10. History of narcolepsy. 11. History of diabetic retinopathy  12. S/P open heart surgery 09/04/18 with mitral valve repair with 28 mm band and tricuspid repair with 32 mm band and LIMA to LAD. 13. Acute liver injury  14. Leukocytosis. 15. coma     Plan of Care      1. Continue CVVH. 2. Will monitor closely for acute rejection of transplanted organs (check amylase, lipase and C-peptide)  3. Continue CVVH to control electrolytes and remove waste. 4. Continue epogen for anemia  5. Resume Myfortic and rapamune in liquid form, but doses have been reduced  6. IV vancomycin was given 09/06/18  7. Replacing potassium and magnesium         Boy Wilkinson. DO [facility].

## 2018-09-07 NOTE — PLAN OF CARE
within specified parameters   Outcome: Ongoing  With support tx.'s      Problem: Fluid Volume - Imbalance:  Goal: Ability to achieve a balanced intake and output will improve  Ability to achieve a balanced intake and output will improve   Outcome: Not Met This Shift  CRRT - unable to pull fluid off    Goal: Chest tube drainage is within specified parameters  Chest tube drainage is within specified parameters   Outcome: Met This Shift      Problem: Gas Exchange - Impaired:  Goal: Levels of oxygenation will improve  Levels of oxygenation will improve   Outcome: Ongoing    Goal: Ability to maintain adequate ventilation will improve  Ability to maintain adequate ventilation will improve   With support tx's      Problem: Pain:  Goal: Pain level will decrease  Pain level will decrease   Outcome: Met This Shift    Goal: Control of acute pain  Control of acute pain   Outcome: Met This Shift    Goal: Control of chronic pain  Control of chronic pain   Outcome: Met This Shift      Problem: Risk for Impaired Skin Integrity  Goal: Tissue integrity - skin and mucous membranes  Structural intactness and normal physiological function of skin and  mucous membranes. Outcome: Ongoing  Pt. Too unstable to turn/reposition.       Problem: DISCHARGE BARRIERS  Goal: Patient's continuum of care needs are met  Outcome: Met This Shift

## 2018-09-07 NOTE — FLOWSHEET NOTE
09/07/18 0949   Encounter Summary   Services provided to: Patient and family together   Referral/Consult From: Rounding   Continue Visiting Yes  (09/7/18)   Complexity of Encounter Moderate   Length of Encounter 15 minutes   Spiritual/Taoist   Type Spiritual support   Assessment Anxious   Intervention Nurtured hope   Outcome Comfort   S: The patient was Unable to respond at the time but the pts. family was actively present. I wanted to be with the family and ask if there were any Spiritual needs. O:   The patient (wasnt able to respond) and the family was actively present. The patients family stated that they would like prayer. A: I offered emotional support, nurtured hope, provided words of encouragement and a prayer of healing/comfort to the pt. The patients family stated that they were grateful for the support. P:           Continued support would be beneficial to the patient and their family.

## 2018-09-07 NOTE — PLAN OF CARE
Problem: Daily Care:  Goal: Daily care needs are met  Daily care needs are met   Outcome: Ongoing  Pt daily cares are met    Problem: Pain:  Goal: Patient's pain/discomfort is manageable  Patient's pain/discomfort is manageable   Outcome: Ongoing  Pt showing no signs of pain, pt pain is evalulated with CPOT    Problem: Skin Integrity:  Goal: Skin integrity will stabilize  Skin integrity will stabilize   Outcome: Ongoing  Pt skin intact, pt not being turned due to critical condition    Problem: Discharge Planning:  Goal: Patients continuum of care needs are met  Patients continuum of care needs are met   Outcome: Ongoing  Pt needs are met this shift, continuing to assess needs with family     Problem: Serum Glucose Level - Abnormal:  Goal: Ability to maintain appropriate glucose levels will improve to within specified parameters  Ability to maintain appropriate glucose levels will improve to within specified parameters   Outcome: Ongoing  Pt continues to have increased blood glucose    Problem: Bleeding:  Goal: Will show no signs and symptoms of excessive bleeding  Will show no signs and symptoms of excessive bleeding   Outcome: Ongoing  Pt showing no signs of bleeding, pt continues to have decreased hgb    Problem: Nutrition  Goal: Optimal nutrition therapy  Outcome: Ongoing  Pt remains NPO    Problem: Falls - Risk of:  Goal: Will remain free from falls  Will remain free from falls   Outcome: Ongoing  Pt free of falls this shift. Pt remains on bedrest   Goal: Absence of physical injury  Absence of physical injury   Outcome: Ongoing  Pt free of physical injury     Problem: Discharge Planning:  Goal: Discharged to appropriate level of care  Discharged to appropriate level of care   Outcome: Ongoing  Pt discharge plans are not clear at this time, will depend on clinical course     Problem:  Activity Intolerance:  Goal: Able to perform prescribed physical activity  Able to perform prescribed physical activity   Outcome:

## 2018-09-07 NOTE — CONSULTS
Inpatient consult to Neurology  Consult performed by: Terra Cea  Consult ordered by: Miguel Angel Brooks NOTE      Requesting Physician: Khalif Gregorio MD    Reason for Consult:  Evaluate for seizure, comatose    History of Present Illness:  Kelly Lozano is a 41 y.o. male admitted to [address] [place] [address] on 8/26/2018. *    41 year old man with multiple medical diseases, recently had a CABG, subsequently had  Biventricular impella placed due to failing heart. Patient has not been neurological responsive for the last 3 days. Patient is currently intubated and having signs of multiple organ failures. Reports no chest pain. No shortness of breath with exertion. Reports no neck pain. No vision changes. No dysphagia. No fever. No rash. No weight loss.     Past Medical History:        Diagnosis Date    Anemia     Angiotensin converting enzyme inhibitor-aggravated angioedema 8/12/2016    Cerebral artery occlusion with cerebral infarction ([address]     CHF (congestive heart failure) (HCC)     Disease of blood and blood forming organ     DM type 1 (diabetes mellitus, type 1) ([address]     previous diabetes, diag at age of 17,  no tx after transplant in 2008, started on insulins again in august 2015 as pancreas is not functioning well    ESRD (end stage renal disease) ([address] 12/10/2013    was on dialysis in [id number], before transplant    GERD (gastroesophageal reflux disease)     H/O pancreas transplant ([address] 5/15/2007    Hemodialysis patient ([address]     History of blood transfusion     FFP for angioedema    HTN (hypertension)     Hyperlipidemia     Hyperphosphatemia 8/17/2018    Hypersomnolence disorder     Kidney transplant status, living unrelated donor 5/7/2006    Leaky heart valve     Metabolic acidosis     Narcolepsy     Nephropathy, diabetic ([address]     Secondary hyperparathyroidism of renal origin ([address] 8/17/2018    Seizures ([address]     Vitamin D deficiency 8/17/2018 Procedure Laterality Date    EYE SURGERY      vitrectomy, in both eyes, bleeding from heparin while on dialysis    KIDNEY TRANSPLANT  2006    OTHER SURGICAL HISTORY  04/15/2005    Dr Palak Sue CAPD cath    OTHER SURGICAL HISTORY  06/07/2005    Dr Graciela Lloyd with lysis of adhesion and manipulatin of CAPD cath    OTHER SURGICAL HISTORY  06/23/2005    Dr Zarate Pour left CAPD cath, placement of right CAPD cath    OTHER SURGICAL HISTORY  10/03/2005    Dr Stephen-Excision of 5cm chest wall mass with intermediate closure, Excision of 5cm abdominal wall mass with removal of PD catheter    PANCREAS SURGERY  2007    transplant    [place] OFFICE/OUTPT VISIT,PROCEDURE ONLY N/A 8/28/2018    TRANSESOPHAGEAL ECHOCARDIOGRAM performed by Jennifer Larsen MD at [address] [place] [facility] OFFICE/OUTPT [facility] N/A 9/4/2018    MVR (REPAIR VS REPLACEMENT MECHANICAL VALVE), TVR, CABG X1, AMARIS performed by Thom Whitaker MD at [address] N/A 6/29/2018    HERNIA VENTRAL REPAIR,with mesh performed by Donna Haas MD at [address] [facility]: Allergies   Allergen Reactions    Ace Inhibitors Swelling and Anaphylaxis     Facial and tongue swelling  Facial and tongue swelling  Causes acute angioedema of the neck and throat.     Bactrim [Sulfamethoxazole-Trimethoprim] Swelling     Acute angioedema with throat swelling  Gregorio    Hydralazine Swelling     Facial and tongue swelling  Facial and tongue swelling    Lisinopril Anaphylaxis     Life-Threatening   Life-Threatening     Losartan Swelling     angioedema    Amlodipine Swelling     Facial swelling (possibly unrelated to rx)  Facial swelling (possibly unrelated to rx)    Morphine Itching     TransChart documentation: unspecified -reaction type/severity  TransChart documentation: unspecified -reaction type/severity    Lipitor [Atorvastatin] Swelling     Reports swelling; taking ACE at the time  Reports release tablet 10 mg Q4H PRN   morphine injection 2 mg Q15 Min PRN   bisacodyl (DULCOLAX) suppository 10 mg Daily PRN   senna (SENOKOT) 8.8 MG/5ML syrup 17.6 mg Daily PRN   ondansetron (ZOFRAN) injection 4 mg Q6H PRN   metoprolol (LOPRESSOR) injection 2.5 mg Q10 Min PRN   aspirin EC tablet 325 mg Daily   albuterol sulfate  (90 Base) MCG/ACT inhaler 2 puff Q6H PRN   albumin human 5 % bottle 25 g PRN   insulin regular (HUMULIN R;NOVOLIN R) 100 Units in sodium chloride 0.9 % 100 mL infusion Continuous   glucose (GLUTOSE) 40 % oral gel 15 g PRN   dextrose 50 % solution 12.5 g PRN   glucagon (rDNA) injection 1 mg PRN   dextrose 5 % solution PRN   epoetin mirna (EPOGEN;PROCRIT) injection 10,000 Units Once per day on Mon Wed Fri   dexmedetomidine (PRECEDEX) 400 mcg in sodium chloride 0.9 % 100 mL infusion Continuous   latanoprost (XALATAN) 0.005 % ophthalmic solution 1 drop Nightly        Social History:  History   Smoking Status    Never Smoker   Smokeless Tobacco    Never Used     History   Alcohol Use No     History   Drug Use No     Single    Family History:   Family History   Problem Relation Age of Onset    Heart Disease Mother     Diabetes Mother     High Blood Pressure Mother     Heart Disease Father     Diabetes Maternal Aunt     Kidney Disease Maternal Aunt     Diabetes Sister     High Blood Pressure Maternal Grandmother        Review of Systems:  All systems reviewed are negative except what is mentioned in history of present illness. Physical Exam:  BP (!) 83/48   Pulse 80   Temp 97.7 °F (36.5 °C) (Core)   Resp 17   Ht 5' 9\" (1.753 m)   Wt 190 lb 0.6 oz (86.2 kg)   SpO2 (!) 86%   BMI 28.06 kg/m²  I Body mass index is 28.06 kg/m².  I Wt Readings from Last 1 Encounters:   09/06/18 190 lb 0.6 oz (86.2 kg)             Neuro exam:  Pupils: sluggishly reactive 2-1mm bilaterally  No vestibular ocular movement  No pain response  No response to voice  No gag  No corneal reflex  Occasionally

## 2018-09-08 NOTE — PROGRESS NOTES
Hospital day 13     Postop Day #4 S/P MVR / TVR  Right Sided Impeller  Left Sided Impeller  CVVHD  Flolan and full ventilatory support    Subjective:     Mr. Don Gambino has not shown any neurological recovery since the postop cardiac arrest. He continues to be fully supported hemodynamically with right and left sided Impeller support and Ionotropic support. .    The fmaily plans to withdraw support today at [facility]. Current Comfort care measures only if he arrests.     Overnight Platelets continue to decrease and are currently 16K      Scheduled Meds:   phosphorus replacement protocol   Other RX Placeholder    sirolimus  2 mg Oral Daily    sodium bicarbonate  100 mEq Intravenous Once    docusate sodium  100 mg Oral BID    mycophenolate (CELLCEPT) IVPB  500 mg Intravenous Q12H    sodium chloride flush  10 mL Intravenous 2 times per day    calcium replacement protocol   Other RX Placeholder    aspirin  325 mg Oral Daily    epoetin  10,000 Units Subcutaneous Once per day on Mon Wed Fri    latanoprost  1 drop Both Eyes Nightly     Continuous Infusions:   prismaSATE BGK 4/2.5 4,600 mL/hr (09/08/18 0520)    0.9% NaCl with KCl 40 mEq 100 mL/hr at 09/07/18 2209    phenylephrine (REJI-SYNEPHRINE) 50mg/250mL infusion 200 mcg/min (09/08/18 0522)    sodium bicarbonate infusion 500 mL/hr at 09/08/18 0525    IV infusion builder 0 mL/hr at 09/08/18 0049    IV infusion builder      heparin (porcine) Stopped (09/07/18 2159)    milrinone Stopped (09/06/18 1423)    vasopressin infusion 0.04 Units/min (09/08/18 0025)    epoprostenol (VELETRI) nebulization solution 50 ng/kg/min (09/08/18 0421)    norepinephrine (LEVOPHED) infusion (double concentration) 38 mcg/min (09/08/18 0144)    EPINEPHrine infusion (double concentration) 20 mcg/min (09/08/18 0221)    sodium chloride 200 mL/hr at 09/07/18 1200    sodium chloride 20 mL/hr at 09/04/18 1425    insulin (HUMAN R) non-weight based infusion 43.1 Units/hr (09/08/18 0631)    dextrose      dexmedetomidine       PRN Meds:sodium bicarbonate, sodium chloride flush, potassium chloride, acetaminophen, acetaminophen, oxyCODONE **OR** oxyCODONE, morphine, bisacodyl, senna, ondansetron, metoprolol, albuterol sulfate HFA, albumin human, glucose, dextrose, glucagon (rDNA), dextrose      Objective:      Physical Exam:   /72   Pulse 80   Temp 97.3 °F (36.3 °C) (Core)   Resp 10   Ht 5' 9\" (1.753 m)   Wt 190 lb 0.6 oz (86.2 kg)   SpO2 100%   BMI 28.06 kg/m²     General Appearance:  Unresponsive    Head:  Normocephalic, without obvious abnormality, atraumatic   Eyes:  Pupils are fixed and dilated   Ears:  Normal TM's and external ear canals, both ears   Nose: Nares normal, septum midline, mucosa normal, no drainage or sinus tenderness   Throat: Lips, mucosa, and tongue normal; teeth and gums normal   Neck: Supple, symmetrical, trachea midline, no adenopathy, thyroid: not enlarged, symmetric, no tenderness/mass/nodules, no carotid bruit or JVD   Back:   Symmetric, no curvature, ROM normal, no CVA tenderness   Lungs:   Coarse and fully supported with Flolan in line   Chest Wall:  No tenderness or deformity   Heart:  Regular rate and rhythm, S1, S2 normal, no murmur, rub or gallop   Abdomen:   Distended, non-tender, bowel sounds active all four quadrants,  no masses, no organomegaly   Genitalia:  Normal male   Rectal:  Normal tone, normal prostate, no masses or tenderness;  guaiac negative stool   Extremities: Extremities normal, atraumatic, no cyanosis or edema   Pulses: 2+ and symmetric   Skin: Skin color, texture, turgor normal, no rashes or lesions   Lymph nodes: Cervical, supraclavicular, and axillary nodes normal   Neurologic: Unresponsive to any stimuli         Cardiographics  ECG: AV paced with junctional rhythm underneath .   Echocardiogram: not done    Imaging  Chest X-Ray: pulmonary edema     Lab Review   Lab Results   Component Value Date     09/08/2018    K 4.1 09/08/2018 K 3.5 09/07/2018    CL 92 09/08/2018    CO2 35 09/08/2018    BUN 8 09/08/2018    CREATININE 1.1 09/08/2018    GLUCOSE 312 09/08/2018    GLUCOSE 74 06/24/2014    CALCIUM 7.9 09/08/2018     Lab Results   Component Value Date    WBC 11.7 09/08/2018    HGB 7.1 09/08/2018    HCT 21.3 09/08/2018    MCV 84.9 09/08/2018    PLT 16 09/08/2018       Assessment:      none  Patient Active Problem List    Diagnosis Date Noted    Severe mitral regurgitation by prior echocardiogram      Priority: High    Essential hypertension      Priority: High    Narcolepsy 10/28/2015     Priority: High    Hyperglycemia 11/12/2015     Priority: Medium    Uncontrolled hypertension      Priority: Medium    Hyponatremia      Priority: Medium    Hypersomnolence disorder 04/23/2014     Priority: Medium    Metabolic acidosis      Priority: Medium    Heart disease 01/23/2013     Priority: Medium    Heart valve disease 01/23/2013     Priority: Medium    Controlled type 1 diabetes mellitus with stage 5 chronic kidney disease not on chronic dialysis ([address] 01/23/2013     Priority: Low    HIGH CHOLESTEROL 01/23/2013     Priority: Low    GERD (gastroesophageal reflux disease) 01/23/2013     Priority: Low    Cardiogenic shock ([address]     [place] coma scale total score 3-8 ([address]     Non-rheumatic tricuspid valve insufficiency     Coronary artery disease involving native coronary artery of native heart without angina pectoris     Shortness of breath at rest 08/26/2018    Atypical chest pain 08/26/2018    Pulmonary hypertension ([address] 08/26/2018    Iron deficiency 08/26/2018    Acute nasopharyngitis 08/26/2018    Pulmonary hypertension due to left ventricular systolic dysfunction ([address]     NSTEMI (non-ST elevated myocardial infarction) ([address]     ESRD on hemodialysis ([address]     Opacity of lung on imaging study     Pulmonary hypertension ([address]     Systolic dysfunction     Atypical chest pain     History of CVA (cerebrovascular accident)     History of angioedema     Systolic CHF, acute on chronic ([address] 08/19/2018    Cardiomyopathy ([address]     Hyperkalemia     Fluid overload 08/17/2018    Secondary hyperparathyroidism of renal origin ([address] 08/17/2018    Hyperphosphatemia 08/17/2018    Vitamin D deficiency 08/17/2018    S/P repair of recurrent ventral hernia 08/07/2018    History of ventral hernia 06/29/2018    Left ventricular systolic dysfunction 18/33/6320    Encounter for peritoneal dialysis for ESRD ([address]     Type 2 diabetes mellitus with complication, with long-term current use of insulin ([address] 12/04/2017    Cellulitis of face 10/07/2017    Diabetes mellitus due to underlying condition with hyperosmolarity without coma ([address]     Immunosuppressed status ([address]     Systolic CHF, acute on chronic ([address] 05/14/2017    CKD (chronic kidney disease) stage 5, GFR less than 15 ml/min ([facility])     History of renal transplant     Diabetic ketoacidosis without coma associated with diabetes mellitus due to underlying condition ([address]     Failed kidney transplant     Type 1 diabetes mellitus with hyperglycemia ([address]     Dehydration     ESRD on peritoneal dialysis ([address]     Chronic renal allograft nephropathy     Diabetic ketoacidosis associated with type 1 diabetes mellitus ([address] 11/21/2016    Elevated troponin     Mitral valve insufficiency 08/31/2016    History of pancreas transplant ([address] 08/31/2016    H/O kidney transplant 08/31/2016    Chronic kidney disease, stage 4 (severe) ([facility])     Anemia in CKD (chronic kidney disease)     Acute respiratory failure with hypoxia (HCC)     Elevated brain natriuretic peptide (BNP) level     Dyspnea     Hypoxemia     Angiotensin converting enzyme inhibitor-aggravated angioedema 08/12/2016    Bilateral leg edema     Acute on chronic renal failure ([address] 07/04/2016    Acute encephalopathy 07/03/2016    Non compliance with medical treatment     Hyponatremia syndrome     Failed

## 2018-09-08 NOTE — PROGRESS NOTES
the staff  No new issues   Admitted for shortness of breath  Has severe pulmonary hypertension,mitral and tricuspid regurgitations respectively   Had biventricular impella      Medications:   Scheduled Meds:   phosphorus replacement protocol   Other RX Placeholder    sirolimus  2 mg Oral Daily    sodium bicarbonate  100 mEq Intravenous Once    docusate sodium  100 mg Oral BID    mycophenolate (CELLCEPT) IVPB  500 mg Intravenous Q12H    sodium chloride flush  10 mL Intravenous 2 times per day    calcium replacement protocol   Other RX Placeholder    aspirin  325 mg Oral Daily    epoetin  10,000 Units Subcutaneous Once per day on Mon Wed Fri    latanoprost  1 drop Both Eyes Nightly     Continuous Infusions:   prismaSATE BGK 4/2.5 4,600 mL/hr (09/08/18 0520)    0.9% NaCl with KCl 40 mEq 100 mL/hr at 09/07/18 2209    phenylephrine (REJI-SYNEPHRINE) 50mg/250mL infusion 200 mcg/min (09/08/18 0522)    sodium bicarbonate infusion 500 mL/hr at 09/08/18 0525    IV infusion builder 0 mL/hr at 09/08/18 0049    IV infusion builder      heparin (porcine) Stopped (09/07/18 2159)    milrinone Stopped (09/06/18 1423)    vasopressin infusion 0.04 Units/min (09/08/18 0025)    epoprostenol (VELETRI) nebulization solution 50 ng/kg/min (09/08/18 0421)    norepinephrine (LEVOPHED) infusion (double concentration) 38 mcg/min (09/08/18 0144)    EPINEPHrine infusion (double concentration) 20 mcg/min (09/08/18 0221)    sodium chloride 200 mL/hr at 09/07/18 1200    sodium chloride 20 mL/hr at 09/04/18 1425    insulin (HUMAN R) non-weight based infusion 43.1 Units/hr (09/08/18 0631)    dextrose      dexmedetomidine         CBC:   Recent Labs      09/07/18   1605  09/07/18 2315  09/08/18   0345   WBC  14.4*  12.1*  11.7*   HGB  7.7*  7.1*  7.1*   PLT  29*  20*  16*     CMP:  Recent Labs      09/07/18   1605  09/07/18   2315  09/08/18   0345   NA  137  136  134*   K  3.0*  3.5  4.1   CL  90*  92*  92*   CO2  35*  36* 35*   BUN  10  9  8   CREATININE  1.6*  1.3*  1.1   GLUCOSE  406*  331*  312*   CALCIUM  7.9*  7.9*  7.9*   LABGLOM  58*  74*  89*     Troponin: No results for input(s): TROPONINI in the last 72 hours. BNP: No results for input(s): BNP in the last 72 hours. INR:   Recent Labs      09/07/18   0800  09/07/18   1605  09/07/18   2315   INR  3.45*  2.58*  2.43*     Lipids: Recent Labs      09/05/18   1027   AMYLASE  75   LIPASE  7.3     Liver: Recent Labs      09/07/18   2315   AST  5,856*   ALT  1,635*   ALKPHOS  219*   PROT  4.6*   LABALBU  3.1*   BILITOT  8.2*     Iron:  No results for input(s): IRONS, FERRITIN in the last 72 hours.     Invalid input(s): LABIRONS    Objective:   Vitals: /72   Pulse 80   Temp 97.3 °F (36.3 °C) (Core)   Resp 10   Ht 5' 9\" (1.753 m)   Wt 190 lb 0.6 oz (86.2 kg)   SpO2 100%   BMI 28.06 kg/m²    Wt Readings from Last 3 Encounters:   09/06/18 190 lb 0.6 oz (86.2 kg)   08/24/18 173 lb 4.5 oz (78.6 kg)   08/07/18 176 lb 1.6 oz (79.9 kg)      24HR INTAKE/OUTPUT:    Intake/Output Summary (Last 24 hours) at 09/08/18 6494  Last data filed at 09/08/18 0600   Gross per 24 hour   Intake            02353 ml   Output            37177 ml   Net            -3310 ml       Constitutional:  On vent   Skin:normal   HEENT:Pupils are poorly reactive and NG tube noted   Neck:supple with no thyromegally  Cardiovascular:  S1, S2 without murmur  Respiratory:  Clear anterior and chest tubes noted   Abdomen: +bs, soft,   Ext: + LE edema  Musculoskeletal:Intact  Neuro:Defrred       Electronically signed by Kris Bautista MD on 9/8/2018 at 6:33 AM

## 2018-09-08 NOTE — PROGRESS NOTES
Saundra Oliveira from loop notified    REFERENCE NUMBER:    [id number]    9/8/18- 0032-Susana returned call states pt is not an organ donor candidate but call and notify with cardiac TOD for possible tissue and bone donation

## 2018-09-08 NOTE — PROGRESS NOTES
Aerogen nebulizer function checked. Nebulizer is in upright position and aerosolization present. Rate of administration of Epoprostenol is 7.07 ml/hour. No issues seen with administration. Back-up Aerogen Solo nebulizer remains at bedside. Syringe changed at this time.

## 2018-09-08 NOTE — FLOWSHEET NOTE
1100 His family have come to see. They have been given an update on his current condition. Hemodynamically he seems fairly stable. His neurologic status remains unchanged.

## 2018-09-08 NOTE — PLAN OF CARE
Problem: Daily Care:  Goal: Daily care needs are met  Daily care needs are met   Outcome: Ongoing  Pt daily cares are met this shift, pt continues with 2 to 1 care    Problem: Pain:  Goal: Patient's pain/discomfort is manageable  Patient's pain/discomfort is manageable   Outcome: Ongoing  Pt showing no signs of pain at this time     Problem: Skin Integrity:  Goal: Skin integrity will stabilize  Skin integrity will stabilize   Outcome: Ongoing  Pt skin intact, unable to turn patient due to his unstable condition     Problem: Discharge Planning:  Goal: Patients continuum of care needs are met  Patients continuum of care needs are met   Outcome: Ongoing  Pt continuum of care met this shift, pt family updated on plan of care     Problem: Serum Glucose Level - Abnormal:  Goal: Ability to maintain appropriate glucose levels will improve to within specified parameters  Ability to maintain appropriate glucose levels will improve to within specified parameters   Outcome: Ongoing  Pt glucose levels continue to increase patient is continued on insulin drip    Problem: Bleeding:  Goal: Will show no signs and symptoms of excessive bleeding  Will show no signs and symptoms of excessive bleeding   Outcome: Ongoing  Pt continues to have bloody secretions in oral cavity. Oral care provided     Problem: Nutrition  Goal: Optimal nutrition therapy  Outcome: Ongoing  Pt continues to be NPO, pt bowel sounds are hypoactive to absent     Problem: Falls - Risk of:  Goal: Will remain free from falls  Will remain free from falls   Outcome: Ongoing  Pt free of falls this shift   Goal: Absence of physical injury  Absence of physical injury   Outcome: Ongoing  Pt free of physical injury     Problem: Discharge Planning:  Goal: Discharged to appropriate level of care  Discharged to appropriate level of care   Outcome: Ongoing  Pt discharge plan is unclear, pending the clinical course of patient     Problem:  Activity Intolerance:  Goal: Able to perform prescribed physical activity  Able to perform prescribed physical activity   Outcome: Ongoing  Pt continues to be unresponsive   Goal: Ability to tolerate increased activity will improve  Ability to tolerate increased activity will improve   Outcome: Ongoing  Pt continues to be unresponsive     Problem: Cardiac Output - Decreased:  Goal: Cardiac output within specified parameters  Cardiac output within specified parameters   Outcome: Ongoing  Cardiac outputs have improved, current output 4.75 cardiac index 2.42  Goal: Hemodynamic stability will improve  Hemodynamic stability will improve   Outcome: Ongoing  Pt remains increasingly hemodynamically stable     Problem: Fluid Volume - Imbalance:  Goal: Ability to achieve a balanced intake and output will improve  Ability to achieve a balanced intake and output will improve   Outcome: Ongoing  Pt continues to net increased fluid, unable to tolerate increased CRRT   Goal: Chest tube drainage is within specified parameters  Chest tube drainage is within specified parameters   Outcome: Ongoing  Chest tube output has continued to decrease     Problem: Gas Exchange - Impaired:  Goal: Levels of oxygenation will improve  Levels of oxygenation will improve   Outcome: Ongoing  Pt PEEP demands have increased, pt currently on a peep of 10  Goal: Ability to maintain adequate ventilation will improve  Ability to maintain adequate ventilation will improve   Outcome: Ongoing  Pt continues to have adequate ventilation     Problem: Pain:  Goal: Pain level will decrease  Pain level will decrease   Outcome: Ongoing  Pt shows no signs of pain at this time, pt evaluated with CPOT     Problem: Risk for Impaired Skin Integrity  Goal: Tissue integrity - skin and mucous membranes  Structural intactness and normal physiological function of skin and  mucous membranes. Outcome: Ongoing  Oral cavity remains dry, continuing frequent oral care.  Pt not being turned due to his instability and

## 2018-09-08 NOTE — PROGRESS NOTES
Epoprostenol Subsequent Assessment    Epoprostenol  continues to be aerosolized via the Alaris syringe pump to the aerogen nebulizer. Medication syringe did need changed. Aerogen nebulizer did not need changed. Syringe tubing did not need changed. Medication syringe and tubing are protected from light. Aerogen nebulizer was functioning properly. Filters were not changed at this check. No problems seen with expiratory resistance. Extra Solo nebulizer remains at bedside for back-up. Assessment was completed. Every 4 hour Assessment Hemodynamic Measurement if available   Heart Rate 80 beats per minute Mean Pulmonary Artery Pressure  N/A  mm Hg   Blood Pressure 121/71 mm Hg Cardiac Output  N/A  l/min   PaO2/ FiO2 ratio N/A ratio Cardiac Index N/A  l/min/m2    [employer] N/A cmH2O Central Venous Pressure (CVP) N/A mm Hg     Notify pharmacy for new syringe on Date: 9/8 Time: 0256, 2 hour prior to medication running out or room air expiration. Syringe will need changed on Date: 9/8 Time: [address] will need changed on Date: 9/11 Time: 1720 , 7 days after initiation or last change. Aerogen tubing will need changed on  Date: 9/8 Time: 1720 , 96 hours after initiation or last change. Epoprostenol is notbeing weaned. Rate of administration is 7.07 ml/hour.

## 2018-09-08 NOTE — PLAN OF CARE
Problem: Daily Care:  Goal: Daily care needs are met  Daily care needs are met   Outcome: Completed Date Met: 09/08/18      Problem: Pain:  Goal: Patient's pain/discomfort is manageable  Patient's pain/discomfort is manageable   Outcome: Completed Date Met: 09/08/18      Problem: Skin Integrity:  Goal: Skin integrity will stabilize  Skin integrity will stabilize   Outcome: Completed Date Met: 09/08/18      Problem: Discharge Planning:  Goal: Patients continuum of care needs are met  Patients continuum of care needs are met   Outcome: Completed Date Met: 09/08/18      Problem: Impaired respiratory status  Goal: Clear lung sounds  Outcome: Completed Date Met: 09/08/18      Problem: Serum Glucose Level - Abnormal:  Goal: Ability to maintain appropriate glucose levels will improve to within specified parameters  Ability to maintain appropriate glucose levels will improve to within specified parameters   Outcome: Completed Date Met: 09/08/18      Problem: Bleeding:  Goal: Will show no signs and symptoms of excessive bleeding  Will show no signs and symptoms of excessive bleeding   Outcome: Completed Date Met: 09/08/18      Problem: Nutrition  Goal: Optimal nutrition therapy  Outcome: Completed Date Met: 09/08/18      Problem: Falls - Risk of:  Goal: Will remain free from falls  Will remain free from falls   Outcome: Completed Date Met: 09/08/18    Goal: Absence of physical injury  Absence of physical injury   Outcome: Completed Date Met: 09/08/18      Problem: Discharge Planning:  Goal: Discharged to appropriate level of care  Discharged to appropriate level of care   Outcome: Completed Date Met: 09/08/18      Problem:  Activity Intolerance:  Goal: Able to perform prescribed physical activity  Able to perform prescribed physical activity   Outcome: Completed Date Met: 09/08/18    Goal: Ability to tolerate increased activity will improve  Ability to tolerate increased activity will improve   Outcome: Completed Date Met:

## 2018-09-08 NOTE — FLOWSHEET NOTE
Lacie Callejas and all consultants have been made aware.  Magdiel Carmine family remain at the bedside. The house supervisor has come to see.

## 2018-09-10 NOTE — DISCHARGE SUMMARY
at rest R06.02    Atypical chest pain R07.89    Pulmonary hypertension ([facility]) I27.20    Iron deficiency E61.1    Acute nasopharyngitis J00    Pulmonary hypertension due to left ventricular systolic dysfunction ([facility]) I27.22    Severe mitral regurgitation by prior echocardiogram I34.0    Non-rheumatic tricuspid valve insufficiency I36.1    Coronary artery disease involving native coronary artery of native heart without angina pectoris I25.10    Cardiogenic shock ([facility]) R57.0    Wilson coma scale total score 3-8 ([facility]) R40.2430         Procedures:    Insertion of bare metal stent in the left anterior descending coronary artery  Mitral and tricuspid valve repair, coronary artery bypass grafting x 1   Insertion of intra-aortic balloon pump  Insertion of right and left heart Impella devices     Reason for Admission:    Dyspnea         Hospital Course: The is patient was a 45year old male, s/p remote failed kidney-pancreas transplant, on hemodialysis and insulin, who presented with congestive heart failure secondary to biventricular failure. Echocardiography demonstrated a dilated cardiomyopathy with an EF of 25-30%, along with severe mitral regurgitation and moderate-severe tricuspid regurgitation. He was subsequently taken for C, during which an 80% mid-proximal LAD lesion was treated with a bare metal stent. A consult was placed to Cardiothoracic Surgery for the mitral and tricuspid regurgitation. On 9/4/18, the patient was taken for repair of the mitral and tricuspid valves, and a LIMA bypass of the LAD; the latter was necessitated because of the need to withdraw anti-platelet therapy perioperatively, thus leaving the fresh bare metal stent unprotected. The operation was technically uncomplicated, completed with 107 minutes of cardiopulmonary bypass. Post-CPB AMARIS showed mild residual MR and TR (swan in place), but poor biventricular function with LVEF at 25%, on moderate inotropic support.       While the

## 2018-09-11 LAB
BLOOD CULTURE, ROUTINE: NORMAL
BLOOD CULTURE, ROUTINE: NORMAL

## 2024-12-30 NOTE — CARE COORDINATION
9/10/18 Received call from insurance CM, Geoff Ozuna requesting update. Notified that patient , accessed chart for date of death as CM needed to add this to her system.
9/5/18, 2:54 PM      [facility] day: 10  Location: -03/003-A Reason for admit: Shortness of breath at rest [R06.02]   Procedure:   9/4 1v CABG, MVR, Tricuspid valve repair, ligation of L atrial appendage  9/4 Intubated  9/4 Veletri started  9/5 IABP placed  9/5 CRRT started  9/5 Echo - report not available at this time  9/5 CXR:   Aortic balloon pump not definitely visualized. The previous life support lines and tubes remain in satisfactory positions. Probable new or mildly progressive interstitial pulmonary edema. Progressive left basilar volume loss, cannot exclude a component of pneumonia. Tiny left pleural effusion. Treatment Plan of Care: POD #1. Last night low CO, increased WOB and RR. Veletri started. Tried milrinone and fluid. On epi and vasopressin drips. Shocked myocardium. IABP placed early this am and CRRT started. Plan to start rapamune and cellcept to IV. PERLA x4, followed commands last night, but not so far today on sedation. Tmax 101.1. Remains on vent w/ETT on SIMV, peep 5, FIO2 25%, sats 100%. Cardiac Rehab/PT/OT. Dietitian consulted. CT x4 to -20 sx. JAQUELINE Esqueda. Telemetry, I&O, daily weight, sternal precautions, IS, CT care, oral care, OG to LIWS, SCDs, wound care, antony care. Epi @ 14 mcg/min, veletri, insulin drip, vasopressin @ 0.04 units/min, albumin Q12H, asa, epogen, sq heparin, prn IV morphine, IV cellcept, rapamune. IV ancef completed. Labs: K+ 5.5, CO2 15, BUN 50, Creat 6.8, AG 24, Phos 5.8, alb 3, alk phos 128, alt 508, AST 1900, wbc 14.2, Hgb 9, Plt 72, INR 1.77. PCP: No primary care provider on file. Readmission Risk Score: 45%  Discharge Plan: From home w/wife and son. Current with HD. SW on case for post-[facility] discharge planning.
9/7/18, 1:38 PM    DISCHARGE BARRIERS    Patient discussed in morning rounds, remains on GLSS, Public Benefits Dept locating family for financial assistance, no discharge plan at this time, re evaluate medical course on Monday.
9/7/18, 3:05 PM      [facility] day: 12  Location: -03/003-A Reason for admit: Shortness of breath at rest [R06.02]   Procedure:  9/4 1v CABG, MVR, Tricuspid valve repair, ligation of L atrial appendage  9/4 Intubated  9/4 Veletri started  9/5 IABP placed  9/5 CRRT started  9/5 Echo - report not available at this time  9/6 IABP removed; Bilateral Impellas placed in cath lab  9/7 CXR:   Mildly improved interstitial pulmonary edema. Persistent left basilar consolidation and mild mid left lung atelectasis. Interval placement of an Impella left ventricular assist device and an additional large bore catheter in the distribution of the right ventricular outflow tract and main pulmonary artery. Treatment Plan of Care: POD #3. IABP out yesterday and bilateral impellas placed. Plan to keep impellas for 4-5 days and re-evaluate for myocardial recovery. Echos yesterday and today - reports not available at this time. RVR overnight. Heparin and Insulin drips added. Also on Epi, Thad, and vasopressin. Veletri running. Remains on CRRT. CT x4 to -20 sx. Remains on vent. PCP: No primary care provider on file. Readmission Risk Score: 49%  Discharge Plan: Plan pending clinical course. SW on case.
0-6 Units Subcutaneous TID WC    insulin lispro  0-3 Units Subcutaneous Nightly     Continuous Infusions:   dextrose        Pertinent Info/Orders/Treatment Plan: Pt admitted for SOB, and was recently here for SOB on 8/17 to 8/24. Pt current with dialysis M, W, F at [facility]. H/O CKD, s/p renal transplant currently waiting for a new transplant. Pulmonary following for pulmonary hypertension, cardiology and nephrology following. Diet: DIET RENAL; Carb Control: 3 carb choices (45 gms)/meal; Low Sodium (2 GM); Daily Fluid Restriction: 1800 ml   Vital Signs: BP (!) 165/107   Pulse 84   Temp 97.6 °F (36.4 °C)   Resp 20   Ht 5' 9\" (1.753 m)   Wt 179 lb 14.3 oz (81.6 kg)   SpO2 100%   BMI 26.57 kg/m²   DVT Prophylaxis: heparin  Influenza Vaccination Screening Completed: yes  Pneumonia Vaccination Screening Completed: yes  PCP: No primary care provider on file. Discussed PCP with patient and a list was provided. He has wanted to follow with [facility], however they state he has been dismissed from [facility] practices due to noncompliance and failure to show to multiple appointments.  offered to set him up with [facility], however he declines stating \"would rather do it on my own, not going there\". Readmission: yes  Patient has been readmitted within 2 days. Patient went to f/u appointment? N/a - pt readmitted too soon  If yes, was it within 7 days? Yes, on 8/29  Patient was able to fill prescriptions? yes  Patient is taking medications as prescribed? yes  Cause for readmission?  SOB    Readmission Risk Score: 36%    Discharge Planning  Current Residence:  Private Residence  Living Arrangements:  Family Members   Support Systems:  Family Members, Friends/Neighbors  Current Services PTA:     Potential Assistance Needed:  N/A  Potential Assistance Purchasing Medications:  No  Does patient want to participate in local refill/ meds to beds program?  No  Type of Home Care Services:  None  Patient expects to be
\"HCGQUANT\"     ABGs: No results found for: \"PHART\", \"PO2ART\", \"FTZ2EEO\", \"ZAX7QQG\", \"BEART\", \"S3SRDFTY\"     Type & Screen (If Applicable):  No results found for: \"ABORH\", \"LABANTI\"    Drug/Infectious Status (If Applicable):  No results found for: \"HIV\", \"HEPCAB\"    COVID-19 Screening (If Applicable):   Lab Results   Component Value Date/Time    COVID19 Not Detected 08/08/2022 12:30 PM    COVID19 Not Detected 01/04/2022 04:22 PM    COVID19 NOT DETECTED 01/29/2021 10:15 AM           Anesthesia Evaluation  Patient summary reviewed and Nursing notes reviewed   no history of anesthetic complications:   Airway: Mallampati: II     Neck ROM: full     Dental:          Pulmonary:   (+)  COPD:  shortness of breath:   sleep apnea:                                  Cardiovascular:    (+) hypertension:, CAD:, dysrhythmias:                  Neuro/Psych:   (+) headaches:            GI/Hepatic/Renal:   (+) GERD:          Endo/Other:                     Abdominal:             Vascular:          Other Findings:       Anesthesia Plan      general     ASA 3       Induction: intravenous.      Anesthetic plan and risks discussed with patient.      Plan discussed with CRNA.                ROSCOE MARIE MD   12/30/2024

## (undated) DEVICE — Device: Brand: MOUTHPIECE

## (undated) DEVICE — SURGICAL PROCEDURE PACK OXGNTR ST MARYS

## (undated) DEVICE — COUNTER NDL 40 COUNT HLD 70 FOAM BLK ADH W/ MAG

## (undated) DEVICE — CHLORAPREP 26ML ORANGE

## (undated) DEVICE — LOOP VES W13MM THK09MM MINI RED SIL FLD REPELLENT

## (undated) DEVICE — Z INACTIVE USE 2662641 SOLUTION IV 1000ML 140MEQ/L SOD 5MEQ/L K 3MEQ/L MG 27MEQ/L

## (undated) DEVICE — GOWN,SIRUS,NON REINFRCD,LARGE,SET IN SL: Brand: MEDLINE

## (undated) DEVICE — HEMOCONCENTRATOR AUTOTRNS L6IN OD0.25IN W/ TBNG CONN DHF 0.6

## (undated) DEVICE — CONNECTOR PERF 1/2X3/8X3/8IN REDUC WYE

## (undated) DEVICE — SUTURE VCRL SZ 0 L18IN ABSRB VLT SUTUPAK PRECUT W/O NDL J106T

## (undated) DEVICE — SOLUTION IV 1000ML 0.9% SOD CHL PH 5 INJ USP VIAFLX PLAS

## (undated) DEVICE — BLADE LARYNSCP SZ 3 ENH DIR INTUB GLIDESCOPE MCGRATH MAC

## (undated) DEVICE — BLADE OPHTH GRN ROUNDED TIP 1 SIDE SHRP GRINDLESS MINI-BLDE

## (undated) DEVICE — DRESSING GRMCDL 6 12FR D1N CNTR HOLE 4MM ANTMCRBL PRTCTVE DI

## (undated) DEVICE — DEVICE FIX L37CM PEEK SMOOTH CANN 30 ABSRB FAST FOR LAP

## (undated) DEVICE — SENSOR OXMTR L AD WT GREATER THAN 40KG FORE-SIGHT ELITE

## (undated) DEVICE — Z CONVERTED USE 2715898 SWABSTICK MEDICATED W1.75XL6.5IN 1.6ML 3.15% CHG 70% ISO

## (undated) DEVICE — GLOVE SURG SZ 65 L12IN THK75MIL DK GRN LTX FREE

## (undated) DEVICE — Device: Brand: SUCTION TIP

## (undated) DEVICE — SUTURE NONABSORBABLE MONOFILAMENT 4-0 RB-1 36 IN BLU PROLENE 8557H

## (undated) DEVICE — DRESSING TRNSPAR W2XL2.75IN FLM SHT SEMIPERMEABLE WIND

## (undated) DEVICE — EXCEL 10FT (3.05 M) INSUFFLATION TUBING SET WITH 0.1 MICRON FILTER: Brand: EXCEL

## (undated) DEVICE — STRIP,CLOSURE,WOUND,MEDI-STRIP,1/2X4: Brand: MEDLINE

## (undated) DEVICE — CLIP LIG M TI 6 SIL HNDL FOR OPN AND ENDOSCP SGL APPL

## (undated) DEVICE — SPONGE LAP W18XL18IN WHT COT 4 PLY FLD STRUNG RADPQ DISP ST

## (undated) DEVICE — CATHETER ETER URETH 24FR L16IN RED RUB INTMIT ROB MOD BARDX

## (undated) DEVICE — GLOVE SURG SZ 6 THK91MIL LTX FREE SYN POLYISOPRENE ANTI

## (undated) DEVICE — TUBING PRSS 36 M F

## (undated) DEVICE — DRESSING GERM DIA1IN CNTR H DIA7MM BLU CHG ANTIMIC PROTCT

## (undated) DEVICE — Device

## (undated) DEVICE — CANNULA PERF 24FR CONN SITE 3/8IN SGL STG VEN W/ R ANG MTL

## (undated) DEVICE — WAX SURG 2.5GM HEMSTAT BNE BEESWAX PARAFFIN ISO PALMITATE

## (undated) DEVICE — SUTURE PERMA-HAND SZ 3-0 L30IN NONABSORBABLE BLK L60MM KS 622H

## (undated) DEVICE — SINGLE STAGE VENOUS RETURN CANNULA: Brand: THIN-FLEX SINGLE STAGE VENOUS DRAINAGE CANNULA

## (undated) DEVICE — KIT SHTH INTRO 9FR L10CM PERC INTEGR HEMSTAS VLV SIDEPRT

## (undated) DEVICE — GLOVE SURG SZ 65 THK91MIL LTX FREE SYN POLYISOPRENE

## (undated) DEVICE — KIT VEN DRNGE VAC ACCSRY PERF VAVD STOCK W/ SPEC TRAP

## (undated) DEVICE — BINDER ABD SM M W12IN CIRC 30 45IN 4 PNL E CNTCT CLSR POSTOP

## (undated) DEVICE — Device: Brand: RETRACT-O-TAPE 18G X 30.5CM BLUNT NEEDLE

## (undated) DEVICE — SOLUTION IV 500ML 0.9% SOD CHL PH 5 INJ USP VIAFLX PLAS

## (undated) DEVICE — SUTURE SZ 7 L18IN NONABSORBABLE SIL CCS L48MM 1/2 CIR STRNM M655G

## (undated) DEVICE — CANNULA PVC RCSP 15FR SLD STYL W/ HNDL OVERALL LEN 11 IN

## (undated) DEVICE — GOWN,SIRUS,NONRNF,SETINSLV,XL,20/CS: Brand: MEDLINE

## (undated) DEVICE — 500ML,PRESSURE INFUSER W/STOPCOCK: Brand: MEDLINE

## (undated) DEVICE — 3M™ TRANSPORE™ WHITE SURGICAL TAPE 1534-2, 2 INCH X 10 YARD (5CM X 9,1M), 6 ROLLS/CARTON 10 CARTONS/CASE: Brand: 3M™ TRANSPORE™

## (undated) DEVICE — TUBING, SUCTION, 1/4" X 20', STRAIGHT: Brand: MEDLINE INDUSTRIES, INC.

## (undated) DEVICE — CATHETER,URETHRAL,REDRUBBER,STERILE,20FR: Brand: MEDLINE

## (undated) DEVICE — 3M™ WARMING BLANKET, UPPER BODY, 10 PER CASE, 42268: Brand: BAIR HUGGER™

## (undated) DEVICE — DRESSING TRNSPAR W5XL4.5IN FLM SHT SEMIPERMEABLE WIND

## (undated) DEVICE — GLOVE ORANGE PI 7   MSG9070

## (undated) DEVICE — PACK SUCT CATHETER 14FR OPN WHSTL W NO VLV

## (undated) DEVICE — CATH THERMDIL VIP 7.5FR 110CM

## (undated) DEVICE — COVER ARMBRD W13XL28.5IN IMPERV BLU FOR OP RM

## (undated) DEVICE — CLIP LIG SM TI 6 BLU HNDL FOR OPN AND ENDOSCP SGL APPL

## (undated) DEVICE — 4-PORT MANIFOLD: Brand: NEPTUNE 2

## (undated) DEVICE — SUTURE VCRL SZ 3-0 L27IN ABSRB UD FS-2 L19MM 1/2 CIR J423H

## (undated) DEVICE — PROBE VASC STD 1-1.5 MM 15 CM OLV

## (undated) DEVICE — BLADE CLIPPER GEN PURP NS

## (undated) DEVICE — SPONGE GZ W4XL4IN COT 12 PLY TYP VII WVN C FLD DSGN

## (undated) DEVICE — SET AUTOTRNS C175ML BOWL BTM OUTLT RESERVOIRXTRA

## (undated) DEVICE — GLOVE ORANGE PI 7 1/2   MSG9075

## (undated) DEVICE — GLOVE ORANGE PI 8   MSG9080

## (undated) DEVICE — SUTURE ETHBND 2-0 L30IN NONABSORBABLE GRN WHT V-5 L17IN 1/2 10X52

## (undated) DEVICE — SUTURE PROL SZ 4-0 L36IN NONABSORBABLE BLU L26MM SH 1/2 CIR 8521H

## (undated) DEVICE — THORACIC CATHETER,RIGHT ANGLE: Brand: ARGYLE

## (undated) DEVICE — CONNECTOR PERF 3/8X3/8X3/8IN EQL WYE

## (undated) DEVICE — AGENT HEMSTAT W2XL14IN OXIDIZED REGENERATED CELOS ABSRB FOR

## (undated) DEVICE — SKIN AFFIX SURG ADHESIVE 72/CS 0.55ML: Brand: MEDLINE

## (undated) DEVICE — EZ GLIDE AORTIC CANNULA: Brand: EDWARDS LIFESCIENCES EZ GLIDE AORTIC CANNULA

## (undated) DEVICE — PAD,ABDOMINAL,5"X9",ST,LF,25/BX: Brand: MEDLINE INDUSTRIES, INC.

## (undated) DEVICE — BREAST HERNIA PACK: Brand: MEDLINE INDUSTRIES, INC.